# Patient Record
Sex: FEMALE | Race: WHITE | NOT HISPANIC OR LATINO | Employment: FULL TIME | URBAN - METROPOLITAN AREA
[De-identification: names, ages, dates, MRNs, and addresses within clinical notes are randomized per-mention and may not be internally consistent; named-entity substitution may affect disease eponyms.]

---

## 2018-02-09 ENCOUNTER — TRANSFERRED RECORDS (OUTPATIENT)
Dept: HEALTH INFORMATION MANAGEMENT | Facility: CLINIC | Age: 43
End: 2018-02-09

## 2018-02-20 ENCOUNTER — TRANSFERRED RECORDS (OUTPATIENT)
Dept: HEALTH INFORMATION MANAGEMENT | Facility: CLINIC | Age: 43
End: 2018-02-20

## 2018-02-21 ENCOUNTER — PRE VISIT (OUTPATIENT)
Dept: ONCOLOGY | Facility: CLINIC | Age: 43
End: 2018-02-21

## 2018-02-21 NOTE — TELEPHONE ENCOUNTER
1.  Date of consult:    2.  Reason for consult: breast cancer    3.  Referring provider/facility: pt    4. Scheduled by: pt    5.  Outside records requested from: [facility]    6.  Additional Information: pt wants appt with Dr Gann

## 2018-02-22 ENCOUNTER — PRE VISIT (OUTPATIENT)
Dept: ONCOLOGY | Facility: CLINIC | Age: 43
End: 2018-02-22

## 2018-02-22 NOTE — TELEPHONE ENCOUNTER
1.  Date of consult: 2/23    2.  Reason for consult: breast cancer    3.  Referring provider/facility: pt    4. Scheduled by: pt    5.  Outside records requested from: [facility] in [place], imaging pushed and pulled into PACS, slides were requested 2/21    6.  Additional Information:    rec'd patholgy slides from [facility] and sent to pathology

## 2018-02-23 ENCOUNTER — ONCOLOGY VISIT (OUTPATIENT)
Dept: ONCOLOGY | Facility: CLINIC | Age: 43
End: 2018-02-23
Attending: INTERNAL MEDICINE
Payer: MEDICAID

## 2018-02-23 ENCOUNTER — CARE COORDINATION (OUTPATIENT)
Dept: ONCOLOGY | Facility: CLINIC | Age: 43
End: 2018-02-23

## 2018-02-23 VITALS
HEART RATE: 77 BPM | WEIGHT: 169.4 LBS | SYSTOLIC BLOOD PRESSURE: 119 MMHG | HEIGHT: 68 IN | TEMPERATURE: 97.7 F | BODY MASS INDEX: 25.67 KG/M2 | OXYGEN SATURATION: 98 % | DIASTOLIC BLOOD PRESSURE: 75 MMHG

## 2018-02-23 DIAGNOSIS — C50.912 MALIGNANT NEOPLASM OF LEFT BREAST IN FEMALE, ESTROGEN RECEPTOR POSITIVE, UNSPECIFIED SITE OF BREAST (H): Primary | ICD-10-CM

## 2018-02-23 DIAGNOSIS — Z17.0 MALIGNANT NEOPLASM OF LEFT BREAST IN FEMALE, ESTROGEN RECEPTOR POSITIVE, UNSPECIFIED SITE OF BREAST (H): Primary | ICD-10-CM

## 2018-02-23 PROCEDURE — 99205 OFFICE O/P NEW HI 60 MIN: CPT | Mod: ZP | Performed by: INTERNAL MEDICINE

## 2018-02-23 PROCEDURE — G0463 HOSPITAL OUTPT CLINIC VISIT: HCPCS | Mod: ZF

## 2018-02-23 RX ORDER — BLUE-GREEN ALGAE 500 MG
TABLET ORAL
COMMUNITY

## 2018-02-23 RX ORDER — NICOTINE POLACRILEX 2 MG
GUM BUCCAL
COMMUNITY
End: 2018-08-03

## 2018-02-23 ASSESSMENT — PAIN SCALES - GENERAL: PAINLEVEL: MILD PAIN (3)

## 2018-02-23 NOTE — PROGRESS NOTES
Met with patient and two roommates and gave business card to contact the [facility]. Gave contact information to set up MyChart and not to use for symptoms, but to call in nurse triage number. Gave brochures for [employer], [employer] and [facility].  Answered all patient and friend's questions and verbalized understanding. Lauren Frank RN, BSN.

## 2018-02-23 NOTE — NURSING NOTE
"Oncology Rooming Note    February 23, 2018 1:14 PM   Mary Chadwick is a 42 year old female who presents for:    Chief Complaint   Patient presents with     Oncology Clinic Visit     New Patient-Breast CA     Initial Vitals: /75 (BP Location: Right arm, Patient Position: Sitting, Cuff Size: Adult Regular)  Pulse 77  Temp 97.7  F (36.5  C) (Oral)  Ht 1.727 m (5' 8\")  Wt 76.8 kg (169 lb 6.4 oz)  LMP 02/16/2018  SpO2 98%  Breastfeeding? Unknown  BMI 25.76 kg/m2 Estimated body mass index is 25.76 kg/(m^2) as calculated from the following:    Height as of this encounter: 1.727 m (5' 8\").    Weight as of this encounter: 76.8 kg (169 lb 6.4 oz). Body surface area is 1.92 meters squared.  Mild Pain (3) Comment: Left   Patient's last menstrual period was 02/16/2018.  Allergies reviewed: Yes  Medications reviewed: Yes    Medications: Medication refills not needed today.  Pharmacy name entered into [facility]:    [facility] [place], [place] - [address] [facility] [place], [place] - [address]    Clinical concerns: Questions Dr. Gann was notified.    10 minutes for nursing intake (face to face time)     Marta Banks LPN              "

## 2018-02-23 NOTE — LETTER
"2/23/2018       RE: Mary Chadwick  [address]  [place] [address]     Dear Colleague,    Thank you for referring your patient, Mary Chadwick, to the [facility]. Please see a copy of my visit note below.    HISTORY OF PRESENT ILLNESS:  I am seeing Ms. Omaira Chadwick at the request of Dr. Nicolette Rodarte for newly diagnosed breast cancer.      Mary, or \"Omaira,\" comes in with a new diagnosis of breast cancer.  She is originally from [place].  She moved here displaced from [place].  She underwent a screening mammography this week.  This screening mammogram was performed on 02/09/2018 followed by diagnostic breast ultrasound which revealed a 2.6 x 1.1 x 1.7 cm irregular shaped mass at the 2 o'clock position involving the left breast.  There were indeterminate microcalcifications involving the left breast and a biopsy was recommended.  A stereotactic biopsy occurred on 02/20/2018 revealing an invasive ductal carcinoma, grade 2, ER positive, VT positive, HER2 negative by immunohistochemistry histochemistry at 1+.  She sought out consultation here for a second opinion.  She saw Dr. Rodarte today who recommended lumpectomy versus mastectomy.  She is here to discuss these options.      She says she is feeling well.  She is trying to digest all of this new information.      She has no pain.  She is premenopausal.  She menstruates monthly.  Her last menstrual period was approximately 2 months ago.  She has never had any prior breast biopsies in the past.  No hormone replacement therapy.  She has 2 children, ages 7 and 11.      REVIEW OF SYSTEMS:  Her 10-point review of systems is otherwise negative.      PAST MEDICAL HISTORY:  No significant past medical history.      MEDICATIONS:  No medications.      ALLERGIES:  She is allergic to lactose.      SOCIAL HISTORY:  She has 7 and 11-year-old.  She is working part-time at the [employer].  She has a remote history of tobacco use.    "   FAMILY HISTORY:  Her mom  of colon cancer at age 41.  Maternal aunt with breast cancer in the 50s.  Maternal grandfather with stomach cancer.  Maternal uncle with stomach cancer.  Father with myasthenia gravis.  A birth brother who is alive and a sister who is alive.  Her significant other comes back and forth to the [place].      PHYSICAL EXAMINATION:   GENERAL:  She is well-appearing, in no apparent distress.   HEENT:  Exam reveals no icterus.  Her oropharynx is clear.  No ulcers or lesions.   NECK:  Supple.  No cervical, supraclavicular or axillary adenopathy.   HEART:  Regular.   LUNGS:  Clear bilaterally.   ABDOMEN:  Soft, nontender, nondistended.  There is no palpable hepatosplenomegaly.   BREASTS:  Her right breast is unremarkable without nodules or masses.  The left breast reveals a 3 x 3 cm mass at the 2 o'clock position involving the left breast with associated ecchymoses.  There is no palpable adenopathy, no nipple discharge.  Given the tenderness and her ecchymosis it was difficult to get an exact palpable size of this lesion.   EXTREMITIES:  She has no peripheral edema.   SKIN:  No skin rashes other than ecchymoses.   NEUROLOGIC:  Unremarkable.      LABORATORY:  Pathology was reviewed.      ASSESSMENT AND PLAN:  This is a 42-year-old female with stage T2 NX, invasive ductal carcinoma, ER positive, ND positive, HER2 negative, involving the left breast.      I discussed with Omaira today that she appears to have stage II-A breast cancer involving her left breast.  The standard of care would be surgical resection with a lumpectomy and radiation or a mastectomy with associated sentinel lymph node biopsy.      We discussed that based on her surgical resection the tumor would be sent off for genetic panel such as Oncotype or MammaPrint or Prosigna to determine the benefits of chemotherapy.      She will need adjuvant endocrine therapy in the form of tamoxifen or an aromatase inhibitor.  This was  briefly discussed.  An aromatase inhibitor would only be used in conjunction with ovarian suppression.      I discussed with her, that her young age as well as her dense breast tissue, I would recommend a breast MRI as well as to see Cancer Genetics.      I did discuss with Omaira today with her friends that she is eligible for our I-SPY-2 clinical trial.  With this trial she would have a breast MRI and biopsy.  If her mammogram or results of the biopsy are in the high-risk range, she will go on to receive neoadjuvant chemotherapy with Taxol, plus/minus an investigational agent, followed by dose-dense Adriamycin and Cytoxan, followed by surgical resection.  We discussed the pros and cons of the clinical trial.  After our large discussion, we decided to wait on her breast MRI to determine whether this would be a reasonable choice for her.      PLAN:   1.  Breast MRI.  This is scheduled for Wednesday 02/28 at [facility].   2.  Referral to Cancer Genetics.   3.  She will need baseline laboratories.   4.  She would like a second surgical opinion and she will see Dr. Antonio Andrews on 03/02 at 9:15.      Based on the information from this, she will decide whether or not she would like to get her care here or at [facility].      She is going to think about the I-SPY-2 clinical trial let us know whether or not she may be interested.      It was a pleasure to meet Omaira today.         Again, thank you for allowing me to participate in the care of your patient.      Sincerely,    Valeria Gnan MD

## 2018-02-23 NOTE — PROGRESS NOTES
"HISTORY OF PRESENT ILLNESS:  I am seeing Ms. Omaira Chadwick at the request of Dr. Nicolette Rodarte for newly diagnosed breast cancer.      Mray, or \"Omaira,\" comes in with a new diagnosis of breast cancer.  She is originally from [place].  She moved here displaced from [place].  She underwent a screening mammography this week.  This screening mammogram was performed on 2018 followed by diagnostic breast ultrasound which revealed a 2.6 x 1.1 x 1.7 cm irregular shaped mass at the 2 o'clock position involving the left breast.  There were indeterminate microcalcifications involving the left breast and a biopsy was recommended.  A stereotactic biopsy occurred on 2018 revealing an invasive ductal carcinoma, grade 2, ER positive, NC positive, HER2 negative by immunohistochemistry histochemistry at 1+.  She sought out consultation here for a second opinion.  She saw Dr. Rodarte today who recommended lumpectomy versus mastectomy.  She is here to discuss these options.      She says she is feeling well.  She is trying to digest all of this new information.      She has no pain.  She is premenopausal.  She menstruates monthly.  Her last menstrual period was approximately 2 months ago.  She has never had any prior breast biopsies in the past.  No hormone replacement therapy.  She has 2 children, ages 7 and 11.      REVIEW OF SYSTEMS:  Her 10-point review of systems is otherwise negative.      PAST MEDICAL HISTORY:  No significant past medical history.      MEDICATIONS:  No medications.      ALLERGIES:  She is allergic to lactose.      SOCIAL HISTORY:  She has 7 and 11-year-old.  She is working part-time at the [employer].  She has a remote history of tobacco use.      FAMILY HISTORY:  Her mom  of colon cancer at age 41.  Maternal aunt with breast cancer in the 50s.  Maternal grandfather with stomach cancer.  Maternal uncle with stomach cancer.  Father with myasthenia gravis.  A birth brother who is alive " and a sister who is alive.  Her significant other comes back and forth to [place].      PHYSICAL EXAMINATION:   GENERAL:  She is well-appearing, in no apparent distress.   HEENT:  Exam reveals no icterus.  Her oropharynx is clear.  No ulcers or lesions.   NECK:  Supple.  No cervical, supraclavicular or axillary adenopathy.   HEART:  Regular.   LUNGS:  Clear bilaterally.   ABDOMEN:  Soft, nontender, nondistended.  There is no palpable hepatosplenomegaly.   BREASTS:  Her right breast is unremarkable without nodules or masses.  The left breast reveals a 3 x 3 cm mass at the 2 o'clock position involving the left breast with associated ecchymoses.  There is no palpable adenopathy, no nipple discharge.  Given the tenderness and her ecchymosis it was difficult to get an exact palpable size of this lesion.   EXTREMITIES:  She has no peripheral edema.   SKIN:  No skin rashes other than ecchymoses.   NEUROLOGIC:  Unremarkable.      LABORATORY:  Pathology was reviewed.      ASSESSMENT AND PLAN:  This is a 42-year-old female with stage T2 NX, invasive ductal carcinoma, ER positive, TX positive, HER2 negative, involving the left breast.      I discussed with Omaira today that she appears to have stage II-A breast cancer involving her left breast.  The standard of care would be surgical resection with a lumpectomy and radiation or a mastectomy with associated sentinel lymph node biopsy.      We discussed that based on her surgical resection the tumor would be sent off for genetic panel such as Oncotype or MammaPrint or Prosigna to determine the benefits of chemotherapy.      She will need adjuvant endocrine therapy in the form of tamoxifen or an aromatase inhibitor.  This was briefly discussed.  An aromatase inhibitor would only be used in conjunction with ovarian suppression.      I discussed with her, that her young age as well as her dense breast tissue, I would recommend a breast MRI as well as to see Cancer  Genetics.      I did discuss with Omaira today with her friends that she is eligible for our I-SPY-2 clinical trial.  With this trial she would have a breast MRI and biopsy.  If her mammogram or results of the biopsy are in the high-risk range, she will go on to receive neoadjuvant chemotherapy with Taxol, plus/minus an investigational agent, followed by dose-dense Adriamycin and Cytoxan, followed by surgical resection.  We discussed the pros and cons of the clinical trial.  After our large discussion, we decided to wait on her breast MRI to determine whether this would be a reasonable choice for her.      PLAN:   1.  Breast MRI.  This is scheduled for Wednesday 02/28 at [facility].   2.  Referral to Cancer Genetics.   3.  She will need baseline laboratories.   4.  She would like a second surgical opinion and she will see Dr. Antonio Andrews on 03/02 at 9:15.      Based on the information from this, she will decide whether or not she would like to get her care here or at [facility].      She is going to think about the I-SPY-2 clinical trial let us know whether or not she may be interested.      It was a pleasure to meet Omaira today.     Addendum:  Met with Omaira today.  Her breast MRI reveals 8 cm area of enhancement with ? Node.  Node may be reactive from biopsy.  She has opted to screen for the ISPY clinical trial.  Consent signed.    Valeria Gann MD

## 2018-02-23 NOTE — MR AVS SNAPSHOT
After Visit Summary   2018    Mary Chadwick    MRN: [id number]           Patient Information     Date Of Birth          1975        Visit Information        Provider Department      2018 1:00 PM Valeria Gann MD [facility]        Today's Diagnoses     Malignant neoplasm of left breast in female, estrogen receptor positive, unspecified site of breast (H)    -  1       Follow-ups after your visit        Additional Services     CANCER RISK MGMT/CANCER GENETIC COUNSELING REFERRAL       Your provider has referred you to the Cancer Risk Management Program - Cancer Genetic Counseling.    Reason for Referral: breast cancer age 42, mom  colon cancer 40, maternal aunt breast cancer, maternal uncle gastric cancer    We have a sent a notice to a staff member of the Cancer Risk Management Program to give you a call to assist with scheduling your appointment.  You may also call  [phone] ([phone]) to initiate scheduling.    Please be aware that coverage of these services is subject to the terms and limitations of your health insurance plan.  Call member services at your health plan with any benefit or coverage questions.      Please bring the completed family history sheet to your appointment in addition to any available outside medical records documenting your cancer diagnosis.                  Your next 10 appointments already scheduled     Mar 02, 2018  8:00 AM CST   (Arrive by 7:45 AM)   NEW WITH ROOM with Nay Smith GC,  2 117 CONSULT [facility] ([facility] [place])    [address] [place] [address]  [address] [place] [id number] [phone]            Mar 02, 2018  9:15 AM CST   (Arrive by 9:00 AM)   New Patient Visit with Antonio Andrews MD   [facility] [place] ([facility])    [address] [place] [address]  [address] [place] [id number] [phone]             "  Who to contact     If you have questions or need follow up information about today's clinic visit or your schedule please contact [facility] directly at [phone].  Normal or non-critical lab and imaging results will be communicated to you by MyChart, letter or phone within 4 business days after the clinic has received the results. If you do not hear from us within 7 days, please contact the clinic through [employer] or phone. If you have a critical or abnormal lab result, we will notify you by phone as soon as possible.  Submit refill requests through [employer] or call your pharmacy and they will forward the refill request to us. Please allow 3 business days for your refill to be completed.          Additional Information About Your Visit        [employer] Information     [employer] gives you secure access to your electronic health record. If you see a primary care provider, you can also send messages to your care team and make appointments. If you have questions, please call your primary care clinic.  If you do not have a primary care provider, please call [phone] and they will assist you.        Care EveryWhere ID     This is your Care EveryWhere ID. This could be used by other organizations to access your [facility] medical records  [id number]        Your Vitals Were     Pulse Temperature Height Last Period Pulse Oximetry Breastfeeding?    77 97.7  F (36.5  C) (Oral) 1.727 m (5' 8\") 02/16/2018 98% Unknown    BMI (Body Mass Index)                   25.76 kg/m2            Blood Pressure from Last 3 Encounters:   02/23/18 119/75   03/30/06 118/78   03/24/06 120/82    Weight from Last 3 Encounters:   02/23/18 76.8 kg (169 lb 6.4 oz)   03/30/06 87.1 kg (192 lb)   03/24/06 86.2 kg (190 lb)              We Performed the Following     CANCER RISK MGMT/CANCER GENETIC COUNSELING REFERRAL          Today's Medication Changes          These changes are accurate as of 2/23/18 11:59 PM.  If you have any " questions, ask your nurse or doctor.               Stop taking these medicines if you haven't already. Please contact your care team if you have questions.     acyclovir 400 MG tablet   Commonly known as:  ZOVIRAX   Stopped by:  Valeria Gann MD           chlorophyll-thymol 3-0.6 MG Tabs   Stopped by:  Valeria Gann MD           LYSINE   Stopped by:  Valeria Gann MD           PRENATAL VITAMIN PO   Stopped by:  Valeria Gann MD                    Primary Care Provider    None Specified       No primary provider on file.        Equal Access to Services     [facility]: Hadii aad ku hadasho Soomaali, waaxda luqadaha, qaybta kaalmada adeegyada, waxay idiin hayaan adeeg [place] . So [facility] [phone].    ATENCIÓN: Si habla español, tiene a sparrow disposición servicios gratuitos de asistencia lingüística. Llame al [phone].    We comply with applicable federal civil rights laws and [place] laws. We do not discriminate on the basis of race, color, national origin, age, disability, sex, sexual orientation, or gender identity.            Thank you!     Thank you for choosing [facility]  for your care. Our goal is always to provide you with excellent care. Hearing back from our patients is one way we can continue to improve our services. Please take a few minutes to complete the written survey that you may receive in the mail after your visit with us. Thank you!             Your Updated Medication List - Protect others around you: Learn how to safely use, store and throw away your medicines at www.disposemymeds.org.          This list is accurate as of 2/23/18 11:59 PM.  Always use your most recent med list.                   Brand Name Dispense Instructions for use Diagnosis    Biotin 1 MG Caps       Malignant neoplasm of left breast in female, estrogen receptor positive, unspecified site of breast (H)       levonorgestrel 20 MCG/24HR IUD    MIRENA     1 each by  Intrauterine route    Malignant neoplasm of left breast in female, estrogen receptor positive, unspecified site of breast (H)       Spirulina 500 MG Tabs      6 Tabs daily.    Malignant neoplasm of left breast in female, estrogen receptor positive, unspecified site of breast (H)

## 2018-02-26 PROCEDURE — 00000346 ZZHCL STATISTIC REVIEW OUTSIDE SLIDES TC 88321: Performed by: INTERNAL MEDICINE

## 2018-02-27 PROBLEM — Z17.0 MALIGNANT NEOPLASM OF LEFT BREAST IN FEMALE, ESTROGEN RECEPTOR POSITIVE (H): Status: ACTIVE | Noted: 2018-02-27

## 2018-02-27 PROBLEM — C50.912 MALIGNANT NEOPLASM OF LEFT BREAST IN FEMALE, ESTROGEN RECEPTOR POSITIVE (H): Status: ACTIVE | Noted: 2018-02-27

## 2018-03-01 LAB — COPATH REPORT: NORMAL

## 2018-03-02 ENCOUNTER — RESEARCH ENCOUNTER (OUTPATIENT)
Dept: ONCOLOGY | Facility: CLINIC | Age: 43
End: 2018-03-02

## 2018-03-02 ENCOUNTER — ONCOLOGY VISIT (OUTPATIENT)
Dept: ONCOLOGY | Facility: CLINIC | Age: 43
End: 2018-03-02
Attending: SURGERY
Payer: COMMERCIAL

## 2018-03-02 ENCOUNTER — OFFICE VISIT (OUTPATIENT)
Dept: ONCOLOGY | Facility: CLINIC | Age: 43
End: 2018-03-02
Attending: GENETIC COUNSELOR, MS
Payer: COMMERCIAL

## 2018-03-02 VITALS
WEIGHT: 169.44 LBS | BODY MASS INDEX: 25.68 KG/M2 | SYSTOLIC BLOOD PRESSURE: 110 MMHG | HEART RATE: 67 BPM | TEMPERATURE: 98.4 F | DIASTOLIC BLOOD PRESSURE: 67 MMHG | HEIGHT: 68 IN | OXYGEN SATURATION: 99 % | RESPIRATION RATE: 16 BRPM

## 2018-03-02 DIAGNOSIS — C50.912 MALIGNANT NEOPLASM OF LEFT BREAST IN FEMALE, ESTROGEN RECEPTOR POSITIVE, UNSPECIFIED SITE OF BREAST (H): Primary | ICD-10-CM

## 2018-03-02 DIAGNOSIS — Z17.0 MALIGNANT NEOPLASM OF LEFT BREAST IN FEMALE, ESTROGEN RECEPTOR POSITIVE, UNSPECIFIED SITE OF BREAST (H): Primary | ICD-10-CM

## 2018-03-02 DIAGNOSIS — Z80.3 FAMILY HISTORY OF MALIGNANT NEOPLASM OF BREAST: ICD-10-CM

## 2018-03-02 DIAGNOSIS — C50.912 MALIGNANT NEOPLASM OF LEFT BREAST IN FEMALE, ESTROGEN RECEPTOR POSITIVE (H): Primary | ICD-10-CM

## 2018-03-02 DIAGNOSIS — Z17.0 MALIGNANT NEOPLASM OF LEFT BREAST IN FEMALE, ESTROGEN RECEPTOR POSITIVE (H): Primary | ICD-10-CM

## 2018-03-02 DIAGNOSIS — Z80.0 FAMILY HISTORY OF COLON CANCER: ICD-10-CM

## 2018-03-02 DIAGNOSIS — Z17.0 MALIGNANT NEOPLASM OF LEFT BREAST IN FEMALE, ESTROGEN RECEPTOR POSITIVE, UNSPECIFIED SITE OF BREAST (H): ICD-10-CM

## 2018-03-02 DIAGNOSIS — C50.912 MALIGNANT NEOPLASM OF LEFT BREAST IN FEMALE, ESTROGEN RECEPTOR POSITIVE, UNSPECIFIED SITE OF BREAST (H): ICD-10-CM

## 2018-03-02 LAB — MISCELLANEOUS TEST: NORMAL

## 2018-03-02 PROCEDURE — G0463 HOSPITAL OUTPT CLINIC VISIT: HCPCS | Mod: ZF

## 2018-03-02 ASSESSMENT — ENCOUNTER SYMPTOMS
DEPRESSION: 0
NERVOUS/ANXIOUS: 1
INSOMNIA: 1
PANIC: 0
DECREASED CONCENTRATION: 1

## 2018-03-02 ASSESSMENT — PAIN SCALES - GENERAL: PAINLEVEL: NO PAIN (0)

## 2018-03-02 NOTE — LETTER
Cancer Risk Management  Program Locations    [facility]  [facility]  Mailing Address  Cancer Risk Management Program  [facility]  [address]  [facility] [address]  [place] [address]    New patient appointments  [phone]  March 5, 2018    Mary Chadwick  [address]  [facility] [place] [facility] [address]      Dear Mary,    It was a pleasure meeting with you at the [facility] on 3/2/2018.  Here is a copy of the progress note from your recent genetic counseling visit to the Cancer Risk Management Program.  If you have any additional questions, please feel free to call.      Referring Provider: Valeria Gann MD    Presenting Information:   I met with Mary Chadwick today for genetic counseling at the Cancer Risk Management Program at the [facility] to discuss her personal and family history of breast cancer and family history of colon and other cancers.  She is here today with her two roommates to review this history, cancer screening recommendations, and available genetic testing options.    Personal History:  Mary is a 42 year old female.  She was diagnosed with invasive ductal breast cancer, ER positive, VA positive, HER-2/mason negative. Her course of treatment is being planned. She is meeting with Dr. Andrews today to discuss surgical options.       She had her first menstrual period at age 16, her first child at age 29, and is pre-menopausal.  Mary has her ovaries, fallopian tubes and uterus in place. She had not had a mammogram before her diagnosis. She reports she will be having a colonoscopy in June 2018 due to her family history of colon cancer. She reports a remote 8 year history of smoking, and social alcohol use.     Family History: (Please see scanned pedigree for detailed family history  information)    Mother had colon cancer at 31, and passed away at 39. Mary reports she was diagnosed after having blood in her stool. She is unsure if her mother had colon polyps.    Maternal aunt  in her 50s of breast cancer.    Maternal uncle  in his 50s of cancer. Mary is unsure what type of cancer this was. There is some speculation that it could have been stomach cancer.    Maternal grandfather  in his 70s-80s of a possible cancer. Mary thought this could have been stomach cancer, but is unsure.    Paternal uncle  in his 60s from cancer, but Mary is unsure what type.     Her maternal ethnicity is [place]. Her paternal ethnicity is  and Wolof.  There is no known Ashkenazi Bahai ancestry on either side of her family. There is no reported consanguinity.    Mary was given an list of follow-up questions to help clarify some of her family history information. She plans to ask her dad and maternal cousins about family history, and will report back. Please see the after visit summary for the list of questions.    Discussion:    Mary's personal and family history of cancer is suggestive of a hereditary cancer syndrome.  We reviewed the features of sporadic, familial, and hereditary cancers.  In looking at Mary's family history, it is possible that a cancer susceptibility gene is present due to her early-onset breast cancer, family history of breast cancer, and mother's history of early-onset colon cancer.    We discussed the following hereditary cancer syndromes as possible explanations for her personal and family history of cancer:    We discussed Hereditary Breast and Ovarian Cancer syndrome (HBOC) due to her personal and family history of breast cancer. Mutations in either BRCA1 or BRCA2 cause HBOC.  Women with HBOC have an increased risk for breast and ovarian cancer.  There is also an increased risk for prostate and breast cancer among males with a  BRCA1/BRCA2 mutation.  Males and females with BRCA1/BRCA2 mutations also face a slightly increased risk for pancreatic cancer.      We discussed Charles syndrome due to the family history of colon and possible stomach cancer. Charles syndrome can be caused by a mutation in one of five genes:  MLH1, MSH2, MSH6, PMS2, and EPCAM.  Charles syndrome may present with polyps, but typically a small number.  The highest cancer risks associated with Charles syndrome include colon cancer, endometrial/uterine cancer, gastric cancer, and ovarian cancer. We discussed that current research has shown a link between breast cancer risk and Charles syndrome in some cases.     We discussed Hereditary Diffuse Gastric Cancer (HDGC) due to the family history of breast and possible stomach cancer. HDGC is caused by CDH1 gene mutations.  HDGC is associated with increased risks for diffuse gastric cancer and lobular breast cancer.  Diffuse gastric cancer infiltrates the stomach wall without forming a distinct mass.  For this reason, individuals are typically recommended to meet with a surgeon to discuss a prophylactic gastrectomy. Women with HDGC are at increased risk for lobular breast cancer. We discussed that a CDH1 mutation is unlikely, but that we discussed it due to family history of stomach cancer and breast cancer.     Based on her personal and family history, Mary meets current National Comprehensive Cancer Network (NCCN) criteria for genetic testing of BRCA1/2.      We discussed that there are additional genes that could cause increased risk for breat and gastrointestinal cancers.  As many of these genes present with overlapping features in a family and accurate cancer risk cannot always be established based upon the pedigree analysis alone, it would be reasonable for Mary to consider panel genetic testing to analyze multiple genes at once.    Genetic testing is available for 34 genes associated with hereditary cancer. This panel  includes breast and colon cancer risk genes: APC, FRANCES, BARD1, BRCA1, BRCA2, BRIP1, BMPR1A, CDH1, CDK4, CDKN2A, CHEK2, DICER1, EPCAM, GREM1, HOXB13, MLH1, MRE11A, MSH2, MSH6, MUTYH, NBN, NF1, PALB2, PMS2, POLD1, POLE, PTEN, RAD50, RAD51C, RAD51D, SMAD4, SMARCA4, STK11, and TP53.    We discussed that many of the genes in the CancerNext panel have known risks and published management guidelines.  Some genes are associated with specific hereditary cancer syndromes: Hereditary Breast and Ovarian Cancer syndrome (BRCA1, BRCA2), Charles syndrome (MLH1, MSH2, MSH6, PMS2, EPCAM), Familial Adenomatous Polyposis syndrome (APC), Hereditary Gastric Cancer syndrome (CDH1), Familial Atypical Multiple Mole Melanoma syndrome (CDK4, CDKN2A), Juvenile Polyposis syndrome (BMPR1A, SMAD4), Cowden syndrome (PTEN), Li Fraumeni syndrome (TP53), Peutz-Jeghers syndrome (STK11), MUTYH Associated Polyposis syndrome (MUTYH), and Neurofibromatosis type 1 (NF1).     The FRANCES, BRIP1, CHEK2, GREM1, NBN , PALB2, POLD1, POLE, RAD51C, and RAD51D genes are associated with increased cancer risk and have published management guidelines for certain cancers.      The remaining genes (BARD1, DICER1, HOXB13, MRE11A, RAD50, and SMARCA4) are associated with increased cancer risk and may allow us to make medical recommendations when mutations are identified.      Consent was obtained and genetic testing for CancerNext was sent to [facility]. We discussed that we will rush the BRCA1/2 results in the event that Mary is scheduled for surgery in the next few weeks. These results typically take 7-10 days. The remainder of the results of the panel will follow a few weeks later.     A detailed handout regarding hereditary breast and gynecologic cancer and the information we discussed was provided to Mary at the end of our appointment today and can be found in the after visit summary.  Topics included: inheritance pattern, cancer risks, cancer  screening recommendations, and also risks, benefits and limitations of testing.    Medical Management: For Mary, we reviewed that the information from genetic testing may determine:    surgery to treat Mary's active cancer diagnosis (i.e. consideration of double mastectomy),    additional cancer screening for which Mary may qualify (i.e. more frequent colonoscopies, more frequent dermatologic exams, etc.),    options for risk reducing surgeries Mary could consider (i.e.surgery to remove the ovaries and/or uterus),      and targeted chemotherapies under particular circumstances if she were to develop certain cancers in the future (i.e. immunotherapy for individuals with Charles syndrome, PARP inhibitors, etc.).     These recommendations and possible targeted chemotherapies will be discussed in detail once genetic testing is completed.     Plan:  1) Today Mary elected to proceed with CancerNext.  2) BRCA1/2 results should be available in 7-10 days. The remainder of the CancerNext genes should follow a week or two after.  3) I will call Mary to discuss the results of BRCA1/2 when available. She will return to clinic to discuss the results of the remainder of the CancerNext panel.    Face to face time: 40 minutes    Nay Smith MS, [facility]  Licensed Genetic Counselor  P. [phone]  F. [phone]

## 2018-03-02 NOTE — NURSING NOTE
"Oncology Rooming Note    March 2, 2018 9:37 AM   Mary Chadwick is a 42 year old female who presents for:    Chief Complaint   Patient presents with     Oncology Clinic Visit     New Pt. Breast Ca     Initial Vitals: /67  Pulse 67  Temp 98.4  F (36.9  C) (Oral)  Resp 16  Ht 1.727 m (5' 8\")  Wt 76.9 kg (169 lb 7 oz)  LMP 02/16/2018  SpO2 99%  BMI 25.76 kg/m2 Estimated body mass index is 25.76 kg/(m^2) as calculated from the following:    Height as of this encounter: 1.727 m (5' 8\").    Weight as of this encounter: 76.9 kg (169 lb 7 oz). Body surface area is 1.92 meters squared.  No Pain (0) Comment: Data Unavailable   Patient's last menstrual period was 02/16/2018.  Allergies reviewed: Yes  Medications reviewed: Yes    Medications: Medication refills not needed today.  Pharmacy name entered into Canines:    [facility] [place], [place] - [address]  [facility] [place], [place] - [address]    Clinical concerns: new patient Dr. Andrews was NOT notified.    5 minutes for nursing intake (face to face time)     Arron Borjas CMA              "

## 2018-03-02 NOTE — MR AVS SNAPSHOT
After Visit Summary   3/2/2018    Mary Chadwick    MRN: [id number]           Patient Information     Date Of Birth          1975        Visit Information        Provider Department      3/2/2018 9:15 AM Antonio Andrews MD [facility]        Today's Diagnoses     Malignant neoplasm of left breast in female, estrogen receptor positive (H)    -  1       Follow-ups after your visit        Your next 10 appointments already scheduled     Mar 20, 2018  7:20 AM CDT   (Arrive by 7:05 AM)   CT CHEST/ABDOMEN/PELVIS W CONTRAST with UCCT2   [facility] ([facility])    [address]  1st Floor  [facility] [place] [facility] [address] [phone]           Please bring any scans or X-rays taken at other hospitals, if similar tests were done. Also bring a list of your medicines, including vitamins, minerals and over-the-counter drugs. It is safest to leave personal items at home.  Be sure to tell your doctor:   If you have any allergies.   If there s any chance you are pregnant.   If you are breastfeeding.  You may have contrast for this exam. To prepare:   Do not eat or drink for 2 hours before your exam. If you need to take medicine, you may take it with small sips of water. (We may ask you to take liquid medicine as well.)   The day before your exam, drink extra fluids at least six 8-ounce glasses (unless your doctor tells you to restrict your fluids).   You will be given instructions on how to drink the contrast.  Patients over 70 or patients with diabetes or kidney problems:   If you haven t had a blood test (creatinine test) within the last 30 days, the Cardiologist/Radiologist may require you to get this test prior to your exam.  If you have diabetes:   Continue to take your metformin medication on the day of your exam  Please wear loose clothing, such as a sweat suit or jogging clothes. Avoid snaps, zippers and other metal. We may ask you to undress  and put on a hospital gown.  If you have any questions, please call the Imaging Department where you will have your exam.            Mar 20, 2018  9:45 AM CDT   (Arrive by 8:15 AM)   IR CHEST PORT PLACEMENT > 5 YRS OF AGE with [id number]   [facility] ([facility])    [address]  5th Floor  [facility] [address]              1. Your doctor will need to do a history and physical within 7 days before this procedure. 2. Your doctor will which medications should not be taken the morning of the exam. 3. Laboratory tests are to be obtained by your doctor prior to the exam (Basic Metabolic Panel, CBCP, PTT and INR) (No labs needed if you are having a tunneled catheter exchange or removal) 4. If you have allergies to x-ray contrast or iodine, contact your doctor or a Radiology nurse prior to the exam day for instructions. 5. Someone will need to drive you to and from the hospital. 6. If you are or may be pregnant, contact your doctor or a Radiology nurse prior to the day of the exam. 7. If you have diabetes, check with your doctor or a Radiology nurse to see if your insulin needs to be adjusted for the exam. 8. If you are taking a medication called Glucophage or Glucovance; these medications need to be held the day of the exam and for approximately 48 hours following. A blood sample must be drawn so your creatinine level can be checked before resuming this medication. 9. If you are taking Coumadin (to thin you blood) please contact your doctor or a Radiology nurse at least 3 days before the exam for special instructions. 10. You should not have received contrast within 48 hours of this exam. 11. The day before your exam you may eat your regular diet and are encouraged to drink at least 2 quarts of clear liquids. Drink no alcoholic beverages for 24 hours prior to the exam. 12. If you have a colostomy you will need to irrigate it with tap water at 8PM the evening before and again  at 6AM the morning of the exam. 13. Do not smoke for 24 hours prior to the procedure. 14. Birth to 4 years: - Breast feeding must be stopped 4 hours prior to exam - Solid food or formula must be stopped 6 hours prior to exam - Tube feedings must be stopped 6 hours prior to exam 15. 4-10 years old: - Nothing to eat or drink 6 hours prior to exam 16. 10+ years old: - Nothing to eat or drink 8 hours prior to exam 17. The morning of the exam you may brush your teeth and take medications as directed with a sip of water. 18. When discharged, you cannot drive until morning, and an adult must be with you until then. You should stay in the [facility] overnight. 19. Bring a list of all drugs you are taking; include supplements and over-the-counter medications. Wear comfortable clothes and leave your valuables at home.            Mar 20, 2018   Procedure with Beba Carter PA-C   [facility] ([facility] [place])    [address] [place] [address]  5th Floor  [facility] [id number] [phone]           Located in the Clinics and Surgery Center at [address] [place] [address].   parking is very convenient and highly recommended.  is a $6 flat rate fee.  Both  and self parkers should enter the main arrival plaza from [facility]; parking attendants will direct you based on your parking preference.            Mar 22, 2018 10:30 AM CDT   [facility] Lab Draw with  MASONIC LAB DRAW   [facility] [place])    [address] [place] [address]  [address]  [facility] [id number] [phone]            Mar 22, 2018 11:00 AM CDT   (Arrive by 10:45 AM)   Return Visit with Valeria Gann MD   [facility] ([facility] [place])    [address] [place] [address]  [address]  [facility] [address]   [phone]            Mar 22, 2018 12:30 PM CDT   Infusion 240 with  ONCOLOGY INFUSION,  30 ATC     [facility] ([facility])    [address]  [address]  [facility] [address] [phone]              Who to contact     If you have questions or need follow up information about today's clinic visit or your schedule please contact [facility] directly at [phone].  Normal or non-critical lab and imaging results will be communicated to you by MyChart, letter or phone within 4 business days after the clinic has received the results. If you do not hear from us within 7 days, please contact the clinic through [employer] or phone. If you have a critical or abnormal lab result, we will notify you by phone as soon as possible.  Submit refill requests through [employer] or call your pharmacy and they will forward the refill request to us. Please allow 3 business days for your refill to be completed.          Additional Information About Your Visit        [employer] Information     [employer] gives you secure access to your electronic health record. If you see a primary care provider, you can also send messages to your care team and make appointments. If you have questions, please call your primary care clinic.  If you do not have a primary care provider, please call [phone] and they will assist you.        Care EveryWhere ID     This is your Care EveryWhere ID. This could be used by other organizations to access your [place] medical records  [id number]        Your Vitals Were     Pulse Temperature Respirations Height Last Period Pulse Oximetry    67 98.4  F (36.9  C) (Oral) 16 1.727 m (5' 8\") 02/16/2018 99%    BMI (Body Mass Index)                   25.76 kg/m2            Blood Pressure from Last 3 Encounters:   03/02/18 110/67   02/23/18 119/75   03/30/06 118/78    Weight from Last 3 Encounters:   03/02/18 76.9 kg (169 lb 7 oz)   02/23/18 76.8 kg (169 lb 6.4 oz)   03/30/06 87.1 kg (192 lb)              Today, you had the following     No orders found for " display       Primary Care Provider Fax #    Physician No Ref-Primary [phone]       No address on file        Equal Access to Services     SURESH CHAPARRO : Hadii aad ku hadmartha Herndon, gary lubnaluiz, wanda nina, naseem nicolein hayaablanca calderonsonny galarza laDebbieveronica mendoza. So [facility] [phone].    ATENCIÓN: Si habla español, tiene a sparrow disposición servicios gratuitos de asistencia lingüística. Llame al [phone].    We comply with applicable federal civil rights laws and [place] laws. We do not discriminate on the basis of race, color, national origin, age, disability, sex, sexual orientation, or gender identity.            Thank you!     Thank you for choosing [facility]  for your care. Our goal is always to provide you with excellent care. Hearing back from our patients is one way we can continue to improve our services. Please take a few minutes to complete the written survey that you may receive in the mail after your visit with us. Thank you!             Your Updated Medication List - Protect others around you: Learn how to safely use, store and throw away your medicines at www.disposemymeds.org.          This list is accurate as of 3/2/18 11:59 PM.  Always use your most recent med list.                   Brand Name Dispense Instructions for use Diagnosis    Biotin 1 MG Caps       Malignant neoplasm of left breast in female, estrogen receptor positive, unspecified site of breast (H)       levonorgestrel 20 MCG/24HR IUD    MIRENA     1 each by Intrauterine route    Malignant neoplasm of left breast in female, estrogen receptor positive, unspecified site of breast (H)       Spirulina 500 MG Tabs      6 Tabs daily.    Malignant neoplasm of left breast in female, estrogen receptor positive, unspecified site of breast (H)

## 2018-03-02 NOTE — PROGRESS NOTES
3/2/2018    Referring Provider: Valeria Gann MD    Presenting Information:   I met with Mary Chadwick today for genetic counseling at the Cancer Risk Management Program at the [facility] to discuss her personal and family history of breast cancer and family history of colon and other cancers.  She is here today with her two roommates to review this history, cancer screening recommendations, and available genetic testing options.    Personal History:  Mary is a 42 year old female.  She was diagnosed with invasive ductal breast cancer, ER positive, UT positive, HER-2/mason negative. Her course of treatment is being planned. She is meeting with Dr. Andrews today to discuss surgical options.       She had her first menstrual period at age 16, her first child at age 29, and is pre-menopausal.  Mary has her ovaries, fallopian tubes and uterus in place. She had not had a mammogram before her diagnosis. She reports she will be having a colonoscopy in 2018 due to her family history of colon cancer. She reports a remote 8 year history of smoking, and social alcohol use.     Family History: (Please see scanned pedigree for detailed family history information)    Mother had colon cancer at 31, and passed away at 39. Mary reports she was diagnosed after having blood in her stool. She is unsure if her mother had colon polyps.    Maternal aunt  in her 50s of breast cancer.    Maternal uncle  in his 50s of cancer. Mary is unsure what type of cancer this was. There is some speculation that it could have been stomach cancer.    Maternal grandfather  in his 70s-80s of a possible cancer. Mary thought this could have been stomach cancer, but is unsure.    Paternal uncle  in his 60s from cancer, but Mary is unsure what type.     Her maternal ethnicity is [place]. Her paternal ethnicity is  and [place].  There is no known Ashkenazi [employer] ancestry on either side of  her family. There is no reported consanguinity.    Mary was given an list of follow-up questions to help clarify some of her family history information. She plans to ask her dad and maternal cousins about family history, and will report back. Please see the after visit summary for the list of questions.    Discussion:    Mary's personal and family history of cancer is suggestive of a hereditary cancer syndrome.  We reviewed the features of sporadic, familial, and hereditary cancers.  In looking at Mary's family history, it is possible that a cancer susceptibility gene is present due to her early-onset breast cancer, family history of breast cancer, and mother's history of early-onset colon cancer.    We discussed the following hereditary cancer syndromes as possible explanations for her personal and family history of cancer:    We discussed Hereditary Breast and Ovarian Cancer syndrome (HBOC) due to her personal and family history of breast cancer. Mutations in either BRCA1 or BRCA2 cause HBOC.  Women with HBOC have an increased risk for breast and ovarian cancer.  There is also an increased risk for prostate and breast cancer among males with a BRCA1/BRCA2 mutation.  Males and females with BRCA1/BRCA2 mutations also face a slightly increased risk for pancreatic cancer.      We discussed Charles syndrome due to the family history of colon and possible stomach cancer. Charles syndrome can be caused by a mutation in one of five genes:  MLH1, MSH2, MSH6, PMS2, and EPCAM.  Charles syndrome may present with polyps, but typically a small number.  The highest cancer risks associated with Charles syndrome include colon cancer, endometrial/uterine cancer, gastric cancer, and ovarian cancer. We discussed that current research has shown a link between breast cancer risk and Charles syndrome in some cases.     We discussed Hereditary Diffuse Gastric Cancer (HDGC) due to the family history of breast and possible stomach  cancer. HDGC is caused by CDH1 gene mutations.  HDGC is associated with increased risks for diffuse gastric cancer and lobular breast cancer.  Diffuse gastric cancer infiltrates the stomach wall without forming a distinct mass.  For this reason, individuals are typically recommended to meet with a surgeon to discuss a prophylactic gastrectomy. Women with HDGC are at increased risk for lobular breast cancer. We discussed that a CDH1 mutation is unlikely, but that we discussed it due to family history of stomach cancer and breast cancer.     Based on her personal and family history, Mary meets current National Comprehensive Cancer Network (NCCN) criteria for genetic testing of BRCA1/2.      We discussed that there are additional genes that could cause increased risk for breat and gastrointestinal cancers.  As many of these genes present with overlapping features in a family and accurate cancer risk cannot always be established based upon the pedigree analysis alone, it would be reasonable for Mary to consider panel genetic testing to analyze multiple genes at once.    Genetic testing is available for 34 genes associated with hereditary cancer. This panel includes breast and colon cancer risk genes: APC, FRANCES, BARD1, BRCA1, BRCA2, BRIP1, BMPR1A, CDH1, CDK4, CDKN2A, CHEK2, DICER1, EPCAM, GREM1, HOXB13, MLH1, MRE11A, MSH2, MSH6, MUTYH, NBN, NF1, PALB2, PMS2, POLD1, POLE, PTEN, RAD50, RAD51C, RAD51D, SMAD4, SMARCA4, STK11, and TP53.    We discussed that many of the genes in the CancerNext panel have known risks and published management guidelines.  Some genes are associated with specific hereditary cancer syndromes: Hereditary Breast and Ovarian Cancer syndrome (BRCA1, BRCA2), Charles syndrome (MLH1, MSH2, MSH6, PMS2, EPCAM), Familial Adenomatous Polyposis syndrome (APC), Hereditary Gastric Cancer syndrome (CDH1), Familial Atypical Multiple Mole Melanoma syndrome (CDK4, CDKN2A), Juvenile Polyposis syndrome (BMPR1A,  SMAD4), Cowden syndrome (PTEN), Li Fraumeni syndrome (TP53), Peutz-Jeghers syndrome (STK11), MUTYH Associated Polyposis syndrome (MUTYH), and Neurofibromatosis type 1 (NF1).     The FRANCES, BRIP1, CHEK2, GREM1, NBN , PALB2, POLD1, POLE, RAD51C, and RAD51D genes are associated with increased cancer risk and have published management guidelines for certain cancers.      The remaining genes (BARD1, DICER1, HOXB13, MRE11A, RAD50, and SMARCA4) are associated with increased cancer risk and may allow us to make medical recommendations when mutations are identified.      Consent was obtained and genetic testing for CancerNext was sent to [facility]. We discussed that we will rush the BRCA1/2 results in the event that Mary is scheduled for surgery in the next few weeks. These results typically take 7-10 days. The remainder of the results of the panel will follow a few weeks later.     A detailed handout regarding hereditary breast and gynecologic cancer and the information we discussed was provided to Mary at the end of our appointment today and can be found in the after visit summary.  Topics included: inheritance pattern, cancer risks, cancer screening recommendations, and also risks, benefits and limitations of testing.    Medical Management: For Mary, we reviewed that the information from genetic testing may determine:    surgery to treat Mary's active cancer diagnosis (i.e. consideration of double mastectomy),    additional cancer screening for which Mary may qualify (i.e. more frequent colonoscopies, more frequent dermatologic exams, etc.),    options for risk reducing surgeries Mary could consider (i.e.surgery to remove the ovaries and/or uterus),      and targeted chemotherapies under particular circumstances if she were to develop certain cancers in the future (i.e. immunotherapy for individuals with Charles syndrome, PARP inhibitors, etc.).     These recommendations and  possible targeted chemotherapies will be discussed in detail once genetic testing is completed.     Plan:  1) Today Mary elected to proceed with CancerNext.  2) BRCA1/2 results should be available in 7-10 days. The remainder of the CancerNext genes should follow a week or two after.  3) I will call Mary to discuss the results of BRCA1/2 when available. She will return to clinic to discuss the results of the remainder of the CancerNext panel.    Face to face time: 40 minutes    Nay Smith MS, [facility]  Licensed Genetic Counselor  P. [phone]  F. [phone]

## 2018-03-02 NOTE — LETTER
3/2/2018       RE: Mary Chadwick  [address]  [place] [address]     Dear Colleague,    Thank you for referring your patient, Mary Chadwick, to the [facility]. Please see a copy of my visit note below.    3/2/2018    Referring Provider: Valeria Gann MD    Presenting Information:   I met with Mary Chadwick today for genetic counseling at the Cancer Risk Management Program at the [facility] to discuss her personal and family history of breast cancer and family history of colon and other cancers.  She is here today with her two roommates to review this history, cancer screening recommendations, and available genetic testing options.    Personal History:  Mary is a 42 year old female.  She was diagnosed with invasive ductal breast cancer, ER positive, MI positive, HER-2/mason negative. Her course of treatment is being planned. She is meeting with Dr. Andrews today to discuss surgical options.       She had her first menstrual period at age 16, her first child at age 29, and is pre-menopausal.  Mary has her ovaries, fallopian tubes and uterus in place. She had not had a mammogram before her diagnosis. She reports she will be having a colonoscopy in 2018 due to her family history of colon cancer. She reports a remote 8 year history of smoking, and social alcohol use.     Family History: (Please see scanned pedigree for detailed family history information)    Mother had colon cancer at 31, and passed away at 39. Mary reports she was diagnosed after having blood in her stool. She is unsure if her mother had colon polyps.    Maternal aunt  in her 50s of breast cancer.    Maternal uncle  in his 50s of cancer. Mary is unsure what type of cancer this was. There is some speculation that it could have been stomach cancer.    Maternal grandfather  in his 70s-80s of a possible cancer. Mary thought this could have been stomach cancer, but is  unsure.    Paternal uncle  in his 60s from cancer, but Mary is unsure what type.     Her maternal ethnicity is [place]. Her paternal ethnicity is  and [place].  There is no known Ashkenazi Restoration ancestry on either side of her family. There is no reported consanguinity.    Mary was given an list of follow-up questions to help clarify some of her family history information. She plans to ask her dad and maternal cousins about family history, and will report back. Please see the after visit summary for the list of questions.    Discussion:    Mary's personal and family history of cancer is suggestive of a hereditary cancer syndrome.  We reviewed the features of sporadic, familial, and hereditary cancers.  In looking at Mary's family history, it is possible that a cancer susceptibility gene is present due to her early-onset breast cancer, family history of breast cancer, and mother's history of early-onset colon cancer.    We discussed the following hereditary cancer syndromes as possible explanations for her personal and family history of cancer:    We discussed Hereditary Breast and Ovarian Cancer syndrome (HBOC) due to her personal and family history of breast cancer. Mutations in either BRCA1 or BRCA2 cause HBOC.  Women with HBOC have an increased risk for breast and ovarian cancer.  There is also an increased risk for prostate and breast cancer among males with a BRCA1/BRCA2 mutation.  Males and females with BRCA1/BRCA2 mutations also face a slightly increased risk for pancreatic cancer.      We discussed Charles syndrome due to the family history of colon and possible stomach cancer. Charles syndrome can be caused by a mutation in one of five genes:  MLH1, MSH2, MSH6, PMS2, and EPCAM.  Charles syndrome may present with polyps, but typically a small number.  The highest cancer risks associated with Charles syndrome include colon cancer, endometrial/uterine cancer, gastric cancer, and  ovarian cancer. We discussed that current research has shown a link between breast cancer risk and Charles syndrome in some cases.     We discussed Hereditary Diffuse Gastric Cancer (HDGC) due to the family history of breast and possible stomach cancer. HDGC is caused by CDH1 gene mutations.  HDGC is associated with increased risks for diffuse gastric cancer and lobular breast cancer.  Diffuse gastric cancer infiltrates the stomach wall without forming a distinct mass.  For this reason, individuals are typically recommended to meet with a surgeon to discuss a prophylactic gastrectomy. Women with HDGC are at increased risk for lobular breast cancer. We discussed that a CDH1 mutation is unlikely, but that we discussed it due to family history of stomach cancer and breast cancer.     Based on her personal and family history, Mary meets current National Comprehensive Cancer Network (NCCN) criteria for genetic testing of BRCA1/2.      We discussed that there are additional genes that could cause increased risk for breat and gastrointestinal cancers.  As many of these genes present with overlapping features in a family and accurate cancer risk cannot always be established based upon the pedigree analysis alone, it would be reasonable for Mary to consider panel genetic testing to analyze multiple genes at once.    Genetic testing is available for 34 genes associated with hereditary cancer. This panel includes breast and colon cancer risk genes: APC, FRANCES, BARD1, BRCA1, BRCA2, BRIP1, BMPR1A, CDH1, CDK4, CDKN2A, CHEK2, DICER1, EPCAM, GREM1, HOXB13, MLH1, MRE11A, MSH2, MSH6, MUTYH, NBN, NF1, PALB2, PMS2, POLD1, POLE, PTEN, RAD50, RAD51C, RAD51D, SMAD4, SMARCA4, STK11, and TP53.    We discussed that many of the genes in the CancerNext panel have known risks and published management guidelines.  Some genes are associated with specific hereditary cancer syndromes: Hereditary Breast and Ovarian Cancer syndrome (BRCA1,  BRCA2), Charles syndrome (MLH1, MSH2, MSH6, PMS2, EPCAM), Familial Adenomatous Polyposis syndrome (APC), Hereditary Gastric Cancer syndrome (CDH1), Familial Atypical Multiple Mole Melanoma syndrome (CDK4, CDKN2A), Juvenile Polyposis syndrome (BMPR1A, SMAD4), Cowden syndrome (PTEN), Li Fraumeni syndrome (TP53), Peutz-Jeghers syndrome (STK11), MUTYH Associated Polyposis syndrome (MUTYH), and Neurofibromatosis type 1 (NF1).     The FRANCES, BRIP1, CHEK2, GREM1, NBN , PALB2, POLD1, POLE, RAD51C, and RAD51D genes are associated with increased cancer risk and have published management guidelines for certain cancers.      The remaining genes (BARD1, DICER1, HOXB13, MRE11A, RAD50, and SMARCA4) are associated with increased cancer risk and may allow us to make medical recommendations when mutations are identified.      Consent was obtained and genetic testing for CancerNext was sent to [facility]. We discussed that we will rush the BRCA1/2 results in the event that Mary is scheduled for surgery in the next few weeks. These results typically take 7-10 days. The remainder of the results of the panel will follow a few weeks later.     A detailed handout regarding hereditary breast and gynecologic cancer and the information we discussed was provided to Mary at the end of our appointment today and can be found in the after visit summary.  Topics included: inheritance pattern, cancer risks, cancer screening recommendations, and also risks, benefits and limitations of testing.    Medical Management: For Mary, we reviewed that the information from genetic testing may determine:    surgery to treat Mary's active cancer diagnosis (i.e. consideration of double mastectomy),    additional cancer screening for which Mary may qualify (i.e. more frequent colonoscopies, more frequent dermatologic exams, etc.),    options for risk reducing surgeries Mary could consider (i.e.surgery to remove the ovaries  and/or uterus),      and targeted chemotherapies under particular circumstances if she were to develop certain cancers in the future (i.e. immunotherapy for individuals with Charles syndrome, PARP inhibitors, etc.).     These recommendations and possible targeted chemotherapies will be discussed in detail once genetic testing is completed.     Plan:  1) Today Mary elected to proceed with CancerNext.  2) BRCA1/2 results should be available in 7-10 days. The remainder of the CancerNext genes should follow a week or two after.  3) I will call Mary to discuss the results of BRCA1/2 when available. She will return to clinic to discuss the results of the remainder of the CancerNext panel.    Face to face time: 40 minutes    Nay Smith MS, Located [facility]  Licensed Genetic Counselor  P. [phone]  F. [phone]

## 2018-03-02 NOTE — PROGRESS NOTES
Research: pt here to see Dr Andrews re: new dx of Breast ca. Saw Dr Gann as well a week ago. Pt has a newly diagnoses ER/IN+/ HER2(-). ISPY research study recommended as an option to explore by both MDs as an option for treatment.   Pt here with 2 friends. Discussed the study, screening process, tests involved, time frame of screening. Went through the treatment schedule/ tests as study goes along. Based on tumor status could be randomized to either 1 of the 2 investigational arms or control of paclitaxel alone.   Discussed that participating in research is completely voluntary, and that she can decide to stop at any time. Also discussed PHI and the HIPAA consents re:PHI. Answered all questions with verbalization of understanding from the patient. Pt signed the screening consent and 4 HIPAA consents.   Discussed next steps- will work on getting the MRI and Biospy scheduled and then the rest of the screening tests. Informed patient its about 2 weeks from biopsy to starting treatment and randomization occurs right before starting.   ISPY research study  [id number]  PI Dr Dillard  Research Coordinator Susan Will RN  Office [phone]  [facility] [phone]

## 2018-03-02 NOTE — PATIENT INSTRUCTIONS
Questions to ask family members:  1. What type of cancer did you dad's bother have?  2. What type of cancer did your mom's brother and mom's father have?  3. Did you mom have any colon polyps? If so, how many?  4. Any other family history of cancer?        Assessing Cancer Risk  Only about 5-10% of cancers are thought to be due to an inherited cancer susceptibility gene.    These families often have:    Several people with the same or related types of cancer    Cancers diagnosed at a young age (before age 50)    Individuals with more than one primary cancer    Multiple generations of the family affected with cancer    Some people may be candidates for genetic testing of more than one gene.  For these families, genetic testing using a cancer panel may be offered.  These panels will test different genes known to increase the risk for breast, ovarian, uterine, and/or other cancers. All of the genes discussed below have published clinical management guidelines for individuals who are found to carry a mutation. The purpose of this handout is to serve as a brief summary of the genes analyzed by the panels used to inquire about hereditary breast and gynecologic cancer:  FRANCES, BRCA1, BRCA2, BRIP1, CDH1, CHEK2, MLH1, MSH2, MSH6, PMS2, EPCAM, PTEN, PALB2, RAD51C, RAD51D, and TP53.  ______________________________________________________________________________  Hereditary Breast and Ovarian Cancer Syndrome   (BRCA1 and BRCA2)  A single mutation in one of the copies of BRCA1 or BRCA2 increases the risk for breast and ovarian cancer, among others.  The risk for pancreatic cancer and melanoma may also be slightly increased in some families.  The chart below shows the chance that someone with a BRCA mutation would develop cancer in his or her lifetime1,2,3,4.        A person s ethnic background is also important to consider, as individuals of Ashkenazi Mormonism ancestry have a higher chance of having a BRCA gene mutation.   There are three BRCA mutations that occur more frequently in this population.    Charles Syndrome   (MLH1, MSH2, MSH6, PMS2, and EPCAM)  Currently five genes are known to cause Charles Syndrome: MLH1, MSH2, MSH6, PMS2, and EPCAM.  A single mutation in one of the Charles Syndrome genes increases the risk for colon, endometrial, ovarian, and stomach cancers.  Other cancers that occur less commonly in Charles Syndrome include urinary tract, skin, and brain cancers.  The chart below shows the chance that a person with Charles syndrome would develop cancer in his or her lifetime5.      *Cancer risk varies depending on Charles syndrome gene found    Cowden Syndrome   (PTEN)  Cowden syndrome is a hereditary condition that increases the risk for breast, thyroid, endometrial, colon, and kidney cancer.  Cowden syndrome is caused by a mutation in the PTEN gene.  A single mutation in one of the copies of PTEN causes Cowden syndrome and increases cancer risk.  The chart below shows the chance that someone with a PTEN mutation would develop cancer in their lifetime6,7.  Other benign features seen in some individuals with Cowden syndrome include benign skin lesions (facial papules, keratoses, lipomas), learning disability, autism, thyroid nodules, colon polyps, and larger head size.      *One recent study found breast cancer risk to be increased to 85%    Li-Fraumeni Syndrome   (TP53)  Li-Fraumeni Syndrome (LFS) is a cancer predisposition syndrome caused by a mutation in the TP53 gene. A single mutation in one of the copies of TP53 increases the risk for multiple cancers. Individuals with LFS are at an increased risk for developing cancer at a young age. The lifetime risk for development of a LFS-associated cancer is 50% by age 30 and 90% by age 60.     Core Cancers: Sarcomas, Breast, Brain, Lung, Leukemias/Lymphomas, Adrenocortical carcinomas  Other Cancers: Gastrointestinal, Thyroid, Skin, Genitourinary    Hereditary Diffuse Gastric Cancer    (CDH1)  Currently, one gene is known to cause hereditary diffuse gastric cancer (HDGC): CDH1.  Individuals with HDGC are at increased risk for diffuse gastric cancer and lobular breast cancer. Of people diagnosed with HDGC, 30-50% have a mutation in the CDH1 gene.  This suggests there are likely other genes that may cause HDGC that have not been identified yet.      Lifetime Cancer Risks    General Population HDGC    Diffuse Gastric  <1% ~80%   Breast 12% 39-52%         Additional Genes  FRANCES  FRANCES is a moderate-risk breast cancer gene. Women who have a mutation in FRANCES can have between a 2-4 fold increased risk for breast cancer compared to the general population8. FRANCES mutations have also been associated with increased risk for pancreatic cancer, however an estimate of this cancer risk is not well understood9. Individuals who inherit two FRANCES mutations have a condition called ataxia-telangiectasia (AT).  This rare autosomal recessive condition affects the nervous system and immune system, and is associated with progressive cerebellar ataxia beginning in childhood.  Individuals with ataxia-telangiectasia often have a weakened immune system and have an increased risk for childhood cancers.    PALB2  Mutations in PALB2 have been shown to increase the risk of breast cancer up to 33-58% in some families; where individuals fall within this risk range is dependent upon family [id number]. PALB2 mutations have also been associated with increased risk for pancreatic cancer, although this risk has not been quantified yet.  Individuals who inherit two PALB2 mutations--one from their mother and one from their father--have a condition called Fanconi Anemia.  This rare autosomal recessive condition is associated with short stature, developmental delay, bone marrow failure, and increased risk for childhood cancers.    CHEK2   CHEK2 is a moderate-risk breast cancer gene.  Women who have a mutation in CHEK2 have around a 2-fold  increased risk for breast cancer compared to the general population, and this risk may be higher depending upon family history.11,12,13 Mutations in CHEK2 have also been shown to increase the risk of a number of other cancers, including colon and prostate, however these cancer risks are currently not well understood.    BRIP1, RAD51C and RAD51D  Mutations in BRIP1, RAD51C, and RAD51D have been shown to increase the risk of ovarian cancer and possibly female breast cancer as well14,15 .       Lifetime Cancer Risk    General Population BRIP1 RAD51C RAD51D   Ovarian 1-2% ~5-8% ~5-9% ~7-15%               Inheritance  All of the cancer syndromes reviewed above are inherited in an autosomal dominant pattern.  This means that if a parent has a mutation, each of his or her children will have a 50% chance of inheriting that same mutation.  Therefore, each child--male or female--would have a 50% chance of being at increased risk for developing cancer.      Image obtained from [employer] Reference, 2013     Mutations in some genes can occur de khurram, which means that a person s mutation occurred for the first time in them and was not inherited from a parent.  Now that they have the mutation, however, it can be passed on to future generations.    Genetic Testing  Genetic testing involves a blood test and will look at the genetic information in the FRANCES, BRCA1, BRCA2, BRIP1, CDH1, CHEK2, MLH1, MSH2, MSH6, PMS2, EPCAM, PTEN, PALB2, RAD51C, RAD51D, and TP53 genes for any harmful mutations that are associated with increased cancer risk.  If possible, it is recommended that the person(s) who has had cancer be tested before other family members.  That person will give us the most useful information about whether or not a specific gene is associated with the cancer in the family.    Results  There are three possible results of genetic testing:    Positive--a harmful mutation was identified in one or more of the genes    Negative--no  mutation was identified in any of the genes on this panel    Variant of unknown significance--a variation in one of the genes was identified, but it is unclear how this impacts cancer risk in the family    Advantages and Disadvantages   There are advantages and disadvantages to genetic testing.    Advantages    May clarify your cancer risk    Can help you make medical decisions    May explain the cancers in your family    May give useful information to your family members (if you share your results)    Disadvantages    Possible negative emotional impact of learning about inherited cancer risk    Uncertainty in interpreting a negative test result in some situations    Possible genetic discrimination concerns (see below)    Genetic Information Nondiscrimination Act (CHRIS)  CHRIS is a federal law that protects individuals from health insurance or employment discrimination based on a genetic test result alone.  Although rare, there are currently no legal discrimination protections in terms of life insurance, long term care, or disability insurances.  Visit the National Human Genome Research [place] website to learn more.    Reducing Cancer Risk  All of the genes described above have nationally recognized cancer screening guidelines that would be recommended for individuals who test positive.  In addition to increased cancer screening, surgeries may be offered or recommended to reduce cancer risk.  Recommendations are based upon an individual s genetic test result as well as their personal and family history of cancer.    Questions to Think About Regarding Genetic Testing:    What effect will the test result have on me and my relationship with my family members if I have an inherited gene mutation?  If I don t have a gene mutation?    Should I share my test results, and how will my family react to this news, which may also affect them?    Are my children ready to learn new information that may one day affect their own  health?    Hereditary Cancer Resources    FORCE: Facing Our Risk of Cancer Empowered facingourrisk.org   Bright Pink bebrightpink.org   Li-Fraumeni Syndrome Association lfsassociation.org   PTEN World PTENworld.com   No stomach for cancer, Inc. nostomachforcancer.org   Stomach cancer relief network Scrnet.org   Collaborative Group of the Americas on Inherited Colorectal Cancer (CGA) cgaicc.com    Cancer Care cancercare.org   American Cancer Society (ACS) cancer.org   National Cancer [place] (NCI) cancer.gov     Cancer Risk Management Program [phone]-[facility]-CANCER ([phone])  ? Nahed Knox, MS, [facility] [place]  [phone]  ? Nay Luis, MS, [facility] [place]  [phone]  ? Thalia Oliver, MS, [facility] [place]  [phone]  ? Kandice Araiza, MS, [facility] [place]  [phone]  ? Renéeon Pedro Luis, MS, [facility] [place]  [phone]    References  1. Peggy Hastings PDP, Teja S, Huber MARTINES, Wan JE, Hemal JL, Edilia N, Suresh H, Linda O, Rachana A, Yongini B, Amy P, Mandavidkikerry S, Tiffanie DM, Arash N, Marlo E, Claudia H, Theron E, Maycol J, Viki J, Erica B, Tulinius H, Thorlacius S, Eerola H, Nevkerrylinna H, Marcus K, Carla OP. Average risks of breast and ovarian cancer associated with BRCA1 or BRCA2 mutations detected in case series unselected for family history: a combined analysis of 222 studies. Am J Hum Basia. 2003;72:1117-30.  2. Debbie N, Jennie M, Lal G.  BRCA1 and BRCA2 Hereditary Breast and Ovarian Cancer. Gene Reviews online. 2013.  3. Rene YC, Annmarie S, Bere G, Coulter S. Breast cancer risk among male BRCA1 and BRCA2 mutation carriers. J Natl Cancer Inst. 2007;99:1811-4.  4. Deondre MOHR, Terry I, Hua J, Lloyd E, Constance ER, Lalloo F. Risk of breast cancer in male BRCA2 carriers. J Med Basia. 2010;47:710-1.  5. National Comprehensive Cancer Network. Clinical practice guidelines in oncology, colorectal cancer screening. Available online (registration required). 2015.  6. oTño PETTIT, Yudy J, Shelby J, Andi REYES, Halima IBRAHIM, Eng C. Lifetime  cancer risks in individuals with germline PTEN mutations. Clin Cancer Res. 2012;18:400-7.  7. Sami CARCAMO. Cowden Syndrome: A Critical Review of the Clinical Literature. J Basia . 2009:18:13-27.  8. Sina DOWNS, Ray TERRY, Miguel S, Zeinab P, Arelis T, Cuco M, Adelfo B, Dev H, Timur R, Aracelis K, Arsh L, Deondre DG, Tiffanie D, Tee DF, Meera MR, The Breast Cancer Susceptibility Collaboration ([place]) & Fabi N. FRANCES mutations that cause ataxia-telangiectasia are breast cancer susceptibility alleles. Nature Genetics. 2006;38:873-875  9. Miguel N , Gladys Y, Christina J, Alexandra L, Amee GM , Feliciano ML, Lois S, Estevez AG, Syngal S, Sydney ML, Emely J , Filiberto R, Rebeka SZ, Esdenia JR, Maegan VE, Gorge M, Vogelstein B, Lindsey N, Stefanie RH, Elke KW, and Sandy AP. FRANCES mutations in patients with hereditary pancreatic cancer. Cancer Discover. 2012;2:41-46  10. Fam RIGGS, et al. Breast-Cancer Risk in Families with Mutations in PALB2. NEJM. 2014; 371(6):497-506.  11. CHEK2 Breast Cancer Case-Control Consortium. CHEK2*1100delC and susceptibility to breast cancer: A collaborative analysis involving 10,860 breast cancer cases and 9,065 controls from 10 studies. Am J Hum Basia, 74 (2004), pp. 6592-6589  12. Raleigh T, Jann S, Ted K, et al. Spectrum of Mutations in BRCA1, BRCA2, CHEK2, and TP53 in Families at High Risk of Breast Cancer. JT. 2006;295(12):2945-7586.   13. Bianka HARDIN, Marialuisa TERRY, Romina DOWNS, et al. Risk of breast cancer in women with a CHEK2 mutation with and without a family history of breast cancer. J Clin Oncol. 2011;29:5777-3977.  14. Moris H, Mira E, John SJ, et al. Contribution of germline mutations in the RAD51B, RAD51C, and RAD51D genes to ovarian cancer in the population. J Clin Oncol. 2015;33(26):5164-8060. Doi:10.1200/JCO.2015.61.2408.  15. Jenniffer KELLOGG, Giovany IRAHETA, Lisa P, et al. Mutations in BRIP1 confer high risk of ovarian cancer. Soledad Basia.  2011;43(11):3074-8434. doi:10.1038/ng.955.

## 2018-03-02 NOTE — PROGRESS NOTES
HISTORY OF PRESENT ILLNESS:  Omaira Chadwick is a 42-year-old woman I was asked to see at the request of Dr. Valeria Gann for evaluation of a new left breast cancer.  The patient came to the [place] from the [facility] and saw a physician who palpated a breast mass.  A diagnostic mammogram and ultrasound were performed which demonstrated a suspicious breast mass measuring 2.6 cm.  There were also some indeterminate calcifications that were anterior to this breast mass.  She underwent a biopsy of the breast mass which was an invasive ductal cancer, ER positive, OH positive, HER-2/mason negative.  The biopsy of the anterior calcifications was negative for DCIS or invasive breast cancer.  She saw Dr. Gann, who had recommended either surgery first or preoperative chemotherapy under the I-SPY trial.  The patient did have a breast MRI, which showed the main mass to be over 3 cm in size with non-mass-like enhancement extending 8 cm in total size.  She also had an enlarged ipsilateral left axillary lymph node.  She is now here to discuss treatment options.  She has had no prior history of any breast problems.      PAST MEDICAL HISTORY:  No major medical problems.      FAMILY HISTORY:  Significant for her mother who  of colon cancer and she had a maternal aunt who had breast cancer.  There is no history of ovarian cancer.  She saw Genetics today and had genetic testing initiated.      PHYSICAL EXAMINATION:  She is a well-appearing woman in no apparent distress.  Head and neck examination reveals no cervical, supraclavicular or infraclavicular lymphadenopathy.  Right breast examination reveals no dominant masses, skin changes, nipple changes or axillary lymphadenopathy.  Left breast examination reveals a palpable mass measuring about 2.5 cm at the 2 o'clock position, a couple of centimeters from the nipple-areolar complex.  It is a fairly superficial tumor.  She does have a fair amount of ecchymosis.  I cannot palpate  any left axillary lymphadenopathy.      IMPRESSION:  Stage II ER positive breast cancer.      PLAN:  I talked about the various treatment options, including a mastectomy with or without reconstruction versus lumpectomy plus radiation therapy.  I told her if she had a BRCA mutation that she would likely benefit from a double mastectomy.  I talked about the management of her axilla.  I told her that it is certainly possible to do a needle biopsy of her axilla.  If it were positive, then we know she had an axillary lymph node metastasis.  If it was negative, we would not be certain.  Alternatively, we could just do a sentinel lymph node biopsy at the time of her surgery.  I told her that endocrine therapy would be recommended.  We discussed her situation at our Tumor Conference this morning and people were leaning towards giving her preoperative chemotherapy.  However, if she entered the I-SPY trial, then she could have a MammaPrint, and if she were positive, then she would enroll in the I-SPY trial.  She is going to speak with the I-SPY trial coordinator today and then follow up with Dr. Gann to decide whether or not to have chemotherapy before surgery or not.      TT:  45 minutes.  CT:  35 minutes.      cc:   Valeria Gann MD   [facility] [address]

## 2018-03-02 NOTE — MR AVS SNAPSHOT
After Visit Summary   3/2/2018    Mary Chadwick    MRN: [id number]           Patient Information     Date Of Birth          1975        Visit Information        Provider Department      3/2/2018 8:00 AM Nay Smith GC;  2 [address] CONSULT [facility]        Today's Diagnoses     Malignant neoplasm of left breast in female, estrogen receptor positive, unspecified site of breast (H)    -  1    Family history of malignant neoplasm of breast        Family history of colon cancer          Care Instructions          Questions to ask family members:  1. What type of cancer did you dad's bother have?  2. What type of cancer did your mom's brother and mom's father have?  3. Did you mom have any colon polyps? If so, how many?  4. Any other family history of cancer?        Assessing Cancer Risk  Only about 5-10% of cancers are thought to be due to an inherited cancer susceptibility gene.    These families often have:    Several people with the same or related types of cancer    Cancers diagnosed at a young age (before age 50)    Individuals with more than one primary cancer    Multiple generations of the family affected with cancer    Some people may be candidates for genetic testing of more than one gene.  For these families, genetic testing using a cancer panel may be offered.  These panels will test different genes known to increase the risk for breast, ovarian, uterine, and/or other cancers. All of the genes discussed below have published clinical management guidelines for individuals who are found to carry a mutation. The purpose of this handout is to serve as a brief summary of the genes analyzed by the panels used to inquire about hereditary breast and gynecologic cancer:  FRANCES, BRCA1, BRCA2, BRIP1, CDH1, CHEK2, MLH1, MSH2, MSH6, PMS2, EPCAM, PTEN, PALB2, RAD51C, RAD51D, and TP53.  ______________________________________________________________________________  Hereditary  Breast and Ovarian Cancer Syndrome   (BRCA1 and BRCA2)  A single mutation in one of the copies of BRCA1 or BRCA2 increases the risk for breast and ovarian cancer, among others.  The risk for pancreatic cancer and melanoma may also be slightly increased in some families.  The chart below shows the chance that someone with a BRCA mutation would develop cancer in his or her lifetime1,2,3,4.        A person s ethnic background is also important to consider, as individuals of Ashkenazi Zoroastrianism ancestry have a higher chance of having a BRCA gene mutation.  There are three BRCA mutations that occur more frequently in this population.    Charles Syndrome   (MLH1, MSH2, MSH6, PMS2, and EPCAM)  Currently five genes are known to cause Charles Syndrome: MLH1, MSH2, MSH6, PMS2, and EPCAM.  A single mutation in one of the Charles Syndrome genes increases the risk for colon, endometrial, ovarian, and stomach cancers.  Other cancers that occur less commonly in Charles Syndrome include urinary tract, skin, and brain cancers.  The chart below shows the chance that a person with Charles syndrome would develop cancer in his or her lifetime5.      *Cancer risk varies depending on Charles syndrome gene found    Cowden Syndrome   (PTEN)  Cowden syndrome is a hereditary condition that increases the risk for breast, thyroid, endometrial, colon, and kidney cancer.  Cowden syndrome is caused by a mutation in the PTEN gene.  A single mutation in one of the copies of PTEN causes Cowden syndrome and increases cancer risk.  The chart below shows the chance that someone with a PTEN mutation would develop cancer in their lifetime6,7.  Other benign features seen in some individuals with Cowden syndrome include benign skin lesions (facial papules, keratoses, lipomas), learning disability, autism, thyroid nodules, colon polyps, and larger head size.      *One recent study found breast cancer risk to be increased to 85%    Li-Fraumeni Syndrome   (TP53)  Li-Fraumeni  Syndrome (LFS) is a cancer predisposition syndrome caused by a mutation in the TP53 gene. A single mutation in one of the copies of TP53 increases the risk for multiple cancers. Individuals with LFS are at an increased risk for developing cancer at a young age. The lifetime risk for development of a LFS-associated cancer is 50% by age 30 and 90% by age 60.     Core Cancers: Sarcomas, Breast, Brain, Lung, Leukemias/Lymphomas, Adrenocortical carcinomas  Other Cancers: Gastrointestinal, Thyroid, Skin, Genitourinary    Hereditary Diffuse Gastric Cancer   (CDH1)  Currently, one gene is known to cause hereditary diffuse gastric cancer (HDGC): CDH1.  Individuals with HDGC are at increased risk for diffuse gastric cancer and lobular breast cancer. Of people diagnosed with HDGC, 30-50% have a mutation in the CDH1 gene.  This suggests there are likely other genes that may cause HDGC that have not been identified yet.      Lifetime Cancer Risks    General Population HDGC    Diffuse Gastric  <1% ~80%   Breast 12% 39-52%         Additional Genes  FRANCES  FRANCES is a moderate-risk breast cancer gene. Women who have a mutation in FRANCES can have between a 2-4 fold increased risk for breast cancer compared to the general population8. FRANCES mutations have also been associated with increased risk for pancreatic cancer, however an estimate of this cancer risk is not well understood9. Individuals who inherit two FRANCES mutations have a condition called ataxia-telangiectasia (AT).  This rare autosomal recessive condition affects the nervous system and immune system, and is associated with progressive cerebellar ataxia beginning in childhood.  Individuals with ataxia-telangiectasia often have a weakened immune system and have an increased risk for childhood cancers.    PALB2  Mutations in PALB2 have been shown to increase the risk of breast cancer up to 33-58% in some families; where individuals fall within this risk range is dependent upon family  [id number]. PALB2 mutations have also been associated with increased risk for pancreatic cancer, although this risk has not been quantified yet.  Individuals who inherit two PALB2 mutations--one from their mother and one from their father--have a condition called Fanconi Anemia.  This rare autosomal recessive condition is associated with short stature, developmental delay, bone marrow failure, and increased risk for childhood cancers.    CHEK2   CHEK2 is a moderate-risk breast cancer gene.  Women who have a mutation in CHEK2 have around a 2-fold increased risk for breast cancer compared to the general population, and this risk may be higher depending upon family history.11,12,13 Mutations in CHEK2 have also been shown to increase the risk of a number of other cancers, including colon and prostate, however these cancer risks are currently not well understood.    BRIP1, RAD51C and RAD51D  Mutations in BRIP1, RAD51C, and RAD51D have been shown to increase the risk of ovarian cancer and possibly female breast cancer as well14,15 .       Lifetime Cancer Risk    General Population BRIP1 RAD51C RAD51D   Ovarian 1-2% ~5-8% ~5-9% ~7-15%               Inheritance  All of the cancer syndromes reviewed above are inherited in an autosomal dominant pattern.  This means that if a parent has a mutation, each of his or her children will have a 50% chance of inheriting that same mutation.  Therefore, each child--male or female--would have a 50% chance of being at increased risk for developing cancer.      Image obtained from Genetics Home Reference, 2013     Mutations in some genes can occur de khurram, which means that a person s mutation occurred for the first time in them and was not inherited from a parent.  Now that they have the mutation, however, it can be passed on to future generations.    Genetic Testing  Genetic testing involves a blood test and will look at the genetic information in the FRANCES, BRCA1, BRCA2, BRIP1, CDH1, CHEK2,  MLH1, MSH2, MSH6, PMS2, EPCAM, PTEN, PALB2, RAD51C, RAD51D, and TP53 genes for any harmful mutations that are associated with increased cancer risk.  If possible, it is recommended that the person(s) who has had cancer be tested before other family members.  That person will give us the most useful information about whether or not a specific gene is associated with the cancer in the family.    Results  There are three possible results of genetic testing:    Positive--a harmful mutation was identified in one or more of the genes    Negative--no mutation was identified in any of the genes on this panel    Variant of unknown significance--a variation in one of the genes was identified, but it is unclear how this impacts cancer risk in the family    Advantages and Disadvantages   There are advantages and disadvantages to genetic testing.    Advantages    May clarify your cancer risk    Can help you make medical decisions    May explain the cancers in your family    May give useful information to your family members (if you share your results)    Disadvantages    Possible negative emotional impact of learning about inherited cancer risk    Uncertainty in interpreting a negative test result in some situations    Possible genetic discrimination concerns (see below)    Genetic Information Nondiscrimination Act (CHRIS)  CHRIS is a federal law that protects individuals from health insurance or employment discrimination based on a genetic test result alone.  Although rare, there are currently no legal discrimination protections in terms of life insurance, long term care, or disability insurances.  Visit the National Human Genome Research Cheraw website to learn more.    Reducing Cancer Risk  All of the genes described above have nationally recognized cancer screening guidelines that would be recommended for individuals who test positive.  In addition to increased cancer screening, surgeries may be offered or recommended to  reduce cancer risk.  Recommendations are based upon an individual s genetic test result as well as their personal and family history of cancer.    Questions to Think About Regarding Genetic Testing:    What effect will the test result have on me and my relationship with my family members if I have an inherited gene mutation?  If I don t have a gene mutation?    Should I share my test results, and how will my family react to this news, which may also affect them?    Are my children ready to learn new information that may one day affect their own health?    Hereditary Cancer Resources    FORCE: Facing Our Risk of Cancer Empowered facingourrisk.org   Bright Pink bebrightpink.org   Li-Fraumeni Syndrome Association lfsassociation.org   PTEN World PTENworld.Your Survival   No stomach for cancer, Inc. nostomachforcancer.org   Stomach cancer relief network Scrnet.org   Collaborative Group of the Americas on Inherited Colorectal Cancer (CGA) cgaicc.com    Cancer Care cancercare.org   American Cancer Society (ACS) cancer.org   National Cancer [place] (NCI) cancer.gov     Cancer Risk Management Program [phone] ([phone])  ? Nahed Lisette, MS, [facility]  [phone]  ? Nay Luis, MS, [facility]  [phone]  ? Thalia Oliver, MS, [facility]  [phone]  ? Kandice Araiza, MS, [facility]  [phone]  ? Bruno Cloud, MS, [facility]  [phone]    References  1. Peggy Hastings PDP, Teja S, Huber MARTINES, Wan JE, Hemal JL, Edilia N, Suresh H, Linda O, Rachana A, Héctor B, Amy P, Mandenise S, Tiffanie DM, Antoine N, Marlo E, Claudia H, Theron E, Maycol J, Viki J, Erica B, Ibis H, Ashley S, Eerola H, Kenyetta H, Marcus K, Carla OP. Average risks of breast and ovarian cancer associated with BRCA1 or BRCA2 mutations detected in case series unselected for family history: a combined analysis of 222 studies. Am J Hum Basia. 2003;72:1117-30.  2. Debbie SMITH, Jennie RICHMOND, Hali BONILLA.  BRCA1 and BRCA2 Hereditary Breast and Ovarian  Cancer. Gene Reviews online. 2013.  3. Rene YC, Annmarie S, Bere G, Marylin S. Breast cancer risk among male BRCA1 and BRCA2 mutation carriers. J Natl Cancer Inst. 2007;99:1811-4.  4. Deondre MOHR, Terry I, Hua J, Lloyd E, Constance ER, Archie F. Risk of breast cancer in male BRCA2 carriers. J Med Basia. 2010;47:710-1.  5. National Comprehensive Cancer Network. Clinical practice guidelines in oncology, colorectal cancer screening. Available online (registration required). 2015.  6. Lundberg MH, Yudy J, Shelby J, Andi LA, Orscottie MS, Eng C. Lifetime cancer risks in individuals with germline PTEN mutations. Clin Cancer Res. 2012;18:400-7.  7. Sami CARCAMO. Cowden Syndrome: A Critical Review of the Clinical Literature. J Basia . 2009:18:13-27.  8. Sina A, Ray D, Miguel S, Zeinab P, Arelis T, Cuco M, Adelfo B, Dev H, Timur R, Aracelis K, Arsh L, Deondre MOHR, Tiffanie D, Tee DF, Meera MR, The Breast Cancer Susceptibility Collaboration (UK) & Fabi SMITH. FRANCES mutations that cause ataxia-telangiectasia are breast cancer susceptibility alleles. Nature Genetics. 2006;38:873-875  9. Miguel N , Gladys Y, Christina J, Alexandra L, Amee GM , Feliciano ML, Leathainger S, Estevez AG, Syngal S, Sydney ML, Emely J , Filiberto R, Rebeka SZ, Stacie JR, Maegan VE, Gorge M, Vogelstein B, Lindsey N, Stefanie RH, Elke KW, and Delgado AP. FRANCES mutations in patients with hereditary pancreatic cancer. Cancer Discover. 2012;2:41-46  10. Fam RIGGS et al. Breast-Cancer Risk in Families with Mutations in PALB2. NEJM. 2014; 371(6):497-506.  11. CHEK2 Breast Cancer Case-Control Consortium. CHEK2*1100delC and susceptibility to breast cancer: A collaborative analysis involving 10,860 breast cancer cases and 9,065 controls from 10 studies. Am J Hum Basia, 74 (2004), pp. 0052-4013  12. Raleigh T, Jann S, Ted K, et al. Spectrum of Mutations in BRCA1, BRCA2, CHEK2, and TP53 in Families at High Risk of Breast Cancer. JT.  2006;295(12):9969-6216.   13. Bianka C, Marialuisa D, Romina A, et al. Risk of breast cancer in women with a CHEK2 mutation with and without a family history of breast cancer. J Clin Oncol. 2011;29:0213-3224.  14. Moris H, Mira E, John SJ, et al. Contribution of germline mutations in the RAD51B, RAD51C, and RAD51D genes to ovarian cancer in the population. J Clin Oncol. 2015;33(26):5405-5067. Doi:10.1200/JCO.2015.61.2408.  15. Jenniffer T, Giovany DF, Lisa P, et al. Mutations in BRIP1 confer high risk of ovarian cancer. Soledad Basia. 2011;43(11):1117-3135. doi:10.1038/ng.955.            Follow-ups after your visit        Your next 10 appointments already scheduled     Mar 02, 2018  9:15 AM CST   (Arrive by 9:00 AM)   New Patient Visit with Antonio Andrews MD   [facility] ([facility])    [address] [place] [address]  [address] [place] [id number] [phone]              Future tests that were ordered for you today     Open Future Orders        Priority Expected Expires Ordered    [facility] - [facility] Test: Laboratory Miscellaneous Order Routine  3/2/2019 3/2/2018            Who to contact     If you have questions or need follow up information about today's clinic visit or your schedule please contact [facility] directly at [phone].  Normal or non-critical lab and imaging results will be communicated to you by SwapBeatshart, letter or phone within 4 business days after the clinic has received the results. If you do not hear from us within 7 days, please contact the clinic through SwapBeatshart or phone. If you have a critical or abnormal lab result, we will notify you by phone as soon as possible.  Submit refill requests through MarketRiders or call your pharmacy and they will forward the refill request to us. Please allow 3 business days for your refill to be completed.          Additional Information About Your Visit        SwapBeatsharCherry Bugs Information     MarketRiders gives  you secure access to your electronic health record. If you see a primary care provider, you can also send messages to your care team and make appointments. If you have questions, please call your primary care clinic.  If you do not have a primary care provider, please call [phone] and they will assist you.        Care EveryWhere ID     This is your Care EveryWhere ID. This could be used by other organizations to access your [place] medical records  [id number]        Your Vitals Were     Last Period                   02/16/2018            Blood Pressure from Last 3 Encounters:   02/23/18 119/75   03/30/06 118/78   03/24/06 120/82    Weight from Last 3 Encounters:   02/23/18 76.8 kg (169 lb 6.4 oz)   03/30/06 87.1 kg (192 lb)   03/24/06 86.2 kg (190 lb)               Primary Care Provider Fax #    Physician No Ref-Primary [phone]       No address on file        Equal Access to Services     SURESH CHAPARRO : Hadii olivier dotsono Sobharati, waaxda luqadaha, qaybta kaalmada adeegyada, naseem lugo . So [facility] [phone].    ATENCIÓN: Si habla español, tiene a sparrow disposición servicios gratuitos de asistencia lingüística. Llame al [phone].    We comply with applicable federal civil rights laws and [place] laws. We do not discriminate on the basis of race, color, national origin, age, disability, sex, sexual orientation, or gender identity.            Thank you!     Thank you for choosing [facility] [place]  for your care. Our goal is always to provide you with excellent care. Hearing back from our patients is one way we can continue to improve our services. Please take a few minutes to complete the written survey that you may receive in the mail after your visit with us. Thank you!             Your Updated Medication List - Protect others around you: Learn how to safely use, store and throw away your medicines at www.disposemymeds.org.          This list is accurate  as of 3/2/18  8:54 AM.  Always use your most recent med list.                   Brand Name Dispense Instructions for use Diagnosis    Biotin 1 MG Caps       Malignant neoplasm of left breast in female, estrogen receptor positive, unspecified site of breast (H)       levonorgestrel 20 MCG/24HR IUD    MIRENA     1 each by Intrauterine route    Malignant neoplasm of left breast in female, estrogen receptor positive, unspecified site of breast (H)       Spirulina 500 MG Tabs      6 Tabs daily.    Malignant neoplasm of left breast in female, estrogen receptor positive, unspecified site of breast (H)

## 2018-03-03 ENCOUNTER — HEALTH MAINTENANCE LETTER (OUTPATIENT)
Age: 43
End: 2018-03-03

## 2018-03-05 ENCOUNTER — ALLIED HEALTH/NURSE VISIT (OUTPATIENT)
Dept: ONCOLOGY | Facility: CLINIC | Age: 43
End: 2018-03-05
Attending: INTERNAL MEDICINE
Payer: COMMERCIAL

## 2018-03-05 ENCOUNTER — RADIANT APPOINTMENT (OUTPATIENT)
Dept: MRI IMAGING | Facility: CLINIC | Age: 43
End: 2018-03-05
Attending: INTERNAL MEDICINE
Payer: COMMERCIAL

## 2018-03-05 DIAGNOSIS — Z17.0 MALIGNANT NEOPLASM OF LEFT BREAST IN FEMALE, ESTROGEN RECEPTOR POSITIVE (H): ICD-10-CM

## 2018-03-05 DIAGNOSIS — Z17.0 MALIGNANT NEOPLASM OF LEFT BREAST IN FEMALE, ESTROGEN RECEPTOR POSITIVE, UNSPECIFIED SITE OF BREAST (H): Primary | ICD-10-CM

## 2018-03-05 DIAGNOSIS — C50.912 MALIGNANT NEOPLASM OF LEFT BREAST IN FEMALE, ESTROGEN RECEPTOR POSITIVE, UNSPECIFIED SITE OF BREAST (H): Primary | ICD-10-CM

## 2018-03-05 DIAGNOSIS — C50.912 MALIGNANT NEOPLASM OF LEFT BREAST IN FEMALE, ESTROGEN RECEPTOR POSITIVE (H): Primary | ICD-10-CM

## 2018-03-05 DIAGNOSIS — C50.912 MALIGNANT NEOPLASM OF LEFT BREAST IN FEMALE, ESTROGEN RECEPTOR POSITIVE (H): ICD-10-CM

## 2018-03-05 DIAGNOSIS — Z17.0 MALIGNANT NEOPLASM OF LEFT BREAST IN FEMALE, ESTROGEN RECEPTOR POSITIVE (H): Primary | ICD-10-CM

## 2018-03-05 PROCEDURE — 93005 ELECTROCARDIOGRAM TRACING: CPT

## 2018-03-05 PROCEDURE — 93010 ELECTROCARDIOGRAM REPORT: CPT | Performed by: INTERNAL MEDICINE

## 2018-03-05 RX ORDER — GADOBUTROL 604.72 MG/ML
0.1 INJECTION INTRAVENOUS ONCE
Status: COMPLETED | OUTPATIENT
Start: 2018-03-05 | End: 2018-03-05

## 2018-03-05 RX ADMIN — GADOBUTROL 7.69 ML: 604.72 INJECTION INTRAVENOUS at 09:38

## 2018-03-05 NOTE — MR AVS SNAPSHOT
After Visit Summary   3/5/2018    Mary Chadwick    MRN: [id number]           Patient Information     Date Of Birth          1975        Visit Information        Provider Department      3/5/2018 10:00 AM Nurse, Mary Oncology [facility]        Today's Diagnoses     Malignant neoplasm of left breast in female, estrogen receptor positive (H)           Follow-ups after your visit        Your next 10 appointments already scheduled     Mar 05, 2018 10:00 AM CST   Nurse Visit with  Oncology Nurse   [facility] ([facility])    [address]  [address]  [facility] [id number] [phone]            Mar 06, 2018 11:00 AM CST   US BREAST BIOPSY CORE NEEDLE LEFT with  Breast Rad, UCBCUS1,  BREAST NURSE   [facility] ([facility])    [address]  [address] Floor  [facility] [id number] [phone]           Bring a list of your medicines to the exam. Include vitamins, minerals and over-the-counter drugs.  Tell your doctor in advance:   If you are or may be pregnant.   If you are taking Coumadin (or any other blood thinners) 5 days prior to the exam for any special instructions.  IF YOUR DOCTOR HAS TOLD YOU THAT YOU WILL BE RECEIVING SEDATION (medicine to help you relax): (Typically sedation is only for liver exams at [facility] and Renal Biopsy exams in Pediatrics)   See your family doctor for an exam within 30 days of treatment.   Plan for an adult to drive you home and stay with you for at least 24 hours.   No eating or drinking for 4 hours before your test. You may take medicine with small sips of water.   If you have diabetes:If you take insulin, call your diabetes care team for any special instructions for this exam.  Please call the Imaging Department at your exam site with any questions.               Who to contact     If you have questions or need  follow up information about today's clinic visit or your schedule please contact [facility] directly at [phone].  Normal or non-critical lab and imaging results will be communicated to you by MyChart, letter or phone within 4 business days after the clinic has received the results. If you do not hear from us within 7 days, please contact the clinic through ROAM Datahart or phone. If you have a critical or abnormal lab result, we will notify you by phone as soon as possible.  Submit refill requests through Gravie or call your pharmacy and they will forward the refill request to us. Please allow 3 business days for your refill to be completed.          Additional Information About Your Visit        ROAM DataharEndurance Lending Network Information     Gravie gives you secure access to your electronic health record. If you see a primary care provider, you can also send messages to your care team and make appointments. If you have questions, please call your primary care clinic.  If you do not have a primary care provider, please call [phone] and they will assist you.        Care EveryWhere ID     This is your Care EveryWhere ID. This could be used by other organizations to access your [place] medical records  [id number]        Your Vitals Were     Last Period                   02/16/2018            Blood Pressure from Last 3 Encounters:   03/02/18 110/67   02/23/18 119/75   03/30/06 118/78    Weight from Last 3 Encounters:   03/02/18 76.9 kg (169 lb 7 oz)   02/23/18 76.8 kg (169 lb 6.4 oz)   03/30/06 87.1 kg (192 lb)              We Performed the Following     EKG 12-lead complete w/read - Clinics        Primary Care Provider Fax #    Physician No Ref-Primary [phone]       No address on file        Equal Access to Services     SURESH CHAPARRO : Michelle Herndon, gary black, naseem aguirre. So [facility] [phone].    ATENCIÓN: cheryl Cueva  a sparrow disposición servicios gratuitos de asistencia lingüística. Karsten samaniego [phone].    We comply with applicable federal civil rights laws and [place] laws. We do not discriminate on the basis of race, color, national origin, age, disability, sex, sexual orientation, or gender identity.            Thank you!     Thank you for choosing [facility]  for your care. Our goal is always to provide you with excellent care. Hearing back from our patients is one way we can continue to improve our services. Please take a few minutes to complete the written survey that you may receive in the mail after your visit with us. Thank you!             Your Updated Medication List - Protect others around you: Learn how to safely use, store and throw away your medicines at www.disposemymeds.org.          This list is accurate as of 3/5/18  9:42 AM.  Always use your most recent med list.                   Brand Name Dispense Instructions for use Diagnosis    Biotin 1 MG Caps       Malignant neoplasm of left breast in female, estrogen receptor positive, unspecified site of breast (H)       levonorgestrel 20 MCG/24HR IUD    MIRENA     1 each by Intrauterine route    Malignant neoplasm of left breast in female, estrogen receptor positive, unspecified site of breast (H)       Spirulina 500 MG Tabs      6 Tabs daily.    Malignant neoplasm of left breast in female, estrogen receptor positive, unspecified site of breast (H)

## 2018-03-06 ENCOUNTER — RADIANT APPOINTMENT (OUTPATIENT)
Dept: MAMMOGRAPHY | Facility: CLINIC | Age: 43
End: 2018-03-06
Attending: INTERNAL MEDICINE
Payer: COMMERCIAL

## 2018-03-06 DIAGNOSIS — Z17.0 MALIGNANT NEOPLASM OF LEFT BREAST IN FEMALE, ESTROGEN RECEPTOR POSITIVE, UNSPECIFIED SITE OF BREAST (H): ICD-10-CM

## 2018-03-06 DIAGNOSIS — C50.912 MALIGNANT NEOPLASM OF LEFT BREAST IN FEMALE, ESTROGEN RECEPTOR POSITIVE, UNSPECIFIED SITE OF BREAST (H): ICD-10-CM

## 2018-03-06 DIAGNOSIS — R92.8 ABNORMAL MRI, BREAST: Primary | ICD-10-CM

## 2018-03-06 DIAGNOSIS — C50.912 MALIGNANT NEOPLASM OF LEFT BREAST IN FEMALE, ESTROGEN RECEPTOR POSITIVE (H): ICD-10-CM

## 2018-03-06 DIAGNOSIS — Z17.0 MALIGNANT NEOPLASM OF LEFT BREAST IN FEMALE, ESTROGEN RECEPTOR POSITIVE (H): ICD-10-CM

## 2018-03-06 LAB
ALBUMIN SERPL-MCNC: 3.9 G/DL (ref 3.4–5)
ALBUMIN UR-MCNC: NEGATIVE MG/DL
ALP SERPL-CCNC: 52 U/L (ref 40–150)
ALT SERPL W P-5'-P-CCNC: 16 U/L (ref 0–50)
ANION GAP SERPL CALCULATED.3IONS-SCNC: 8 MMOL/L (ref 3–14)
APPEARANCE UR: ABNORMAL
APTT PPP: 32 SEC (ref 22–37)
AST SERPL W P-5'-P-CCNC: 20 U/L (ref 0–45)
BACTERIA #/AREA URNS HPF: ABNORMAL /HPF
BASOPHILS # BLD AUTO: 0.1 10E9/L (ref 0–0.2)
BASOPHILS NFR BLD AUTO: 0.7 %
BILIRUB SERPL-MCNC: 0.3 MG/DL (ref 0.2–1.3)
BILIRUB UR QL STRIP: NEGATIVE
BUN SERPL-MCNC: 9 MG/DL (ref 7–30)
CALCIUM SERPL-MCNC: 9.1 MG/DL (ref 8.5–10.1)
CHLORIDE SERPL-SCNC: 105 MMOL/L (ref 94–109)
CO2 SERPL-SCNC: 24 MMOL/L (ref 20–32)
COLOR UR AUTO: YELLOW
CREAT SERPL-MCNC: 0.66 MG/DL (ref 0.52–1.04)
DIFFERENTIAL METHOD BLD: NORMAL
EOSINOPHIL # BLD AUTO: 0.1 10E9/L (ref 0–0.7)
EOSINOPHIL NFR BLD AUTO: 1.6 %
ERYTHROCYTE [DISTWIDTH] IN BLOOD BY AUTOMATED COUNT: 12.3 % (ref 10–15)
GFR SERPL CREATININE-BSD FRML MDRD: >90 ML/MIN/1.7M2
GLUCOSE SERPL-MCNC: 95 MG/DL (ref 70–99)
GLUCOSE UR STRIP-MCNC: NEGATIVE MG/DL
HBA1C MFR BLD: 5.4 % (ref 4.3–6)
HCG SERPL QL: NEGATIVE
HCT VFR BLD AUTO: 42.3 % (ref 35–47)
HGB BLD-MCNC: 14.1 G/DL (ref 11.7–15.7)
HGB UR QL STRIP: NEGATIVE
IMM GRANULOCYTES # BLD: 0 10E9/L (ref 0–0.4)
IMM GRANULOCYTES NFR BLD: 0.3 %
INR PPP: 1.02 (ref 0.86–1.14)
INTERPRETATION ECG - MUSE: NORMAL
KETONES UR STRIP-MCNC: NEGATIVE MG/DL
LEUKOCYTE ESTERASE UR QL STRIP: ABNORMAL
LYMPHOCYTES # BLD AUTO: 2.8 10E9/L (ref 0.8–5.3)
LYMPHOCYTES NFR BLD AUTO: 37 %
MAGNESIUM SERPL-MCNC: 2.3 MG/DL (ref 1.6–2.3)
MCH RBC QN AUTO: 29.3 PG (ref 26.5–33)
MCHC RBC AUTO-ENTMCNC: 33.3 G/DL (ref 31.5–36.5)
MCV RBC AUTO: 88 FL (ref 78–100)
MONOCYTES # BLD AUTO: 0.5 10E9/L (ref 0–1.3)
MONOCYTES NFR BLD AUTO: 7.2 %
MUCOUS THREADS #/AREA URNS LPF: PRESENT /LPF
NEUTROPHILS # BLD AUTO: 4 10E9/L (ref 1.6–8.3)
NEUTROPHILS NFR BLD AUTO: 53.2 %
NITRATE UR QL: NEGATIVE
NRBC # BLD AUTO: 0 10*3/UL
NRBC BLD AUTO-RTO: 0 /100
PH UR STRIP: 5 PH (ref 5–7)
PLATELET # BLD AUTO: 273 10E9/L (ref 150–450)
POTASSIUM SERPL-SCNC: 3.9 MMOL/L (ref 3.4–5.3)
PROT SERPL-MCNC: 7.4 G/DL (ref 6.8–8.8)
RBC # BLD AUTO: 4.82 10E12/L (ref 3.8–5.2)
RBC #/AREA URNS AUTO: 4 /HPF (ref 0–2)
SODIUM SERPL-SCNC: 136 MMOL/L (ref 133–144)
SOURCE: ABNORMAL
SP GR UR STRIP: 1.02 (ref 1–1.03)
SQUAMOUS #/AREA URNS AUTO: 9 /HPF (ref 0–1)
TSH SERPL DL<=0.005 MIU/L-ACNC: 1.19 MU/L (ref 0.4–4)
UROBILINOGEN UR STRIP-MCNC: 0 MG/DL (ref 0–2)
WBC # BLD AUTO: 7.5 10E9/L (ref 4–11)
WBC #/AREA URNS AUTO: 3 /HPF (ref 0–5)

## 2018-03-06 PROCEDURE — 83036 HEMOGLOBIN GLYCOSYLATED A1C: CPT | Performed by: INTERNAL MEDICINE

## 2018-03-06 PROCEDURE — 85610 PROTHROMBIN TIME: CPT | Performed by: INTERNAL MEDICINE

## 2018-03-06 PROCEDURE — 84443 ASSAY THYROID STIM HORMONE: CPT | Performed by: INTERNAL MEDICINE

## 2018-03-06 PROCEDURE — 80053 COMPREHEN METABOLIC PANEL: CPT | Performed by: INTERNAL MEDICINE

## 2018-03-06 PROCEDURE — 81001 URINALYSIS AUTO W/SCOPE: CPT | Performed by: INTERNAL MEDICINE

## 2018-03-06 PROCEDURE — 83735 ASSAY OF MAGNESIUM: CPT | Performed by: INTERNAL MEDICINE

## 2018-03-06 PROCEDURE — 85025 COMPLETE CBC W/AUTO DIFF WBC: CPT | Performed by: INTERNAL MEDICINE

## 2018-03-06 PROCEDURE — 84703 CHORIONIC GONADOTROPIN ASSAY: CPT | Performed by: INTERNAL MEDICINE

## 2018-03-06 PROCEDURE — 85730 THROMBOPLASTIN TIME PARTIAL: CPT | Performed by: INTERNAL MEDICINE

## 2018-03-06 RX ORDER — LIDOCAINE HYDROCHLORIDE 10 MG/ML
10 INJECTION, SOLUTION EPIDURAL; INFILTRATION; INTRACAUDAL; PERINEURAL ONCE
Status: COMPLETED | OUTPATIENT
Start: 2018-03-06 | End: 2018-03-06

## 2018-03-06 RX ADMIN — LIDOCAINE HYDROCHLORIDE 100 MG: 10 INJECTION, SOLUTION EPIDURAL; INFILTRATION; INTRACAUDAL; PERINEURAL at 12:10

## 2018-03-06 NOTE — NURSING NOTE
Chief Complaint   Patient presents with     Lab Only     labs drawn with vpt by rn.     Labs (including research labs) drawn with vpt by rn.  Pt tolerated well.    Jennifer Muse RN

## 2018-03-07 ENCOUNTER — HOSPITAL ENCOUNTER (OUTPATIENT)
Facility: AMBULATORY SURGERY CENTER | Age: 43
End: 2018-03-07
Attending: PHYSICIAN ASSISTANT
Payer: COMMERCIAL

## 2018-03-07 LAB — COPATH REPORT: NORMAL

## 2018-03-14 ENCOUNTER — TELEPHONE (OUTPATIENT)
Dept: ONCOLOGY | Facility: CLINIC | Age: 43
End: 2018-03-14

## 2018-03-14 NOTE — TELEPHONE ENCOUNTER
Research- attempted to call pt to remind to call ECHO scheduling to set up appt for next week before 3/22 when she is suppose to see Dr Gann. This appt has been set up twice and pt missed both appts and was told she needed to set up herself to be scheduled. Noticed as of today 3/14 she has not set up this appt. She will not be able to be randomized for the ISPY research trial without receiving the ECHO.  Pts voice mail is full and unable to accept new messages. So did not leave a message.  Susan Gr  [phone]

## 2018-03-15 ENCOUNTER — TELEPHONE (OUTPATIENT)
Dept: ONCOLOGY | Facility: CLINIC | Age: 43
End: 2018-03-15

## 2018-03-15 NOTE — TELEPHONE ENCOUNTER
3/15/2018    I called Mary today to discuss her BRCA1/2 results, but was unable to reach her.  I was unable to leave a voicemail, as her voicemail box was full. I had sent a Tipzu message a few days ago, but this is still unread. I will try calling again at a later time.    Nay Smith MS, [facility]  Licensed Genetic Counselor  P. [phone]  F. [phone]

## 2018-03-16 ENCOUNTER — TELEPHONE (OUTPATIENT)
Dept: ONCOLOGY | Facility: CLINIC | Age: 43
End: 2018-03-16

## 2018-03-16 NOTE — TELEPHONE ENCOUNTER
I called Omaira and was unable to reach her.  Her mammaprint returned as low risk; she will not go onto the chemotherapy portion of the clinical trial.    She is to return and see me next week.  We will need to decide on goals of chemotherapy vs endocrine therapy vs surgery first.    Valeria Gann

## 2018-03-19 ENCOUNTER — TELEPHONE (OUTPATIENT)
Dept: ONCOLOGY | Facility: CLINIC | Age: 43
End: 2018-03-19

## 2018-03-19 RX ORDER — ONDANSETRON 4 MG/1
4 TABLET, ORALLY DISINTEGRATING ORAL EVERY 30 MIN PRN
Status: CANCELLED | OUTPATIENT
Start: 2018-03-19

## 2018-03-19 RX ORDER — HEPARIN SODIUM (PORCINE) LOCK FLUSH IV SOLN 100 UNIT/ML 100 UNIT/ML
5 SOLUTION INTRAVENOUS ONCE
Status: CANCELLED | OUTPATIENT
Start: 2018-03-19 | End: 2018-03-19

## 2018-03-19 RX ORDER — ONDANSETRON 2 MG/ML
4 INJECTION INTRAMUSCULAR; INTRAVENOUS EVERY 30 MIN PRN
Status: CANCELLED | OUTPATIENT
Start: 2018-03-19

## 2018-03-19 RX ORDER — SODIUM CHLORIDE, SODIUM LACTATE, POTASSIUM CHLORIDE, CALCIUM CHLORIDE 600; 310; 30; 20 MG/100ML; MG/100ML; MG/100ML; MG/100ML
INJECTION, SOLUTION INTRAVENOUS CONTINUOUS
Status: CANCELLED | OUTPATIENT
Start: 2018-03-19

## 2018-03-19 RX ORDER — NALOXONE HYDROCHLORIDE 0.4 MG/ML
.1-.4 INJECTION, SOLUTION INTRAMUSCULAR; INTRAVENOUS; SUBCUTANEOUS
Status: CANCELLED | OUTPATIENT
Start: 2018-03-19 | End: 2018-03-20

## 2018-03-19 RX ORDER — SODIUM CHLORIDE 9 MG/ML
INJECTION, SOLUTION INTRAVENOUS CONTINUOUS
Status: CANCELLED | OUTPATIENT
Start: 2018-03-19

## 2018-03-19 ASSESSMENT — ENCOUNTER SYMPTOMS
DECREASED CONCENTRATION: 1
BREAST PAIN: 1
DEPRESSION: 0
INSOMNIA: 1
PANIC: 0
BREAST MASS: 1
NERVOUS/ANXIOUS: 1

## 2018-03-19 NOTE — TELEPHONE ENCOUNTER
I tried calling Omaira to discuss her plan.  She is low risk Mammaprint and will not enroll on the treatment trial of I-SPY.  I have tried calling Omaira as well as her friend and emergency contact to discuss this with them.  I was not able to reach them.      I would like Omaira to get her CT scan tomorrow.  Will hold off on port placement.    Valeria Gann

## 2018-03-19 NOTE — TELEPHONE ENCOUNTER
Patient did not qualify for chemotherapy portion of I SPY - 2 trial. However she has CT and port placement scheduled tomorrow. Dr. Gann attempted to call her on 3/16 but did not reach her. Patient called back in today asking if she needs her appointments tomorrow and what the plan will be.     Paged Dr. Gann

## 2018-03-20 ENCOUNTER — TELEPHONE (OUTPATIENT)
Dept: ONCOLOGY | Facility: CLINIC | Age: 43
End: 2018-03-20

## 2018-03-20 ENCOUNTER — RADIANT APPOINTMENT (OUTPATIENT)
Dept: CT IMAGING | Facility: CLINIC | Age: 43
End: 2018-03-20
Attending: INTERNAL MEDICINE
Payer: COMMERCIAL

## 2018-03-20 DIAGNOSIS — K76.9 LESION OF LIVER: Primary | ICD-10-CM

## 2018-03-20 DIAGNOSIS — Z17.0 MALIGNANT NEOPLASM OF LEFT BREAST IN FEMALE, ESTROGEN RECEPTOR POSITIVE, UNSPECIFIED SITE OF BREAST (H): ICD-10-CM

## 2018-03-20 DIAGNOSIS — C50.912 MALIGNANT NEOPLASM OF LEFT BREAST IN FEMALE, ESTROGEN RECEPTOR POSITIVE, UNSPECIFIED SITE OF BREAST (H): ICD-10-CM

## 2018-03-20 LAB — RADIOLOGIST FLAGS: NORMAL

## 2018-03-20 RX ORDER — IOPAMIDOL 755 MG/ML
105 INJECTION, SOLUTION INTRAVASCULAR ONCE
Status: COMPLETED | OUTPATIENT
Start: 2018-03-20 | End: 2018-03-20

## 2018-03-20 RX ADMIN — IOPAMIDOL 105 ML: 755 INJECTION, SOLUTION INTRAVASCULAR at 07:25

## 2018-03-20 NOTE — DISCHARGE INSTRUCTIONS

## 2018-03-21 ENCOUNTER — OFFICE VISIT (OUTPATIENT)
Dept: ONCOLOGY | Facility: CLINIC | Age: 43
End: 2018-03-21
Attending: GENETIC COUNSELOR, MS
Payer: COMMERCIAL

## 2018-03-21 DIAGNOSIS — Z15.09 MUTATION IN TP53 GENE: ICD-10-CM

## 2018-03-21 DIAGNOSIS — Z15.89 MUTATION IN TP53 GENE: ICD-10-CM

## 2018-03-21 DIAGNOSIS — Z15.01 MUTATION IN TP53 GENE: ICD-10-CM

## 2018-03-21 DIAGNOSIS — Z14.8 CARRIER OF GENETIC DISORDER: Primary | ICD-10-CM

## 2018-03-21 PROCEDURE — 96040 ZZH GENETIC COUNSELING, EACH 30 MINUTES: CPT | Mod: ZF | Performed by: GENETIC COUNSELOR, MS

## 2018-03-21 NOTE — PATIENT INSTRUCTIONS
Assessing Cancer Risk  Only about 5-10% of cancers are thought to be due to an inherited cancer susceptibility gene.    These families often have:    Several people with the same or related types of cancer    Cancers diagnosed at a young age (before age 50)    Individuals with more than one primary cancer    Multiple generations of the family affected with cancer    Li-Fraumeni Syndrome (LFS)  LFS is a cancer predisposition syndrome. Individuals with LFS are at an increased risk for developing cancer at a young age. The general lifetime risk for development of cancer is 50% by age 30 and 90% by age 60.  LFS is caused by a mutation in the TP53 gene.     TP53 Gene  We each inherit two copies of every gene in our bodies: one from our mother and one from our father. Each gene has a specific job to do. When a gene has a mistake or  mutation  in it, it does not work like it should. Everyone has two copies of the TP53 gene. A single mutation in one of the copies of TP53 increases the risk for multiple cancers.     Core Cancers: Sarcomas, Breast, Brain, Lung, Leukemias/Lymphomas, Adrenocortical carcinomas  Other Cancers: Gastrointestinal, Thyroid, Skin, Genitourinary    Inheriting a mutation does not mean a person has cancer or will develop cancer, but it does significantly increase his or her risk above the general population risk.    Inheritance   TP53 mutations are inherited in an autosomal dominant pattern.  This means that if a parent has a mutation, each of his or her children will have a 50% chance of inheriting that same mutation.  Therefore, each child--male or female--would have a 50% chance of being at increased risk for developing cancer.    Some individuals with LFS (up to 20%) have a de khurram mutation, which means that their TP53 mutation was not inherited from a parent.  This means, the mutation occurred for the first time in them.  Now that they have the TP53 mutation, however, it can be passed on to future  generations.    Image obtained from Genetics Home Reference, 2013     Genetic Testing  Genetic testing involves a simple blood test and will look at the genetic information in the TP53 gene for any harmful mutations that are associated with increased cancer risk.  If possible, it is recommended that the person(s) who has had cancer be tested before other family members.  That person will give us the most useful information about whether or not a specific gene is associated with the cancer in the family.    Results  There are three possible results of TP53 genetic testing:    Positive--a harmful mutation was identified     Negative--no mutation was identified     Variant of unknown significance--a variation in TP53 was identified, but it is unclear how this impacts cancer risk in the family    Advantages and Disadvantages  There are advantages and disadvantages to genetic testing of TP53.    Advantages    May clarify your cancer risk    Can help you make medical decisions    May explain the cancers in your family    May give useful information to your family members (if you share your results)    Disadvantages    Possible negative emotional impact of learning about inherited cancer risk    Uncertainty in interpreting a negative test result in some situations    Possible genetic discrimination concerns (see below)  Genetic Information Nondiscrimination Act (CHRIS)  CHRIS is a federal law that protects individuals from health insurance or employment discrimination based on a genetic test result alone.  Although rare, there are currently no legal protections in terms of life insurance, long term care, or disability insurances.  Visit the National Human Genome Research [place] genome.gov/42570634 to learn more.    Reducing Cancer Risk  Current screening recommendations for people with Li Fraumeni Syndrome include1,2:    Breast:  o Breast self-exams starting at age 18; repeated monthly  o Clinical breast exams starting at  age 20-25; repeated every 6-12 months  o Annual mammogram and breast MRI starting at age 20-25 (or possibly younger)  o Consideration of preventative mastectomy    Colorectal: Consider colonoscopy starting no later than 25; repeated every 2-5 years     Other Cancers:   o Annual comprehensive physical exam  o Consider novel screening techniques and clinical trials: brain MRI, whole body MRI, abdominal and pelvic ultrasound    Skin: Consider annual dermatology exam    Radiation therapy for cancer should be used with caution in individuals with Li Fraumeni Syndrome.    Questions to Think About Regarding Genetic Testing    What effect will the test result have on me and my relationship with my family members if I have an inherited gene mutation?  If I don t have a gene mutation?    Should I share my test results, and how will my family react to this news, which may also affect them?    Are my children ready to learn new information that may one day affect their own health?    Resources  Li-Fraumeni Syndrome Association [email or web address]   Cancer Care [email or web address].org   American Cancer Society (ACS) cancer.org   National Cancer [place] (NCI) cancer.gov     Cancer Risk Management Program [id number]-UM-CANCER ([phone])    ? Nahed Lisette, MS, [facility]  [phone]  X Nay Luis, MS, [facility]  [phone]  ? Thalia Oliver, MS, [facility]  [phone]  ? Kandice Araiza, MS, [facility]  [phone]   ? Bruno Cloud, MS, [facility]  [phone]  References  1. National Comprehensive Cancer Network. Clinical practice guidelines in oncology, genetic/familial high-risk assessment: breast and ovarian. Available online (registration required). 2013.  2. Angely DOWNS, Domingo U, Alyssa J, Danay DOWNS, Long WHITEHEAD, Frank H, Zaid A, Leann J, Rich TERRY. Biochemical and imaging surveillance in germline TP53 mutation carriers with Li-Fraumeni syndrome: A prospective observational study. Lancet Oncol. 2011;12:559-67

## 2018-03-21 NOTE — MR AVS SNAPSHOT
After Visit Summary   3/21/2018    Mary Chadwick    MRN: [id number]           Patient Information     Date Of Birth          1975        Visit Information        Provider Department      3/21/2018 1:15 PM Nay Smith GC;  2 116 CONSULT [facility]        Care Instructions      Assessing Cancer Risk  Only about 5-10% of cancers are thought to be due to an inherited cancer susceptibility gene.    These families often have:    Several people with the same or related types of cancer    Cancers diagnosed at a young age (before age 50)    Individuals with more than one primary cancer    Multiple generations of the family affected with cancer    Li-Fraumeni Syndrome (LFS)  LFS is a cancer predisposition syndrome. Individuals with LFS are at an increased risk for developing cancer at a young age. The general lifetime risk for development of cancer is 50% by age 30 and 90% by age 60.  LFS is caused by a mutation in the TP53 gene.     TP53 Gene  We each inherit two copies of every gene in our bodies: one from our mother and one from our father. Each gene has a specific job to do. When a gene has a mistake or  mutation  in it, it does not work like it should. Everyone has two copies of the TP53 gene. A single mutation in one of the copies of TP53 increases the risk for multiple cancers.     Core Cancers: Sarcomas, Breast, Brain, Lung, Leukemias/Lymphomas, Adrenocortical carcinomas  Other Cancers: Gastrointestinal, Thyroid, Skin, Genitourinary    Inheriting a mutation does not mean a person has cancer or will develop cancer, but it does significantly increase his or her risk above the general population risk.    Inheritance   TP53 mutations are inherited in an autosomal dominant pattern.  This means that if a parent has a mutation, each of his or her children will have a 50% chance of inheriting that same mutation.  Therefore, each child--male or female--would have a 50%  chance of being at increased risk for developing cancer.    Some individuals with LFS (up to 20%) have a de khurram mutation, which means that their TP53 mutation was not inherited from a parent.  This means, the mutation occurred for the first time in them.  Now that they have the TP53 mutation, however, it can be passed on to future generations.    Image obtained from Genetics Home Reference, 2013     Genetic Testing  Genetic testing involves a simple blood test and will look at the genetic information in the TP53 gene for any harmful mutations that are associated with increased cancer risk.  If possible, it is recommended that the person(s) who has had cancer be tested before other family members.  That person will give us the most useful information about whether or not a specific gene is associated with the cancer in the family.    Results  There are three possible results of TP53 genetic testing:    Positive--a harmful mutation was identified     Negative--no mutation was identified     Variant of unknown significance--a variation in TP53 was identified, but it is unclear how this impacts cancer risk in the family    Advantages and Disadvantages  There are advantages and disadvantages to genetic testing of TP53.    Advantages    May clarify your cancer risk    Can help you make medical decisions    May explain the cancers in your family    May give useful information to your family members (if you share your results)    Disadvantages    Possible negative emotional impact of learning about inherited cancer risk    Uncertainty in interpreting a negative test result in some situations    Possible genetic discrimination concerns (see below)  Genetic Information Nondiscrimination Act (CHRIS)  CHRIS is a federal law that protects individuals from health insurance or employment discrimination based on a genetic test result alone.  Although rare, there are currently no legal protections in terms of life insurance, long  term care, or disability insurances.  Visit the National Human Genome [employer] [place] genome.gov/94096498 to learn more.    Reducing Cancer Risk  Current screening recommendations for people with Li Fraumeni Syndrome include1,2:    Breast:  o Breast self-exams starting at age 18; repeated monthly  o Clinical breast exams starting at age 20-25; repeated every 6-12 months  o Annual mammogram and breast MRI starting at age 20-25 (or possibly younger)  o Consideration of preventative mastectomy    Colorectal: Consider colonoscopy starting no later than 25; repeated every 2-5 years     Other Cancers:   o Annual comprehensive physical exam  o Consider novel screening techniques and clinical trials: brain MRI, whole body MRI, abdominal and pelvic ultrasound    Skin: Consider annual dermatology exam    Radiation therapy for cancer should be used with caution in individuals with Li Fraumeni Syndrome.    Questions to Think About Regarding Genetic Testing    What effect will the test result have on me and my relationship with my family members if I have an inherited gene mutation?  If I don t have a gene mutation?    Should I share my test results, and how will my family react to this news, which may also affect them?    Are my children ready to learn new information that may one day affect their own health?    Resources  Li-Fraumeni Syndrome Association [email or web address]   Cancer Care cancercare.org   American Cancer Society (ACS) cancer.org   National Cancer [place] (NCI) cancer.gov     Cancer Risk Management Program 5-015-2-[facility]-CANCER ([phone])    ? Nahed Knox, [place], [facility]  [phone]  X Nay Smith, MS, [facility]  [phone]  ? Thalia Oliver, MS, [facility]  [phone]  ? Kandice Araiza, MS, [facility]  [phone]   ? Bruno Cloud, MS, [facility]  [phone]  References  1. National Comprehensive Cancer Network. Clinical practice guidelines in oncology, genetic/familial high-risk assessment: breast and ovarian. Available online  (registration required). 2013.  2. Angely DOWNS, Domingo U, Alyssa J, Danay A, Long J, Frank H, Zaid A, Leann J, Rich TERRY. Biochemical and imaging surveillance in germline TP53 mutation carriers with Li-Fraumeni syndrome: A prospective observational study. Lancet Oncol. 2011;12:559-67            Follow-ups after your visit        Your next 10 appointments already scheduled     Mar 22, 2018 10:30 AM CDT   [facility] Draw [facility]   [facility] Draw ([facility])    [address] [place] [address]  [address]  [facility] [address]   [phone]            Mar 22, 2018 11:00 AM CDT   (Arrive by 10:45 AM)   Return Visit with Valeria Gann MD   [facility], located within [facility] ([facility])    [address] [place] [address]  [address]  [facility] [address]   [phone]            Mar 22, 2018 12:30 PM CDT   Infusion 240 with  ONCOLOGY INFUSION,  30 ATC   [facility]    [address] [place] [address]  [address]  [facility] [address] [phone]            Mar 30, 2018 10:15 AM CDT   (Arrive by 10:00 AM)   RETURN WITH ROOM with Nay Smith GC,  2 114 CONSULT RM   [facility])    [address] [place] [address]  [address]  [facility] [id number] [phone]              Future tests that were ordered for you today     Open Future Orders        Priority Expected Expires Ordered    MRI Liver  w & w/o contrast* Routine  6/18/2018 3/20/2018            Who to contact     If you have questions or need follow up information about today's clinic visit or your schedule please contact [facility] directly at [phone].  Normal or non-critical lab and imaging results will be communicated to you by MyChart, letter or phone within 4 business days after the clinic has received the results. If you do not hear from us within 7  days, please contact the clinic through [employer] or phone. If you have a critical or abnormal lab result, we will notify you by phone as soon as possible.  Submit refill requests through [employer] or call your pharmacy and they will forward the refill request to us. Please allow 3 business days for your refill to be completed.          Additional Information About Your Visit        [employer] Information     [employer] gives you secure access to your electronic health record. If you see a primary care provider, you can also send messages to your care team and make appointments. If you have questions, please call your primary care clinic.  If you do not have a primary care provider, please call [phone] and they will assist you.        Care EveryWhere ID     This is your Care EveryWhere ID. This could be used by other organizations to access your [facility] medical records  [id number]         Blood Pressure from Last 3 Encounters:   03/02/18 110/67   02/23/18 119/75   03/30/06 118/78    Weight from Last 3 Encounters:   03/02/18 76.9 kg (169 lb 7 oz)   02/23/18 76.8 kg (169 lb 6.4 oz)   03/30/06 87.1 kg (192 lb)              Today, you had the following     No orders found for display       Primary Care Provider Fax #    Physician No Ref-Primary [phone]       No address on file        Equal Access to Services     SURESH CHAPARRO : Hadii aad ku hadasho Soomaali, waaxda luqadaha, qaybta kaalmada adeegyada, naseem lugo . So [facility] [phone].    ATENCIÓN: Si habla español, tiene a sparrow disposición servicios gratuitos de asistencia lingüística. Llame al [phone].    We comply with applicable federal civil rights laws and [place] laws. We do not discriminate on the basis of race, color, national origin, age, disability, sex, sexual orientation, or gender identity.            Thank you!     Thank you for choosing [facility] CANCER [facility]  for your care. Our goal is always to provide you  with excellent care. Hearing back from our patients is one way we can continue to improve our services. Please take a few minutes to complete the written survey that you may receive in the mail after your visit with us. Thank you!             Your Updated Medication List - Protect others around you: Learn how to safely use, store and throw away your medicines at www.disposemymeds.org.          This list is accurate as of 3/21/18  1:39 PM.  Always use your most recent med list.                   Brand Name Dispense Instructions for use Diagnosis    Biotin 1 MG Caps       Malignant neoplasm of left breast in female, estrogen receptor positive, unspecified site of breast (H)       levonorgestrel 20 MCG/24HR IUD    MIRENA     1 each by Intrauterine route    Malignant neoplasm of left breast in female, estrogen receptor positive, unspecified site of breast (H)       Spirulina 500 MG Tabs      6 Tabs daily.    Malignant neoplasm of left breast in female, estrogen receptor positive, unspecified site of breast (H)

## 2018-03-21 NOTE — PROGRESS NOTES
"  3/21/2018    Referring Provider: Valeria Gann MD    Presenting Information:   Mary (\"Omaira\") Farrukh returned to the Cancer Risk Management Program at the [facility] to discuss her genetic testing results. Her blood was drawn on 3/2/2018 and we ordered Cancer[facility] testing from [facility]. This testing was done because of her diagnosis of breast cancer and family history of colon and other cancers.  She is here alone today.    Omaira reported some updates to the family history today that she learned from family members. Relevant updates to family history:    Maternal aunt who she though had breast cancer, had ovarian cancer.    Maternal uncle had liver cancer which progressed quickly. He did not have stomach cancer.    Maternal grandfather did have stomach cancer.    A maternal cousin had two cancers. Omaira is unsure what cancers she had.    Paternal uncle's cancer is still unknown.    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 Specifically her mutation is called c.1187G>A (p.G396D). We discussed this mutation is associated with a slightly increased risk for colon cancer.  Some research has suggested that there is a small increased risk for breast cancer in individuals who have a mutation in this gene.  However, there are no guidelines for breast screening with this gene. No other mutations were found in the MUTYH gene.    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 Specifically her mutation is called c.638G>A (p.R213Q). 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 We discussed the impact of this testing on Omaira in detail.     When TP53 mutations are identified, the laboratory notes on each report that it is expected that the sample " from which the DNA was extracted would represent the patient s germline. It is not possible, however, to determine whether the results of testing truly represent the germline, or if the mutation is of somatic origin. Possible explanations for somatic mutations would be: clonal hematopoiesis, an effect of chemotherapy, hematologic malignancy/pre-malignancy, or circulating tumor cells.  I did call [employer] to verify coverage, and determine the heterozygosity rate. I spoke with Juwan. This region was covered at 433x, and the heterozygosity rate was 48%. This is consistent with a germline mutation, as the expected heterozygosity rate is around 50% (i.e. one allele has the mutation and the other allele does not). Mandeep is confident that this is very likely germline.  Omaira and I discussed that testing of another tissue type may be helpful in clarifying this, but would most likely be consistent with a germline TP53 mutation, and yield the same results. Therefore, in this case, further testing would be likely be of low benefit. Omaira was in agreement of this.    Of note, Omaira tested negative for mutations in the following genes: APC, FRANCES, BARD1, BRCA1, BRCA2, BRIP1, BMPR1A, CDH1, CDK4, CDKN2A, CHEK2, DICER1, EPCAM, HOXB13, GREM1, MLH1, MRE11A, MSH2, MSH6, NBN, NF1, PALB2, PMS2, POLD1, POLE, PTEN, RAD50, RAD51C, RAD51D, SMAD4, SMARCA4, and STK11. No mutations were found in these remaining 32 genes analyzed.  This test involved sequencing and deletion/duplication analysis of all genes with the exception of EPCAM and GREM1 (deletions only). Omaira cannot pass on a mutation in any of these 32 genes to her children based on this test result.  Mutations in these genes do not skip generations.      Testing did not detect an identifiable mutation associated with  Hereditary Breast and Ovarian Cancer syndrome (BRCA1, BRCA2), Charles syndrome (MLH1, MSH2, MSH6, PMS2, EPCAM), Familial Adenomatous Polyposis (APC), Hereditary Diffuse  "Gastric Cancer (CDH1), Familial Atypical Multiple Mole Melanoma syndrome (CDK4, CDKN2A), Juvenile Polyposis syndrome (BMPR1A, SMAD4), Cowden syndrome (PTEN), Peutz-Jeghers syndrome (STK11), or Neurofibromatosis type 1 (NF1).    A copy of the test report can be found in the Laboratory tab, dated 3/2/2018, and named \"SEND OUTS MISC TEST\". The report is scanned in as a linked document.    Li-Fraumeni Cancer Risks:  We discussed that Li-Fraumeni syndrome is associated with a greater than 90% lifetime cancer risk for women and greater than 70% lifetime cancer risk for men.  Individuals with Li-Fraumeni syndrome are at increased risk of developing multiple primary cancers in their lifetime. Research has suggested there may be as high as a 50% chance to develop a second cancer and approximately 33% chance to develop a third cancer. It is not currently clear how the age at first diagnosis, the type of first cancer, or cancer treatment may influence future cancer risks.    There are several \"core\" cancers that are most commonly seen with classic Li-Fraumeni syndrome and are included below. The exact lifetime risks for these cancers have not been established, though recent research has attempted to define these risks.  We discussed cancer risks as seen by a research study in 2016 (Cancer. 2016 Dec 1;122(77):1280-7274.):    Breast cancer: the lifetime breast cancer risk may be as high as 85%, compared to a 12% lifetime risk in the general population (Milagros et al 2016).    Soft-tissue sarcoma: the lifetime risk may be as high as 70% and 40% for women and men, respectively (Milagros et al 2016).    Brain tumors (astrocytomas, glioblastomas, medulloblastomas, choroid plexus carcinomas [CPC], etc.):  the lifetime risk may be as high as 20% and 30% for women and men, respectively (Milagros et al 2016).    Osteosarcomas:  the lifetime risk may be as high as 10% for both women and men (Milagros et al 2016).    Adrenocortical carcinomas (ACC):  " "typically a childhood cancer, though it can rarely be seen in adulthood    Leukemia - previous research suggested that acute leukemia was a \"core'\" cancer in Li-Fraumeni syndrome, though more recent data has suggested otherwise.    Several other cancers have also been seen in families with Li-Fraumeni syndrome, including melanoma (and other skin cancers), lymphoma, gastrointestinal (colorectal, pancreatic, stomach, etc.), kidney, gynecologic (uterine and ovarian), lung (particularly bronchoalveolar), and thyroid cancer. The exact lifetime risks for these cancers are unknown at this time.  Of note, additional information may also be available in the future that further clarifies cancer risks for individuals, like Omaira, who may not have a history consistent with classic Li-Fraumeni syndrome.    Li-Fraumeni Cancer Screening (NCCN v.1.2018, Angely et al., 2016, Jesse et al, 2017):    Comprehensive: Comprehensive physical examination and neurologic exam, repeated every 6 months    Adrenocortical carcinoma: Annual ultrasound of abdomen and pelvis    Leukemia: complete blood count, erythrocyte sedimentation rate,and  lactate dehydrogenase every 4 months.  Recent data suggests that this screening may not be necessary.    Melanoma: Annual dermatological exam    Brain tumors and sarcomas: Perform annual whole body MRI.  The brain may be examined with the whole body MRI or separately (annual).     Gastrointestinal cancer: Upper endoscopy and colonoscopy every 2-5 years, beginning at age 25 years, or 5 years before the earliest known colon cancer in the family (whichever is first)     Breast:    Breast awareness beginning at age 18    Begin clinical breast exam, repeated 6-12 months, starting at age 20, or at age of earliest diagnosed breast cancer in the family, if below 20y    Breast screening:    Annual breast MRI with contrast starting at age 20-29 (or at earliest age of diagnosis in the family, if <20y)    Annual " mammogram and breast MRI for individuals between the ages of 30-75    For individuals >75y, screening should be individualized     Consideration of prophylactic bilateral mastectomy    Avoid therapeutic radiation when possible. Decisions regarding radiation therapy should be discussed with Omaira's treating physician    Additional screening may be indicated based on family history     Pediatric Screening (<18y)    The child's pediatrician should be aware of the risk of childhood cancers    Comprehensive screen, as noted above (physical/neurological exam), every 3-4 months (change to every 6 months at age 18)    Adrenocortical carcinoma screening:    Ultrasound of abdomen and pelvis every 3-4 months (change to annually at age 18)     Complete urinalysis every 3-4 months    Blood tests every 4 months: a-human chorionic gonadotropin, alpha-fetoprotein, 17-OH-progesterone, testosterone, dehydroepiandrosterone sulfate, androstenedione    Screening for sarcoma, and brain tumors, as noted above    We discussed that Omaira could participate in our Cancer Risk Management Program in which our clinical nurse specialist provides an individual screening plan and assists with medical management. She was agreeable to this, and a referral was made to see REY Palacios, for this service.    Implications for Family Members - TP53:  We reviewed that mutations in TP53 are inherited in an autosomal dominant pattern. This means that each of mOaira's children have a 50% chance of inheriting the same mutation.  Likewise, each of her siblings also has a 50% risk of having the same mutation. We did discuss that sometimes, mutations in TP53 arise de khurram (i.e. new for the first time in an individual), but this cannot be determined from Omaira's results alone. Genetic testing would be indicated for her relatives on either side of the family, as we are not able to tell with certainty which side of the family the mutation is coming from. I am  happy to help her relatives connect with a genetic counselor in their area if they would like to discuss testing.  Omaira plans on having her sons tested soon, and information on appointments for the Cancer Risk Management Program in [facility] was provided. One of her sons still lives in [place], but may be coming here on spring break in a few weeks. She also plans to discuss testing with her sister and cousins.     MUTYH Carrier Cancer Risks and Screening  We reviewed that having only one MUTYH mutation means that person is a carrier for MAP.  Carriers of MAP have a very slightly increased risk of colon cancer (which could be as high as twice the population risk of 5-6%). We discussed that approximately 1 in 100 (1%) individuals in the general population are carriers of MAP (carry a single mutation in the MUTYH gene). Individuals with MAP (who have two MUTYH mutations) can have hundreds to thousands of polyps, but most have fewer than 100.  Individuals with MAP have up to an 80% risk of developing colon cancer by age 70 if not treated.  MAP is also associated with an increased risk of duodenal cancer.  Other benign growths have been reported in MAP.      MUTYH Cancer Screening and Prevention:  The following screening is recommended for individuals who have a mutation in the MUTYH gene:  The National Comprehensive Cancer Network (NCCN) recommends that individuals who have a first degree relative (parent, child, sibling) with colon cancer have a colonoscopy every five years starting at 40 (or 10 years before their relative's age of diagnosis, whichever is first).  NCCN states that it is uncertain if a specialized screening plan is necessary for those individuals who have do not have a family history of colon cancer. These guidelines may change with time.  It is important to note that the screening that Omaira will receive for her diagnosis of Li-Fraumeni is more comprehensive than the screening for MUTYH  heterozygotes.    Implications for Family Members - MUTYH:  We reviewed the autosomal recessive inheritance of MAP. Individuals with MAP have a mutation within both copies of the MUTYH gene (two mutations, one that was inherited from each parent). When both parents are carriers for MAP, they have a 25% chance of having a child who inherited two mutations (affected with MAP), a 50% chance of having a child who inherited one mutation (carriers of MAP, with small increased risk for colon cancer), and a 25% chance of having a child who inherited neither mutation (unaffected; not carriers). These chances apply to each pregnancy.     Testing for MUTYH mutations in the father of Omaira's children is available, which would be helpful in determining risk for their children.  If he  is a carrier, the above risks apply to their sons.  If he is not, their children could still carry the mutation found in Omaira.  If the father of Fatous children chooses not to have testing, her children should have full gene sequencing and deletion/duplication analysis of the MUTYH gene to best clarify their risk. We discussed that this testing could be addressed in adulthood, as screening would not start until later ages. Genetic counseling is also recommended for Omaira s siblings, who should also be tested for mutations in MUTYH, as it is possible that they could have MAP, or carry a mutation in the MUTYH gene that is different the one identified in Omaira.      Support Resources:  I provided Omaira with information on the Li-Fraumeni Association (https://www.lfsassociation.[email or web address]). A handout on Li-Fraumeni syndrome, which also includes other resources, was also provided today and can be found in the after-visit summary.     We also discussed how she has been coping, especially with moving to [place] from [place], and her recent cancer diagnosis. She reports that she has a very good support system, and is thankful for her friends who she is  "currently living with. She stated that she is concerned now about finding a new place to live, and whether or not she will be able to work during her cancer treatment. We discussed that some patients find it helpful to meet with a third party to discuss stressors related to their cancer diagnosis. We discussed that she could meet with our psychologist, Christian Franco, who specializes in meeting patients with cancer diagnoses. She was agreeable to this. I contacted Dr. Gann about making a referral.     Plan:  1.  I provided Omaira with a copy of her test results and support resources today.  2.  I will provide a \"Dear Relative\" letter for Omaira to share with her family members.  3.  She plans to follow up with Dr. Gann. She has appointment with her tomorrow.    If Omaira has additional questions, I encouraged her to contact me directly at [phone].     Time spent face to face: 40 minutes.    Nay Smith MS, [facility]  Licensed Genetic Counselor  P. [phone]  F. [phone]      "

## 2018-03-21 NOTE — LETTER
Cancer Risk Management  Program [facility]  [facility] Cancer [facility]  Mailing Address  Cancer Risk Management Program  [address]  [place] [address]    New patient appointments  [phone]  3/22/2018    Dear Relative:    The purpose of this letter is to inform you that your relative recently underwent genetic counseling and genetic testing due to the family history of cancer.  The testing done through [employer] identified a mutation in the TP53 gene, c.638G>A (p.R213Q). This testing also identified one mutation in the MUTYH gene, c.1187G>A (p.G396D).      Li-Fraumeni syndrome (LFS) is caused by mutations in the TP53 gene.    Having just one mutation in the TP53 gene is sufficient to cause LFS. Cancers associated with LFS include: sarcomas, breast cancer, brain cancer, leukemia, lymphoma, adrenocortical carcinoma, and others.  LFS is associated with a greater than 90% lifetime cancer risk for women, and greater than 70% lifetime cancer risk for men.  Individuals with LFS are at increased risk for developing multiple primary cancers.      If individuals are found to have a mutation in the TP53 gene, we would recommend increased cancer screening starting in childhood.  Risk reduction surgeries are also available options that can prevent the development of certain cancers.  Both men and women have a 50% chance of passing a TP53 mutation on to each of their children.    Mutations in MUTYH cause a condition called MAP (MUTYH-associated polyposis).    Having just one mutation in MUTYH means you are a carrier, and have a slightly increased risk for colon cancer.  Carriers of MAP have a very slight increased risk of colon cancer (which could be as high as twice the population risk of about 5%).     Having  two mutations in MUTYH means you have MAP.  Individuals with MAP can have hundreds to thousands of colon polyps, although most have fewer than 100 adenomatous polyps.  Individuals with MAP have up to an 80% risk of developing colon cancer by age 70 if not treated.  MAP is also associated with an increased risk of duodenal cancer.  Several other benign growths have been reported in MAP.    The risk to pass a MUTYH mutation on to children depends on if a parent has one or two MUTYH mutation(s).  The risks are different if one or both parents are carriers, not carriers, or have MAP themselves.    I understand that this may be surprising, unexpected, and even scary news.  Because this mutation has been identified in your family, you are at risk for having this same mutation (being a carrier for MAP), or having two mutations (having MAP due to this mutation and another mutation).  As mentioned earlier, your children may also be at risk.      Genetic testing for mutations in the MUTYH gene and TP53 can be done to determine your risk.  Depending on how you are related to other individuals in the family who have already undergone genetic testing, additional testing may be appropriate. Thus, I encourage you to contact me with questions or to schedule a genetic counseling appointment.    If you do not live in the [facility] area, I would be happy to provide you with contact information for genetic counselors in your city or state.  Genetic counselors can be found by visiting [email or web address].     Scheduling a genetic counseling appointment does not mean you have to undergo genetic testing.  The decision to pursue such testing is a very personal one that is discussed in more detail during the session.  Much of cancer genetic counseling is providing valuable information to individuals who are impacted by genetic information such as this.      Please feel free to contact me with any questions or  concerns.  I can be reached at [phone].    Sincerely,       Nay Smith MS, [facility]  Licensed Genetic Counselor  P. [phone]  F. [phone]

## 2018-03-21 NOTE — LETTER
"3/21/2018       RE: Mary Chadwick  [address]  [place] [facility] [address]     Dear Colleague,    Thank you for referring your patient, Mary Chadwick, to the [facility]. Please see a copy of my visit note below.      3/21/2018    Referring Provider: Valeria Gann MD    Presenting Information:   Mary (\"Omaira\") Farrukh returned to the Cancer Risk Management Program at the [facility] to discuss her genetic testing results. Her blood was drawn on 3/2/2018 and we ordered [facility] testing from [facility]. This testing was done because of her diagnosis of breast cancer and family history of colon and other cancers.  She is here alone today.    Omaira reported some updates to the family history today that she learned from family members. Relevant updates to family history:    Maternal aunt who she though had breast cancer, had ovarian cancer.    Maternal uncle had liver cancer which progressed quickly. He did not have stomach cancer.    Maternal grandfather did have stomach cancer.    A maternal cousin had two cancers. Omaira is unsure what cancers she had.    Paternal uncle's cancer is still unknown.    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 Specifically her mutation is called c.[id number]>A (p.G396D). We discussed this mutation is associated with a slightly increased risk for colon cancer.  Some research has suggested that there is a small increased risk for breast cancer in individuals who have a mutation in this gene.  However, there are no guidelines for breast screening with this gene. No other mutations were found in the MUTYH gene.    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 Specifically her mutation is called c.638G>A (p.R213Q). 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 We discussed the impact of this testing on Omaira in detail.     When TP53 mutations are identified, the laboratory notes on each report that it is expected that the sample from which the DNA was extracted would represent the patient s germline. It is not possible, however, to determine whether the results of testing truly represent the germline, or if the mutation is of somatic origin. Possible explanations for somatic mutations would be: clonal hematopoiesis, an effect of chemotherapy, hematologic malignancy/pre-malignancy, or circulating tumor cells.  I did call [email or web address] to verify coverage, and determine the heterozygosity rate. I spoke with Juwan. This region was covered at 433x, and the heterozygosity rate was 48%. This is consistent with a germline mutation, as the expected heterozygosity rate is around 50% (i.e. one allele has the mutation and the other allele does not). Mandeep is confident that this is very likely germline.  Omaira and I discussed that testing of another tissue type may be helpful in clarifying this, but would most likely be consistent with a germline TP53 mutation, and yield the same results. Therefore, in this case, further testing would be likely be of low benefit. Omaira was in agreement of this.    Of note, Omaira tested negative for mutations in the following genes: APC, FRANCES, BARD1, BRCA1, BRCA2, BRIP1, BMPR1A, CDH1, CDK4, CDKN2A, CHEK2, DICER1, EPCAM, HOXB13, GREM1, MLH1, MRE11A, MSH2, MSH6, NBN, NF1, PALB2, PMS2, POLD1, POLE, PTEN, RAD50, RAD51C, RAD51D, SMAD4, SMARCA4, and STK11. No mutations were found in these remaining 32 genes analyzed.  This test involved sequencing and deletion/duplication analysis of all genes with the exception of EPCAM and GREM1 (deletions only). Omaira cannot pass on a mutation in any of these 32 genes to her children based on this test result.  Mutations in these genes do not skip  "generations.      Testing did not detect an identifiable mutation associated with  Hereditary Breast and Ovarian Cancer syndrome (BRCA1, BRCA2), Charles syndrome (MLH1, MSH2, MSH6, PMS2, EPCAM), Familial Adenomatous Polyposis (APC), Hereditary Diffuse Gastric Cancer (CDH1), Familial Atypical Multiple Mole Melanoma syndrome (CDK4, CDKN2A), Juvenile Polyposis syndrome (BMPR1A, SMAD4), Cowden syndrome (PTEN), Peutz-Jeghers syndrome (STK11), or Neurofibromatosis type 1 (NF1).    A copy of the test report can be found in the Laboratory tab, dated 3/2/2018, and named \"SEND OUTS [facility] TEST\". The report is scanned in as a linked document.    Li-Fraumeni Cancer Risks:  We discussed that Li-Fraumeni syndrome is associated with a greater than 90% lifetime cancer risk for women and greater than 70% lifetime cancer risk for men.  Individuals with Li-Fraumeni syndrome are at increased risk of developing multiple primary cancers in their lifetime. Research has suggested there may be as high as a 50% chance to develop a second cancer and approximately 33% chance to develop a third cancer. It is not currently clear how the age at first diagnosis, the type of first cancer, or cancer treatment may influence future cancer risks.    There are several \"core\" cancers that are most commonly seen with classic Li-Fraumeni syndrome and are included below. The exact lifetime risks for these cancers have not been established, though recent research has attempted to define these risks.  We discussed cancer risks as seen by a research study in 2016 (Cancer. 2016 Dec 1;122(24):4452-6773.):    Breast cancer: the lifetime breast cancer risk may be as high as 85%, compared to a 12% lifetime risk in the general population (Milagros et al 2016).    Soft-tissue sarcoma: the lifetime risk may be as high as 70% and 40% for women and men, respectively (Milagros et al 2016).    Brain tumors (astrocytomas, glioblastomas, medulloblastomas, choroid plexus carcinomas [CPC], " "etc.):  the lifetime risk may be as high as 20% and 30% for women and men, respectively (Milagros et al 2016).    Osteosarcomas:  the lifetime risk may be as high as 10% for both women and men (Milagros et al 2016).    Adrenocortical carcinomas (ACC):  typically a childhood cancer, though it can rarely be seen in adulthood    Leukemia - previous research suggested that acute leukemia was a \"core'\" cancer in Li-Fraumeni syndrome, though more recent data has suggested otherwise.    Several other cancers have also been seen in families with Li-Fraumeni syndrome, including melanoma (and other skin cancers), lymphoma, gastrointestinal (colorectal, pancreatic, stomach, etc.), kidney, gynecologic (uterine and ovarian), lung (particularly bronchoalveolar), and thyroid cancer. The exact lifetime risks for these cancers are unknown at this time.  Of note, additional information may also be available in the future that further clarifies cancer risks for individuals, like Omaira, who may not have a history consistent with classic Li-Fraumeni syndrome.    Li-Fraumeni Cancer Screening (NCCN v.1.2018, Angely et al., 2016, Jesse et al, 2017):    Comprehensive: Comprehensive physical examination and neurologic exam, repeated every 6 months    Adrenocortical carcinoma: Annual ultrasound of abdomen and pelvis    Leukemia: complete blood count, erythrocyte sedimentation rate,and  lactate dehydrogenase every 4 months.  Recent data suggests that this screening may not be necessary.    Melanoma: Annual dermatological exam    Brain tumors and sarcomas: Perform annual whole body MRI.  The brain may be examined with the whole body MRI or separately (annual).     Gastrointestinal cancer: Upper endoscopy and colonoscopy every 2-5 years, beginning at age 25 years, or 5 years before the earliest known colon cancer in the family (whichever is first)     Breast:    Breast awareness beginning at age 18    Begin clinical breast exam, repeated 6-12 months, " starting at age 20, or at age of earliest diagnosed breast cancer in the family, if below 20y    Breast screening:    Annual breast MRI with contrast starting at age 20-29 (or at earliest age of diagnosis in the family, if <20y)    Annual mammogram and breast MRI for individuals between the ages of 30-75    For individuals >75y, screening should be individualized     Consideration of prophylactic bilateral mastectomy    Avoid therapeutic radiation when possible. Decisions regarding radiation therapy should be discussed with Omaira's treating physician    Additional screening may be indicated based on family history     Pediatric Screening (<18y)    The child's pediatrician should be aware of the risk of childhood cancers    Comprehensive screen, as noted above (physical/neurological exam), every 3-4 months (change to every 6 months at age 18)    Adrenocortical carcinoma screening:    Ultrasound of abdomen and pelvis every 3-4 months (change to annually at age 18)     Complete urinalysis every 3-4 months    Blood tests every 4 months: a-human chorionic gonadotropin, alpha-fetoprotein, 17-OH-progesterone, testosterone, dehydroepiandrosterone sulfate, androstenedione    Screening for sarcoma, and brain tumors, as noted above    We discussed that Omaira could participate in our Cancer Risk Management Program in which our clinical nurse specialist provides an individual screening plan and assists with medical management. She was agreeable to this, and a referral was made to see REY Palacios, for this service.    Implications for Family Members - TP53:  We reviewed that mutations in TP53 are inherited in an autosomal dominant pattern. This means that each of Omaira's children have a 50% chance of inheriting the same mutation.  Likewise, each of her siblings also has a 50% risk of having the same mutation. We did discuss that sometimes, mutations in TP53 arise de khurram (i.e. new for the first time in an individual),  but this cannot be determined from Omiara's results alone. Genetic testing would be indicated for her relatives on either side of the family, as we are not able to tell with certainty which side of the family the mutation is coming from. I am happy to help her relatives connect with a genetic counselor in their area if they would like to discuss testing.  Omaira plans on having her sons tested soon, and information on appointments for the Cancer Risk Management Program in [facility] was provided. One of her sons still lives in [place], but may be coming here on spring break in a few weeks. She also plans to discuss testing with her sister and cousins.     MUTYH Carrier Cancer Risks and Screening  We reviewed that having only one MUTYH mutation means that person is a carrier for MAP.  Carriers of MAP have a very slightly increased risk of colon cancer (which could be as high as twice the population risk of 5-6%). We discussed that approximately 1 in 100 (1%) individuals in the general population are carriers of MAP (carry a single mutation in the MUTYH gene). Individuals with MAP (who have two MUTYH mutations) can have hundreds to thousands of polyps, but most have fewer than 100.  Individuals with MAP have up to an 80% risk of developing colon cancer by age 70 if not treated.  MAP is also associated with an increased risk of duodenal cancer.  Other benign growths have been reported in MAP.      MUTYH Cancer Screening and Prevention:  The following screening is recommended for individuals who have a mutation in the MUTYH gene:  The National Comprehensive Cancer Network (NCCN) recommends that individuals who have a first degree relative (parent, child, sibling) with colon cancer have a colonoscopy every five years starting at 40 (or 10 years before their relative's age of diagnosis, whichever is first).  NCCN states that it is uncertain if a specialized screening plan is necessary for those individuals who  have do not have a family history of colon cancer. These guidelines may change with time.  It is important to note that the screening that Omaira will receive for her diagnosis of Li-Fraumeni is more comprehensive than the screening for MUTYH heterozygotes.    Implications for Family Members - MUTLYRIC:  We reviewed the autosomal recessive inheritance of MAP. Individuals with MAP have a mutation within both copies of the MUTYH gene (two mutations, one that was inherited from each parent). When both parents are carriers for MAP, they have a 25% chance of having a child who inherited two mutations (affected with MAP), a 50% chance of having a child who inherited one mutation (carriers of MAP, with small increased risk for colon cancer), and a 25% chance of having a child who inherited neither mutation (unaffected; not carriers). These chances apply to each pregnancy.     Testing for MUTYH mutations in the father of Omaira's children is available, which would be helpful in determining risk for their children.  If he  is a carrier, the above risks apply to their sons.  If he is not, their children could still carry the mutation found in Omaira.  If the father of Omaira's children chooses not to have testing, her children should have full gene sequencing and deletion/duplication analysis of the MUTYH gene to best clarify their risk. We discussed that this testing could be addressed in adulthood, as screening would not start until later ages. Genetic counseling is also recommended for Omaira s siblings, who should also be tested for mutations in MUTYH, as it is possible that they could have MAP, or carry a mutation in the MUTYH gene that is different the one identified in Omaira.      Support Resources:  I provided Omaira with information on the Li-Fraumeni Association (https://www.lfsassociation.org/). A handout on Li-Fraumeni syndrome, which also includes other resources, was also provided today and can be found in the after-visit  "summary.     We also discussed how she has been coping, especially with moving to [place] from [place], and her recent cancer diagnosis. She reports that she has a very good support system, and is thankful for her friends who she is currently living with. She stated that she is concerned now about finding a new place to live, and whether or not she will be able to work during her cancer treatment. We discussed that some patients find it helpful to meet with a third party to discuss stressors related to their cancer diagnosis. We discussed that she could meet with our psychologist, Christian Franco, who specializes in meeting patients with cancer diagnoses. She was agreeable to this. I contacted Dr. Gann about making a referral.     Plan:  1.  I provided Omaira with a copy of her test results and support resources today.  2.  I will provide a \"Dear Relative\" letter for Omaira to share with her family members.  3.  She plans to follow up with Dr. Gann. She has appointment with her tomorrow.    If Omaira has additional questions, I encouraged her to contact me directly at [phone].     Time spent face to face: 40 minutes.    Nay Smith MS, [facility]  Licensed Genetic Counselor  P. [phone]  F. [phone]      "

## 2018-03-21 NOTE — LETTER
"    Cancer Risk Management  Program Locations    [facility]  [facility]  [facility]  [facility] [place] [facility]  Mailing Address  Cancer Risk Management Program  [facility]  [address]  [facility] [address]  [place] [address]    New patient appointments  [phone]  March 22, 2018    Mary Chadwick  [address]  [facility] [address]      Dear Mary,    It was a pleasure meeting with you at the [facility] on 3/21/2018.  Here is a copy of the progress note from your recent genetic counseling visit to the Cancer Risk Management Program.  If you have any additional questions, please feel free to call.    Referring Provider: Valeria Gann MD    Presenting Information:   Mary (\"Omaira\") Farrukh returned to the Cancer Risk Management Program at the [facility] to discuss her genetic testing results. Her blood was drawn on 3/2/2018 and we ordered Cancer[employer] testing from [employer]. This testing was done because of her diagnosis of breast cancer and family history of colon and other cancers.  She is here alone today.    Omaira reported some updates to the family history today that she learned from family members. Relevant updates to family history:    Maternal aunt who she though had breast cancer, had ovarian cancer.    Maternal uncle had liver cancer which progressed quickly. He did not have stomach cancer.    Maternal grandfather did have stomach cancer.    A maternal cousin had two cancers. Omaira is unsure what cancers she had.    Paternal uncle's cancer is still unknown.    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 Specifically her mutation is called c.[id number]>A (p.G396D). We discussed this mutation is associated with a " slightly increased risk for colon cancer.  Some research has suggested that there is a small increased risk for breast cancer in individuals who have a mutation in this gene.  However, there are no guidelines for breast screening with this gene. No other mutations were found in the MUTYH gene.    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 Specifically her mutation is called c.638G>A (p.R213Q). 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 We discussed the impact of this testing on Omaira in detail.     When TP53 mutations are identified, the laboratory notes on each report that it is expected that the sample from which the DNA was extracted would represent the patient s germline. It is not possible, however, to determine whether the results of testing truly represent the germline, or if the mutation is of somatic origin. Possible explanations for somatic mutations would be: clonal hematopoiesis, an effect of chemotherapy, hematologic malignancy/pre-malignancy, or circulating tumor cells.  I did call [employer] to verify coverage, and determine the heterozygosity rate. I spoke with Juwan. This region was covered at 433x, and the heterozygosity rate was 48%. This is consistent with a germline mutation, as the expected heterozygosity rate is around 50% (i.e. one allele has the mutation and the other allele does not). Mandeep is confident that this is very likely germline.  Omaira and I discussed that testing of another tissue type may be helpful in clarifying this, but would most likely be consistent with a germline TP53 mutation, and yield the same results. Therefore, in this case, further testing would be likely be of low benefit. Omaira was in agreement of this.    Of note, Omaira tested negative for mutations in the following genes: APC, FRANCES, BARD1, BRCA1, BRCA2, BRIP1,  "BMPR1A, CDH1, CDK4, CDKN2A, CHEK2, DICER1, EPCAM, HOXB13, GREM1, MLH1, MRE11A, MSH2, MSH6, NBN, NF1, PALB2, PMS2, POLD1, POLE, PTEN, RAD50, RAD51C, RAD51D, SMAD4, SMARCA4, and STK11. No mutations were found in these remaining 32 genes analyzed.  This test involved sequencing and deletion/duplication analysis of all genes with the exception of EPCAM and GREM1 (deletions only). Omaira cannot pass on a mutation in any of these 32 genes to her children based on this test result.  Mutations in these genes do not skip generations.      Testing did not detect an identifiable mutation associated with  Hereditary Breast and Ovarian Cancer syndrome (BRCA1, BRCA2), Charles syndrome (MLH1, MSH2, MSH6, PMS2, EPCAM), Familial Adenomatous Polyposis (APC), Hereditary Diffuse Gastric Cancer (CDH1), Familial Atypical Multiple Mole Melanoma syndrome (CDK4, CDKN2A), Juvenile Polyposis syndrome (BMPR1A, SMAD4), Cowden syndrome (PTEN), Peutz-Jeghers syndrome (STK11), or Neurofibromatosis type 1 (NF1).    A copy of the test report can be found in the Laboratory tab, dated 3/2/2018, and named \"[facility]\". The report is scanned in as a linked document.    Li-Fraumeni Cancer Risks:  We discussed that Li-Fraumeni syndrome is associated with a greater than 90% lifetime cancer risk for women and greater than 70% lifetime cancer risk for men.  Individuals with Li-Fraumeni syndrome are at increased risk of developing multiple primary cancers in their lifetime. Research has suggested there may be as high as a 50% chance to develop a second cancer and approximately 33% chance to develop a third cancer. It is not currently clear how the age at first diagnosis, the type of first cancer, or cancer treatment may influence future cancer risks.    There are several \"core\" cancers that are most commonly seen with classic Li-Fraumeni syndrome and are included below. The exact lifetime risks for these cancers have not been established, though recent " "research has attempted to define these risks.  We discussed cancer risks as seen by a research study in 2016 (Cancer. 2016 Dec 1;122(43):5536-9761.):    Breast cancer: the lifetime breast cancer risk may be as high as 85%, compared to a 12% lifetime risk in the general population (Milagros et al 2016).    Soft-tissue sarcoma: the lifetime risk may be as high as 70% and 40% for women and men, respectively (Milagros et al 2016).    Brain tumors (astrocytomas, glioblastomas, medulloblastomas, choroid plexus carcinomas [CPC], etc.):  the lifetime risk may be as high as 20% and 30% for women and men, respectively (Milagros et al 2016).    Osteosarcomas:  the lifetime risk may be as high as 10% for both women and men (Milagros et al 2016).    Adrenocortical carcinomas (ACC):  typically a childhood cancer, though it can rarely be seen in adulthood    Leukemia - previous research suggested that acute leukemia was a \"core'\" cancer in Li-Fraumeni syndrome, though more recent data has suggested otherwise.    Several other cancers have also been seen in families with Li-Fraumeni syndrome, including melanoma (and other skin cancers), lymphoma, gastrointestinal (colorectal, pancreatic, stomach, etc.), kidney, gynecologic (uterine and ovarian), lung (particularly bronchoalveolar), and thyroid cancer. The exact lifetime risks for these cancers are unknown at this time.  Of note, additional information may also be available in the future that further clarifies cancer risks for individuals, like Omaira, who may not have a history consistent with classic Li-Fraumeni syndrome.    Li-Fraumeni Cancer Screening (NCCN v.1.2018, Angely et al., 2016, Jesse et al, 2017):    Comprehensive: Comprehensive physical examination and neurologic exam, repeated every 6 months    Adrenocortical carcinoma: Annual ultrasound of abdomen and pelvis    Leukemia: complete blood count, erythrocyte sedimentation rate,and  lactate dehydrogenase every 4 months.  Recent data suggests " that this screening may not be necessary.    Melanoma: Annual dermatological exam    Brain tumors and sarcomas: Perform annual whole body MRI.  The brain may be examined with the whole body MRI or separately (annual).     Gastrointestinal cancer: Upper endoscopy and colonoscopy every 2-5 years, beginning at age 25 years, or 5 years before the earliest known colon cancer in the family (whichever is first)     Breast:    Breast awareness beginning at age 18    Begin clinical breast exam, repeated 6-12 months, starting at age 20, or at age of earliest diagnosed breast cancer in the family, if below 20y    Breast screening:    Annual breast MRI with contrast starting at age 20-29 (or at earliest age of diagnosis in the family, if <20y)    Annual mammogram and breast MRI for individuals between the ages of 30-75    For individuals >75y, screening should be individualized     Consideration of prophylactic bilateral mastectomy    Avoid therapeutic radiation when possible. Decisions regarding radiation therapy should be discussed with Omaira's treating physician    Additional screening may be indicated based on family history     Pediatric Screening (<18y)    The child's pediatrician should be aware of the risk of childhood cancers    Comprehensive screen, as noted above (physical/neurological exam), every 3-4 months (change to every 6 months at age 18)    Adrenocortical carcinoma screening:    Ultrasound of abdomen and pelvis every 3-4 months (change to annually at age 18)     Complete urinalysis every 3-4 months    Blood tests every 4 months: a-human chorionic gonadotropin, alpha-fetoprotein, 17-OH-progesterone, testosterone, dehydroepiandrosterone sulfate, androstenedione    Screening for sarcoma, and brain tumors, as noted above    We discussed that Omaira could participate in our Cancer Risk Management Program in which our clinical nurse specialist provides an individual screening plan and assists with medical management.  She was agreeable to this, and a referral was made to see REY Palacios, for this service.    Implications for Family Members - TP53:  We reviewed that mutations in TP53 are inherited in an autosomal dominant pattern. This means that each of Omaira's children have a 50% chance of inheriting the same mutation.  Likewise, each of her siblings also has a 50% risk of having the same mutation. We did discuss that sometimes, mutations in TP53 arise de khurram (i.e. new for the first time in an individual), but this cannot be determined from Omaira's results alone. Genetic testing would be indicated for her relatives on either side of the family, as we are not able to tell with certainty which side of the family the mutation is coming from. I am happy to help her relatives connect with a genetic counselor in their area if they would like to discuss testing.  Omaira plans on having her sons tested soon, and information on appointments for the Cancer Risk Management Program in [facility] [address] [facility] was provided. One of her sons still lives in [place], but may be coming here on spring break in a few weeks. She also plans to discuss testing with her sister and cousins.     MUTYH Carrier Cancer Risks and Screening  We reviewed that having only one MUTYH mutation means that person is a carrier for MAP.  Carriers of MAP have a very slightly increased risk of colon cancer (which could be as high as twice the population risk of 5-6%). We discussed that approximately 1 in 100 (1%) individuals in the general population are carriers of MAP (carry a single mutation in the MUTYH gene). Individuals with MAP (who have two MUTYH mutations) can have hundreds to thousands of polyps, but most have fewer than 100.  Individuals with MAP have up to an 80% risk of developing colon cancer by age 70 if not treated.  MAP is also associated with an increased risk of duodenal cancer.  Other benign growths have been reported in MAP.      MUTYH  Cancer Screening and Prevention:  The following screening is recommended for individuals who have a mutation in the MUTYH gene:  The National Comprehensive Cancer Network (NCCN) recommends that individuals who have a first degree relative (parent, child, sibling) with colon cancer have a colonoscopy every five years starting at 40 (or 10 years before their relative's age of diagnosis, whichever is first).  NCCN states that it is uncertain if a specialized screening plan is necessary for those individuals who have do not have a family history of colon cancer. These guidelines may change with time.  It is important to note that the screening that Omaira will receive for her diagnosis of Li-Fraumeni is more comprehensive than the screening for MUTYH heterozygotes.    Implications for Family Members - MUTYH:  We reviewed the autosomal recessive inheritance of MAP. Individuals with MAP have a mutation within both copies of the MUTYH gene (two mutations, one that was inherited from each parent). When both parents are carriers for MAP, they have a 25% chance of having a child who inherited two mutations (affected with MAP), a 50% chance of having a child who inherited one mutation (carriers of MAP, with small increased risk for colon cancer), and a 25% chance of having a child who inherited neither mutation (unaffected; not carriers). These chances apply to each pregnancy.     Testing for MUTYH mutations in the father of Omaira's children is available, which would be helpful in determining risk for their children.  If he  is a carrier, the above risks apply to their sons.  If he is not, their children could still carry the mutation found in Omaira.  If the father of Omaira's children chooses not to have testing, her children should have full gene sequencing and deletion/duplication analysis of the MUTYH gene to best clarify their risk. We discussed that this testing could be addressed in adulthood, as screening would not start  "until later ages. Genetic counseling is also recommended for Omaira s siblings, who should also be tested for mutations in MUTYH, as it is possible that they could have MAP, or carry a mutation in the MUTYH gene that is different the one identified in Omaira.      Support Resources:  I provided Omaira with information on the Li-Fraumeni Association (https://www.lfsassociation.org/). A handout on Li-Fraumeni syndrome, which also includes other resources, was also provided today and can be found in the after-visit summary.     We also discussed how she has been coping, especially with moving to [place] from [place], and her recent cancer diagnosis. She reports that she has a very good support system, and is thankful for her friends who she is currently living with. She stated that she is concerned now about finding a new place to live, and whether or not she will be able to work during her cancer treatment. We discussed that some patients find it helpful to meet with a third party to discuss stressors related to their cancer diagnosis. We discussed that she could meet with our psychologist, Christian Franco, who specializes in meeting patients with cancer diagnoses. She was agreeable to this. I contacted Dr. Gann about making a referral.     Plan:  1.  I provided Omaira with a copy of her test results and support resources today.  2.  I will provide a \"Dear Relative\" letter for Omaira to share with her family members.  3.  She plans to follow up with Dr. Gann. She has appointment with her tomorrow.    If Omaira has additional questions, I encouraged her to contact me directly at [phone].     Time spent face to face: 40 minutes.    Nay Smith MS, [facility]  Licensed Genetic Counselor  P. [phone]  F. [phone]    "

## 2018-03-22 ENCOUNTER — ALLIED HEALTH/NURSE VISIT (OUTPATIENT)
Dept: ONCOLOGY | Facility: CLINIC | Age: 43
End: 2018-03-22
Attending: INTERNAL MEDICINE
Payer: COMMERCIAL

## 2018-03-22 ENCOUNTER — APPOINTMENT (OUTPATIENT)
Dept: LAB | Facility: CLINIC | Age: 43
End: 2018-03-22
Attending: INTERNAL MEDICINE
Payer: COMMERCIAL

## 2018-03-22 VITALS
DIASTOLIC BLOOD PRESSURE: 69 MMHG | WEIGHT: 170.5 LBS | BODY MASS INDEX: 25.84 KG/M2 | HEIGHT: 68 IN | TEMPERATURE: 98 F | SYSTOLIC BLOOD PRESSURE: 103 MMHG | HEART RATE: 74 BPM | OXYGEN SATURATION: 100 % | RESPIRATION RATE: 16 BRPM

## 2018-03-22 DIAGNOSIS — C50.212 MALIGNANT NEOPLASM OF UPPER-INNER QUADRANT OF LEFT BREAST IN FEMALE, ESTROGEN RECEPTOR POSITIVE (H): Primary | ICD-10-CM

## 2018-03-22 DIAGNOSIS — Z15.01 LI-FRAUMENI SYNDROME: ICD-10-CM

## 2018-03-22 DIAGNOSIS — Z17.0 MALIGNANT NEOPLASM OF UPPER-OUTER QUADRANT OF LEFT BREAST IN FEMALE, ESTROGEN RECEPTOR POSITIVE (H): Primary | ICD-10-CM

## 2018-03-22 DIAGNOSIS — C50.412 MALIGNANT NEOPLASM OF UPPER-OUTER QUADRANT OF LEFT BREAST IN FEMALE, ESTROGEN RECEPTOR POSITIVE (H): Primary | ICD-10-CM

## 2018-03-22 DIAGNOSIS — Z17.0 MALIGNANT NEOPLASM OF UPPER-INNER QUADRANT OF LEFT BREAST IN FEMALE, ESTROGEN RECEPTOR POSITIVE (H): Primary | ICD-10-CM

## 2018-03-22 PROCEDURE — 25000128 H RX IP 250 OP 636: Mod: ZF | Performed by: INTERNAL MEDICINE

## 2018-03-22 PROCEDURE — 99215 OFFICE O/P EST HI 40 MIN: CPT | Mod: ZP | Performed by: INTERNAL MEDICINE

## 2018-03-22 PROCEDURE — 25000125 ZZHC RX 250: Mod: ZF | Performed by: INTERNAL MEDICINE

## 2018-03-22 PROCEDURE — 96402 CHEMO HORMON ANTINEOPL SQ/IM: CPT

## 2018-03-22 PROCEDURE — G0463 HOSPITAL OUTPT CLINIC VISIT: HCPCS | Mod: ZF,25

## 2018-03-22 RX ADMIN — GOSERELIN ACETATE 3.6 MG: 3.6 IMPLANT SUBCUTANEOUS at 13:21

## 2018-03-22 RX ADMIN — LIDOCAINE HYDROCHLORIDE 2 ML: 10 INJECTION, SOLUTION EPIDURAL; INFILTRATION; INTRACAUDAL; PERINEURAL at 13:17

## 2018-03-22 ASSESSMENT — PAIN SCALES - GENERAL: PAINLEVEL: NO PAIN (0)

## 2018-03-22 NOTE — MR AVS SNAPSHOT
After Visit Summary   3/22/2018    Mary Chadwick    MRN: [id number]           Patient Information     Date Of Birth          1975        Visit Information        Provider Department      3/22/2018 1:00 PM Nurse,  Oncology [facility]        Today's Diagnoses     Malignant neoplasm of upper-outer quadrant of left breast in female, estrogen receptor positive (H)    -  1      Care Instructions    Contact Numbers    [facility] Main Line: [phone]  [facility] Triage:  [phone]    Call triage with chills and/or temperature greater than or equal to 100.5, uncontrolled nausea/vomiting, diarrhea, constipation, dizziness, shortness of breath, chest pain, bleeding, unexplained bruising, or any new/concerning symptoms, questions/concerns.     If you are having any concerning symptoms or wish to speak to a provider before your next infusion visit, please call your care coordinator or triage to notify them so we can adequately serve you.       After Hours: [phone]    If after hours, weekends, or holidays, call main hospital  and ask for Oncology doctor on call.           March 2018 Sunday Monday Tuesday Wednesday Thursday Friday Saturday                       1     2     [facility] WITH ROOM    7:45 AM   (75 min.)   Nay Smith GC   [facility] LAB DRAW    8:45 AM   (15 min.)   [facility]   [facility] Lab Draw     [facility]    9:00 AM   (45 min.)   Antonio Andrews MD   [facility] 3       4     5     MR BREAST BILATERAL WWO    7:45 AM   (90 min.)   [id number]   [facility]     [facility] NURSE VISIT   10:00 AM   (10 min.)   Nurse,  Oncology   [facility] 6     [facility] LAB DRAW   10:30 AM   (15 min.)   [facility] DRAW   [facility] Lab Draw     US BREAST BX CORE NEEDLE LEFT   11:00 AM   (50 min.)   [email or web address]   [facility] Imaging     US BREAST RT  LMT 1-3 QUAD   11:35 AM   (30 min.)   [id number]   [facility] Imaging     US BREAST BX CORE NEEDLE RIGHT   11:45 AM   (50 min.)   [email or web address]   [facility] Imaging     MA POST PROCEDURE RIGHT   12:30 PM   (10 min.)   [email or web address]   [facility] Imaging 7     8     9     10       11     12     13     14     15     16     17       18     19     20     CT CHEST/ABDOMEN/PELVIS W    7:05 AM   (20 min.)   UCCT2   [facility] CT 21     [facility] RETURN WITH ROOM    1:00 PM   (60 min.)   Nay Smith GC   [facility] 22     [facility] LAB DRAW   10:30 AM   (15 min.)   [facility] LAB DRAW   [facility] Lab Draw     [facility] RETURN   10:45 AM   (30 min.)   Valeria Gann MD   [facility] INJECTION   12:45 PM   (30 min.)   Nurse,  Oncology Injection   [facility] 23     24       25     26     27     28     MR ABDOMEN WWO    2:45 PM   (60 min.)   UUMR1   [facility] [place], MRI 29     30     [facility] NEW   11:15 AM   (60 min.)   Shelli Díaz APRN CNS   [facility] 31 April 2018 Sunday Monday Tuesday Wednesday Thursday Friday Saturday   1     2     3     4     5     6     7       8     9     10     11     12     13     14       15     16     17     18     19     20     21       22     23     24     25     26     27     28       29     30                                        No results found for this or any previous visit (from the past 24 hour(s)).              Follow-ups after your visit        Your next 10 appointments already scheduled     Mar 28, 2018  3:00 PM CDT   (Arrive by 2:45 PM)   MR ABDOMEN W/O & W CONTRAST with UUMR1   [facility] [place], MRI ([facility] [place])    [address] [address] [phone]           Take your medicines as usual, unless your doctor tells you not to. Bring a list of your current  medicines to your exam (including vitamins, minerals and over-the-counter drugs). Also bring the results of similar scans you may have had.    You may or may not receive IV contrast for this exam pending the discretion of the Radiologist.   Do not eat or drink for 6 hours prior to exam.  The MRI machine uses a strong magnet. Please wear clothes without metal (snaps, zippers). A sweatsuit works well, or we may give you a hospital gown.  Please remove any body piercings and hair extensions before you arrive. You will also remove watches, jewelry, hairpins, wallets, dentures, partial dental plates and hearing aids. You may wear contact lenses, and you may be able to wear your rings. We have a safe place to keep your personal items, but it is safer to leave them at home.  **IMPORTANT** THE INSTRUCTIONS BELOW ARE ONLY FOR THOSE PATIENTS WHO HAVE BEEN PRESCRIBED SEDATION OR GENERAL ANESTHESIA DURING THEIR MRI PROCEDURE:  IF YOUR DOCTOR PRESCRIBED ORAL SEDATION (take medicine to help you relax during your exam):   You must get the medicine from your doctor (oral medication) before you arrive. Bring the medicine to the exam. Do not take it at home. You ll be told when to take it upon arriving for your exam.   Arrive one hour early. Bring someone who can take you home after the test. Your medicine will make you sleepy. After the exam, you may not drive, take a bus or take a taxi by yourself.  IF YOUR DOCTOR PRESCRIBED IV SEDATION:   Arrive one hour early. Bring someone who can take you home after the test. Your medicine will make you sleepy. After the exam, you may not drive, take a bus or take a taxi by yourself.   No eating 6 hours before your exam. You may have clear liquids up until 4 hours before your exam. (Clear liquids include water, clear tea, black coffee and fruit juice without pulp.)  IF YOUR DOCTOR PRESCRIBED ANESTHESIA (be asleep for your exam):   Arrive 1 1/2 hours early. Bring someone who can take you home  after the test. You may not drive, take a bus or take a taxi by yourself.   No eating 8 hours before your exam. You may have clear liquids up until 4 hours before your exam. (Clear liquids include water, clear tea, black coffee and fruit juice without pulp.)   You will spend four to five hours in the recovery room.  If you have any questions, please contact your Imaging Department exam site.            Mar 30, 2018 11:30 AM CDT   (Arrive by 11:15 AM)   New Patient Visit with REY Johnson   [facility] ([email or web address] and Surgery [place])    [address]  [address]  [place] [address] [phone]              Who to contact     If you have questions or need follow up information about today's clinic visit or your schedule please contact [facility] directly at [phone].  Normal or non-critical lab and imaging results will be communicated to you by [employer], letter or phone within 4 business days after the clinic has received the results. If you do not hear from us within 7 days, please contact the clinic through [employer] or phone. If you have a critical or abnormal lab result, we will notify you by phone as soon as possible.  Submit refill requests through [employer] or call your pharmacy and they will forward the refill request to us. Please allow 3 business days for your refill to be completed.          Additional Information About Your Visit        Links [employer] Information     [employer] gives you secure access to your electronic health record. If you see a primary care provider, you can also send messages to your care team and make appointments. If you have questions, please call your primary care clinic.  If you do not have a primary care provider, please call [phone] and they will assist you.        Care EveryWhere ID     This is your Care EveryWhere ID. This could be used by other organizations to access your [facility]  records  [id number]        Your Vitals Were     Last Period                   03/14/2018            Blood Pressure from Last 3 Encounters:   03/22/18 103/69   03/02/18 110/67   02/23/18 119/75    Weight from Last 3 Encounters:   03/22/18 77.3 kg (170 lb 8 oz)   03/02/18 76.9 kg (169 lb 7 oz)   02/23/18 76.8 kg (169 lb 6.4 oz)              Today, you had the following     No orders found for display       Primary Care Provider Fax #    Physician No Ref-Primary [phone]       No address on file        Equal Access to Services     [facility]: Hadblessing [place], gary black, wanda nina, naseem lugo . So [facility] [phone].    ATENCIÓN: Si habla español, tiene a sparrow disposición servicios gratuitos de asistencia lingüística. Llame al [phone].    We comply with applicable federal civil rights laws and [place] laws. We do not discriminate on the basis of race, color, national origin, age, disability, sex, sexual orientation, or gender identity.            Thank you!     Thank you for choosing [facility]  for your care. Our goal is always to provide you with excellent care. Hearing back from our patients is one way we can continue to improve our services. Please take a few minutes to complete the written survey that you may receive in the mail after your visit with us. Thank you!             Your Updated Medication List - Protect others around you: Learn how to safely use, store and throw away your medicines at www.disposemymeds.org.          This list is accurate as of 3/22/18  1:53 PM.  Always use your most recent med list.                   Brand Name Dispense Instructions for use Diagnosis    Biotin 1 MG Caps       Malignant neoplasm of left breast in female, estrogen receptor positive, unspecified site of breast (H)       levonorgestrel 20 MCG/24HR IUD    MIRENA     1 each by Intrauterine route    Malignant neoplasm of left  breast in female, estrogen receptor positive, unspecified site of breast (H)       Spirulina 500 MG Tabs      6 Tabs daily.    Malignant neoplasm of left breast in female, estrogen receptor positive, unspecified site of breast (H)

## 2018-03-22 NOTE — MR AVS SNAPSHOT
After Visit Summary   3/22/2018    Mary Chadwick    MRN: [id number]           Patient Information     Date Of Birth          1975        Visit Information        Provider Department      3/22/2018 11:00 AM Valeria Gann MD [facility]        Today's Diagnoses     Malignant neoplasm of upper-inner quadrant of left breast in female, estrogen receptor positive (H)    -  1    Li-Fraumeni syndrome           Follow-ups after your visit        Additional Services     PLASTIC SURGERY REFERRAL       Your provider has referred you to: [facility]: Plastic and Reconstructive Surgery [facility] [phone]   [email or web address]    Please be aware that coverage of these services is subject to the terms and limitations of your health insurance plan.  Call member services at your health plan with any benefit or coverage questions.      Please bring the following with you to your appointment:    (1) Any X-Rays, CTs or MRIs which have been performed.  Contact the facility where they were done to arrange for  prior to your scheduled appointment.    (2) List of current medications  (3) This referral request   (4) Any documents/labs given to you for this referral                  Your next 10 appointments already scheduled     Mar 22, 2018  1:00 PM CDT   (Arrive by 12:45 PM)   INJECTION with  Oncology Injection Nurse   [facility] ([facility])    [address]  [address] [place] [id number] [phone]            Mar 28, 2018  3:00 PM CDT   (Arrive by 2:45 PM)   MR ABDOMEN W/O & W CONTRAST with UUMR1   [facility] [place], MRI ([facility] [place])    [address] [id number] [phone]           Take your medicines as usual, unless your doctor tells you not to. Bring a list of your  current medicines to your exam (including vitamins, minerals and over-the-counter drugs). Also bring the results of similar scans you may have had.    You may or may not receive IV contrast for this exam pending the discretion of the Radiologist.   Do not eat or drink for 6 hours prior to exam.  The MRI machine uses a strong magnet. Please wear clothes without metal (snaps, zippers). A sweatsuit works well, or we may give you a hospital gown.  Please remove any body piercings and hair extensions before you arrive. You will also remove watches, jewelry, hairpins, wallets, dentures, partial dental plates and hearing aids. You may wear contact lenses, and you may be able to wear your rings. We have a safe place to keep your personal items, but it is safer to leave them at home.  **IMPORTANT** THE INSTRUCTIONS BELOW ARE ONLY FOR THOSE PATIENTS WHO HAVE BEEN PRESCRIBED SEDATION OR GENERAL ANESTHESIA DURING THEIR MRI PROCEDURE:  IF YOUR DOCTOR PRESCRIBED ORAL SEDATION (take medicine to help you relax during your exam):   You must get the medicine from your doctor (oral medication) before you arrive. Bring the medicine to the exam. Do not take it at home. You ll be told when to take it upon arriving for your exam.   Arrive one hour early. Bring someone who can take you home after the test. Your medicine will make you sleepy. After the exam, you may not drive, take a bus or take a taxi by yourself.  IF YOUR DOCTOR PRESCRIBED IV SEDATION:   Arrive one hour early. Bring someone who can take you home after the test. Your medicine will make you sleepy. After the exam, you may not drive, take a bus or take a taxi by yourself.   No eating 6 hours before your exam. You may have clear liquids up until 4 hours before your exam. (Clear liquids include water, clear tea, black coffee and fruit juice without pulp.)  IF YOUR DOCTOR PRESCRIBED ANESTHESIA (be asleep for your exam):   Arrive 1 1/2 hours early. Bring someone who can take you  "home after the test. You may not drive, take a bus or take a taxi by yourself.   No eating 8 hours before your exam. You may have clear liquids up until 4 hours before your exam. (Clear liquids include water, clear tea, black coffee and fruit juice without pulp.)   You will spend four to five hours in the recovery room.  If you have any questions, please contact your Imaging Department exam site.              Who to contact     If you have questions or need follow up information about today's clinic visit or your schedule please contact [facility] directly at [phone].  Normal or non-critical lab and imaging results will be communicated to you by Airbritehart, letter or phone within 4 business days after the clinic has received the results. If you do not hear from us within 7 days, please contact the clinic through [employer] or phone. If you have a critical or abnormal lab result, we will notify you by phone as soon as possible.  Submit refill requests through [employer] or call your pharmacy and they will forward the refill request to us. Please allow 3 business days for your refill to be completed.          Additional Information About Your Visit        [employer] Information     [employer] gives you secure access to your electronic health record. If you see a primary care provider, you can also send messages to your care team and make appointments. If you have questions, please call your primary care clinic.  If you do not have a primary care provider, please call [phone] and they will assist you.        Care EveryWhere ID     This is your Care EveryWhere ID. This could be used by other organizations to access your [facility] medical records  [id number]        Your Vitals Were     Pulse Temperature Respirations Height Last Period Pulse Oximetry    74 98  F (36.7  C) (Oral) 16 1.727 m (5' 7.99\") 03/14/2018 100%    BMI (Body Mass Index)                   25.93 kg/m2            Blood Pressure from Last " 3 Encounters:   03/22/18 103/69   03/02/18 110/67   02/23/18 119/75    Weight from Last 3 Encounters:   03/22/18 77.3 kg (170 lb 8 oz)   03/02/18 76.9 kg (169 lb 7 oz)   02/23/18 76.8 kg (169 lb 6.4 oz)              We Performed the Following     PLASTIC SURGERY REFERRAL        Primary Care Provider Fax #    Physician No Ref-Primary [phone]       No address on file        Equal Access to Services     SURESH CHAPARRO : Hadii aad ku hadasho Soomaali, waaxda luqadaha, qaybta kaalmada adeegyada, waxtala mendoza. So [facility] [phone].    ATENCIÓN: Si habla español, tiene a sparrow disposición servicios gratuitos de asistencia lingüística. Llame al [phone].    We comply with applicable federal civil rights laws and [place] laws. We do not discriminate on the basis of race, color, national origin, age, disability, sex, sexual orientation, or gender identity.            Thank you!     Thank you for choosing [facility] CL[facility]  for your care. Our goal is always to provide you with excellent care. Hearing back from our patients is one way we can continue to improve our services. Please take a few minutes to complete the written survey that you may receive in the mail after your visit with us. Thank you!             Your Updated Medication List - Protect others around you: Learn how to safely use, store and throw away your medicines at www.disposemymeds.org.          This list is accurate as of 3/22/18 12:31 PM.  Always use your most recent med list.                   Brand Name Dispense Instructions for use Diagnosis    Biotin 1 MG Caps       Malignant neoplasm of left breast in female, estrogen receptor positive, unspecified site of breast (H)       levonorgestrel 20 MCG/24HR IUD    MIRENA     1 each by Intrauterine route    Malignant neoplasm of left breast in female, estrogen receptor positive, unspecified site of breast (H)       Spirulina 500 MG Tabs      6 Tabs daily.     Malignant neoplasm of left breast in female, estrogen receptor positive, unspecified site of breast (H)

## 2018-03-22 NOTE — NURSING NOTE
"Oncology Rooming Note    March 22, 2018 11:17 AM   Mary Chadwick is a 42 year old female who presents for:    Chief Complaint   Patient presents with     Blood Draw     no labs pt checked in for provider appt only     Oncology Clinic Visit     Return: Breast c a     Initial Vitals: /69 (BP Location: Right arm, Patient Position: Chair, Cuff Size: Adult Regular)  Pulse 74  Temp 98  F (36.7  C) (Oral)  Resp 16  Ht 1.727 m (5' 7.99\")  Wt 77.3 kg (170 lb 8 oz)  LMP 03/14/2018  SpO2 100%  BMI 25.93 kg/m2 Estimated body mass index is 25.93 kg/(m^2) as calculated from the following:    Height as of this encounter: 1.727 m (5' 7.99\").    Weight as of this encounter: 77.3 kg (170 lb 8 oz). Body surface area is 1.93 meters squared.  No Pain (0) Comment: Data Unavailable   Patient's last menstrual period was 03/14/2018.  Allergies reviewed: Yes  Medications reviewed: Yes    Medications: Medication refills not needed today.  Pharmacy name entered into Mobile Safe Case:    [facility] [place], [place] - [address]  [facility] [place], [place] - [address]    Clinical concerns: no new concerns     6 minutes for nursing intake (face to face time)     Kathi Moore CMA                "

## 2018-03-22 NOTE — PROGRESS NOTES
Infusion Nursing Note:  Mary Chadwick presents today for Zoladex.    Patient seen by provider today: Yes: Dr. Gann   present during visit today: Not Applicable.    Note: Patient new to oncology infusion room and is receiving zoladex for the first time. Pt oriented to infusion room and call light. New patient teaching done previously. Pt received drug info handout prior to coming to infusion. Writer reinforced teaching/side effects and schedule. Patient instructed to call triage with questions/concerns or if he/she has chills and/or temperature greater than or equal to 100.5. Triage number: [phone]; After hours, weekends, or holidays, call main hospital  at [phone] and ask for oncology doctor on call.    Did not complete the day because pt states she is getting her oral chemo in three weeks.     Intravenous Access:  No Intravenous access/labs at this visit.    Post Infusion Assessment:  Patient tolerated injection without incident to left lower quadrant abdomen    Discharge Plan:   Patient declined prescription refills.  Discharge instructions reviewed with: Patient.  Patient and/or family verbalized understanding of discharge instructions and all questions answered.  AVS to patient via [employer].  Patient will monitor mychart for next infusion. Pts apts to be scheduled per Dr. Gann' note sent to scheduling.   Patient discharged in stable condition accompanied by: self.  Departure Mode: Ambulatory.    ZAIRA ARDON RN

## 2018-03-22 NOTE — LETTER
"3/22/2018       RE: Mary Chadwick  [address]  [facility] [address]     Dear Colleague,    Thank you for referring your patient, Mary Chadwick, to the [facility] at [facility]. Please see a copy of my visit note below.    HISTORY OF PRESENT ILLNESS:  I am seeing Ms. Omaira Chadwick in follow up for low risk mammaprint at least stage 2 breast cancer.     Mary, or \"Omaira,\" comes in with a new diagnosis of breast cancer.  She is originally from [place].  She moved here displaced from [place].  She underwent a screening mammography this week.  This screening mammogram was performed on 02/09/2018 followed by diagnostic breast ultrasound which revealed a 2.6 x 1.1 x 1.7 cm irregular shaped mass at the 2 o'clock position involving the left breast.  There were indeterminate microcalcifications involving the left breast and a biopsy was recommended.  A stereotactic biopsy occurred on 02/20/2018 revealing an invasive ductal carcinoma, grade 2, ER positive, AL positive, HER2 negative by immunohistochemistry histochemistry at 1+.      Omaira had a breast MRI which revealed a 3.5-cm mass in her left breast with an area of non-mass-like enhancement measuring approximately 8 cm.  On this imaging, she had an equivocal lymph node in the left axilla.  This was not biopsied.       She was screened for the I-SPY-2 clinical trial.  She came back as a low-risk MammaPrint and comes in today to discuss next steps.       She was also recently diagnosed with Li-Fraumeni syndrome and has questions about this.      She says she is coping reasonably well.  She has no chest pain or shortness of breath.  She has no nausea, vomiting, diarrhea or constipation.  Her 10-point review of systems is otherwise negative.     PAST MEDICAL HISTORY:  No significant past medical history.      MEDICATIONS:  No medications.      ALLERGIES:  She is allergic to lactose.    "   SOCIAL HISTORY:  She has 7 and 11-year-old.  She is working part-time at the [employer].  She has a remote history of tobacco use.      FAMILY HISTORY:  Her mom  of colon cancer at age 41.  Maternal aunt with breast cancer in the 50s.  Maternal grandfather with stomach cancer.  Maternal uncle with stomach cancer.  Father with myasthenia gravis.  A birth brother who is alive and a sister who is alive.  Her significant other comes back and forth to the [place].      PHYSICAL EXAMINATION: pt appears well.  > 40 min were spent in counseling and coordinating care.     LABORATORY:  Pathology was reviewed.      Lab Results   Component Value Date    WBC 7.5 2018     Lab Results   Component Value Date    RBC 4.82 2018     Lab Results   Component Value Date    HGB 14.1 2018     Lab Results   Component Value Date    HCT 42.3 2018     No components found for: MCT  Lab Results   Component Value Date    MCV 88 2018     Lab Results   Component Value Date    MCH 29.3 2018     Lab Results   Component Value Date    MCHC 33.3 2018     Lab Results   Component Value Date    RDW 12.3 2018     Lab Results   Component Value Date     2018       Recent Labs   Lab Test  18   1029   NA  136   POTASSIUM  3.9   CHLORIDE  105   CO2  24   ANIONGAP  8   GLC  95   BUN  9   CR  0.66   MARICHUY  9.1     Liver Function Studies -   Recent Labs   Lab Test  18   1029   PROTTOTAL  7.4   ALBUMIN  3.9   BILITOTAL  0.3   ALKPHOS  52   AST  20   ALT  16     CT - 2 suspicious areas in the liver that need further follow up imaging    ASSESSMENT AND PLAN:  This is a 42-year-old female with stage T2 NX, invasive ductal carcinoma, ER positive, KS positive, HER2 negative, involving the left breast in the setting of a new diagnosis of Li-Fraumeni syndrome.     I discussed with Omaira today that she appears to have stage II-A breast cancer involving her left breast.   She was screened for  the I-SPY-2 clinical trial and was found to have a low MammaPrint, indicating that she does not go on to the chemotherapy portion of the clinical trial.        I discussed with Omaira that in a patient with a low-risk MammaPrint and ineligible for I-SPY, I think it would be reasonable to go ahead with surgery.  There were questions for her breast MRI whether or not she has isreal involvement as well as the extent of disease.  If truly she had stage III breast cancer, I would recommend chemotherapy.       In the meanwhile since I met her, she was found to have Li-Fraumeni syndrome.  We discussed that radiation in general is typically avoided in women who have Li-Fraumeni syndrome.  As a result, with her p53 mutation, she is interested in bilateral mastectomy with sentinel lymph node biopsy.  She is also interested in reconstruction.  We will go ahead and have her see Dr. Andrews and our plastic surgeon, Dr. Kidd, for consultation.      At this time, given the uncertainty about the role of chemotherapy with surgical resection, we discussed going ahead with neoadjuvant endocrine therapy at this time.  She will begin Zoladex today and then subsequently start Femara.  We discussed the side effects including hot flashes, menopausal changes, vaginal dryness, mood changes, irritability, as well as arthralgias, myalgias and bone loss.  Consent for neoadjuvant endocrine therapy was obtained.       I will have her come back in 2 weeks for symptom assessment and subsequently on monthly Zoladex.       She has a Mirena IUD.  This needs to be removed.  A referral to Gynecology will be placed.      She was found to have Li-Fraumeni syndrome.  She will need additional imaging.  She does have  two abnormal lesions in her liver appreciated.  A liver MRI is set up for next week.  Overall, with Li-Fraumeni she will need brain MRI as well as full body MRI.  We did not go into all of if this screening and counseling today given the extent  of other conversations that we had.       She understands she will be put into menopause.  She is not currently interested in future children and understands she will be put into menopause.       She is coping reasonably well.  She is here with a friend.  She has been given information regarding support systems.         Again, thank you for allowing me to participate in the care of your patient.      Sincerely,    Valeria Gann MD

## 2018-03-22 NOTE — PATIENT INSTRUCTIONS
Contact Numbers    [facility] Main Line: [phone]  [facility] Triage:  [phone]    Call triage with chills and/or temperature greater than or equal to 100.5, uncontrolled nausea/vomiting, diarrhea, constipation, dizziness, shortness of breath, chest pain, bleeding, unexplained bruising, or any new/concerning symptoms, questions/concerns.     If you are having any concerning symptoms or wish to speak to a provider before your next infusion visit, please call your care coordinator or triage to notify them so we can adequately serve you.       After Hours: [phone]    If after hours, weekends, or holidays, call main hospital  and ask for Oncology doctor on call.           March 2018 Sunday Monday Tuesday Wednesday Thursday Friday Saturday                       1     2     [facility] WITH ROOM    7:45 AM   (75 min.)   Nay Smith GC   [facility] LAB DRAW    8:45 AM   (15 min.)    [facility] DRAW   [facility] Lab Draw     [facility]    9:00 AM   (45 min.)   Antonio Andrews MD   [facility] 3       4     5     MR BREAST BILATERAL WWO    7:45 AM   (90 min.)   [id number]   [facility]     [facility] NURSE VISIT   10:00 AM   (10 min.)   Nurse,  Oncology   [facility] 6     [facility] DRAW   10:30 AM   (15 min.)    [facility] DRAW   [facility] Lab Draw     US BREAST BX CORE NEEDLE LEFT   11:00 AM   (50 min.)   [id number] [place] [address] Imaging     US BREAST RT LMT 1-3 QUAD   11:35 AM   (30 min.)   [id number] [place] [address] Imaging     US BREAST BX CORE NEEDLE RIGHT   11:45 AM   (50 min.)   [facility] Imaging     MA POST PROCEDURE RIGHT   12:30 PM   (10 min.)   [id number]   [facility] Imaging 7     8     9     10       11     12     13     14     15     16     17       18     19     20     CT CHEST/ABDOMEN/PELVIS W    7:05 AM   (20 min.)   [email or web address]   [facility]  Center CT 21     [facility] RETURN WITH ROOM    1:00 PM   (60 min.)   Nay Smith GC   [facility] 22     [facility] LAB DRAW   10:30 AM   (15 min.)    [facility] DRAW   [facility] Lab Draw     [facility] RETURN   10:45 AM   (30 min.)   Valeria Gann MD   [facility] INJECTION   12:45 PM   (30 min.)   Nurse,  Oncology Injection   [facility] 23     24       25     26     27     28     MR ABDOMEN WWO    2:45 PM   (60 min.)   [email or web address]   [facility] [place], MRI 29     30     P NEW   11:15 AM   (60 min.)   Shelli Díaz APRN CNS   [facility] 31 April 2018 Sunday Monday Tuesday Wednesday Thursday Friday Saturday   1     2     3     4     5     6     7       8     9     10     11     12     13     14       15     16     17     18     19     20     21       22     23     24     25     26     27     28       29     30                                        No results found for this or any previous visit (from the past 24 hour(s)).

## 2018-03-22 NOTE — LETTER
"3/22/2018       RE: Mary Chadwick  [address]  [facility] [address]     Dear Colleague,    Thank you for referring your patient, Mary Chadwick, to the [facility]CER [facility]. Please see a copy of my visit note below.    HISTORY OF PRESENT ILLNESS:  I am seeing Ms. Omaira Chadwick in follow up for low risk mammaprint at least stage 2 breast cancer.     Mary, or \"Omaira,\" comes in with a new diagnosis of breast cancer.  She is originally from [place].  She moved here displaced from [place].  She underwent a screening mammography this week.  This screening mammogram was performed on 2018 followed by diagnostic breast ultrasound which revealed a 2.6 x 1.1 x 1.7 cm irregular shaped mass at the 2 o'clock position involving the left breast.  There were indeterminate microcalcifications involving the left breast and a biopsy was recommended.  A stereotactic biopsy occurred on 2018 revealing an invasive ductal carcinoma, grade 2, ER positive, WA positive, HER2 negative by immunohistochemistry histochemistry at 1+.       Dictation on: 2018 12:44 PM by: ELIZ STINSON [[id number]]     PAST MEDICAL HISTORY:  No significant past medical history.      MEDICATIONS:  No medications.      ALLERGIES:  She is allergic to lactose.      SOCIAL HISTORY:  She has 7 and 11-year-old.  She is working part-time at the [employer].  She has a remote history of tobacco use.      FAMILY HISTORY:  Her mom  of colon cancer at age 41.  Maternal aunt with breast cancer in the 50s.  Maternal grandfather with stomach cancer.  Maternal uncle with stomach cancer.  Father with myasthenia gravis.  A birth brother who is alive and a sister who is alive.  Her significant other comes back and forth to the [place].      PHYSICAL EXAMINATION: pt appears well.  > 40 min were spent in counseling and coordinating care.     LABORATORY:  Pathology was reviewed.      Lab Results   Component Value Date "    WBC 7.5 03/06/2018     Lab Results   Component Value Date    RBC 4.82 03/06/2018     Lab Results   Component Value Date    HGB 14.1 03/06/2018     Lab Results   Component Value Date    HCT 42.3 03/06/2018     No components found for: MCT  Lab Results   Component Value Date    MCV 88 03/06/2018     Lab Results   Component Value Date    MCH 29.3 03/06/2018     Lab Results   Component Value Date    MCHC 33.3 03/06/2018     Lab Results   Component Value Date    RDW 12.3 03/06/2018     Lab Results   Component Value Date     03/06/2018       Recent Labs   Lab Test  03/06/18   1029   NA  136   POTASSIUM  3.9   CHLORIDE  105   CO2  24   ANIONGAP  8   GLC  95   BUN  9   CR  0.66   MARICHUY  9.1     Liver Function Studies -   Recent Labs   Lab Test  03/06/18   1029   PROTTOTAL  7.4   ALBUMIN  3.9   BILITOTAL  0.3   ALKPHOS  52   AST  20   ALT  16     CT - 2 suspicious areas in the liver that need further follow up imaging    ASSESSMENT AND PLAN:  This is a 42-year-old female with stage T2 NX, invasive ductal carcinoma, ER positive, MT positive, HER2 negative, involving the left breast in the setting of a new diagnosis of Li-Fraumeni syndrome.     I discussed with Omaira today that she appears to have stage II-A breast cancer involving her left breast.    Dictation on: 03/22/2018 12:43 PM by: VALERIA STINSON [[id number]]         Again, thank you for allowing me to participate in the care of your patient.      Sincerely,    Valeria Stinson MD

## 2018-03-22 NOTE — LETTER
"3/22/2018      RE: Mary Chadwick  [address]  [facility] [address]       HISTORY OF PRESENT ILLNESS:  I am seeing Ms. Omaira Chadwick in follow up for low risk mammaprint at least stage 2 breast cancer.     Mary, or \"Omaira,\" comes in with a new diagnosis of breast cancer.  She is originally from [place].  She moved here displaced from [place].  She underwent a screening mammography this week.  This screening mammogram was performed on 2018 followed by diagnostic breast ultrasound which revealed a 2.6 x 1.1 x 1.7 cm irregular shaped mass at the 2 o'clock position involving the left breast.  There were indeterminate microcalcifications involving the left breast and a biopsy was recommended.  A stereotactic biopsy occurred on 2018 revealing an invasive ductal carcinoma, grade 2, ER positive, IN positive, HER2 negative by immunohistochemistry histochemistry at 1+.       Dictation on: 2018 12:44 PM by: ELIZ STINSON [[id number]]     PAST MEDICAL HISTORY:  No significant past medical history.      MEDICATIONS:  No medications.      ALLERGIES:  She is allergic to lactose.      SOCIAL HISTORY:  She has 7 and 11-year-old.  She is working part-time at the [employer].  She has a remote history of tobacco use.      FAMILY HISTORY:  Her mom  of colon cancer at age 41.  Maternal aunt with breast cancer in the 50s.  Maternal grandfather with stomach cancer.  Maternal uncle with stomach cancer.  Father with myasthenia gravis.  A birth brother who is alive and a sister who is alive.  Her significant other comes back and forth to the [place].      PHYSICAL EXAMINATION: pt appears well.  > 40 min were spent in counseling and coordinating care.     LABORATORY:  Pathology was reviewed.      Lab Results   Component Value Date    WBC 7.5 2018     Lab Results   Component Value Date    RBC 4.82 2018     Lab Results   Component Value Date    HGB 14.1 2018     Lab " Results   Component Value Date    HCT 42.3 03/06/2018     No components found for: MCT  Lab Results   Component Value Date    MCV 88 03/06/2018     Lab Results   Component Value Date    MCH 29.3 03/06/2018     Lab Results   Component Value Date    MCHC 33.3 03/06/2018     Lab Results   Component Value Date    RDW 12.3 03/06/2018     Lab Results   Component Value Date     03/06/2018       Recent Labs   Lab Test  03/06/18   1029   NA  136   POTASSIUM  3.9   CHLORIDE  105   CO2  24   ANIONGAP  8   GLC  95   BUN  9   CR  0.66   MARICHUY  9.1     Liver Function Studies -   Recent Labs   Lab Test  03/06/18   1029   PROTTOTAL  7.4   ALBUMIN  3.9   BILITOTAL  0.3   ALKPHOS  52   AST  20   ALT  16     CT - 2 suspicious areas in the liver that need further follow up imaging    ASSESSMENT AND PLAN:  This is a 42-year-old female with stage T2 NX, invasive ductal carcinoma, ER positive, MT positive, HER2 negative, involving the left breast in the setting of a new diagnosis of Li-Fraumeni syndrome.     I discussed with Omaira today that she appears to have stage II-A breast cancer involving her left breast.    Dictation on: 03/22/2018 12:43 PM by: ELIZ STINSON [[id number]]         She was screened for the I-SPY-2 clinical trial and was found to have a low MammaPrint, indicating that she does not go on to the chemotherapy portion of the clinical trial.        I discussed with Omaira that in a patient with a low-risk MammaPrint and ineligible for I-SPY, I think it would be reasonable to go ahead with surgery.  There were questions for her breast MRI whether or not she has isreal involvement as well as the extent of disease.  If truly she had stage III breast cancer, I would recommend chemotherapy.       In the meanwhile since I met her, she was found to have Li-Fraumeni syndrome.  We discussed that radiation in general is typically avoided in women who have Li-Fraumeni syndrome.  As a result, with her p53 mutation, she is interested in  bilateral mastectomy with sentinel lymph node biopsy.  She is also interested in reconstruction.  We will go ahead and have her see Dr. Andrews and our plastic surgeon, Dr. Kidd, for consultation.      At this time, given the uncertainty about the role of chemotherapy with surgical resection, we discussed going ahead with neoadjuvant endocrine therapy at this time.  She will begin Zoladex today and then subsequently start Femara.  We discussed the side effects including hot flashes, menopausal changes, vaginal dryness, mood changes, irritability, as well as arthralgias, myalgias and bone loss.  Consent for neoadjuvant endocrine therapy was obtained.       I will have her come back in 2 weeks for symptom assessment and subsequently on monthly Zoladex.       She has a Mirena IUD.  This needs to be removed.  A referral to Gynecology will be placed.      She was found to have Li-Fraumeni syndrome.  She will need additional imaging.  She does have  two abnormal lesions in her liver appreciated.  A liver MRI is set up for next week.  Overall, with Li-Fraumeni she will need brain MRI as well as full body MRI.  We did not go into all of if this screening and counseling today given the extent of other conversations that we had.       She understands she will be put into menopause.  She is not currently interested in future children and understands she will be put into menopause.       She is coping reasonably well.  She is here with a friend.  She has been given information regarding support systems.     Omaira had a breast MRI which revealed a 3.5-cm mass in her left breast with an area of non-mass-like enhancement measuring approximately 8 cm.  On this imaging, she had an equivocal lymph node in the left axilla.  This was not biopsied.       She was screened for the I-SPY-2 clinical trial.  She came back as a low-risk MammaPrint and comes in today to discuss next steps.       She was also recently diagnosed with  Li-Fraumeni syndrome and has questions about this.      She says she is coping reasonably well.  She has no chest pain or shortness of breath.  She has no nausea, vomiting, diarrhea or constipation.  Her 10-point review of systems is otherwise negative.      Valeria Gann MD

## 2018-03-22 NOTE — PROGRESS NOTES
"HISTORY OF PRESENT ILLNESS:  I am seeing Ms. Omaira Chadwick in follow up for low risk mammaprint at least stage 2 breast cancer.     Mary, or \"Omaira,\" comes in with a new diagnosis of breast cancer.  She is originally from [place].  She moved here displaced from [place].  She underwent a screening mammography this week.  This screening mammogram was performed on 2018 followed by diagnostic breast ultrasound which revealed a 2.6 x 1.1 x 1.7 cm irregular shaped mass at the 2 o'clock position involving the left breast.  There were indeterminate microcalcifications involving the left breast and a biopsy was recommended.  A stereotactic biopsy occurred on 2018 revealing an invasive ductal carcinoma, grade 2, ER positive, UT positive, HER2 negative by immunohistochemistry histochemistry at 1+.      Omaira had a breast MRI which revealed a 3.5-cm mass in her left breast with an area of non-mass-like enhancement measuring approximately 8 cm.  On this imaging, she had an equivocal lymph node in the left axilla.  This was not biopsied.       She was screened for the I-SPY-2 clinical trial.  She came back as a low-risk MammaPrint and comes in today to discuss next steps.       She was also recently diagnosed with Li-Fraumeni syndrome and has questions about this.      She says she is coping reasonably well.  She has no chest pain or shortness of breath.  She has no nausea, vomiting, diarrhea or constipation.  Her 10-point review of systems is otherwise negative.     PAST MEDICAL HISTORY:  No significant past medical history.      MEDICATIONS:  No medications.      ALLERGIES:  She is allergic to lactose.      SOCIAL HISTORY:  She has 7 and 11-year-old.  She is working part-time at the [employer].  She has a remote history of tobacco use.      FAMILY HISTORY:  Her mom  of colon cancer at age 41.  Maternal aunt with breast cancer in the 50s.  Maternal grandfather with stomach cancer.  Maternal " uncle with stomach cancer.  Father with myasthenia gravis.  A birth brother who is alive and a sister who is alive.  Her significant other comes back and forth to the [place].      PHYSICAL EXAMINATION: pt appears well.  > 40 min were spent in counseling and coordinating care.     LABORATORY:  Pathology was reviewed.      Lab Results   Component Value Date    WBC 7.5 03/06/2018     Lab Results   Component Value Date    RBC 4.82 03/06/2018     Lab Results   Component Value Date    HGB 14.1 03/06/2018     Lab Results   Component Value Date    HCT 42.3 03/06/2018     No components found for: MCT  Lab Results   Component Value Date    MCV 88 03/06/2018     Lab Results   Component Value Date    MCH 29.3 03/06/2018     Lab Results   Component Value Date    MCHC 33.3 03/06/2018     Lab Results   Component Value Date    RDW 12.3 03/06/2018     Lab Results   Component Value Date     03/06/2018       Recent Labs   Lab Test  03/06/18   1029   NA  136   POTASSIUM  3.9   CHLORIDE  105   CO2  24   ANIONGAP  8   GLC  95   BUN  9   CR  0.66   MARICHUY  9.1     Liver Function Studies -   Recent Labs   Lab Test  03/06/18   1029   PROTTOTAL  7.4   ALBUMIN  3.9   BILITOTAL  0.3   ALKPHOS  52   AST  20   ALT  16     CT - 2 suspicious areas in the liver that need further follow up imaging    ASSESSMENT AND PLAN:  This is a 42-year-old female with stage T2 NX, invasive ductal carcinoma, ER positive, ID positive, HER2 negative, involving the left breast in the setting of a new diagnosis of Li-Fraumeni syndrome.     I discussed with Omaira today that she appears to have stage II-A breast cancer involving her left breast.   She was screened for the I-SPY-2 clinical trial and was found to have a low MammaPrint, indicating that she does not go on to the chemotherapy portion of the clinical trial.        I discussed with Omaira that in a patient with a low-risk MammaPrint and ineligible for I-SPY, I think it would be reasonable to go ahead  with surgery.  There were questions for her breast MRI whether or not she has isreal involvement as well as the extent of disease.  If truly she had stage III breast cancer, I would recommend chemotherapy.       In the meanwhile since I met her, she was found to have Li-Fraumeni syndrome.  We discussed that radiation in general is typically avoided in women who have Li-Fraumeni syndrome.  As a result, with her p53 mutation, she is interested in bilateral mastectomy with sentinel lymph node biopsy.  She is also interested in reconstruction.  We will go ahead and have her see Dr. Andrews and our plastic surgeon, Dr. Kidd, for consultation.      At this time, given the uncertainty about the role of chemotherapy with surgical resection, we discussed going ahead with neoadjuvant endocrine therapy at this time.  She will begin Zoladex today and then subsequently start Femara.  We discussed the side effects including hot flashes, menopausal changes, vaginal dryness, mood changes, irritability, as well as arthralgias, myalgias and bone loss.  Consent for neoadjuvant endocrine therapy was obtained.       I will have her come back in 2 weeks for symptom assessment and subsequently on monthly Zoladex.       She has a Mirena IUD.  This needs to be removed.  A referral to Gynecology will be placed.      She was found to have Li-Fraumeni syndrome.  She will need additional imaging.  She does have  two abnormal lesions in her liver appreciated.  A liver MRI is set up for next week.  Overall, with Li-Fraumeni she will need brain MRI as well as full body MRI.  We did not go into all of if this screening and counseling today given the extent of other conversations that we had.       She understands she will be put into menopause.  She is not currently interested in future children and understands she will be put into menopause.       She is coping reasonably well.  She is here with a friend.  She has been given information regarding  support systems.

## 2018-03-28 ENCOUNTER — HOSPITAL ENCOUNTER (OUTPATIENT)
Dept: MRI IMAGING | Facility: CLINIC | Age: 43
Discharge: HOME | End: 2018-03-28
Attending: INTERNAL MEDICINE | Admitting: INTERNAL MEDICINE
Payer: COMMERCIAL

## 2018-03-28 DIAGNOSIS — K76.9 LESION OF LIVER: ICD-10-CM

## 2018-03-28 LAB — LAB SCANNED RESULT: ABNORMAL

## 2018-03-28 PROCEDURE — 25000128 H RX IP 250 OP 636: Performed by: INTERNAL MEDICINE

## 2018-03-28 PROCEDURE — A9585 GADOBUTROL INJECTION: HCPCS | Performed by: INTERNAL MEDICINE

## 2018-03-28 PROCEDURE — 74183 MRI ABD W/O CNTR FLWD CNTR: CPT

## 2018-03-28 RX ORDER — GADOBUTROL 604.72 MG/ML
7.5 INJECTION INTRAVENOUS ONCE
Status: COMPLETED | OUTPATIENT
Start: 2018-03-28 | End: 2018-03-28

## 2018-03-28 RX ORDER — GADOBUTROL 604.72 MG/ML
7.5 INJECTION INTRAVENOUS ONCE
Status: DISCONTINUED | OUTPATIENT
Start: 2018-03-28 | End: 2018-03-29 | Stop reason: HOSPADM

## 2018-03-28 RX ADMIN — SODIUM CHLORIDE 100 ML: 9 INJECTION, SOLUTION INTRAVENOUS at 16:25

## 2018-03-28 RX ADMIN — GADOBUTROL 7.5 ML: 604.72 INJECTION INTRAVENOUS at 16:25

## 2018-04-04 ASSESSMENT — ENCOUNTER SYMPTOMS
DECREASED LIBIDO: 1
PANIC: 0
POLYPHAGIA: 1
DECREASED APPETITE: 0
INSOMNIA: 1
JOINT SWELLING: 0
ARTHRALGIAS: 0
INCREASED ENERGY: 1
SINUS PAIN: 0
NECK MASS: 0
MUSCLE CRAMPS: 0
SINUS CONGESTION: 0
HALLUCINATIONS: 0
MUSCLE WEAKNESS: 0
TASTE DISTURBANCE: 0
CHILLS: 1
SORE THROAT: 0
FEVER: 0
NERVOUS/ANXIOUS: 1
WEIGHT LOSS: 0
TROUBLE SWALLOWING: 0
BACK PAIN: 1
DEPRESSION: 1
ALTERED TEMPERATURE REGULATION: 1
POLYDIPSIA: 1
SMELL DISTURBANCE: 0
HOARSE VOICE: 0
HOT FLASHES: 1
MYALGIAS: 1
FATIGUE: 1
STIFFNESS: 0
NECK PAIN: 0
WEIGHT GAIN: 0
NIGHT SWEATS: 1
DECREASED CONCENTRATION: 1

## 2018-04-06 ENCOUNTER — ONCOLOGY VISIT (OUTPATIENT)
Dept: ONCOLOGY | Facility: CLINIC | Age: 43
End: 2018-04-06
Attending: SURGERY
Payer: COMMERCIAL

## 2018-04-06 ENCOUNTER — ONCOLOGY VISIT (OUTPATIENT)
Dept: ONCOLOGY | Facility: CLINIC | Age: 43
End: 2018-04-06
Attending: PHYSICIAN ASSISTANT
Payer: COMMERCIAL

## 2018-04-06 VITALS
HEART RATE: 68 BPM | BODY MASS INDEX: 25.58 KG/M2 | OXYGEN SATURATION: 98 % | SYSTOLIC BLOOD PRESSURE: 118 MMHG | WEIGHT: 168.8 LBS | HEIGHT: 68 IN | DIASTOLIC BLOOD PRESSURE: 72 MMHG | RESPIRATION RATE: 18 BRPM | TEMPERATURE: 98 F

## 2018-04-06 VITALS
DIASTOLIC BLOOD PRESSURE: 72 MMHG | TEMPERATURE: 98 F | SYSTOLIC BLOOD PRESSURE: 118 MMHG | RESPIRATION RATE: 16 BRPM | BODY MASS INDEX: 25.46 KG/M2 | WEIGHT: 168 LBS | HEART RATE: 68 BPM | OXYGEN SATURATION: 98 % | HEIGHT: 68 IN

## 2018-04-06 DIAGNOSIS — Z17.0 MALIGNANT NEOPLASM OF UPPER-INNER QUADRANT OF LEFT BREAST IN FEMALE, ESTROGEN RECEPTOR POSITIVE (H): Primary | ICD-10-CM

## 2018-04-06 DIAGNOSIS — Z17.0 MALIGNANT NEOPLASM OF UPPER-OUTER QUADRANT OF LEFT BREAST IN FEMALE, ESTROGEN RECEPTOR POSITIVE (H): ICD-10-CM

## 2018-04-06 DIAGNOSIS — C50.212 MALIGNANT NEOPLASM OF UPPER-INNER QUADRANT OF LEFT BREAST IN FEMALE, ESTROGEN RECEPTOR POSITIVE (H): Primary | ICD-10-CM

## 2018-04-06 DIAGNOSIS — C50.412 MALIGNANT NEOPLASM OF UPPER-OUTER QUADRANT OF LEFT BREAST IN FEMALE, ESTROGEN RECEPTOR POSITIVE (H): ICD-10-CM

## 2018-04-06 DIAGNOSIS — C50.912 MALIGNANT NEOPLASM OF LEFT BREAST (H): Primary | ICD-10-CM

## 2018-04-06 PROCEDURE — G0463 HOSPITAL OUTPT CLINIC VISIT: HCPCS | Mod: ZF

## 2018-04-06 PROCEDURE — 99213 OFFICE O/P EST LOW 20 MIN: CPT | Mod: ZP | Performed by: PHYSICIAN ASSISTANT

## 2018-04-06 RX ORDER — LETROZOLE 2.5 MG/1
2.5 TABLET, FILM COATED ORAL DAILY
Qty: 30 TABLET | Refills: 11 | Status: SHIPPED | OUTPATIENT
Start: 2018-04-06 | End: 2019-05-14

## 2018-04-06 ASSESSMENT — PAIN SCALES - GENERAL
PAINLEVEL: NO PAIN (0)
PAINLEVEL: NO PAIN (0)

## 2018-04-06 NOTE — PATIENT INSTRUCTIONS
[place] Arredondo or [place]    Appointments:  [phone] ([phone]  Information:  [phone]    Calcium 1200 mg to 1500 mg daily (usually divided into 2 doses)  Vitamin D at least 800-1000 IU daily    Start letrozole today

## 2018-04-06 NOTE — PROGRESS NOTES
Mary Chadwick is here for a followup visit.  Since I have seen her, she underwent genetic testing as a p53 mutation.      PLAN:  We discussed her situation in breast conference today.  The general feeling was that she is not a good candidate for radiation therapy.  She is interested therefore in having a mastectomy and a double mastectomy.  I think she is a good candidate for that.  We talked about nipple-sparing versus non-nipple sparing.  On her MRI, she has linear enhancement all the way up to the nipple.  I think she is a poor candidate for nipple preservation.  We talked about the role of a sentinel lymph node biopsy and a complete axillary lymph node dissection.  I told her that if she had a negative sentinel lymph node then it is an easy decision.  If she had a positive sentinel lymph node, then we will have to decide whether or not to do a dissection on her or just observe her since radiation is not an option.  We talked about the risks and benefits of a lymph node dissection if she has a positive sentinel lymph node biopsy.  She does not want to undergo a complete axillary lymph node dissection.  I think this is reasonable if she has limited axillary disease.  We did discuss the risks and complications of surgery and she wishes to proceed.  We are going to make an appointment with her for a plastic surgeon.  She is now on aromatase inhibitor.  I told her that she could have endocrine treatment for several months if she likes before undergoing surgical treatment.  She likes that idea and we will tentatively look at doing a bilateral skin-sparing, non-nipple-sparing mastectomy and sentinel lymph node biopsy.      TT:  30 minutes.  CT:  30 minutes.      cc:   Valeria Gann MD    Physicians    [address]  [address]

## 2018-04-06 NOTE — MR AVS SNAPSHOT
After Visit Summary   4/6/2018    Mary Chadwick    MRN: [id number]           Patient Information     Date Of Birth          1975        Visit Information        Provider Department      4/6/2018 7:50 AM Paty Pandey PA-C [facility]        Today's Diagnoses     Malignant neoplasm of upper-inner quadrant of left breast in female, estrogen receptor positive (H)    -  1    Malignant neoplasm of upper-outer quadrant of left breast in female, estrogen receptor positive (H)          Care Instructions    [place] TRINITYSARAH Arredondo or [place]    Appointments:  [phone] ([phone])  Information:  [phone]    Calcium 1200 mg to 1500 mg daily (usually divided into 2 doses)  Vitamin D at least 800-1000 IU daily    Start letrozole today            Follow-ups after your visit        Your next 10 appointments already scheduled     Apr 06, 2018  9:30 AM CDT   (Arrive by 9:15 AM)   Return Visit with Antonio Andrews MD   [facility] ([facility])    [address] [place] [address]  [address] [place] [address] [phone]            Apr 06, 2018 11:00 AM CDT   US AXILLARY LEFT with [email or web address]   [facility] ([facility])    [address] [place] [address]  2nd Floor  [facility] [address] [phone]           Please bring a list of your medicines (including vitamins, minerals and over-the-counter drugs). Also, tell your doctor about any allergies you may have. Wear comfortable clothes and leave your valuables at home.  You do not need to do anything special to prepare for your exam.  Please call the Imaging Department at your exam site with any questions.            Apr 16, 2018  9:00 AM CDT   (Arrive by 8:45 AM)   New Patient Visit with Bruce Ribeiro MD   [facility] ([facility])    [address] [place] [address]  [address]  [facility] [address]    [phone]            Apr 20, 2018 12:30 PM CDT   [facility] Lab Draw with UC MASONIC LAB DRAW   [facility] ([facility])    [address] [place] [address]  [address]  [facility] [id number] [phone]            Apr 20, 2018  1:00 PM CDT   (Arrive by 12:45 PM)   Return Visit with Valeria Gann MD   [facility] ([facility])    [address] [place] [address]  [address]  [facility] [address] [phone]            Apr 20, 2018  2:00 PM CDT   Infusion 60 with UC ONCOLOGY INFUSION, UC 24 ATC   [facility] ([facility])    [address] [place] [address]  [address]  [facility] [address] [phone]              Future tests that were ordered for you today     Open Future Orders        Priority Expected Expires Ordered    US Axillary Left Routine  4/6/2019 4/6/2018            Who to contact     If you have questions or need follow up information about today's clinic visit or your schedule please contact [facility] directly at [phone].  Normal or non-critical lab and imaging results will be communicated to you by MyChart, letter or phone within 4 business days after the clinic has received the results. If you do not hear from us within 7 days, please contact the clinic through ideasofthart or phone. If you have a critical or abnormal lab result, we will notify you by phone as soon as possible.  Submit refill requests through [employer] or call your pharmacy and they will forward the refill request to us. Please allow 3 business days for your refill to be completed.          Additional Information About Your Visit        ideasoftharAccelalox Information     [employer] gives you secure access to your electronic health record. If you see a primary care provider, you can also send messages to your care team and make appointments. If you have questions, please call your primary care clinic.  If you do not  "have a primary care provider, please call [phone] and they will assist you.        Care EveryWhere ID     This is your Care EveryWhere ID. This could be used by other organizations to access your [facility] medical records  [id number]        Your Vitals Were     Pulse Temperature Respirations Height Last Period Pulse Oximetry    68 98  F (36.7  C) (Tympanic) 18 1.727 m (5' 7.99\") 03/14/2018 98%    BMI (Body Mass Index)                   25.67 kg/m2            Blood Pressure from Last 3 Encounters:   04/06/18 118/72   03/22/18 103/69   03/02/18 110/67    Weight from Last 3 Encounters:   04/06/18 76.6 kg (168 lb 12.8 oz)   03/22/18 77.3 kg (170 lb 8 oz)   03/02/18 76.9 kg (169 lb 7 oz)                 Today's Medication Changes          These changes are accurate as of 4/6/18  8:51 AM.  If you have any questions, ask your nurse or doctor.               Start taking these medicines.        Dose/Directions    letrozole 2.5 MG tablet   Commonly known as:  FEMARA   Used for:  Malignant neoplasm of upper-outer quadrant of left breast in female, estrogen receptor positive (H)   Started by:  Paty Pandey PA-C        Dose:  2.5 mg   Take 1 tablet (2.5 mg) by mouth daily   Quantity:  30 tablet   Refills:  11            Where to get your medicines      These medications were sent to [address] [place] [address] [address] [place] [id number]    Hours:  TRANSPLANT PHONE NUMBER [phone] Phone:  [phone]     letrozole 2.5 MG tablet                Primary Care Provider Fax #    Physician No Ref-Primary [phone]       No address on file        Equal Access to Services     SURESH CHAPARRO : Michelle Herndon, gary black, wanda kaalmanaseem gant So [facility] [phone].    ATENCIÓN: Si habla español, tiene a sparrow disposición servicios gratuitos de asistencia lingüística. Llame al " [phone].    We comply with applicable federal civil rights laws and [place] laws. We do not discriminate on the basis of race, color, national origin, age, disability, sex, sexual orientation, or gender identity.            Thank you!     Thank you for choosing [facility]  for your care. Our goal is always to provide you with excellent care. Hearing back from our patients is one way we can continue to improve our services. Please take a few minutes to complete the written survey that you may receive in the mail after your visit with us. Thank you!             Your Updated Medication List - Protect others around you: Learn how to safely use, store and throw away your medicines at www.disposemymeds.org.          This list is accurate as of 4/6/18  8:51 AM.  Always use your most recent med list.                   Brand Name Dispense Instructions for use Diagnosis    Biotin 1 MG Caps       Malignant neoplasm of left breast in female, estrogen receptor positive, unspecified site of breast (H)       letrozole 2.5 MG tablet    FEMARA    30 tablet    Take 1 tablet (2.5 mg) by mouth daily    Malignant neoplasm of upper-outer quadrant of left breast in female, estrogen receptor positive (H)       levonorgestrel 20 MCG/24HR IUD    MIRENA     1 each by Intrauterine route    Malignant neoplasm of left breast in female, estrogen receptor positive, unspecified site of breast (H)       Spirulina 500 MG Tabs      6 Tabs daily.    Malignant neoplasm of left breast in female, estrogen receptor positive, unspecified site of breast (H)
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
## 2018-04-06 NOTE — MR AVS SNAPSHOT
After Visit Summary   4/6/2018    Mary Chadwick    MRN: [id number]           Patient Information     Date Of Birth          1975        Visit Information        Provider Department      4/6/2018 9:30 AM Antonio Andrews MD [facility]        Today's Diagnoses     Malignant neoplasm of left breast (H)    -  1       Follow-ups after your visit        Your next 10 appointments already scheduled     Apr 10, 2018 11:45 AM CDT   (Arrive by 11:30 AM)   New Patient Visit with BASILIO Kidd MD   [facility] ([facility])    [address] [place] [address]  [address]  [facility] [address]   [phone]            Apr 20, 2018 12:30 PM CDT   [facility] Lab Draw with  [facility] LAB DRAW   [facility] ([facility])    [address] [place] [address]  [address]  [facility] [address] [phone]            Apr 20, 2018  1:00 PM CDT   (Arrive by 12:45 PM)   Return Visit with Valeria Gann MD   [facility] ([facility])    [address] [place] [address]  [address]  [facility] [address] [phone]            Apr 20, 2018  2:00 PM CDT   Infusion 60 with  ONCOLOGY INFUSION, UC 24 ATC   [facility] ([facility])    [address] [place] [address]  [address]  [facility] [id number] [phone]              Who to contact     If you have questions or need follow up information about today's clinic visit or your schedule please contact [facility] directly at [phone].  Normal or non-critical lab and imaging results will be communicated to you by MyChart, letter or phone within 4 business days after the clinic has received the results. If you do not hear from us within 7 days, please contact the clinic through MyChart or phone. If you have a critical or abnormal lab result, we will notify you by phone as soon as possible.  Submit  "refill requests through Zova or call your pharmacy and they will forward the refill request to us. Please allow 3 business days for your refill to be completed.          Additional Information About Your Visit        VeriFonehart Information     Zova gives you secure access to your electronic health record. If you see a primary care provider, you can also send messages to your care team and make appointments. If you have questions, please call your primary care clinic.  If you do not have a primary care provider, please call [phone] and they will assist you.        Care EveryWhere ID     This is your Care EveryWhere ID. This could be used by other organizations to access your [facility] medical records  [id number]        Your Vitals Were     Pulse Temperature Respirations Height Last Period Pulse Oximetry    68 98  F (36.7  C) (Oral) 16 1.727 m (5' 7.99\") 03/14/2018 98%    BMI (Body Mass Index)                   25.55 kg/m2            Blood Pressure from Last 3 Encounters:   04/06/18 118/72   04/06/18 118/72   03/22/18 103/69    Weight from Last 3 Encounters:   04/06/18 76.2 kg (168 lb)   04/06/18 76.6 kg (168 lb 12.8 oz)   03/22/18 77.3 kg (170 lb 8 oz)              Today, you had the following     No orders found for display         Today's Medication Changes          These changes are accurate as of 4/6/18 11:59 PM.  If you have any questions, ask your nurse or doctor.               Start taking these medicines.        Dose/Directions    letrozole 2.5 MG tablet   Commonly known as:  FEMARA   Used for:  Malignant neoplasm of upper-outer quadrant of left breast in female, estrogen receptor positive (H)   Started by:  Paty Pandey PA-C        Dose:  2.5 mg   Take 1 tablet (2.5 mg) by mouth daily   Quantity:  30 tablet   Refills:  11            Where to get your medicines      These medications were sent to [facility] [place], [place] - [address] [place] [address] [phone] " [facility] [address] [address]    Hours:  TRANSPLANT PHONE NUMBER [phone] Phone:  [phone]     letrozole 2.5 MG tablet                Primary Care Provider Fax #    Physician No Ref-Primary [phone]       No address on file        Equal Access to Services     SURESH CHAPARRO : Hadii aad ku hadlindao Soomaali, waaxda luqadaha, qaybta kaalmada adeegyada, waxay idiin hayangelineblanca liceashahnazjoby mendoza. So [facility] [phone].    ATENCIÓN: Si habla español, tiene a sparrow disposición servicios gratuitos de asistencia lingüística. Angellaame al [phone].    We comply with applicable federal civil rights laws and [place] laws. We do not discriminate on the basis of race, color, national origin, age, disability, sex, sexual orientation, or gender identity.            Thank you!     Thank you for choosing [facility]  for your care. Our goal is always to provide you with excellent care. Hearing back from our patients is one way we can continue to improve our services. Please take a few minutes to complete the written survey that you may receive in the mail after your visit with us. Thank you!             Your Updated Medication List - Protect others around you: Learn how to safely use, store and throw away your medicines at www.disposemymeds.org.          This list is accurate as of 4/6/18 11:59 PM.  Always use your most recent med list.                   Brand Name Dispense Instructions for use Diagnosis    Biotin 1 MG Caps       Malignant neoplasm of left breast in female, estrogen receptor positive, unspecified site of breast (H)       letrozole 2.5 MG tablet    FEMARA    30 tablet    Take 1 tablet (2.5 mg) by mouth daily    Malignant neoplasm of upper-outer quadrant of left breast in female, estrogen receptor positive (H)       levonorgestrel 20 MCG/24HR IUD    MIRENA     1 each by Intrauterine route    Malignant neoplasm of left breast in female, estrogen receptor positive, unspecified site of  breast (H)       Spirulina 500 MG Tabs      6 Tabs daily.    Malignant neoplasm of left breast in female, estrogen receptor positive, unspecified site of breast (H)

## 2018-04-06 NOTE — NURSING NOTE
"Oncology Rooming Note    April 6, 2018 8:02 AM   Mary Chadwick is a 42 year old female who presents for:    Chief Complaint   Patient presents with     Oncology Clinic Visit     Return visit related to Breast Cancer     Initial Vitals: /72 (BP Location: Left arm, Patient Position: Sitting, Cuff Size: Adult Regular)  Pulse 68  Temp 98  F (36.7  C) (Tympanic)  Resp 18  Ht 1.727 m (5' 7.99\")  Wt 76.6 kg (168 lb 12.8 oz)  LMP 03/14/2018  SpO2 98%  BMI 25.67 kg/m2 Estimated body mass index is 25.67 kg/(m^2) as calculated from the following:    Height as of this encounter: 1.727 m (5' 7.99\").    Weight as of this encounter: 76.6 kg (168 lb 12.8 oz). Body surface area is 1.92 meters squared.  No Pain (0) Comment: Data Unavailable   Patient's last menstrual period was 03/14/2018.  Allergies reviewed: Yes  Medications reviewed: Yes    Medications: Medication refills not needed today.  Pharmacy name entered into [facility]:    [facility] [place], [place] - [address]  [facility] [place], [place] - [address]    Clinical concerns: No new concerns. Provider was notified.    10 minutes for nursing intake (face to face time)     Valeria Ray LPN            "

## 2018-04-06 NOTE — NURSING NOTE
"Oncology Rooming Note    April 6, 2018 9:42 AM   Mary Chadwick is a 42 year old female who presents for:    Chief Complaint   Patient presents with     Oncology Clinic Visit     Return: Breast CA     Initial Vitals: /72  Pulse 68  Temp 98  F (36.7  C) (Oral)  Resp 16  Ht 1.727 m (5' 7.99\")  Wt 76.2 kg (168 lb)  LMP 03/14/2018  SpO2 98%  BMI 25.55 kg/m2 Estimated body mass index is 25.55 kg/(m^2) as calculated from the following:    Height as of this encounter: 1.727 m (5' 7.99\").    Weight as of this encounter: 76.2 kg (168 lb). Body surface area is 1.91 meters squared.  No Pain (0) Comment: Data Unavailable   Patient's last menstrual period was 03/14/2018.  Allergies reviewed: Yes  Medications reviewed: Yes    Medications: Medication refills not needed today.  Pharmacy name entered into [facility]:    [facility] [place], [place] - [address] [place], [place] [address]  [facility] [place], [place] - [address] [facility]    Clinical concerns: no new concerns     6 minutes for nursing intake (face to face time)     Kathi Moore CMA                "
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
## 2018-04-06 NOTE — Clinical Note
Urgent-- pt is here to see dr macdonald and needs an US. US not urgent but urgent in sense she would like it done while here if possible

## 2018-04-10 ENCOUNTER — OFFICE VISIT (OUTPATIENT)
Dept: PLASTIC SURGERY | Facility: CLINIC | Age: 43
End: 2018-04-10
Attending: PLASTIC SURGERY
Payer: COMMERCIAL

## 2018-04-10 ENCOUNTER — MYC MEDICAL ADVICE (OUTPATIENT)
Dept: PLASTIC SURGERY | Facility: CLINIC | Age: 43
End: 2018-04-10

## 2018-04-10 DIAGNOSIS — Z17.0 MALIGNANT NEOPLASM OF UPPER-INNER QUADRANT OF LEFT BREAST IN FEMALE, ESTROGEN RECEPTOR POSITIVE (H): Primary | ICD-10-CM

## 2018-04-10 DIAGNOSIS — C50.212 MALIGNANT NEOPLASM OF UPPER-INNER QUADRANT OF LEFT BREAST IN FEMALE, ESTROGEN RECEPTOR POSITIVE (H): Primary | ICD-10-CM

## 2018-04-10 RX ORDER — CEFAZOLIN SODIUM 1 G/50ML
1 INJECTION, SOLUTION INTRAVENOUS SEE ADMIN INSTRUCTIONS
Status: CANCELLED | OUTPATIENT
Start: 2018-04-10

## 2018-04-10 NOTE — MR AVS SNAPSHOT
After Visit Summary   4/10/2018    Mary Chadwick    MRN: [id number]           Patient Information     Date Of Birth          1975        Visit Information        Provider Department      4/10/2018 11:45 AM BASILIO Kidd MD [facility]        Today's Diagnoses     Malignant neoplasm of upper-inner quadrant of left breast in female, estrogen receptor positive (H)    -  1       Follow-ups after your visit        Follow-up notes from your care team     Return if symptoms worsen or fail to improve.      Your next 10 appointments already scheduled     Apr 20, 2018 12:30 PM CDT   [facility] Lab Draw with  M[facility]IC LAB DRAW   [facility] ([facility])    [address] [place] [address]  [address]  [facility] [id number] [phone]            Apr 20, 2018  1:00 PM CDT   (Arrive by 12:45 PM)   Return Visit with Valeria Gann MD   [facility] ([facility])    [address] [place] [address]  [address]  [facility] [id number] [phone]            Apr 20, 2018  2:00 PM CDT   Infusion 60 with  ONCOLOGY INFUSION, UC 24 ATC   [facility] ([facility])    [address] [place] [address]  [address]  [facility] [id number] [phone]              Who to contact     If you have questions or need follow up information about today's clinic visit or your schedule please contact [facility] directly at [phone].  Normal or non-critical lab and imaging results will be communicated to you by MyChart, letter or phone within 4 business days after the clinic has received the results. If you do not hear from us within 7 days, please contact the clinic through MyChart or phone. If you have a critical or abnormal lab result, we will notify you by phone as soon as possible.  Submit refill requests through H[employer]le or call your pharmacy and they will forward the refill  request to us. Please allow 3 business days for your refill to be completed.          Additional Information About Your Visit        Sunlothart Information     Sun[employer]art gives you secure access to your electronic health record. If you see a primary care provider, you can also send messages to your care team and make appointments. If you have questions, please call your primary care clinic.  If you do not have a primary care provider, please call [phone] and they will assist you.        Care EveryWhere ID     This is your Care EveryWhere ID. This could be used by other organizations to access your [facility] medical records  [id number]        Your Vitals Were     Last Period                   03/14/2018            Blood Pressure from Last 3 Encounters:   04/06/18 118/72   04/06/18 118/72   03/22/18 103/69    Weight from Last 3 Encounters:   04/06/18 168 lb   04/06/18 168 lb 12.8 oz   03/22/18 170 lb 8 oz              Today, you had the following     No orders found for display       Primary Care Provider Fax #    Physician No Ref-Primary [phone]       No address on file        Equal Access to Services     [facility]: Hadii aad ku hadasho Soomaali, waaxda luqadaha, qaybta kaalmada adeegyashani, naseem lugo . So [facility] [phone].    ATENCIÓN: Si habla español, tiene a sparrow disposición servicios gratuitos de asistencia lingüística. Llame al [phone].    We comply with applicable federal civil rights laws and [place] laws. We do not discriminate on the basis of race, color, national origin, age, disability, sex, sexual orientation, or gender identity.            Thank you!     Thank you for choosing [facility]  for your care. Our goal is always to provide you with excellent care. Hearing back from our patients is one way we can continue to improve our services. Please take a few minutes to complete the written survey that you may receive in the mail after your visit with  us. Thank you!             Your Updated Medication List - Protect others around you: Learn how to safely use, store and throw away your medicines at www.disposemymeds.org.          This list is accurate as of 4/10/18 11:59 PM.  Always use your most recent med list.                   Brand Name Dispense Instructions for use Diagnosis    Biotin 1 MG Caps       Malignant neoplasm of left breast in female, estrogen receptor positive, unspecified site of breast (H)       letrozole 2.5 MG tablet    FEMARA    30 tablet    Take 1 tablet (2.5 mg) by mouth daily    Malignant neoplasm of upper-outer quadrant of left breast in female, estrogen receptor positive (H)       levonorgestrel 20 MCG/24HR IUD    MIRENA     1 each by Intrauterine route    Malignant neoplasm of left breast in female, estrogen receptor positive, unspecified site of breast (H)       Spirulina 500 MG Tabs      6 Tabs daily.    Malignant neoplasm of left breast in female, estrogen receptor positive, unspecified site of breast (H)

## 2018-04-10 NOTE — LETTER
4/10/2018       RE: Mary Chadwick  [address]  [facility] [address]     Dear Colleague,    Thank you for referring your patient, Mary Chadwick, to the [facility] at [facility]. Please see a copy of my visit note below.    Service Date: 04/10/2018      REFERRING PROVIDER:  Dr. Antonio Andrews      PRESENTING COMPLAINT:  Consultation for breast reconstruction.      HISTORY OF PRESENTING COMPLAINT:  Ms. Chadwick is 42 years old.  She has been diagnosed with stage II left-sided breast cancer and has also been diagnosed with Li-Fraumeni syndrome which puts her at a higher risk for breast cancer and other cancers and is not a candidate for radiation therapy.  The patient is scheduled to undergo bilateral mastectomies, the left side being nipple nonsparing and the right side potentially could be nipple sparing.  She wears a B to C cup.  She would like to be around the same size or little larger.  She has no back pain.  She has a very active lifestyle.  She likes to swim and is a .      PAST MEDICAL HISTORY:  Li-Fraumeni syndrome.      PAST SURGICAL HISTORY:  Right inguinal hernia repair with mesh.      MEDICATIONS:  Femara, Spirulina.       ALLERGIES:  Nil.      SOCIAL HISTORY:  She used to smoke about a pack a week for about 7 years, quit in 2004.  Does not drink alcohol.  She lives in [place].  She works in the design world.      REVIEW OF SYSTEMS:  Denies chest pain, shortness of breath, MI, CVA, DVT and PE.      PHYSICAL EXAMINATION:  Vitals signs stable.  She is afebrile, in no obvious distress.  She is 5 feet 8 inches, 168 pounds, BMI of 25 kg/m2.  On examination of her breasts, she has deflated breasts, grade 0 ptosis.  The left breast is slightly larger than the right side.  She has a skin pinch thickness of about 2-4 cm.  No obvious hernias.  No scars on the back.      ASSESSMENT AND PLAN:  Based on above findings, a diagnosis of  left-sided breast cancer and Li-Fraumeni syndrome with a planned bilateral mastectomy and interested in immediate reconstruction was made.  She is here with her friend.  I discussed and examined the patient in the presence of my nurse.  We discussed what breast reconstruction philosophically was and explained to them the elective nature of reconstruction.  I explained immediate versus delayed reconstruction, expander based versus autologous reconstruction.  I explained the pros and cons of each.  It is important to note that the patient is not going to get radiation therapy given her Li-Fraumeni syndrome.  We did discuss the concept behind symmetry.  I did discuss the fact what nipple sparing mastectomies were and the advantages of keeping a nipple on the contralateral prophylactic side versus the disadvantages.  I was very clear that from a symmetry enhancement standpoint, probably doing bilateral nipple nonsparing mastectomies would give her more symmetry as the same procedure would be done on each side.  However, she must also understand that having a scar going across the center of the breast has its own potential aesthetic downsides including the lower chance of getting a nice rounded breast mound and a more prominent scar in the center of the chest compared to potentially a more aesthetic and natural looking breast with 1 original nipple areolar complex, especially if the scar for the mastectomy is hidden in the inframammary fold or lateral mammary fold.  Obviously, the left side cannot be a nipple sparing given the location of the tumor, but the right side has the opportunity of a nipple sparing procedure with the pros and cons described above.  These options were given to the patient.  The ultimate decision is in the hands of Dr. Andrews and the patient given the fact that deciding how best to proceed does impact how the mastectomy is carried out.  We discussed all options for breast reconstruction going  forward including expander implant-based versus autologous reconstruction.  Given the fact that the patient is relatively thin and does not have the amount of tissue to give her a full C cup bilaterally and she is an active and avid , I think an implant-based reconstruction would be the way to proceed, especially given the fact that radiation therapy is not going to be in the adjuvant treatment picture.  I discussed with the patient the staged nature reconstruction, the first stage being placement of an expander with ADM possibly in the subcutaneous or submuscular plane followed by a second stage where the expander is replaced with an implant and then a third stage where revisional surgeries and possible nipple reconstruction could be undertaken.  With regards to the first stage, I will leave the timing to do the mastectomy and reconstruction to her oncologists given the fact she is undergoing neoadjuvant hormonal treatments in an effort to try and affect the tumor.  The patient stated that this was planned sometime in June.  I went over the planned first stage.  All risks, benefits and alternatives of the procedure including pain, infection, bleeding, scarring, asymmetry, seromas, hematomas, wound breakdown, wound dehiscence, standing cutaneous cones, skin necrosis, fat necrosis, implant problems like implant visibility, palpability, rippling, exposure, infections, requirement of removal of the implant, asymmetries, seromas, hematomas, DVT, PE, MI, CVA, pneumonia, renal failure and death were all explained.  She understood everything and wants to proceed.  I will see her back in clinic prior to the proposed surgery.  All exam and discussion were done presence of my nurse.  Consent obtained and photographs obtained.  I look forward to helping out in the near future.      Total time spent with patient 40 minutes, of which more than half was counseling.     BASILIO STEEN MD       cc:   Antonio  MD Darryl    Physicians    [address], [facility] [address]   [place]  [address]                    D: 04/10/2018   T: 04/10/2018   MT: winston      Name:     JASMYNE HERNANDEZ   MRN:      [id number]        Account:      [id number]   :      1975           Service Date: 04/10/2018      Document: [id number]

## 2018-04-11 NOTE — PROGRESS NOTES
Service Date: 04/10/2018      REFERRING PROVIDER:  Dr. Antonio Andrews      PRESENTING COMPLAINT:  Consultation for breast reconstruction.      HISTORY OF PRESENTING COMPLAINT:  Ms. Chadwick is 42 years old.  She has been diagnosed with stage II left-sided breast cancer and has also been diagnosed with Li-Fraumeni syndrome which puts her at a higher risk for breast cancer and other cancers and is not a candidate for radiation therapy.  The patient is scheduled to undergo bilateral mastectomies, the left side being nipple nonsparing and the right side potentially could be nipple sparing.  She wears a B to C cup.  She would like to be around the same size or little larger.  She has no back pain.  She has a very active lifestyle.  She likes to swim and is a .      PAST MEDICAL HISTORY:  Li-Fraumeni syndrome.      PAST SURGICAL HISTORY:  Right inguinal hernia repair with mesh.      MEDICATIONS:  Femara, Spirulina.       ALLERGIES:  Nil.      SOCIAL HISTORY:  She used to smoke about a pack a week for about 7 years, quit in 2004.  Does not drink alcohol.  She lives in [place].  She works in the design world.      REVIEW OF SYSTEMS:  Denies chest pain, shortness of breath, MI, CVA, DVT and PE.      PHYSICAL EXAMINATION:  Vitals signs stable.  She is afebrile, in no obvious distress.  She is 5 feet 8 inches, 168 pounds, BMI of 25 kg/m2.  On examination of her breasts, she has deflated breasts, grade 0 ptosis.  The left breast is slightly larger than the right side.  She has a skin pinch thickness of about 2-4 cm.  No obvious hernias.  No scars on the back.      ASSESSMENT AND PLAN:  Based on above findings, a diagnosis of left-sided breast cancer and Li-Fraumeni syndrome with a planned bilateral mastectomy and interested in immediate reconstruction was made.  She is here with her friend.  I discussed and examined the patient in the presence of my nurse.  We discussed what breast reconstruction philosophically  was and explained to them the elective nature of reconstruction.  I explained immediate versus delayed reconstruction, expander based versus autologous reconstruction.  I explained the pros and cons of each.  It is important to note that the patient is not going to get radiation therapy given her Li-Fraumeni syndrome.  We did discuss the concept behind symmetry.  I did discuss the fact what nipple sparing mastectomies were and the advantages of keeping a nipple on the contralateral prophylactic side versus the disadvantages.  I was very clear that from a symmetry enhancement standpoint, probably doing bilateral nipple nonsparing mastectomies would give her more symmetry as the same procedure would be done on each side.  However, she must also understand that having a scar going across the center of the breast has its own potential aesthetic downsides including the lower chance of getting a nice rounded breast mound and a more prominent scar in the center of the chest compared to potentially a more aesthetic and natural looking breast with 1 original nipple areolar complex, especially if the scar for the mastectomy is hidden in the inframammary fold or lateral mammary fold.  Obviously, the left side cannot be a nipple sparing given the location of the tumor, but the right side has the opportunity of a nipple sparing procedure with the pros and cons described above.  These options were given to the patient.  The ultimate decision is in the hands of Dr. Andrews and the patient given the fact that deciding how best to proceed does impact how the mastectomy is carried out.  We discussed all options for breast reconstruction going forward including expander implant-based versus autologous reconstruction.  Given the fact that the patient is relatively thin and does not have the amount of tissue to give her a full C cup bilaterally and she is an active and avid , I think an implant-based reconstruction would  be the way to proceed, especially given the fact that radiation therapy is not going to be in the adjuvant treatment picture.  I discussed with the patient the staged nature reconstruction, the first stage being placement of an expander with ADM possibly in the subcutaneous or submuscular plane followed by a second stage where the expander is replaced with an implant and then a third stage where revisional surgeries and possible nipple reconstruction could be undertaken.  With regards to the first stage, I will leave the timing to do the mastectomy and reconstruction to her oncologists given the fact she is undergoing neoadjuvant hormonal treatments in an effort to try and affect the tumor.  The patient stated that this was planned sometime in .  I went over the planned first stage.  All risks, benefits and alternatives of the procedure including pain, infection, bleeding, scarring, asymmetry, seromas, hematomas, wound breakdown, wound dehiscence, standing cutaneous cones, skin necrosis, fat necrosis, implant problems like implant visibility, palpability, rippling, exposure, infections, requirement of removal of the implant, asymmetries, seromas, hematomas, DVT, PE, MI, CVA, pneumonia, renal failure and death were all explained.  She understood everything and wants to proceed.  I will see her back in clinic prior to the proposed surgery.  All exam and discussion were done presence of my nurse.  Consent obtained and photographs obtained.  I look forward to helping out in the near future.      Total time spent with patient 40 minutes, of which more than half was counseling.      cc:   Antonio Andrews MD    Physicians    [address] [facility] [facility] [address]   [place]  [address]         [facility] ALLISON STEEN MD             D: 04/10/2018   T: 04/10/2018   MT: [place]      Name:     JASMYNE HERNANDEZ   MRN:      [id number]        Account:      [id number]   :      1975           Service Date: 04/10/2018      Document:  [id number]

## 2018-04-20 ENCOUNTER — INFUSION THERAPY VISIT (OUTPATIENT)
Dept: ONCOLOGY | Facility: CLINIC | Age: 43
End: 2018-04-20
Attending: INTERNAL MEDICINE
Payer: COMMERCIAL

## 2018-04-20 ENCOUNTER — APPOINTMENT (OUTPATIENT)
Dept: LAB | Facility: CLINIC | Age: 43
End: 2018-04-20
Attending: INTERNAL MEDICINE
Payer: COMMERCIAL

## 2018-04-20 VITALS
HEART RATE: 80 BPM | RESPIRATION RATE: 16 BRPM | TEMPERATURE: 98.8 F | BODY MASS INDEX: 25.37 KG/M2 | DIASTOLIC BLOOD PRESSURE: 72 MMHG | HEIGHT: 68 IN | OXYGEN SATURATION: 98 % | WEIGHT: 167.4 LBS | SYSTOLIC BLOOD PRESSURE: 112 MMHG

## 2018-04-20 DIAGNOSIS — C50.212 MALIGNANT NEOPLASM OF UPPER-INNER QUADRANT OF LEFT BREAST IN FEMALE, ESTROGEN RECEPTOR POSITIVE (H): Primary | ICD-10-CM

## 2018-04-20 DIAGNOSIS — C50.412 MALIGNANT NEOPLASM OF UPPER-OUTER QUADRANT OF LEFT BREAST IN FEMALE, ESTROGEN RECEPTOR POSITIVE (H): ICD-10-CM

## 2018-04-20 DIAGNOSIS — Z17.0 MALIGNANT NEOPLASM OF UPPER-INNER QUADRANT OF LEFT BREAST IN FEMALE, ESTROGEN RECEPTOR POSITIVE (H): Primary | ICD-10-CM

## 2018-04-20 DIAGNOSIS — Z17.0 MALIGNANT NEOPLASM OF UPPER-OUTER QUADRANT OF LEFT BREAST IN FEMALE, ESTROGEN RECEPTOR POSITIVE (H): ICD-10-CM

## 2018-04-20 DIAGNOSIS — Z15.01 LI-FRAUMENI SYNDROME: Primary | ICD-10-CM

## 2018-04-20 LAB
ALBUMIN SERPL-MCNC: 4.1 G/DL (ref 3.4–5)
ALP SERPL-CCNC: 56 U/L (ref 40–150)
ALT SERPL W P-5'-P-CCNC: 15 U/L (ref 0–50)
ANION GAP SERPL CALCULATED.3IONS-SCNC: 6 MMOL/L (ref 3–14)
AST SERPL W P-5'-P-CCNC: 11 U/L (ref 0–45)
BASOPHILS # BLD AUTO: 0.1 10E9/L (ref 0–0.2)
BASOPHILS NFR BLD AUTO: 1 %
BILIRUB SERPL-MCNC: 0.4 MG/DL (ref 0.2–1.3)
BUN SERPL-MCNC: 17 MG/DL (ref 7–30)
CALCIUM SERPL-MCNC: 9.1 MG/DL (ref 8.5–10.1)
CHLORIDE SERPL-SCNC: 104 MMOL/L (ref 94–109)
CO2 SERPL-SCNC: 29 MMOL/L (ref 20–32)
CREAT SERPL-MCNC: 0.76 MG/DL (ref 0.52–1.04)
DIFFERENTIAL METHOD BLD: NORMAL
EOSINOPHIL # BLD AUTO: 0.1 10E9/L (ref 0–0.7)
EOSINOPHIL NFR BLD AUTO: 1.5 %
ERYTHROCYTE [DISTWIDTH] IN BLOOD BY AUTOMATED COUNT: 12.2 % (ref 10–15)
GFR SERPL CREATININE-BSD FRML MDRD: 84 ML/MIN/1.7M2
GLUCOSE SERPL-MCNC: 89 MG/DL (ref 70–99)
HCT VFR BLD AUTO: 42.3 % (ref 35–47)
HGB BLD-MCNC: 14.5 G/DL (ref 11.7–15.7)
IMM GRANULOCYTES # BLD: 0 10E9/L (ref 0–0.4)
IMM GRANULOCYTES NFR BLD: 0.1 %
LYMPHOCYTES # BLD AUTO: 2.7 10E9/L (ref 0.8–5.3)
LYMPHOCYTES NFR BLD AUTO: 37.4 %
MCH RBC QN AUTO: 30.3 PG (ref 26.5–33)
MCHC RBC AUTO-ENTMCNC: 34.3 G/DL (ref 31.5–36.5)
MCV RBC AUTO: 89 FL (ref 78–100)
MONOCYTES # BLD AUTO: 0.5 10E9/L (ref 0–1.3)
MONOCYTES NFR BLD AUTO: 7.4 %
NEUTROPHILS # BLD AUTO: 3.9 10E9/L (ref 1.6–8.3)
NEUTROPHILS NFR BLD AUTO: 52.6 %
NRBC # BLD AUTO: 0 10*3/UL
NRBC BLD AUTO-RTO: 0 /100
PLATELET # BLD AUTO: 258 10E9/L (ref 150–450)
POTASSIUM SERPL-SCNC: 3.8 MMOL/L (ref 3.4–5.3)
PROT SERPL-MCNC: 7.7 G/DL (ref 6.8–8.8)
RBC # BLD AUTO: 4.78 10E12/L (ref 3.8–5.2)
SODIUM SERPL-SCNC: 138 MMOL/L (ref 133–144)
WBC # BLD AUTO: 7.3 10E9/L (ref 4–11)

## 2018-04-20 PROCEDURE — G0463 HOSPITAL OUTPT CLINIC VISIT: HCPCS | Mod: ZF

## 2018-04-20 PROCEDURE — 85025 COMPLETE CBC W/AUTO DIFF WBC: CPT | Performed by: PHYSICIAN ASSISTANT

## 2018-04-20 PROCEDURE — 80053 COMPREHEN METABOLIC PANEL: CPT | Performed by: PHYSICIAN ASSISTANT

## 2018-04-20 PROCEDURE — 25000128 H RX IP 250 OP 636: Mod: ZF | Performed by: INTERNAL MEDICINE

## 2018-04-20 PROCEDURE — 96402 CHEMO HORMON ANTINEOPL SQ/IM: CPT

## 2018-04-20 PROCEDURE — 25000125 ZZHC RX 250: Mod: JW,ZF | Performed by: INTERNAL MEDICINE

## 2018-04-20 PROCEDURE — 36415 COLL VENOUS BLD VENIPUNCTURE: CPT

## 2018-04-20 PROCEDURE — 99214 OFFICE O/P EST MOD 30 MIN: CPT | Mod: ZP | Performed by: INTERNAL MEDICINE

## 2018-04-20 RX ORDER — BISACODYL 5 MG/1
10 TABLET, DELAYED RELEASE ORAL
COMMUNITY
Start: 2018-04-20 | End: 2018-04-21

## 2018-04-20 RX ORDER — POLYETHYLENE GLYCOL 3350 17 G/17G
238 POWDER, FOR SOLUTION ORAL
COMMUNITY
Start: 2018-04-20 | End: 2018-04-21

## 2018-04-20 RX ORDER — LETROZOLE 2.5 MG/1
2.5 TABLET, FILM COATED ORAL DAILY
Qty: 30 TABLET | Refills: 11 | Status: ON HOLD | OUTPATIENT
Start: 2018-04-20 | End: 2018-08-06

## 2018-04-20 RX ORDER — MAGNESIUM CITRATE
296 SOLUTION, ORAL ORAL
COMMUNITY
Start: 2018-04-20 | End: 2018-04-21

## 2018-04-20 RX ADMIN — LIDOCAINE HYDROCHLORIDE 1 ML: 10 INJECTION, SOLUTION EPIDURAL; INFILTRATION; INTRACAUDAL; PERINEURAL at 15:01

## 2018-04-20 RX ADMIN — GOSERELIN ACETATE 3.6 MG: 3.6 IMPLANT SUBCUTANEOUS at 15:04

## 2018-04-20 ASSESSMENT — PAIN SCALES - GENERAL: PAINLEVEL: NO PAIN (0)

## 2018-04-20 NOTE — PROGRESS NOTES
Infusion Nursing Note:  Mary Chadwick presents today for Zoladex injection.    Patient seen by provider today: Yes: Tracy Gann MD   present during visit today: Not Applicable.    Note: Patient feels well. No complaints made. Appointment is not made by the the time patient left the facility. Instructed patient if unable to see appointment for next couple of days to call triage. Verbalized understanding.     Intravenous Access:  No Intravenous access/labs at this visit.    Treatment Conditions:  Lab Results   Component Value Date    HGB 14.5 04/20/2018     Lab Results   Component Value Date    WBC 7.3 04/20/2018      Lab Results   Component Value Date    ANEU 3.9 04/20/2018     Lab Results   Component Value Date     04/20/2018      Lab Results   Component Value Date     04/20/2018                   Lab Results   Component Value Date    POTASSIUM 3.8 04/20/2018           Lab Results   Component Value Date    MAG 2.3 03/06/2018            Lab Results   Component Value Date    CR 0.76 04/20/2018                   Lab Results   Component Value Date    MARICHUY 9.1 04/20/2018                Lab Results   Component Value Date    BILITOTAL 0.4 04/20/2018           Lab Results   Component Value Date    ALBUMIN 4.1 04/20/2018                    Lab Results   Component Value Date    ALT 15 04/20/2018           Lab Results   Component Value Date    AST 11 04/20/2018       Results reviewed, labs MET treatment parameters, ok to proceed with treatment.    Zoladex injection on left lower quadrant.      Post Infusion Assessment:  Patient tolerated injection without incident.  Site patent and intact, free from redness, edema or discomfort.  No evidence of extravasations.    Discharge Plan:   Patient declined prescription refills.  Discharge instructions reviewed with: Patient.  Patient and/or family verbalized understanding of discharge instructions and all questions answered.  AVS to patient via [facility].   Patient will return 4 weeks from today for next appointment.   Patient discharged in stable condition accompanied by: self.  Departure Mode: Ambulatory.    ANNEMARIE SCHAFFER RN                         none

## 2018-04-20 NOTE — PROGRESS NOTES
"April 20, 2018    HISTORY OF PRESENT ILLNESS:  I am seeing Ms. Omaira Chadwick in follow up for low risk mammaprint at least stage 2 breast cancer.     Mary, or \"Omaira,\" comes in with a new diagnosis of breast cancer.  She is originally from [place].  She [place] here displaced from [place].  She underwent a screening mammography this week.  This screening mammogram was performed on 02/09/2018 followed by diagnostic breast ultrasound which revealed a 2.6 x 1.1 x 1.7 cm irregular shaped mass at the 2 o'clock position involving the left breast.  There were indeterminate microcalcifications involving the left breast and a biopsy was recommended.  A stereotactic biopsy occurred on 02/20/2018 revealing an invasive ductal carcinoma, grade 2, ER positive, GA positive, HER2 negative by immunohistochemistry histochemistry at 1+.      Omaira had a breast MRI which revealed a 3.5-cm mass in her left breast with an area of non-mass-like enhancement measuring approximately 8 cm.  On this imaging, she had an equivocal lymph node in the left axilla.  This was not biopsied.       She was screened for the I-SPY-2 clinical trial.  She came back as a low-risk MammaPrint.  She was not enrolled on the therapeutic portion of the trial, as a result.  She was also diagnosed with Li-Fraumeni syndrome.      She began zoladex and  Femara.  She returns today for follow up.        She says she is coping reasonably well.  She has no chest pain or shortness of breath.  She has no nausea, vomiting, diarrhea or constipation.  She has occasional hot flashes.  She thinks her breast mass is getting softer.      Her 10-point review of systems is otherwise negative.     PAST MEDICAL HISTORY:  Breast cancer, p53 mutation, MUTHY mutation     MEDICATIONS:  No medications.      ALLERGIES:  She is allergic to lactose.      SOCIAL HISTORY:  She has 7 and 11-year-old.  She is working part-time at the [employer].  She has a remote history of " tobacco use.      FAMILY HISTORY:  Her mom  of colon cancer at age 41.  Maternal aunt with breast cancer in the 50s.  Maternal grandfather with stomach cancer.  Maternal uncle with stomach cancer.  Father with myasthenia gravis.  A birth brother who is alive and a sister who is alive.  Her significant other comes back and forth to [place].      PHYSICAL EXAMINATION:  /72 (BP Location: Right arm, Cuff Size: Adult Regular)  Pulse 80  Temp 98.8  F (37.1  C) (Oral)  Resp 16  Wt 75.9 kg (167 lb 6.4 oz)  SpO2 98%  BMI 25.46 kg/m2  Gen: well appearing, nad  Heent: no icterus o/p clear  Cv: rrr, nl S1S2  Lungs: clear  Abd: soft, nt, nd + bs  Ext: no edema  Skin: no rashes  Breast exam:  Left breast with 3 x 2 cm mass that is softer in her upper outer quadrant, no axillary adenopathy     LABORATORY:  Pathology was reviewed.      Lab Results   Component Value Date    WBC 7.5 2018     Lab Results   Component Value Date    RBC 4.82 2018     Lab Results   Component Value Date    HGB 14.1 2018     Lab Results   Component Value Date    HCT 42.3 2018     No components found for: MCT  Lab Results   Component Value Date    MCV 88 2018     Lab Results   Component Value Date    MCH 29.3 2018     Lab Results   Component Value Date    MCHC 33.3 2018     Lab Results   Component Value Date    RDW 12.3 2018     Lab Results   Component Value Date     2018       Recent Labs   Lab Test  18   1029   NA  136   POTASSIUM  3.9   CHLORIDE  105   CO2  24   ANIONGAP  8   GLC  95   BUN  9   CR  0.66   MARICHUY  9.1     Liver Function Studies -   Recent Labs   Lab Test  18   1029   PROTTOTAL  7.4   ALBUMIN  3.9   BILITOTAL  0.3   ALKPHOS  52   AST  20   ALT  16       ASSESSMENT AND PLAN:  This is a 42-year-old female with stage T2 NX, invasive ductal carcinoma, ER positive, VA positive, HER2 negative, involving the left breast in the setting of Li-Fraumeni  syndrome.     I discussed with Omaira today that she appears to have stage II-A breast cancer involving her left breast.   She was screened for the I-SPY-2 clinical trial and was found to have a low MammaPrint, indicating that she does not go on to the chemotherapy portion of the clinical trial.        Given Omaira's low-risk MammaPrint, we began her on therapy with Zoladex and an aromatase inhibitor.  She is tolerating this therapy without any difficulty.  She has occasional hot flashes.  Recommended continuing her Zoladex on a monthly basis in conjunction with her aromatase inhibitor.      She has met with Dr. Kidd, as well as Dr. Antonio Andrews.  Given her P53 mutation, the plan is for bilateral mastectomy with a sentinel lymph node biopsy.  If her sentinel lymph node is positive, the plan is likely to proceed with a full axillary lymph node dissection in order to try to avoid adjuvant radiation.  She has met with Dr. Kidd to discuss breast reconstruction.      We discussed given her low risk MammaPrint at this time, I would not recommend chemotherapy.  We have discussed that if she has more extensive disease than initially anticipated based on her final pathology from her surgery, this may be reconsidered.      She is having her Mirena IUD removed later this afternoon.      She has occasional hot flashes that are well-tolerated.      She is coping reasonably well.      She will likely plan for surgery in late June.

## 2018-04-20 NOTE — MR AVS SNAPSHOT
After Visit Summary   4/20/2018    Mary Chadwick    MRN: [id number]           Patient Information     Date Of Birth          1975        Visit Information        Provider Department      4/20/2018 1:00 PM Valeria Gann MD [facility]        Today's Diagnoses     Li-Fraumeni syndrome    -  1    Malignant neoplasm of upper-outer quadrant of left breast in female, estrogen receptor positive (H)           Follow-ups after your visit        Your next 10 appointments already scheduled     May 18, 2018  2:00 PM CDT   Infusion 60 with UC ONCOLOGY INFUSION, UC 16 ATC   [facility] ([facility])    [address]  [address]  [facility] [address]   [phone]            Vincenzo 15, 2018  1:00 PM CDT   [facility] Draw with  [facility] LAB DRAW   [facility] ([facility])    [address] [place] [address]  [address]  [facility] [address]   [phone]            Vincenzo 15, 2018  1:40 PM CDT   (Arrive by 1:25 PM)   Return Visit with Paty Pandey PA-C   [facility] ([facility])    [address] [place] [address]  [address]  [facility] [address]   [phone]            Vincenzo 15, 2018  2:30 PM CDT   Infusion 60 with UC ONCOLOGY INFUSION, UC 19 ATC   [facility] ([facility])    [address]  [address]  [facility] [address]   [phone]            Jul 13, 2018 12:00 PM CDT   (Arrive by 11:45 AM)   Return Visit with Valeria Gann MD   [facility] ([facility])    [address] [place] [address]  [address]  [facility] [address]   [phone]            Jul 13, 2018 12:30 PM CDT   Infusion 60 with UC ONCOLOGY INFUSION, UC 30 ATC   [facility] ([facility])    [address] [place] [address]  [address]  [facility]  "[id number] [phone]              Who to contact     If you have questions or need follow up information about today's clinic visit or your schedule please contact [facility] directly at [phone].  Normal or non-critical lab and imaging results will be communicated to you by Urbasolarhart, letter or phone within 4 business days after the clinic has received the results. If you do not hear from us within 7 days, please contact the clinic through Urbasolarhart or phone. If you have a critical or abnormal lab result, we will notify you by phone as soon as possible.  Submit refill requests through Tynt or call your pharmacy and they will forward the refill request to us. Please allow 3 business days for your refill to be completed.          Additional Information About Your Visit        [email or web address] Information     Tynt gives you secure access to your electronic health record. If you see a primary care provider, you can also send messages to your care team and make appointments. If you have questions, please call your primary care clinic.  If you do not have a primary care provider, please call [phone] and they will assist you.        Care EveryWhere ID     This is your Care EveryWhere ID. This could be used by other organizations to access your [facility] medical records  [id number]        Your Vitals Were     Pulse Temperature Respirations Height Last Period Pulse Oximetry    80 98.8  F (37.1  C) (Oral) 16 1.727 m (5' 8\") 04/20/2018 98%    Breastfeeding? BMI (Body Mass Index)                No 25.45 kg/m2           Blood Pressure from Last 3 Encounters:   04/20/18 112/72   04/06/18 118/72   04/06/18 118/72    Weight from Last 3 Encounters:   04/20/18 75.9 kg (167 lb 6.4 oz)   04/06/18 76.2 kg (168 lb)   04/06/18 76.6 kg (168 lb 12.8 oz)              Today, you had the following     No orders found for display         Today's Medication Changes          These changes are accurate as of 4/20/18 " 11:59 PM.  If you have any questions, ask your nurse or doctor.               These medicines have changed or have updated prescriptions.        Dose/Directions    * letrozole 2.5 MG tablet   Commonly known as:  FEMARA   This may have changed:  Another medication with the same name was added. Make sure you understand how and when to take each.   Used for:  Malignant neoplasm of upper-outer quadrant of left breast in female, estrogen receptor positive (H)        Dose:  2.5 mg   Take 1 tablet (2.5 mg) by mouth daily   Quantity:  30 tablet   Refills:  11       * letrozole 2.5 MG tablet   Commonly known as:  FEMARA   This may have changed:  You were already taking a medication with the same name, and this prescription was added. Make sure you understand how and when to take each.   Used for:  Malignant neoplasm of upper-outer quadrant of left breast in female, estrogen receptor positive (H)        Dose:  2.5 mg   Take 1 tablet (2.5 mg) by mouth daily   Quantity:  30 tablet   Refills:  11       * Notice:  This list has 2 medication(s) that are the same as other medications prescribed for you. Read the directions carefully, and ask your doctor or other care provider to review them with you.         Where to get your medicines      These medications were sent to [address] [place] [address] [address] [id number]    Hours:  TRANSPLANT PHONE NUMBER [phone] Phone:  [phone]     letrozole 2.5 MG tablet                Primary Care Provider Fax #    Physician No Ref-Primary [phone]       No address on file        Equal Access to Services     SURESH CHAPARRO AH: Hadii olivier dotsono Sobharati, waaxda luqadaha, qaybta kaalmada adeegyashani, naseem mendoza. So [facility] [phone].    ATENCIÓN: Si habla español, tiene a sparrow disposición servicios gratuitos de asistencia lingüística. Llame al [phone].    We comply with  applicable federal civil rights laws and [place] laws. We do not discriminate on the basis of race, color, national origin, age, disability, sex, sexual orientation, or gender identity.            Thank you!     Thank you for choosing [facility]  for your care. Our goal is always to provide you with excellent care. Hearing back from our patients is one way we can continue to improve our services. Please take a few minutes to complete the written survey that you may receive in the mail after your visit with us. Thank you!             Your Updated Medication List - Protect others around you: Learn how to safely use, store and throw away your medicines at www.disposemymeds.org.          This list is accurate as of 4/20/18 11:59 PM.  Always use your most recent med list.                   Brand Name Dispense Instructions for use Diagnosis    Biotin 1 MG Caps       Malignant neoplasm of left breast in female, estrogen receptor positive, unspecified site of breast (H)       bisacodyl 5 MG EC tablet    DULCOLAX     Take 10 mg by mouth        calcium citrate-vitamin D 315-250 MG-UNIT Tabs per tablet    CITRACAL     Take 2 tablets by mouth        citrate of magnesia Soln      Take 296 mLs by mouth        * letrozole 2.5 MG tablet    FEMARA    30 tablet    Take 1 tablet (2.5 mg) by mouth daily    Malignant neoplasm of upper-outer quadrant of left breast in female, estrogen receptor positive (H)       * letrozole 2.5 MG tablet    FEMARA    30 tablet    Take 1 tablet (2.5 mg) by mouth daily    Malignant neoplasm of upper-outer quadrant of left breast in female, estrogen receptor positive (H)       levonorgestrel 20 MCG/24HR IUD    MIRENA     1 each by Intrauterine route    Malignant neoplasm of left breast in female, estrogen receptor positive, unspecified site of breast (H)       NONFORMULARY      Take 1 capsule by mouth daily Rosehip oil        OMEGA 3-6-9 FATTY ACIDS PO      Take 2 capsules by mouth  daily        polyethylene glycol powder    MIRALAX/GLYCOLAX     Take 238 g by mouth        Spirulina 500 MG Tabs      6 Tabs daily.    Malignant neoplasm of left breast in female, estrogen receptor positive, unspecified site of breast (H)       * Notice:  This list has 2 medication(s) that are the same as other medications prescribed for you. Read the directions carefully, and ask your doctor or other care provider to review them with you.

## 2018-04-20 NOTE — PATIENT INSTRUCTIONS
Contact Numbers  [facility]: [phone]    After Hours:  [phone]  Triage: [phone]    Please call the [facility] Triage line if you experience a temperature greater than or equal to 100.5, shaking chills, have uncontrolled nausea, vomiting and/or diarrhea, dizziness, shortness of breath, chest pain, bleeding, unexplained bruising, or if you have any other new/concerning symptoms, questions or concerns.     If it is after hours, weekends, or holidays, please call the main hospital  at  [phone] and ask to speak to the Oncology doctor on call.     If you are having any concerning symptoms or wish to speak to a provider before your next infusion visit, please call your care coordinator or triage to notify them so we can adequately serve you.     If you need a refill on a narcotic prescription or other medication, please call triage before your infusion appointment.           April 2018 Sunday Monday Tuesday Wednesday Thursday Friday Saturday   1     2     3     4     5     6     [facility] RETURN    7:35 AM   (50 min.)   Paty Pandey PA-C   [facility] RETURN    9:15 AM   (30 min.)   Antonio Andrews MD   [facility] 7       8     9     10     [facility] NEW   11:30 AM   (45 min.)   BASILIO Kidd MD   [facility] 11     12     13     14       15     16     17     18     19     20     [facility] LAB DRAW   12:30 PM   (15 min.)   [facility]   [facility] Lab Draw     [facility] RETURN   12:45 PM   (30 min.)   Valeria Gann MD   [facility] ONC INFUSION 60    2:00 PM   (60 min.)   [facility] ONCOLOGY INFUSION   [facility] NEW    3:00 PM   (30 min.)   Kayla Hugo APRN CN   [facility] 21       22     23     24     25     26     27     28       29     30                                         May 2018   [place] Monday Tuesday Wednesday Thursday  Friday Saturday             1     2     3     4     5       6     7     8     9     10     11     12       13     14     15     16     17     18     [facility] ONC INFUSION 60    2:00 PM   (60 min.)    ONCOLOGY INFUSION   [facility] 19       20 21 22 23     24     25     26       27     28     29     30     31                            Lab Results:  Recent Results (from the past 12 hour(s))   CBC with platelets differential    Collection Time: 04/20/18  1:10 PM   Result Value Ref Range    WBC 7.3 4.0 - 11.0 10e9/L    RBC Count 4.78 3.8 - 5.2 10e12/L    Hemoglobin 14.5 11.7 - 15.7 g/dL    Hematocrit 42.3 35.0 - 47.0 %    MCV 89 78 - 100 fl    MCH 30.3 26.5 - 33.0 pg    MCHC 34.3 31.5 - 36.5 g/dL    RDW 12.2 10.0 - 15.0 %    Platelet Count 258 150 - 450 10e9/L    Diff Method Automated Method     % Neutrophils 52.6 %    % Lymphocytes 37.4 %    % Monocytes 7.4 %    % Eosinophils 1.5 %    % Basophils 1.0 %    % Immature Granulocytes 0.1 %    Nucleated RBCs 0 0 /100    Absolute Neutrophil 3.9 1.6 - 8.3 10e9/L    Absolute Lymphocytes 2.7 0.8 - 5.3 10e9/L    Absolute Monocytes 0.5 0.0 - 1.3 10e9/L    Absolute Eosinophils 0.1 0.0 - 0.7 10e9/L    Absolute Basophils 0.1 0.0 - 0.2 10e9/L    Abs Immature Granulocytes 0.0 0 - 0.4 10e9/L    Absolute Nucleated RBC 0.0    Comprehensive metabolic panel    Collection Time: 04/20/18  1:10 PM   Result Value Ref Range    Sodium 138 133 - 144 mmol/L    Potassium 3.8 3.4 - 5.3 mmol/L    Chloride 104 94 - 109 mmol/L    Carbon Dioxide 29 20 - 32 mmol/L    Anion Gap 6 3 - 14 mmol/L    Glucose 89 70 - 99 mg/dL    Urea Nitrogen 17 7 - 30 mg/dL    Creatinine 0.76 0.52 - 1.04 mg/dL    GFR Estimate 84 >60 mL/min/1.7m2    GFR Estimate If Black >90 >60 mL/min/1.7m2    Calcium 9.1 8.5 - 10.1 mg/dL    Bilirubin Total 0.4 0.2 - 1.3 mg/dL    Albumin 4.1 3.4 - 5.0 g/dL    Protein Total 7.7 6.8 - 8.8 g/dL    Alkaline Phosphatase 56 40 - 150 U/L    ALT 15 0 - 50 U/L    AST 11 0  - 45 U/L

## 2018-04-20 NOTE — MR AVS SNAPSHOT
After Visit Summary   4/20/2018    Mary Chadwick    MRN: [id number]           Patient Information     Date Of Birth          1975        Visit Information        Provider Department      4/20/2018 2:00 PM  24 ATC;  ONCOLOGY INFUSION [facility]        Today's Diagnoses     Malignant neoplasm of upper-inner quadrant of left breast in female, estrogen receptor positive (H)    -  1    Malignant neoplasm of upper-outer quadrant of left breast in female, estrogen receptor positive (H)          Care Instructions    Contact Numbers  [facility]: [phone]    After Hours:  [phone]  Triage: [phone]    Please call the [facility] Triage line if you experience a temperature greater than or equal to 100.5, shaking chills, have uncontrolled nausea, vomiting and/or diarrhea, dizziness, shortness of breath, chest pain, bleeding, unexplained bruising, or if you have any other new/concerning symptoms, questions or concerns.     If it is after hours, weekends, or holidays, please call the main hospital  at  [phone] and ask to speak to the Oncology doctor on call.     If you are having any concerning symptoms or wish to speak to a provider before your next infusion visit, please call your care coordinator or triage to notify them so we can adequately serve you.     If you need a refill on a narcotic prescription or other medication, please call triage before your infusion appointment.           April 2018 Sunday Monday Tuesday Wednesday Thursday Friday Saturday   1     2     3     4     5     6     [facility] RETURN    7:35 AM   (50 min.)   Paty Pandey PA-C   [facility] RETURN    9:15 AM   (30 min.)   Antonio Andrews MD   [facility] 7       8     9     10     [facility] NEW   11:30 AM   (45 min.)   BASILIO Kidd MD   [facility] 11     12     13     14       15     16     17     18     19      20     [facility] DRAW   12:30 PM   (15 min.)    [facility] DRAW   [facility] Lab Draw     [facility] RETURN   12:45 PM   (30 min.)   Valeria Gann MD   [facility]     [facility] ONC INFUSION 60    2:00 PM   (60 min.)   [facility] ONCOLOGY INFUSION   [facility]     [facility] NEW    3:00 PM   (30 min.)   Kayla Hugo APRN [facility] 21       22     23     24     25     26     27     28       29     30                                         May 2018   [place] Monday Tuesday Wednesday Thursday Friday Saturday             1     2     3     4     5       6     7     8     9     10     11     12       13     14     15     16     17     18     [facility] ONC INFUSION 60    2:00 PM   (60 min.)    ONCOLOGY INFUSION   [facility] 19       20 21     22     23     24     25     26       27     28     29     30     31                            Lab Results:  Recent Results (from the past 12 hour(s))   CBC with platelets differential    Collection Time: 04/20/18  1:10 PM   Result Value Ref Range    WBC 7.3 4.0 - 11.0 10e9/L    RBC Count 4.78 3.8 - 5.2 10e12/L    Hemoglobin 14.5 11.7 - 15.7 g/dL    Hematocrit 42.3 35.0 - 47.0 %    MCV 89 78 - 100 fl    MCH 30.3 26.5 - 33.0 pg    MCHC 34.3 31.5 - 36.5 g/dL    RDW 12.2 10.0 - 15.0 %    Platelet Count 258 150 - 450 10e9/L    Diff Method Automated Method     % Neutrophils 52.6 %    % Lymphocytes 37.4 %    % Monocytes 7.4 %    % Eosinophils 1.5 %    % Basophils 1.0 %    % Immature Granulocytes 0.1 %    Nucleated RBCs 0 0 /100    Absolute Neutrophil 3.9 1.6 - 8.3 10e9/L    Absolute Lymphocytes 2.7 0.8 - 5.3 10e9/L    Absolute Monocytes 0.5 0.0 - 1.3 10e9/L    Absolute Eosinophils 0.1 0.0 - 0.7 10e9/L    Absolute Basophils 0.1 0.0 - 0.2 10e9/L    Abs Immature Granulocytes 0.0 0 - 0.4 10e9/L    Absolute Nucleated RBC 0.0    Comprehensive metabolic panel    Collection Time: 04/20/18  1:10 PM   Result  Value Ref Range    Sodium 138 133 - 144 mmol/L    Potassium 3.8 3.4 - 5.3 mmol/L    Chloride 104 94 - 109 mmol/L    Carbon Dioxide 29 20 - 32 mmol/L    Anion Gap 6 3 - 14 mmol/L    Glucose 89 70 - 99 mg/dL    Urea Nitrogen 17 7 - 30 mg/dL    Creatinine 0.76 0.52 - 1.04 mg/dL    GFR Estimate 84 >60 mL/min/1.7m2    GFR Estimate If Black >90 >60 mL/min/1.7m2    Calcium 9.1 8.5 - 10.1 mg/dL    Bilirubin Total 0.4 0.2 - 1.3 mg/dL    Albumin 4.1 3.4 - 5.0 g/dL    Protein Total 7.7 6.8 - 8.8 g/dL    Alkaline Phosphatase 56 40 - 150 U/L    ALT 15 0 - 50 U/L    AST 11 0 - 45 U/L               Follow-ups after your visit        Your next 10 appointments already scheduled     May 18, 2018  2:00 PM CDT   Infusion 60 with UC ONCOLOGY INFUSION, [facility] 16 ATC   [facility])    [address] [place] [address]  [address]  [facility] [address] [phone]            Vincenzo 15, 2018  1:00 PM CDT   [facility] Draw with [facility] DRAW   [facility] Lab Draw ([facility])    [address] [place] [address]  [address]  [facility] [address] [phone]            Vincenzo 15, 2018  1:40 PM CDT   (Arrive by 1:25 PM)   Return Visit with Paty Pandey PA-C   [facility] ([facility])    [address] [place] [address]  [address]  [facility] [address]   [phone]            Vincenzo 15, 2018  2:30 PM CDT   Infusion 60 with UC ONCOLOGY INFUSION, UC 19 ATC   [facility] ([facility])    [address] [place] [address]  [address]  [facility] [address]   [phone]            Jul 13, 2018 12:00 PM CDT   (Arrive by 11:45 AM)   Return Visit with Valeria Gann MD   [facility] ([facility])    [address] [place] [address]  [address]  [facility] [address]   [phone]            Jul 13, 2018 12:30 PM CDT   Infusion 60 with UC ONCOLOGY INFUSION,  30  ATC   [facility])    [address]  [address]  [facility] [address] [phone]              Who to contact     If you have questions or need follow up information about today's clinic visit or your schedule please contact [facility] CANCER [facility] directly at [phone].  Normal or non-critical lab and imaging results will be communicated to you by MyChart, letter or phone within 4 business days after the clinic has received the results. If you do not hear from us within 7 days, please contact the clinic through [employer] or phone. If you have a critical or abnormal lab result, we will notify you by phone as soon as possible.  Submit refill requests through [employer] or call your pharmacy and they will forward the refill request to us. Please allow 3 business days for your refill to be completed.          Additional Information About Your Visit        [email or web address] Information     [employer] gives you secure access to your electronic health record. If you see a primary care provider, you can also send messages to your care team and make appointments. If you have questions, please call your primary care clinic.  If you do not have a primary care provider, please call [phone] and they will assist you.        Care EveryWhere ID     This is your Care EveryWhere ID. This could be used by other organizations to access your [place] medical records  [id number]        Your Vitals Were     Last Period                   04/20/2018            Blood Pressure from Last 3 Encounters:   04/20/18 112/72   04/06/18 118/72   04/06/18 118/72    Weight from Last 3 Encounters:   04/20/18 75.9 kg (167 lb 6.4 oz)   04/06/18 76.2 kg (168 lb)   04/06/18 76.6 kg (168 lb 12.8 oz)              We Performed the Following     CBC with platelets differential     Comprehensive metabolic panel          Today's Medication Changes          These changes are accurate as of  4/20/18  3:18 PM.  If you have any questions, ask your nurse or doctor.               These medicines have changed or have updated prescriptions.        Dose/Directions    * letrozole 2.5 MG tablet   Commonly known as:  FEMARA   This may have changed:  Another medication with the same name was added. Make sure you understand how and when to take each.   Used for:  Malignant neoplasm of upper-outer quadrant of left breast in female, estrogen receptor positive (H)        Dose:  2.5 mg   Take 1 tablet (2.5 mg) by mouth daily   Quantity:  30 tablet   Refills:  11       * letrozole 2.5 MG tablet   Commonly known as:  FEMARA   This may have changed:  You were already taking a medication with the same name, and this prescription was added. Make sure you understand how and when to take each.   Used for:  Malignant neoplasm of upper-outer quadrant of left breast in female, estrogen receptor positive (H)        Dose:  2.5 mg   Take 1 tablet (2.5 mg) by mouth daily   Quantity:  30 tablet   Refills:  11       * Notice:  This list has 2 medication(s) that are the same as other medications prescribed for you. Read the directions carefully, and ask your doctor or other care provider to review them with you.         Where to get your medicines      These medications were sent to [address] [place] [address] [address] [address]    Hours:  TRANSPLANT PHONE NUMBER [phone] Phone:  [phone]     letrozole 2.5 MG tablet                Primary Care Provider Fax #    Physician No Ref-Primary [phone]       No address on file        Equal Access to Services     SURESH CHAPARRO : Hadii aad ku hadasho Soconstantinoali, waaxda luqadaha, qaybta kaalmada adeegyada, naseem mendoza. So [facility] [phone].    ATENCIÓN: Si habla español, tiene a sparrow disposición servicios gratuitos de asistencia lingüística. Llame al [phone].    We  comply with applicable federal civil rights laws and [place] laws. We do not discriminate on the basis of race, color, national origin, age, disability, sex, sexual orientation, or gender identity.            Thank you!     Thank you for choosing [facility]CER CLINIC  for your care. Our goal is always to provide you with excellent care. Hearing back from our patients is one way we can continue to improve our services. Please take a few minutes to complete the written survey that you may receive in the mail after your visit with us. Thank you!             Your Updated Medication List - Protect others around you: Learn how to safely use, store and throw away your medicines at www.disposemymeds.org.          This list is accurate as of 4/20/18  3:18 PM.  Always use your most recent med list.                   Brand Name Dispense Instructions for use Diagnosis    Biotin 1 MG Caps       Malignant neoplasm of left breast in female, estrogen receptor positive, unspecified site of breast (H)       bisacodyl 5 MG EC tablet    DULCOLAX     Take 10 mg by mouth        calcium citrate-vitamin D 315-250 MG-UNIT Tabs per tablet    CITRACAL     Take 2 tablets by mouth        citrate of magnesia Soln      Take 296 mLs by mouth        * letrozole 2.5 MG tablet    FEMARA    30 tablet    Take 1 tablet (2.5 mg) by mouth daily    Malignant neoplasm of upper-outer quadrant of left breast in female, estrogen receptor positive (H)       * letrozole 2.5 MG tablet    FEMARA    30 tablet    Take 1 tablet (2.5 mg) by mouth daily    Malignant neoplasm of upper-outer quadrant of left breast in female, estrogen receptor positive (H)       levonorgestrel 20 MCG/24HR IUD    MIRENA     1 each by Intrauterine route    Malignant neoplasm of left breast in female, estrogen receptor positive, unspecified site of breast (H)       NONFORMULARY      Take 1 capsule by mouth daily Rosehip oil        OMEGA 3-6-9 FATTY ACIDS PO      Take 2 capsules  by mouth daily        polyethylene glycol powder    MIRALAX/GLYCOLAX     Take 238 g by mouth        Spirulina 500 MG Tabs      6 Tabs daily.    Malignant neoplasm of left breast in female, estrogen receptor positive, unspecified site of breast (H)       * Notice:  This list has 2 medication(s) that are the same as other medications prescribed for you. Read the directions carefully, and ask your doctor or other care provider to review them with you.

## 2018-04-20 NOTE — LETTER
"4/20/2018       RE: Mary Chadwick  [address]  [facility] [address]     Dear Colleague,    Thank you for referring your patient, Mary Chadwick, to the [facility]. Please see a copy of my visit note below.    April 20, 2018    HISTORY OF PRESENT ILLNESS:  I am seeing Ms. Omaira Chadwick in follow up for low risk mammaprint at least stage 2 breast cancer.     Mary, or \"Omaira,\" comes in with a new diagnosis of breast cancer.  She is originally from [place].  She [place] displaced from [place].  She underwent a screening mammography this week.  This screening mammogram was performed on 02/09/2018 followed by diagnostic breast ultrasound which revealed a 2.6 x 1.1 x 1.7 cm irregular shaped mass at the 2 o'clock position involving the left breast.  There were indeterminate microcalcifications involving the left breast and a biopsy was recommended.  A stereotactic biopsy occurred on 02/20/2018 revealing an invasive ductal carcinoma, grade 2, ER positive, AR positive, HER2 negative by immunohistochemistry histochemistry at 1+.      Omaira had a breast MRI which revealed a 3.5-cm mass in her left breast with an area of non-mass-like enhancement measuring approximately 8 cm.  On this imaging, she had an equivocal lymph node in the left axilla.  This was not biopsied.       She was screened for the I-SPY-2 clinical trial.  She came back as a low-risk MammaPrint.  She was not enrolled on the therapeutic portion of the trial, as a result.  She was also diagnosed with Li-Fraumeni syndrome.      She began zoladex and  Femara.  She returns today for follow up.        She says she is coping reasonably well.  She has no chest pain or shortness of breath.  She has no nausea, vomiting, diarrhea or constipation.  She has occasional hot flashes.  She thinks her breast mass is getting softer.      Her 10-point review of systems is otherwise negative.     PAST MEDICAL HISTORY:  Breast " cancer, p53 mutation, MUTHY mutation     MEDICATIONS:  No medications.      ALLERGIES:  She is allergic to lactose.      SOCIAL HISTORY:  She has 7 and 11-year-old.  She is working part-time at the [employer].  She has a remote history of tobacco use.      FAMILY HISTORY:  Her mom  of colon cancer at age 41.  Maternal aunt with breast cancer in the 50s.  Maternal grandfather with stomach cancer.  Maternal uncle with stomach cancer.  Father with myasthenia gravis.  A birth brother who is alive and a sister who is alive.  Her significant other comes back and forth to [place].      PHYSICAL EXAMINATION:  /72 (BP Location: Right arm, Cuff Size: Adult Regular)  Pulse 80  Temp 98.8  F (37.1  C) (Oral)  Resp 16  Wt 75.9 kg (167 lb 6.4 oz)  SpO2 98%  BMI 25.46 kg/m2  Gen: well appearing, nad  Heent: no icterus o/p clear  Cv: rrr, nl S1S2  Lungs: clear  Abd: soft, nt, nd + bs  Ext: no edema  Skin: no rashes  Breast exam:  Left breast with 3 x 2 cm mass that is softer in her upper outer quadrant, no axillary adenopathy     LABORATORY:  Pathology was reviewed.      Lab Results   Component Value Date    WBC 7.5 2018     Lab Results   Component Value Date    RBC 4.82 2018     Lab Results   Component Value Date    HGB 14.1 2018     Lab Results   Component Value Date    HCT 42.3 2018     No components found for: MCT  Lab Results   Component Value Date    MCV 88 2018     Lab Results   Component Value Date    MCH 29.3 2018     Lab Results   Component Value Date    MCHC 33.3 2018     Lab Results   Component Value Date    RDW 12.3 2018     Lab Results   Component Value Date     2018       Recent Labs   Lab Test  18   1029   NA  136   POTASSIUM  3.9   CHLORIDE  105   CO2  24   ANIONGAP  8   GLC  95   BUN  9   CR  0.66   MARICHUY  9.1     Liver Function Studies -   Recent Labs   Lab Test  18   1029   PROTTOTAL  7.4   ALBUMIN  3.9   BILITOTAL   0.3   ALKPHOS  52   AST  20   ALT  16       ASSESSMENT AND PLAN:  This is a 42-year-old female with stage T2 NX, invasive ductal carcinoma, ER positive, VA positive, HER2 negative, involving the left breast in the setting of Li-Fraumeni syndrome.     I discussed with Omaira today that she appears to have stage II-A breast cancer involving her left breast.   She was screened for the I-SPY-2 clinical trial and was found to have a low MammaPrint, indicating that she does not go on to the chemotherapy portion of the clinical trial.        Given Omaira's low-risk MammaPrint, we began her on therapy with Zoladex and an aromatase inhibitor.  She is tolerating this therapy without any difficulty.  She has occasional hot flashes.  Recommended continuing her Zoladex on a monthly basis in conjunction with her aromatase inhibitor.      She has met with Dr. Kidd, as well as Dr. Antonio Andrews.  Given her P53 mutation, the plan is for bilateral mastectomy with a sentinel lymph node biopsy.  If her sentinel lymph node is positive, the plan is likely to proceed with a full axillary lymph node dissection in order to try to avoid adjuvant radiation.  She has met with Dr. Kidd to discuss breast reconstruction.      We discussed given her low risk MammaPrint at this time, I would not recommend chemotherapy.  We have discussed that if she has more extensive disease than initially anticipated based on her final pathology from her surgery, this may be reconsidered.      She is having her Mirena IUD removed later this afternoon.      She has occasional hot flashes that are well-tolerated.      She is coping reasonably well.      She will likely plan for surgery in late June.          Again, thank you for allowing me to participate in the care of your patient.      Sincerely,    Valeria Gann MD

## 2018-04-30 ENCOUNTER — CARE COORDINATION (OUTPATIENT)
Dept: ONCOLOGY | Facility: CLINIC | Age: 43
End: 2018-04-30

## 2018-04-30 NOTE — PROGRESS NOTES
Attempted for two days to reach patient on 4/27 and 4/30/18 and telephone has a busy signal to return call about legal issues. Lauren Frank RN, BSN

## 2018-05-14 ENCOUNTER — OFFICE VISIT (OUTPATIENT)
Dept: OBGYN | Facility: CLINIC | Age: 43
End: 2018-05-14
Attending: ADVANCED PRACTICE MIDWIFE
Payer: COMMERCIAL

## 2018-05-14 VITALS
HEIGHT: 68 IN | DIASTOLIC BLOOD PRESSURE: 72 MMHG | HEART RATE: 72 BPM | BODY MASS INDEX: 24.96 KG/M2 | SYSTOLIC BLOOD PRESSURE: 106 MMHG | WEIGHT: 164.7 LBS

## 2018-05-14 DIAGNOSIS — Z30.432 ENCOUNTER FOR IUD REMOVAL: Primary | ICD-10-CM

## 2018-05-14 PROBLEM — Z80.0 FAMILY HISTORY OF COLON CANCER: Status: ACTIVE | Noted: 2018-02-09

## 2018-05-14 PROCEDURE — 58301 REMOVE INTRAUTERINE DEVICE: CPT | Mod: ZF | Performed by: ADVANCED PRACTICE MIDWIFE

## 2018-05-14 PROCEDURE — G0463 HOSPITAL OUTPT CLINIC VISIT: HCPCS | Mod: ZF

## 2018-05-14 PROCEDURE — 40000269 ZZH STATISTIC NO CHARGE FACILITY FEE

## 2018-05-14 RX ORDER — HYDROCORTISONE 25 MG/G
OINTMENT TOPICAL
COMMUNITY
Start: 2018-05-07 | End: 2018-07-13

## 2018-05-14 NOTE — MR AVS SNAPSHOT
After Visit Summary   5/14/2018    Mary Chadwick    MRN: [id number]           Patient Information     Date Of Birth          1975        Visit Information        Provider Department      5/14/2018 1:00 PM Kayla Hugo APRN CNM [facility]        Today's Diagnoses     Encounter for IUD removal    -  1      Care Instructions      IUD pamphlet reviewed and given to patient.    IUD pamphlet reviewed and given to patient.          Follow-ups after your visit        Your next 10 appointments already scheduled     Vincenzo 15, 2018  1:00 PM CDT   [facility] with  [facility]   [facility] Draw ([facility])    [address]  [address]  [facility] [id number] [phone]            Vincenzo 15, 2018  1:40 PM CDT   (Arrive by 1:25 PM)   Return Visit with Paty Pandey PA-C   [facility] ([facility])    [address] [place] [address]  [address]  [facility] [id number] [phone]            Vincenzo 15, 2018  2:30 PM CDT   Infusion 60 with [facility] ONCOLOGY INFUSION, UC 19 ATC   [facility] ([facility])    [address] [place] [address]  [address]  [facility] [id number] [phone]            Jul 13, 2018 12:00 PM CDT   (Arrive by 11:45 AM)   Return Visit with Valeria Gann MD   [facility] ([facility])    [address]  [address]  [facility] [id number] [phone]            Jul 13, 2018 12:30 PM CDT   Infusion 60 with UC ONCOLOGY INFUSION, UC 30 ATC   [facility] ([facility])    [address] [place] [address]  [address]  [facility] [id number] [phone]              Who to contact     Please call your clinic at [phone] to:    Ask questions about your health    Make or cancel appointments    Discuss your medicines    Learn about your  "test results    Speak to your doctor            Additional Information About Your Visit        [employer] Information     [employer] gives you secure access to your electronic health record. If you see a primary care provider, you can also send messages to your care team and make appointments. If you have questions, please call your primary care clinic.  If you do not have a primary care provider, please call [phone] and they will assist you.      [employer] is an electronic gateway that provides easy, online access to your medical records. With [employer], you can request a clinic appointment, read your test results, renew a prescription or communicate with your care team.     To access your existing account, please contact your [facility] or call [phone] for assistance.        Care EveryWhere ID     This is your Care EveryWhere ID. This could be used by other organizations to access your [facility] medical records  [id number]        Your Vitals Were     Pulse Height Last Period BMI (Body Mass Index)          72 1.727 m (5' 7.99\") 04/20/2018 25.05 kg/m2         Blood Pressure from Last 3 Encounters:   No data found for BP    Weight from Last 3 Encounters:   No data found for Wt              Today, you had the following     No orders found for display         Today's Medication Changes          These changes are accurate as of 5/14/18 11:59 PM.  If you have any questions, ask your nurse or doctor.               Stop taking these medicines if you haven't already. Please contact your care team if you have questions.     levonorgestrel 20 MCG/24HR IUD   Commonly known as:  MIRENA   Stopped by:  Kayla Hugo APRN CNM                    Primary Care Provider Fax #    Physician No Ref-Primary [phone]       No address on file        Equal Access to Services     SURESH CHAPARRO AH: Hadii olivier dotsono Sobharati, waaxda luqadaha, qaybta kaaljoo nina, naseem carrillo " geovanni lugo ah. So [facility] [phone].    ATENCIÓN: Si mkla roc, tiene a sparrow disposición servicios gratuitos de asistencia lingüística. Karsten al [phone].    We comply with applicable federal civil rights laws and [place] laws. We do not discriminate on the basis of race, color, national origin, age, disability, sex, sexual orientation, or gender identity.            Thank you!     Thank you for choosing [facility]  for your care. Our goal is always to provide you with excellent care. Hearing back from our patients is one way we can continue to improve our services. Please take a few minutes to complete the written survey that you may receive in the mail after your visit with us. Thank you!             Your Updated Medication List - Protect others around you: Learn how to safely use, store and throw away your medicines at www.disposemymeds.org.          This list is accurate as of 5/14/18 11:59 PM.  Always use your most recent med list.                   Brand Name Dispense Instructions for use Diagnosis    Biotin 1 MG Caps       Malignant neoplasm of left breast in female, estrogen receptor positive, unspecified site of breast (H)       calcium citrate-vitamin D 315-250 MG-UNIT Tabs per tablet    CITRACAL     Take 2 tablets by mouth        goserelin 3.6 MG injection    ZOLADEX     Inject 3.6 mg Subcutaneous every 30 days        hydrocortisone 2.5 % ointment           * letrozole 2.5 MG tablet    FEMARA    30 tablet    Take 1 tablet (2.5 mg) by mouth daily    Malignant neoplasm of upper-outer quadrant of left breast in female, estrogen receptor positive (H)       * letrozole 2.5 MG tablet    FEMARA    30 tablet    Take 1 tablet (2.5 mg) by mouth daily    Malignant neoplasm of upper-outer quadrant of left breast in female, estrogen receptor positive (H)       NONFORMULARY      Take 1 capsule by mouth daily Rosehip oil        OMEGA 3-6-9 FATTY ACIDS PO      Take 2 capsules by mouth  daily        Spirulina 500 MG Tabs      6 Tabs daily.    Malignant neoplasm of left breast in female, estrogen receptor positive, unspecified site of breast (H)       * Notice:  This list has 2 medication(s) that are the same as other medications prescribed for you. Read the directions carefully, and ask your doctor or other care provider to review them with you.

## 2018-05-14 NOTE — LETTER
"2018       RE: Mary Chadwick  [address]  [place] [address]     Dear Colleague,    Thank you for referring your patient, Mary Chadwick, to the [facility] at [facility]. Please see a copy of my visit note below.        SUBJECTIVE: Mary Chadwick is a 42 year old  female, requests Mirena IUD removal.  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  Pt states she is taking medication daily to suppress ovulation/cuase menopause as part of her cancer treatment so she declines to have Paragard IUD placed today.  Her partner is out of the country until January so she does not forsee having any sexual activity until then. Plans to follow up sooner with [facility] clinic should she decide to become sexually active before that time.  Agreeable to have CNM verify ovulation suppression/pregnancy risk without additional birth control.     Pt has questions about possible option of surgical menopause.     Verification of Procedure:  Just before the procedure begans through verbal and active participation of team members, I verified:     Initials   Patient Name [facility]   Patient  [facility]   Procedure to be performed [facility]     Consent:  Risks, benefits of treatment, and alternative options for contraception were discussed.  Patient's questions were elicited and answered.  Written consent was obtained and scanned into medical record.       OBJECTIVE: /72  Pulse 72  Ht 1.727 m (5' 7.99\")  Wt 74.7 kg (164 lb 11.2 oz)  LMP 2018  BMI 25.05 kg/m2    Pelvic Exam:  EG/BUS: Normal genital architecture without lesions, erythema or abnormal secretions. Bartholin's, Urethra, Del Sol's glands are normal.   Vagina: moist, pink, rugae with creamy, white and odorlesssecretions  Cervix: Multiparous,, no lesions, pink, moist, closed, without lesion and Mirena IUD strings extend 4 cm from " external os.  Uterus: small, smooth, firm, mobile w/o pain   Adnexa: Within normal limits and No masses, nodularity, tenderness  Rectum: anus normal      PROCEDURE NOTE - Mirena IUD removal    Reason for removal: Hormone mediated breast cancer, Oncology team recommends removal    Cervix was visualized with a medium Pedersion speculum and prepped with Hibiclens. The strings were grasped with a uterine packing forceps and the IUD removed with gentle traction.  No resistance was encountered.  The Mirena IUD immediately regained resting shape and was without odor or discoloration.  There were no complications.  Mary Chadwick reported no pain.  There was no bleeding.     EBL:  Minimal     Complications: None      Mary Johns Farrukh tolerated procedure well.    PLAN:      -  Symptoms to report reviewed. To report heavy bleeding, severe cramping, or abnormal vaginal discharge.  May take Ibuprofen 400-800 mg PO TID PRN or Naproxen 500 mg PO BID for cramping. Patient has been counseled to use backup birth control at this time should she resume sexual activity.    - Reviewed Paragard IUD as non hormonal option, CDC safety category 1 for current breast cancer.  Pt again declined placement today - plans abstinence at this time as partner is out of town until January and then condoms.  Reviewed contraceptive benefit of Mirena immediately non effective with removal.  Pt agreeable for CNM to look into effectiveness of medications she is currently on to suppress ovulation and update.      - Reviewed with pt  OB/GYN MD team  available for surgical consults prn if oncology team recommends oophorectomy.   Pt encouraged to follow up asap with surgical consult visit with MD team if desires additional information.         Over 50% of the 30 minute visit was spent in face to face counseling and care coordination as above.  Mary Chadwick  verbalized understanding of and agreement to plan of care    Kayla LE,  CNM

## 2018-05-14 NOTE — PROGRESS NOTES
"    SUBJECTIVE: Mary Chadwick is a 42 year old  female, requests Mirena IUD removal.  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  Pt states she is taking medication daily to suppress ovulation/cuase menopause as part of her cancer treatment so she declines to have Paragard IUD placed today.  Her partner is out of the country until January so she does not forsee having any sexual activity until then. Plans to follow up sooner with [facility] clinic should she decide to become sexually active before that time.  Agreeable to have CNM verify ovulation suppression/pregnancy risk without additional birth control.     Pt has questions about possible option of surgical menopause.     Verification of Procedure:  Just before the procedure begans through verbal and active participation of team members, I verified:     Initials   Patient Name [facility]   Patient  [facility]   Procedure to be performed [facility]     Consent:  Risks, benefits of treatment, and alternative options for contraception were discussed.  Patient's questions were elicited and answered.  Written consent was obtained and scanned into medical record.       OBJECTIVE: /72  Pulse 72  Ht 1.727 m (5' 7.99\")  Wt 74.7 kg (164 lb 11.2 oz)  LMP 2018  BMI 25.05 kg/m2    Pelvic Exam:  EG/BUS: Normal genital architecture without lesions, erythema or abnormal secretions. Bartholin's, Urethra, Pear[place]'s glands are normal.   Vagina: moist, pink, rugae with creamy, white and odorlesssecretions  Cervix: Multiparous,, no lesions, pink, moist, closed, without lesion and Mirena IUD strings extend 4 cm from external os.  Uterus: small, smooth, firm, mobile w/o pain   Adnexa: Within normal limits and No masses, nodularity, tenderness  Rectum: anus normal      PROCEDURE NOTE - Mirena IUD removal    Reason for removal: Hormone mediated breast cancer, Oncology team recommends removal    Cervix was visualized " with a medium Pedersion speculum and prepped with Hibiclens. The strings were grasped with a uterine packing forceps and the IUD removed with gentle traction.  No resistance was encountered.  The Mirena IUD immediately regained resting shape and was without odor or discoloration.  There were no complications.  Mary Chadwick reported no pain.  There was no bleeding.     EBL:  Minimal     Complications: None      Mary Dohertydiogenes Chadwick tolerated procedure well.    PLAN:      -  Symptoms to report reviewed. To report heavy bleeding, severe cramping, or abnormal vaginal discharge.  May take Ibuprofen 400-800 mg PO TID PRN or Naproxen 500 mg PO BID for cramping. Patient has been counseled to use backup birth control at this time should she resume sexual activity.    - Reviewed Paragard IUD as non hormonal option, CDC safety category 1 for current breast cancer.  Pt again declined placement today - plans abstinence at this time as partner is out of town until January and then condoms.  Reviewed contraceptive benefit of Mirena immediately non effective with removal.  Pt agreeable for CNM to look into effectiveness of medications she is currently on to suppress ovulation and update.      - Reviewed with pt  OB/GYN MD team  available for surgical consults prn if oncology team recommends oophorectomy.   Pt encouraged to follow up asap with surgical consult visit with MD team if desires additional information.         Over 50% of the 30 minute visit was spent in face to face counseling and care coordination as above.  Mary Chadwick  verbalized understanding of and agreement to plan of care    Kayla LE CNM

## 2018-05-18 ENCOUNTER — INFUSION THERAPY VISIT (OUTPATIENT)
Dept: ONCOLOGY | Facility: CLINIC | Age: 43
End: 2018-05-18
Attending: INTERNAL MEDICINE
Payer: COMMERCIAL

## 2018-05-18 VITALS
SYSTOLIC BLOOD PRESSURE: 108 MMHG | OXYGEN SATURATION: 99 % | HEART RATE: 74 BPM | TEMPERATURE: 98.5 F | DIASTOLIC BLOOD PRESSURE: 68 MMHG | RESPIRATION RATE: 18 BRPM

## 2018-05-18 DIAGNOSIS — Z17.0 MALIGNANT NEOPLASM OF UPPER-OUTER QUADRANT OF LEFT BREAST IN FEMALE, ESTROGEN RECEPTOR POSITIVE (H): Primary | ICD-10-CM

## 2018-05-18 DIAGNOSIS — C50.412 MALIGNANT NEOPLASM OF UPPER-OUTER QUADRANT OF LEFT BREAST IN FEMALE, ESTROGEN RECEPTOR POSITIVE (H): Primary | ICD-10-CM

## 2018-05-18 PROCEDURE — 96402 CHEMO HORMON ANTINEOPL SQ/IM: CPT

## 2018-05-18 PROCEDURE — 25000128 H RX IP 250 OP 636: Mod: ZF | Performed by: INTERNAL MEDICINE

## 2018-05-18 PROCEDURE — 25000125 ZZHC RX 250: Mod: JW,ZF | Performed by: INTERNAL MEDICINE

## 2018-05-18 RX ADMIN — GOSERELIN ACETATE 3.6 MG: 3.6 IMPLANT SUBCUTANEOUS at 14:39

## 2018-05-18 RX ADMIN — LIDOCAINE HYDROCHLORIDE 1 ML: 10 INJECTION, SOLUTION EPIDURAL; INFILTRATION; INTRACAUDAL; PERINEURAL at 14:37

## 2018-05-18 ASSESSMENT — PAIN SCALES - GENERAL: PAINLEVEL: NO PAIN (0)

## 2018-05-18 NOTE — PROGRESS NOTES
Infusion Nursing Note:  Mary Chadwick presents today for Zoladex.    Patient seen by provider today: No   present during visit today: Not Applicable.    Note: pt feeling well today with no new complaints. Pt has a specific location in the left abdomen where she prefers the Zoladex injection. Pt wondering if Zoladex can continue in this same location. Per Tom in pharmacy, Zoladex site should be rotated to decrease chance of tissue damage and scar tissue buildup/risk for decreased absorption. Zoladex injection given in left abdomen today (several inches from previous location) per pt preference. Pt understands that next Zoladex needs to be given in the right abdomen.     Intravenous Access:  No Intravenous access/labs at this visit.    Treatment Conditions:  Not Applicable.    Post Infusion Assessment:  Patient tolerated injection to left abdomen without incident.    Discharge Plan:   Patient declined prescription refills.  [facility]  Patient will return 6/15 for next appointment.   Patient discharged in stable condition accompanied by: self.    CLAUDIO SOL RN

## 2018-05-18 NOTE — PATIENT INSTRUCTIONS
Contact Numbers    [facility] [place]: [phone]  [facility] [place] and after hours / weekends / holidays:  [phone]      Please call the triage or after hours line if you experience a temperature greater than or equal to 100.5, shaking chills, have uncontrolled nausea, vomiting and/or diarrhea, dizziness, shortness of breath, chest pain, bleeding, unexplained bruising, or if you have any other new/concerning symptoms, questions or concerns.      If you are having any concerning symptoms or wish to speak to a provider before your next infusion visit, please call your care coordinator or triage to notify them so we can adequately serve you.     If you need a refill on a narcotic prescription or other medication, please call before your infusion appointment.                     May 2018   [place] Monday Tuesday Wednesday Thursday Friday Saturday             1     2     3     4     5       6     7     8     9     10     11     12       13     14     [facility] RETURN    1:00 PM   (30 min.)   [facility]     16     17     18     [facility] ONC INFUSION 60    2:00 PM   (60 min.)   [facility]FUSION   [facility] 19       20     21     22     23     24     25     26       27     28     29     30     31 June 2018 Sunday Monday Tuesday Wednesday Thursday Friday Saturday                            1     2       3     4     5     6     7     8     9       10     11     12     13     14     15     [facility]    1:00 PM   (15 min.)   [facility]   [facility] Draw     [facility] RETURN    1:25 PM   (50 min.)   Paty Pandey PA-C   [facility]     [facility] ONC INFUSION 60    2:30 PM   (60 min.)   [facility]   [facility] 16       17     18     19     20     21     22     23       24     25     26     27     28     29     30               No results found for this or any  previous visit (from the past 24 hour(s)).

## 2018-05-18 NOTE — MR AVS SNAPSHOT
After Visit Summary   5/18/2018    Mary Chadwick    MRN: [id number]           Patient Information     Date Of Birth          1975        Visit Information        Provider Department      5/18/2018 2:00 PM UC 16 ATC; UC ONCOLOGY INFUSION [facility]        Today's Diagnoses     Malignant neoplasm of upper-outer quadrant of left breast in female, estrogen receptor positive (H)    -  1      Care Instructions    Contact Numbers    [facility] [place]: [phone]  [facility] Triage and after hours / weekends / holidays:  [phone]      Please call the triage or after hours line if you experience a temperature greater than or equal to 100.5, shaking chills, have uncontrolled nausea, vomiting and/or diarrhea, dizziness, shortness of breath, chest pain, bleeding, unexplained bruising, or if you have any other new/concerning symptoms, questions or concerns.      If you are having any concerning symptoms or wish to speak to a provider before your next infusion visit, please call your care coordinator or triage to notify them so we can adequately serve you.     If you need a refill on a narcotic prescription or other medication, please call before your infusion appointment.                     May 2018   [place] Monday Tuesday Wednesday Thursday Friday Saturday             1     2     3     4     5       6     7     8     9     10     11     12       13     14     [facility] RETURN    1:00 PM   (30 min.)   Kayla Hugo APRN CNM   [facility] 15     16     17     18     UMP ONC INFUSION 60    2:00 PM   (60 min.)   UC ONCOLOGY INFUSION   [facility] 19       20     21     22     23     24     25     26       27     28     29     30     31 June 2018 Sunday Monday Tuesday Wednesday Thursday Friday Saturday                            1     2       3     4     5     6     7     8     9       10     11     12     13     14      15     [facility] MASONIC LAB DRAW    1:00 PM   (15 min.)   UC MASONIC LAB DRAW   [facility] Masonic Lab Draw     UMP RETURN    1:25 PM   (50 min.)   Paty Pandey PA-C   [facility]     UMP ONC INFUSION 60    2:30 PM   (60 min.)   UC ONCOLOGY INFUSION   [facility] 16       17     18     19     20     21     22     23       24     25     26     27     28     29     30               No results found for this or any previous visit (from the past 24 hour(s)).              Follow-ups after your visit        Your next 10 appointments already scheduled     Vincenzo 15, 2018  1:00 PM CDT   Masonic Lab Draw with UC MASONIC LAB DRAW   [facility] Draw ([facility])    [address] [place] [address]  [address]  [facility] [address]   [phone]            Vincenzo 15, 2018  1:40 PM CDT   (Arrive by 1:25 PM)   Return Visit with Paty Pandey PA-C   [facility] ([facility])    [address] [place] [address]  [address]  [facility] [address]   [phone]            Vincenzo 15, 2018  2:30 PM CDT   Infusion 60 with UC ONCOLOGY INFUSION, UC 19 ATC   [facility] ([facility])    [address] [place] [address]  [address]  [facility] [address]   [phone]            Jul 13, 2018 12:00 PM CDT   (Arrive by 11:45 AM)   Return Visit with Valeria Gann MD   [facility] ([facility])    [address] [place] [address]  [address]  [facility] [address]   [phone]            Jul 13, 2018 12:30 PM CDT   Infusion 60 with UC ONCOLOGY INFUSION, UC 30 ATC   [facility] ([facility])    [address] [place] [address]  [address]  [facility] [address]   [phone]              Who to contact     If you have questions or need follow up information about today's clinic visit or your schedule please contact [facility] [place] directly at [phone].  Normal or non-critical lab and imaging results will be communicated to you by MyChart, letter or phone within 4 business days after the clinic has received the results. If you do not hear from us within 7 days, please contact the clinic through [employer] or phone. If you have a critical or abnormal lab result, we will notify you by phone as soon as possible.  Submit refill requests through [employer] or call your pharmacy and they will forward the refill request to us. Please allow 3 business days for your refill to be completed.          Additional Information About Your Visit        [employer] Information     [employer] gives you secure access to your electronic health record. If you see a primary care provider, you can also send messages to your care team and make appointments. If you have questions, please call your primary care clinic.  If you do not have a primary care provider, please call [phone] and they will assist you.        Care EveryWhere ID     This is your Care EveryWhere ID. This could be used by other organizations to access your [place] medical records  [id number]        Your Vitals Were     Pulse Temperature Respirations Last Period Pulse Oximetry       74 98.5  F (36.9  C) 18 04/20/2018 99%        Blood Pressure from Last 3 Encounters:   05/18/18 108/68   05/14/18 106/72   04/20/18 112/72    Weight from Last 3 Encounters:   05/14/18 74.7 kg (164 lb 11.2 oz)   04/20/18 75.9 kg (167 lb 6.4 oz)   04/06/18 76.2 kg (168 lb)              Today, you had the following     No orders found for display       Primary Care Provider Fax #    Physician No Ref-Primary [phone]       No address on file        Equal Access to Services     [facility] : Hadii olivier Herndon, gary black, qaybta parrishalnaseem toscano . So [facility] [phone].    ATENCIÓN: Si habla español, tiene a sparrow disposición servicios gratuitos  de asistencia lingüística. Karsten samaniego [phone].    We comply with applicable federal civil rights laws and [place] laws. We do not discriminate on the basis of race, color, national origin, age, disability, sex, sexual orientation, or gender identity.            Thank you!     Thank you for choosing [facility]  for your care. Our goal is always to provide you with excellent care. Hearing back from our patients is one way we can continue to improve our services. Please take a few minutes to complete the written survey that you may receive in the mail after your visit with us. Thank you!             Your Updated Medication List - Protect others around you: Learn how to safely use, store and throw away your medicines at www.disposemymeds.org.          This list is accurate as of 5/18/18  3:16 PM.  Always use your most recent med list.                   Brand Name Dispense Instructions for use Diagnosis    Biotin 1 MG Caps       Malignant neoplasm of left breast in female, estrogen receptor positive, unspecified site of breast (H)       calcium citrate-vitamin D 315-250 MG-UNIT Tabs per tablet    CITRACAL     Take 2 tablets by mouth        goserelin 3.6 MG injection    ZOLADEX     Inject 3.6 mg Subcutaneous every 30 days        hydrocortisone 2.5 % ointment           * letrozole 2.5 MG tablet    FEMARA    30 tablet    Take 1 tablet (2.5 mg) by mouth daily    Malignant neoplasm of upper-outer quadrant of left breast in female, estrogen receptor positive (H)       * letrozole 2.5 MG tablet    FEMARA    30 tablet    Take 1 tablet (2.5 mg) by mouth daily    Malignant neoplasm of upper-outer quadrant of left breast in female, estrogen receptor positive (H)       NONFORMULARY      Take 1 capsule by mouth daily Rosehip oil        OMEGA 3-6-9 FATTY ACIDS PO      Take 2 capsules by mouth daily        Spirulina 500 MG Tabs      6 Tabs daily.    Malignant neoplasm of left breast in female, estrogen receptor  positive, unspecified site of breast (H)       * Notice:  This list has 2 medication(s) that are the same as other medications prescribed for you. Read the directions carefully, and ask your doctor or other care provider to review them with you.

## 2018-05-30 ENCOUNTER — CARE COORDINATION (OUTPATIENT)
Dept: ONCOLOGY | Facility: CLINIC | Age: 43
End: 2018-05-30

## 2018-05-30 NOTE — PROGRESS NOTES
Verbally given message from Lauren Frank care coordinator that patient would like to have breast surgery.  Reviewed patient's chart and gave Lauren 3 appt times for next Thursday that patient may come in and meet with Dr. Andrews to firm up surgical plan.  Lauren will contact patient.     Portia Fisher RNCC BSN CBCN

## 2018-05-30 NOTE — PROGRESS NOTES
Patient called today to schedule a bilateral mastectomy and re-construction with Dr Andrews/Marti. Will offer an appointment with Dr Andrews having been seen with Dr Andrews April 6, 18 and will offer an appointment June 7, 18 and will offer appointment times and send a message to scheduling to confirm the time.  Left a message on patient''s home telephone per patient at [phone] to return call to offer appointment times to see Dr Andrews on June 7, 18 either 7:30, 7:45 or 8:15. Lauren Frank RN, BSN.

## 2018-06-07 ENCOUNTER — ONCOLOGY VISIT (OUTPATIENT)
Dept: ONCOLOGY | Facility: CLINIC | Age: 43
End: 2018-06-07
Attending: SURGERY
Payer: COMMERCIAL

## 2018-06-07 DIAGNOSIS — C50.912 MALIGNANT NEOPLASM OF LEFT BREAST (H): Primary | ICD-10-CM

## 2018-06-07 PROCEDURE — G0463 HOSPITAL OUTPT CLINIC VISIT: HCPCS | Mod: ZF

## 2018-06-07 RX ORDER — CEFAZOLIN SODIUM 1 G/50ML
1 INJECTION, SOLUTION INTRAVENOUS SEE ADMIN INSTRUCTIONS
Status: CANCELLED | OUTPATIENT
Start: 2018-06-07

## 2018-06-07 ASSESSMENT — PAIN SCALES - GENERAL: PAINLEVEL: NO PAIN (0)

## 2018-06-07 NOTE — LETTER
June 7, 2018      To Whom It May Concern:      This letter is on behalf of my patient Ms Mary Chadwick; she is currently being treated for left breast cancer (C50.912). 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 This genetic mutation is significant and makes her vulnerable to developing other cancers.     We are in the process of scheduling her for a complex surgery for her breast cancer. She will be having bilateral mastectomies with reconstruction and a left sentinel lymph node biopsy in the next month. The recovery for this surgery will be 4-5 weeks. She will have a second surgery in several months for her second stage of reconstruction.    Depending on the pathology results from her surgery she may also need chemotherapy, that is yet to be determined.    This is a current update on her health status. Thank you.        Sincerely yours,         Elodia Davidson RN, MA for Dr Antonio Andrews  [facility]

## 2018-06-07 NOTE — PROGRESS NOTES
HISTORY OF PRESENT ILLNESS:  Mary hCadwick is here for a followup visit.  She has a p53 mutation and is scheduled for bilateral mastectomies for her stage II breast cancer.   She has seen Dr. Kidd who has agreed with immediate reconstruction.  Our plan is for a bilateral skin-sparing mastectomy with a sentinel lymph node biopsy.  We will not do an axillary node dissection.  I talked to her today about the risks and complications of surgery including infection and nonhealing.  She agrees and wishes to proceed.  Will find a date for her surgery.        TT:  15 minutes.  CT:  15 minutes.      cc:   BASILIO Kidd MD     Physicians   [address], [facility] [address]   [place] [address]      Valeria Gann MD     Physicians   [address], [facility] [address]   [place] [address]

## 2018-06-07 NOTE — MR AVS SNAPSHOT
After Visit Summary   6/7/2018    Mary Chadwick    MRN: [id number]           Patient Information     Date Of Birth          1975        Visit Information        Provider Department      6/7/2018 7:30 AM Antonio Andrews MD [facility]        Today's Diagnoses     Malignant neoplasm of left breast (H)    -  1       Follow-ups after your visit        Your next 10 appointments already scheduled     Vincenzo 15, 2018  1:00 PM CDT   Masonic Lab Draw with  MASONIC LAB DRAW   [facility] Lab Draw ([facility])    [address] [place] [address]  [address]  [facility] [address]   [phone]            Vincenzo 15, 2018  1:40 PM CDT   (Arrive by 1:25 PM)   Return Visit with Paty Pandey PA-C   [facility] ([facility])    [address] [place] [address]  [address]  [facility] [id number] [phone]            Vincenzo 15, 2018  2:30 PM CDT   Infusion 60 with UC ONCOLOGY INFUSION, UC 19 ATC   [facility] ([facility])    [address] [place] [address]  [address]  [facility] [address] [phone]            Jul 13, 2018 12:00 PM CDT   (Arrive by 11:45 AM)   Return Visit with Valeria Gann MD   [facility] ([facility])    [address] [place] [address]  [address]  [facility] [address] [phone]            Jul 13, 2018 12:30 PM CDT   Infusion 60 with UC ONCOLOGY INFUSION, UC 30 ATC   [facility] ([facility])    [address] [place] [address]  [address]  [facility] [address] [phone]              Who to contact     If you have questions or need follow up information about today's clinic visit or your schedule please contact [facility] directly at [phone].  Normal or non-critical lab and imaging results will be communicated to you by MyChart, letter or phone within 4 business days  after the clinic has received the results. If you do not hear from us within 7 days, please contact the clinic through [employer] or phone. If you have a critical or abnormal lab result, we will notify you by phone as soon as possible.  Submit refill requests through [employer] or call your pharmacy and they will forward the refill request to us. Please allow 3 business days for your refill to be completed.          Additional Information About Your Visit        [employer] Information     [employer] gives you secure access to your electronic health record. If you see a primary care provider, you can also send messages to your care team and make appointments. If you have questions, please call your primary care clinic.  If you do not have a primary care provider, please call [phone] and they will assist you.        Care EveryWhere ID     This is your Care EveryWhere ID. This could be used by other organizations to access your [facility] medical records  [id number]         Blood Pressure from Last 3 Encounters:   06/07/18 (P) 107/71   05/18/18 108/68   05/14/18 106/72    Weight from Last 3 Encounters:   06/07/18 (P) 74.4 kg (164 lb)   05/14/18 74.7 kg (164 lb 11.2 oz)   04/20/18 75.9 kg (167 lb 6.4 oz)              We Performed the Following     Jordyn-Operative Worksheet ([facility] - Plastics)        Primary Care Provider Fax #    Physician No Ref-Primary [phone]       No address on file        Equal Access to Services     SURESH CHAPARRO : Hadii olivier ku hadasho Soconstantinoali, waaxda luqadaha, qaybta kaalmada adeegyada, naseem lugo . So [facility] [phone].    ATENCIÓN: Si habla español, tiene a sparrow disposición servicios gratuitos de asistencia lingüística. Llame al [phone].    We comply with applicable federal civil rights laws and [place] laws. We do not discriminate on the basis of race, color, national origin, age, disability, sex, sexual orientation, or gender identity.            Thank  you!     Thank you for choosing [facility]  for your care. Our goal is always to provide you with excellent care. Hearing back from our patients is one way we can continue to improve our services. Please take a few minutes to complete the written survey that you may receive in the mail after your visit with us. Thank you!             Your Updated Medication List - Protect others around you: Learn how to safely use, store and throw away your medicines at www.disposemymeds.org.          This list is accurate as of 6/7/18 11:59 PM.  Always use your most recent med list.                   Brand Name Dispense Instructions for use Diagnosis    Biotin 1 MG Caps       Malignant neoplasm of left breast in female, estrogen receptor positive, unspecified site of breast (H)       calcium citrate-vitamin D 315-250 MG-UNIT Tabs per tablet    CITRACAL     Take 2 tablets by mouth        goserelin 3.6 MG injection    ZOLADEX     Inject 3.6 mg Subcutaneous every 30 days        hydrocortisone 2.5 % ointment           * letrozole 2.5 MG tablet    FEMARA    30 tablet    Take 1 tablet (2.5 mg) by mouth daily    Malignant neoplasm of upper-outer quadrant of left breast in female, estrogen receptor positive (H)       * letrozole 2.5 MG tablet    FEMARA    30 tablet    Take 1 tablet (2.5 mg) by mouth daily    Malignant neoplasm of upper-outer quadrant of left breast in female, estrogen receptor positive (H)       NONFORMULARY      Take 1 capsule by mouth daily Rosehip oil        OMEGA 3-6-9 FATTY ACIDS PO      Take 2 capsules by mouth daily        Spirulina 500 MG Tabs      6 Tabs daily.    Malignant neoplasm of left breast in female, estrogen receptor positive, unspecified site of breast (H)       * Notice:  This list has 2 medication(s) that are the same as other medications prescribed for you. Read the directions carefully, and ask your doctor or other care provider to review them with you.

## 2018-06-07 NOTE — NURSING NOTE
"Oncology Rooming Note    June 7, 2018 7:22 AM   Mary Chadwick is a 42 year old female who presents for:    Chief Complaint   Patient presents with     Oncology Clinic Visit     return       Initial Vitals: There were no vitals taken for this visit. Estimated body mass index is 25.05 kg/(m^2) as calculated from the following:    Height as of 5/14/18: 1.727 m (5' 7.99\").    Weight as of 5/14/18: 74.7 kg (164 lb 11.2 oz). There is no height or weight on file to calculate BSA.  Data Unavailable Comment: Data Unavailable   No LMP recorded. Patient is not currently having periods (Reason: Premenopausal).  Allergies reviewed: Yes  Medications reviewed: Yes    Medications: Medication refills not needed today.  Pharmacy name entered into [employer]:    [facility] [place], [place] - [address]  [facility] - [place], [place] - [address]    Clinical concerns: no new concerns      6 minutes for nursing intake (face to face time)     Marie Willoughby CMA            "

## 2018-06-13 ENCOUNTER — TELEPHONE (OUTPATIENT)
Dept: SURGERY | Facility: CLINIC | Age: 43
End: 2018-06-13
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
## 2018-06-15 ENCOUNTER — INFUSION THERAPY VISIT (OUTPATIENT)
Dept: ONCOLOGY | Facility: CLINIC | Age: 43
End: 2018-06-15
Attending: INTERNAL MEDICINE
Payer: COMMERCIAL

## 2018-06-15 ENCOUNTER — APPOINTMENT (OUTPATIENT)
Dept: LAB | Facility: CLINIC | Age: 43
End: 2018-06-15
Attending: INTERNAL MEDICINE
Payer: COMMERCIAL

## 2018-06-15 VITALS
TEMPERATURE: 98.3 F | OXYGEN SATURATION: 100 % | SYSTOLIC BLOOD PRESSURE: 112 MMHG | RESPIRATION RATE: 16 BRPM | WEIGHT: 167.4 LBS | BODY MASS INDEX: 25.46 KG/M2 | HEART RATE: 68 BPM | DIASTOLIC BLOOD PRESSURE: 67 MMHG

## 2018-06-15 DIAGNOSIS — Z17.0 MALIGNANT NEOPLASM OF UPPER-OUTER QUADRANT OF LEFT BREAST IN FEMALE, ESTROGEN RECEPTOR POSITIVE (H): ICD-10-CM

## 2018-06-15 DIAGNOSIS — Z17.0 MALIGNANT NEOPLASM OF UPPER-OUTER QUADRANT OF LEFT BREAST IN FEMALE, ESTROGEN RECEPTOR POSITIVE (H): Primary | ICD-10-CM

## 2018-06-15 DIAGNOSIS — Z17.0 MALIGNANT NEOPLASM OF UPPER-INNER QUADRANT OF LEFT BREAST IN FEMALE, ESTROGEN RECEPTOR POSITIVE (H): Primary | ICD-10-CM

## 2018-06-15 DIAGNOSIS — Z17.0 MALIGNANT NEOPLASM OF UPPER-INNER QUADRANT OF LEFT BREAST IN FEMALE, ESTROGEN RECEPTOR POSITIVE (H): ICD-10-CM

## 2018-06-15 DIAGNOSIS — C50.212 MALIGNANT NEOPLASM OF UPPER-INNER QUADRANT OF LEFT BREAST IN FEMALE, ESTROGEN RECEPTOR POSITIVE (H): Primary | ICD-10-CM

## 2018-06-15 DIAGNOSIS — C50.412 MALIGNANT NEOPLASM OF UPPER-OUTER QUADRANT OF LEFT BREAST IN FEMALE, ESTROGEN RECEPTOR POSITIVE (H): Primary | ICD-10-CM

## 2018-06-15 DIAGNOSIS — C50.212 MALIGNANT NEOPLASM OF UPPER-INNER QUADRANT OF LEFT BREAST IN FEMALE, ESTROGEN RECEPTOR POSITIVE (H): ICD-10-CM

## 2018-06-15 DIAGNOSIS — C50.412 MALIGNANT NEOPLASM OF UPPER-OUTER QUADRANT OF LEFT BREAST IN FEMALE, ESTROGEN RECEPTOR POSITIVE (H): ICD-10-CM

## 2018-06-15 LAB
ALBUMIN SERPL-MCNC: 4.2 G/DL (ref 3.4–5)
ALP SERPL-CCNC: 67 U/L (ref 40–150)
ALT SERPL W P-5'-P-CCNC: 27 U/L (ref 0–50)
ANION GAP SERPL CALCULATED.3IONS-SCNC: 9 MMOL/L (ref 3–14)
AST SERPL W P-5'-P-CCNC: 22 U/L (ref 0–45)
BASOPHILS # BLD AUTO: 0.1 10E9/L (ref 0–0.2)
BASOPHILS NFR BLD AUTO: 0.8 %
BILIRUB SERPL-MCNC: 0.4 MG/DL (ref 0.2–1.3)
BUN SERPL-MCNC: 16 MG/DL (ref 7–30)
CALCIUM SERPL-MCNC: 10 MG/DL (ref 8.5–10.1)
CHLORIDE SERPL-SCNC: 101 MMOL/L (ref 94–109)
CO2 SERPL-SCNC: 28 MMOL/L (ref 20–32)
CREAT SERPL-MCNC: 0.77 MG/DL (ref 0.52–1.04)
DIFFERENTIAL METHOD BLD: NORMAL
EOSINOPHIL # BLD AUTO: 0.1 10E9/L (ref 0–0.7)
EOSINOPHIL NFR BLD AUTO: 1.8 %
ERYTHROCYTE [DISTWIDTH] IN BLOOD BY AUTOMATED COUNT: 12.3 % (ref 10–15)
GFR SERPL CREATININE-BSD FRML MDRD: 83 ML/MIN/1.7M2
GLUCOSE SERPL-MCNC: 85 MG/DL (ref 70–99)
HCT VFR BLD AUTO: 42.3 % (ref 35–47)
HGB BLD-MCNC: 13.7 G/DL (ref 11.7–15.7)
IMM GRANULOCYTES # BLD: 0 10E9/L (ref 0–0.4)
IMM GRANULOCYTES NFR BLD: 0.1 %
LYMPHOCYTES # BLD AUTO: 2.8 10E9/L (ref 0.8–5.3)
LYMPHOCYTES NFR BLD AUTO: 36.7 %
MCH RBC QN AUTO: 28.7 PG (ref 26.5–33)
MCHC RBC AUTO-ENTMCNC: 32.4 G/DL (ref 31.5–36.5)
MCV RBC AUTO: 89 FL (ref 78–100)
MONOCYTES # BLD AUTO: 0.7 10E9/L (ref 0–1.3)
MONOCYTES NFR BLD AUTO: 9.1 %
NEUTROPHILS # BLD AUTO: 3.9 10E9/L (ref 1.6–8.3)
NEUTROPHILS NFR BLD AUTO: 51.5 %
NRBC # BLD AUTO: 0 10*3/UL
NRBC BLD AUTO-RTO: 0 /100
PLATELET # BLD AUTO: 282 10E9/L (ref 150–450)
POTASSIUM SERPL-SCNC: 4.1 MMOL/L (ref 3.4–5.3)
PROT SERPL-MCNC: 7.6 G/DL (ref 6.8–8.8)
RBC # BLD AUTO: 4.78 10E12/L (ref 3.8–5.2)
SODIUM SERPL-SCNC: 138 MMOL/L (ref 133–144)
WBC # BLD AUTO: 7.6 10E9/L (ref 4–11)

## 2018-06-15 PROCEDURE — 99213 OFFICE O/P EST LOW 20 MIN: CPT | Mod: ZP | Performed by: PHYSICIAN ASSISTANT

## 2018-06-15 PROCEDURE — 96402 CHEMO HORMON ANTINEOPL SQ/IM: CPT

## 2018-06-15 PROCEDURE — 25000128 H RX IP 250 OP 636: Mod: ZF | Performed by: PHYSICIAN ASSISTANT

## 2018-06-15 PROCEDURE — 85025 COMPLETE CBC W/AUTO DIFF WBC: CPT | Performed by: PHYSICIAN ASSISTANT

## 2018-06-15 PROCEDURE — 80053 COMPREHEN METABOLIC PANEL: CPT | Performed by: PHYSICIAN ASSISTANT

## 2018-06-15 PROCEDURE — 36415 COLL VENOUS BLD VENIPUNCTURE: CPT

## 2018-06-15 PROCEDURE — G0463 HOSPITAL OUTPT CLINIC VISIT: HCPCS | Mod: ZF

## 2018-06-15 PROCEDURE — 25000125 ZZHC RX 250: Mod: JW,ZF | Performed by: PHYSICIAN ASSISTANT

## 2018-06-15 RX ADMIN — GOSERELIN ACETATE 3.6 MG: 3.6 IMPLANT SUBCUTANEOUS at 15:01

## 2018-06-15 RX ADMIN — LIDOCAINE HYDROCHLORIDE 1 ML: 10 INJECTION, SOLUTION EPIDURAL; INFILTRATION; INTRACAUDAL; PERINEURAL at 14:57

## 2018-06-15 ASSESSMENT — PAIN SCALES - GENERAL: PAINLEVEL: NO PAIN (0)

## 2018-06-15 NOTE — NURSING NOTE
Chief Complaint   Patient presents with     Blood Draw            Vitals taken, labs collected from right antecubital space via .  Pt tolerated procedure.  Checked in for next appt.     Isabella Qureshi RN

## 2018-06-15 NOTE — PROGRESS NOTES
Infusion Nursing Note:  Mary Chadwick presents today for cycle 4, day 1 Zoladex.    Patient seen by provider today: Yes: ABDIRIZAK Michel   present during visit today: Not Applicable.    Note: Patient arrived to infusion center with no new complaints since seeing provider in clinic. Zoladex injected into right side of abdomen with lidocaine used as numbing agent prior -- see MAR for details.    Intravenous Access:  No Intravenous access at this visit.    Treatment Conditions:  Lab Results   Component Value Date    HGB 13.7 06/15/2018     Lab Results   Component Value Date    WBC 7.6 06/15/2018      Lab Results   Component Value Date    ANEU 3.9 06/15/2018     Lab Results   Component Value Date     06/15/2018      Lab Results   Component Value Date     06/15/2018                   Lab Results   Component Value Date    POTASSIUM 4.1 06/15/2018           Lab Results   Component Value Date    CR 0.77 06/15/2018                   Lab Results   Component Value Date    MARICHUY 10.0 06/15/2018                Lab Results   Component Value Date    BILITOTAL 0.4 06/15/2018           Lab Results   Component Value Date    ALBUMIN 4.2 06/15/2018                    Lab Results   Component Value Date    ALT 27 06/15/2018           Lab Results   Component Value Date    AST 22 06/15/2018     Results reviewed, labs MET treatment parameters, ok to proceed with treatment.    Post Infusion Assessment:  Patient tolerated injection without incident.    Discharge Plan:   Patient declined prescription refills.  Discharge instructions reviewed with: Patient.  Patient and/or family verbalized understanding of discharge instructions and all questions answered.  Copy of AVS reviewed with patient and/or family.  Patient will return 7/13/2018 for next appointment.  Patient discharged in stable condition accompanied by: self.  Departure Mode: Ambulatory.    Jameel Pereira RN

## 2018-06-15 NOTE — PATIENT INSTRUCTIONS
Contact Numbers    [facility] Main Line: [phone]  [facility] Triage and after hours / weekends / holidays:  [phone]      Please call the triage or after hours line if you experience a temperature greater than or equal to 100.5, shaking chills, have uncontrolled nausea, vomiting and/or diarrhea, dizziness, shortness of breath, chest pain, bleeding, unexplained bruising, or if you have any other new/concerning symptoms, questions or concerns.      If you are having any concerning symptoms or wish to speak to a provider before your next infusion visit, please call your care coordinator or triage to notify them so we can adequately serve you.     If you need a refill on a narcotic prescription or other medication, please call before your infusion appointment.           June 2018 Sunday Monday Tuesday Wednesday Thursday Friday Saturday                            1     2       3     4     5     6     7     [facility] RETURN    7:15 AM   (30 min.)   Antonio Andrews MD   [facility] 8     9       10     11     12     13     14     15     [facility]    1:00 PM   (15 min.)    [facility] DRAW   [facility] Lab Draw     [facility] RETURN    1:25 PM   (50 min.)   Paty Pandey PA-C   [facility] 60    2:30 PM   (60 min.)   UC ONCOLOGY INFUSION   [facility] 16       17     18     19     20     21     22     23       24     25     26     27     28     29     30                July 2018 Sunday Monday Tuesday Wednesday Thursday Friday Saturday   1     2     3     4     5     6     7       8     9     10     11     12     13     [facility] RETURN   11:45 AM   (30 min.)   Valeria Gann MD   [facility]     [facility] ONC INFUSION 60   12:30 PM   (60 min.)    ONCOLOGY INFUSION   [facility] 14       15     16     17     18     19     20     21       22     23     24     25     26     27     28       29     30      31                                      Lab Results:  Recent Results (from the past 12 hour(s))   CBC with platelets differential    Collection Time: 06/15/18  1:29 PM   Result Value Ref Range    WBC 7.6 4.0 - 11.0 10e9/L    RBC Count 4.78 3.8 - 5.2 10e12/L    Hemoglobin 13.7 11.7 - 15.7 g/dL    Hematocrit 42.3 35.0 - 47.0 %    MCV 89 78 - 100 fl    MCH 28.7 26.5 - 33.0 pg    MCHC 32.4 31.5 - 36.5 g/dL    RDW 12.3 10.0 - 15.0 %    Platelet Count 282 150 - 450 10e9/L    Diff Method Automated Method     % Neutrophils 51.5 %    % Lymphocytes 36.7 %    % Monocytes 9.1 %    % Eosinophils 1.8 %    % Basophils 0.8 %    % Immature Granulocytes 0.1 %    Nucleated RBCs 0 0 /100    Absolute Neutrophil 3.9 1.6 - 8.3 10e9/L    Absolute Lymphocytes 2.8 0.8 - 5.3 10e9/L    Absolute Monocytes 0.7 0.0 - 1.3 10e9/L    Absolute Eosinophils 0.1 0.0 - 0.7 10e9/L    Absolute Basophils 0.1 0.0 - 0.2 10e9/L    Abs Immature Granulocytes 0.0 0 - 0.4 10e9/L    Absolute Nucleated RBC 0.0    Comprehensive metabolic panel    Collection Time: 06/15/18  1:29 PM   Result Value Ref Range    Sodium 138 133 - 144 mmol/L    Potassium 4.1 3.4 - 5.3 mmol/L    Chloride 101 94 - 109 mmol/L    Carbon Dioxide 28 20 - 32 mmol/L    Anion Gap 9 3 - 14 mmol/L    Glucose 85 70 - 99 mg/dL    Urea Nitrogen 16 7 - 30 mg/dL    Creatinine 0.77 0.52 - 1.04 mg/dL    GFR Estimate 83 >60 mL/min/1.7m2    GFR Estimate If Black >90 >60 mL/min/1.7m2    Calcium 10.0 8.5 - 10.1 mg/dL    Bilirubin Total 0.4 0.2 - 1.3 mg/dL    Albumin 4.2 3.4 - 5.0 g/dL    Protein Total 7.6 6.8 - 8.8 g/dL    Alkaline Phosphatase 67 40 - 150 U/L    ALT 27 0 - 50 U/L    AST 22 0 - 45 U/L

## 2018-06-15 NOTE — MR AVS SNAPSHOT
After Visit Summary   6/15/2018    Mary Chadwick    MRN: [id number]           Patient Information     Date Of Birth          1975        Visit Information        Provider Department      6/15/2018 2:30 PM UC 19 ATC;  ONCOLOGY INFUSION [facility]        Today's Diagnoses     Malignant neoplasm of upper-outer quadrant of left breast in female, estrogen receptor positive (H)    -  1      Care Instructions    Contact Numbers    [facility] [place]: [phone]  [facility] [place] and after hours / weekends / holidays:  [phone]      Please call the triage or after hours line if you experience a temperature greater than or equal to 100.5, shaking chills, have uncontrolled nausea, vomiting and/or diarrhea, dizziness, shortness of breath, chest pain, bleeding, unexplained bruising, or if you have any other new/concerning symptoms, questions or concerns.      If you are having any concerning symptoms or wish to speak to a provider before your next infusion visit, please call your care coordinator or triage to notify them so we can adequately serve you.     If you need a refill on a narcotic prescription or other medication, please call before your infusion appointment.           June 2018 Sunday Monday Tuesday Wednesday Thursday Friday Saturday                            1     2       3     4     5     6     7     [facility] RETURN    7:15 AM   (30 min.)   Antonio Andrews MD   [facility] 8     9       10     11     12     13     14     15     [facility] DRAW    1:00 PM   (15 min.)   [facility] LAB DRAW   [facility] Lab Draw     [facility] RETURN    1:25 PM   (50 min.)   Paty Pandey PA-C   [facility] ONC INFUSION 60    2:30 PM   (60 min.)    ONCOLOGY INFUSION   [facility] 16       17     18     19     20     21     22     23       24     25     26     27     28     29     30                July 2018 Sunday  Monday Tuesday Wednesday Thursday Friday Saturday   1     2     3     4     5     6     7       8     9     10     11     12     13     [facility] RETURN   11:45 AM   (30 min.)   Valeria Gann MD   [facility]   12:30 PM   (60 min.)   UC ONCOLOGY INFUSION   [facility] 14       15     16     17     18     19     20     21       22     23     24     25     26     27     28       29     30     31                                      Lab Results:  Recent Results (from the past 12 hour(s))   CBC with platelets differential    Collection Time: 06/15/18  1:29 PM   Result Value Ref Range    WBC 7.6 4.0 - 11.0 10e9/L    RBC Count 4.78 3.8 - 5.2 10e12/L    Hemoglobin 13.7 11.7 - 15.7 g/dL    Hematocrit 42.3 35.0 - 47.0 %    MCV 89 78 - 100 fl    MCH 28.7 26.5 - 33.0 pg    MCHC 32.4 31.5 - 36.5 g/dL    RDW 12.3 10.0 - 15.0 %    Platelet Count 282 150 - 450 10e9/L    Diff Method Automated Method     % Neutrophils 51.5 %    % Lymphocytes 36.7 %    % Monocytes 9.1 %    % Eosinophils 1.8 %    % Basophils 0.8 %    % Immature Granulocytes 0.1 %    Nucleated RBCs 0 0 /100    Absolute Neutrophil 3.9 1.6 - 8.3 10e9/L    Absolute Lymphocytes 2.8 0.8 - 5.3 10e9/L    Absolute Monocytes 0.7 0.0 - 1.3 10e9/L    Absolute Eosinophils 0.1 0.0 - 0.7 10e9/L    Absolute Basophils 0.1 0.0 - 0.2 10e9/L    Abs Immature Granulocytes 0.0 0 - 0.4 10e9/L    Absolute Nucleated RBC 0.0    Comprehensive metabolic panel    Collection Time: 06/15/18  1:29 PM   Result Value Ref Range    Sodium 138 133 - 144 mmol/L    Potassium 4.1 3.4 - 5.3 mmol/L    Chloride 101 94 - 109 mmol/L    Carbon Dioxide 28 20 - 32 mmol/L    Anion Gap 9 3 - 14 mmol/L    Glucose 85 70 - 99 mg/dL    Urea Nitrogen 16 7 - 30 mg/dL    Creatinine 0.77 0.52 - 1.04 mg/dL    GFR Estimate 83 >60 mL/min/1.7m2    GFR Estimate If Black >90 >60 mL/min/1.7m2    Calcium 10.0 8.5 - 10.1 mg/dL    Bilirubin Total 0.4 0.2 - 1.3 mg/dL     Albumin 4.2 3.4 - 5.0 g/dL    Protein Total 7.6 6.8 - 8.8 g/dL    Alkaline Phosphatase 67 40 - 150 U/L    ALT 27 0 - 50 U/L    AST 22 0 - 45 U/L               Follow-ups after your visit        Your next 10 appointments already scheduled     Jul 13, 2018 12:00 PM CDT   (Arrive by 11:45 AM)   Return Visit with Valeria Gann MD   [facility] ([facility])    [address] [place] [address]  [address]  [facility] [id number] [phone]            Jul 13, 2018 12:30 PM CDT   Infusion 60 with UC ONCOLOGY INFUSION, UC 30 ATC   [facility] ([facility])    [address] [place] [address]  [address]  [facility] [id number] [phone]            Aug 06, 2018  7:00 AM CDT   NM SENTINEL NODE INJECTION BILATERAL with [id number]   [facility], Nuclear Medicine ([facility])    [address] [facility] [id number] [phone]           Please bring a list of your medicines to the exam. (Include vitamins, minerals and over-the-counter drugs.) You should wear comfortable clothes. Leave your valuables at home. Please bring related prior results and films.  Tell your doctor:   If you are breastfeeding or may be pregnant.   If you have had a barium test within the past few days. Barium may change the results of certain exams.   If you think you may need sedation (medicine to help you relax).  You may eat and drink as normal.  Please call your Imaging Department at your exam site with any questions.            Aug 06, 2018   Procedure with Antonio Andrews MD   [facility], Same Day Surgery (--)    [address] [facility] [id number] [phone]            Aug 14, 2018 11:00 AM CDT   (Arrive by 10:45 AM)   Post-Op with BASILIO Kidd MD   [facility] ([facility])    [address] [place] [address]  [address]  [facility] [id number] [phone]             Aug 17, 2018 11:45 AM CDT   (Arrive by 11:30 AM)   Post-Op with Antonio Andrews MD   [facility] ([facility])    [address] [facility]  [address] [place] [id number] [phone]              Who to contact     If you have questions or need follow up information about today's clinic visit or your schedule please contact [facility] directly at [phone].  Normal or non-critical lab and imaging results will be communicated to you by COGEONhart, letter or phone within 4 business days after the clinic has received the results. If you do not hear from us within 7 days, please contact the clinic through COGEONhart or phone. If you have a critical or abnormal lab result, we will notify you by phone as soon as possible.  Submit refill requests through Taste Filter or call your pharmacy and they will forward the refill request to us. Please allow 3 business days for your refill to be completed.          Additional Information About Your Visit        COGEONharCrowdFeed Information     Taste Filter gives you secure access to your electronic health record. If you see a primary care provider, you can also send messages to your care team and make appointments. If you have questions, please call your primary care clinic.  If you do not have a primary care provider, please call [phone] and they will assist you.        Care EveryWhere ID     This is your Care EveryWhere ID. This could be used by other organizations to access your [facility] medical records  [id number]         Blood Pressure from Last 3 Encounters:   06/15/18 112/67   06/07/18 (P) 107/71   05/18/18 108/68    Weight from Last 3 Encounters:   06/15/18 75.9 kg (167 lb 6.4 oz)   06/07/18 (P) 74.4 kg (164 lb)   05/14/18 74.7 kg (164 lb 11.2 oz)              Today, you had the following     No orders found for display       Primary Care Provider Fax #    Physician No Ref-Primary [phone]       No address on file        Equal  Access to Services     [facility] AL[facility]ERT[facility] : Hadii aad ku hadlindaaletha Danielali, wajaceyda luqadaha, qaraineta kakamnaseem gant. So [facility] [phone].    ATENCIÓN: Si habla español, tiene a sparrow disposición servicios gratuitos de asistencia lingüística. Llame al [phone].    We comply with applicable federal civil rights laws and [place] laws. We do not discriminate on the basis of race, color, national origin, age, disability, sex, sexual orientation, or gender identity.            Thank you!     Thank you for choosing [facility]  for your care. Our goal is always to provide you with excellent care. Hearing back from our patients is one way we can continue to improve our services. Please take a few minutes to complete the written survey that you may receive in the mail after your visit with us. Thank you!             Your Updated Medication List - Protect others around you: Learn how to safely use, store and throw away your medicines at www.disposemymeds.org.          This list is accurate as of 6/15/18  3:56 PM.  Always use your most recent med list.                   Brand Name Dispense Instructions for use Diagnosis    Biotin 1 MG Caps       Malignant neoplasm of left breast in female, estrogen receptor positive, unspecified site of breast (H)       calcium citrate-vitamin D 315-250 MG-UNIT Tabs per tablet    CITRACAL     Take 2 tablets by mouth        goserelin 3.6 MG injection    ZOLADEX     Inject 3.6 mg Subcutaneous every 30 days        hydrocortisone 2.5 % ointment           * letrozole 2.5 MG tablet    FEMARA    30 tablet    Take 1 tablet (2.5 mg) by mouth daily    Malignant neoplasm of upper-outer quadrant of left breast in female, estrogen receptor positive (H)       * letrozole 2.5 MG tablet    FEMARA    30 tablet    Take 1 tablet (2.5 mg) by mouth daily    Malignant neoplasm of upper-outer quadrant of left breast in female, estrogen receptor positive  (H)       NONFORMULARY      Take 1 capsule by mouth daily Rosehip oil        OMEGA 3-6-9 FATTY ACIDS PO      Take 2 capsules by mouth daily        Spirulina 500 MG Tabs      6 Tabs daily.    Malignant neoplasm of left breast in female, estrogen receptor positive, unspecified site of breast (H)       * Notice:  This list has 2 medication(s) that are the same as other medications prescribed for you. Read the directions carefully, and ask your doctor or other care provider to review them with you.
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
## 2018-06-15 NOTE — MR AVS SNAPSHOT
After Visit Summary   6/15/2018    Mary Chadwick    MRN: [id number]           Patient Information     Date Of Birth          1975        Visit Information        Provider Department      6/15/2018 1:40 PM Paty Pandey PA-C [facility]        Today's Diagnoses     Malignant neoplasm of upper-inner quadrant of left breast in female, estrogen receptor positive (H)    -  1    Malignant neoplasm of upper-outer quadrant of left breast in female, estrogen receptor positive (H)           Follow-ups after your visit        Your next 10 appointments already scheduled     Jul 13, 2018 12:00 PM CDT   (Arrive by 11:45 AM)   Return Visit with Valeria Gann MD   [facility] ([facility])    [address] [place] [address]  [address]  [facility] [place] [address] [phone]            Jul 13, 2018 12:30 PM CDT   Infusion 60 with UC ONCOLOGY INFUSION, UC 30 ATC   [facility] ([facility])    [address] [place] [address]  [address]  [facility] [place] [id number] [phone]            Aug 06, 2018  7:00 AM CDT   NM SENTINEL NODE INJECTION BILATERAL with UUNMINJ1   [facility], [place], Nuclear Medicine ([facility])    [address] [facility] [place] [facility] [id number] [phone]           Please bring a list of your medicines to the exam. (Include vitamins, minerals and over-the-counter drugs.) You should wear comfortable clothes. Leave your valuables at home. Please bring related prior results and films.  Tell your doctor:   If you are breastfeeding or may be pregnant.   If you have had a barium test within the past few days. Barium may change the results of certain exams.   If you think you may need sedation (medicine to help you relax).  You may eat and drink as normal.  Please call your Imaging Department at your exam site with any questions.            Aug 06,  2018   Procedure with Antonio Andrews MD   [facility], [place], Same Day Surgery (--)    [address]  [place] [address] [phone]            Aug 14, 2018 11:00 AM CDT   (Arrive by 10:45 AM)   Post-Op with BASILIO Kidd MD   [facility] ([facility])    [address]  [address]  [facility] [address] [phone]            Aug 17, 2018 11:45 AM CDT   (Arrive by 11:30 AM)   Post-Op with Antonio Andrews MD   [facility] ([facility])    [address]  [address]  [facility] [address] [phone]              Who to contact     If you have questions or need follow up information about today's clinic visit or your schedule please contact [facility] directly at [phone].  Normal or non-critical lab and imaging results will be communicated to you by VM Enterpriseshart, letter or phone within 4 business days after the clinic has received the results. If you do not hear from us within 7 days, please contact the clinic through [email or web address] or phone. If you have a critical or abnormal lab result, we will notify you by phone as soon as possible.  Submit refill requests through Virtusize or call your pharmacy and they will forward the refill request to us. Please allow 3 business days for your refill to be completed.          Additional Information About Your Visit        VM EnterprisesharPicaboo Information     V[employer]ize gives you secure access to your electronic health record. If you see a primary care provider, you can also send messages to your care team and make appointments. If you have questions, please call your primary care clinic.  If you do not have a primary care provider, please call [phone] and they will assist you.        Care EveryWhere ID     This is your Care EveryWhere ID. This could be used by other organizations to access your [place] medical records  [id number]        Your Vitals Were     Pulse Temperature  Respirations Pulse Oximetry BMI (Body Mass Index)       68 98.3  F (36.8  C) (Oral) 16 100% 25.46 kg/m2        Blood Pressure from Last 3 Encounters:   06/15/18 112/67   06/07/18 (P) 107/71   05/18/18 108/68    Weight from Last 3 Encounters:   06/15/18 75.9 kg (167 lb 6.4 oz)   06/07/18 (P) 74.4 kg (164 lb)   05/14/18 74.7 kg (164 lb 11.2 oz)               Primary Care Provider Fax #    Physician No Ref-Primary [phone]       No address on file        Equal Access to Services     ALIX CHAPARRO : Michelle Herndon, gary black, wanda nina, naseem mendoza. So [facility] [phone].    ATENCIÓN: Si habla español, tiene a sparrow disposición servicios gratuitos de asistencia lingüística. [facility] [phone].    We comply with applicable federal civil rights laws and [place] laws. We do not discriminate on the basis of race, color, national origin, age, disability, sex, sexual orientation, or gender identity.            Thank you!     Thank you for choosing [facility]  for your care. Our goal is always to provide you with excellent care. Hearing back from our patients is one way we can continue to improve our services. Please take a few minutes to complete the written survey that you may receive in the mail after your visit with us. Thank you!             Your Updated Medication List - Protect others around you: Learn how to safely use, store and throw away your medicines at www.disposemymeds.org.          This list is accurate as of 6/15/18  3:38 PM.  Always use your most recent med list.                   Brand Name Dispense Instructions for use Diagnosis    Biotin 1 MG Caps       Malignant neoplasm of left breast in female, estrogen receptor positive, unspecified site of breast (H)       calcium citrate-vitamin D 315-250 MG-UNIT Tabs per tablet    CITRACAL     Take 2 tablets by mouth        goserelin 3.6 MG injection    ZOLADEX     Inject 3.6  mg Subcutaneous every 30 days        hydrocortisone 2.5 % ointment           * letrozole 2.5 MG tablet    FEMARA    30 tablet    Take 1 tablet (2.5 mg) by mouth daily    Malignant neoplasm of upper-outer quadrant of left breast in female, estrogen receptor positive (H)       * letrozole 2.5 MG tablet    FEMARA    30 tablet    Take 1 tablet (2.5 mg) by mouth daily    Malignant neoplasm of upper-outer quadrant of left breast in female, estrogen receptor positive (H)       NONFORMULARY      Take 1 capsule by mouth daily Rosehip oil        OMEGA 3-6-9 FATTY ACIDS PO      Take 2 capsules by mouth daily        Spirulina 500 MG Tabs      6 Tabs daily.    Malignant neoplasm of left breast in female, estrogen receptor positive, unspecified site of breast (H)       * Notice:  This list has 2 medication(s) that are the same as other medications prescribed for you. Read the directions carefully, and ask your doctor or other care provider to review them with you.

## 2018-06-15 NOTE — LETTER
Date:June 18, 2018      Patient was self referred, no letter generated. Do not send.        [facility] Health Information

## 2018-06-15 NOTE — NURSING NOTE
"Oncology Rooming Note    Ammy 15, 2018 1:50 PM   Mary Chadwick is a 42 year old female who presents for:    Chief Complaint   Patient presents with     Blood Draw          Oncology Clinic Visit     Return for Breast Ca , Labs, Tx     Initial Vitals: /67  Pulse 68  Temp 98.3  F (36.8  C) (Oral)  Resp 16  Wt 75.9 kg (167 lb 6.4 oz)  SpO2 100%  BMI (P) 25.46 kg/m2 Estimated body mass index is 25.46 kg/(m^2) (pended) as calculated from the following:    Height as of 6/7/18: (P) 1.727 m (5' 7.99\").    Weight as of this encounter: 75.9 kg (167 lb 6.4 oz). Body surface area is 1.91 meters squared (pended).  No Pain (0) Comment: Data Unavailable   No LMP recorded. Patient is not currently having periods (Reason: Premenopausal).  Allergies reviewed: Yes  Medications reviewed: Yes    Medications: Medication refills not needed today.  Pharmacy name entered into [facility]:    [facility] [place], [place] - [address] [place] [address]  [facility] [place], [place] - [address] [facility] [address] [facility] [address]    Clinical concerns: results  Paty was notified.    6  minutes for nursing intake (face to face time)     Sheryl Oliveira MA              "

## 2018-07-13 ENCOUNTER — INFUSION THERAPY VISIT (OUTPATIENT)
Dept: ONCOLOGY | Facility: CLINIC | Age: 43
End: 2018-07-13
Attending: INTERNAL MEDICINE
Payer: COMMERCIAL

## 2018-07-13 ENCOUNTER — RADIANT APPOINTMENT (OUTPATIENT)
Dept: MAMMOGRAPHY | Facility: CLINIC | Age: 43
End: 2018-07-13
Attending: INTERNAL MEDICINE
Payer: COMMERCIAL

## 2018-07-13 VITALS
TEMPERATURE: 97.9 F | DIASTOLIC BLOOD PRESSURE: 75 MMHG | HEART RATE: 62 BPM | BODY MASS INDEX: 25.83 KG/M2 | RESPIRATION RATE: 16 BRPM | SYSTOLIC BLOOD PRESSURE: 114 MMHG | HEIGHT: 68 IN | WEIGHT: 170.44 LBS | OXYGEN SATURATION: 98 %

## 2018-07-13 DIAGNOSIS — Z17.0 MALIGNANT NEOPLASM OF UPPER-OUTER QUADRANT OF LEFT BREAST IN FEMALE, ESTROGEN RECEPTOR POSITIVE (H): Primary | ICD-10-CM

## 2018-07-13 DIAGNOSIS — C50.412 MALIGNANT NEOPLASM OF UPPER-OUTER QUADRANT OF LEFT BREAST IN FEMALE, ESTROGEN RECEPTOR POSITIVE (H): ICD-10-CM

## 2018-07-13 DIAGNOSIS — C50.412 MALIGNANT NEOPLASM OF UPPER-OUTER QUADRANT OF LEFT BREAST IN FEMALE, ESTROGEN RECEPTOR POSITIVE (H): Primary | ICD-10-CM

## 2018-07-13 DIAGNOSIS — Z17.0 MALIGNANT NEOPLASM OF UPPER-OUTER QUADRANT OF LEFT BREAST IN FEMALE, ESTROGEN RECEPTOR POSITIVE (H): ICD-10-CM

## 2018-07-13 PROCEDURE — 99214 OFFICE O/P EST MOD 30 MIN: CPT | Mod: ZP | Performed by: INTERNAL MEDICINE

## 2018-07-13 PROCEDURE — G0463 HOSPITAL OUTPT CLINIC VISIT: HCPCS | Mod: ZF

## 2018-07-13 PROCEDURE — 25000128 H RX IP 250 OP 636: Mod: ZF | Performed by: INTERNAL MEDICINE

## 2018-07-13 PROCEDURE — 25000125 ZZHC RX 250: Mod: ZF | Performed by: INTERNAL MEDICINE

## 2018-07-13 PROCEDURE — 96402 CHEMO HORMON ANTINEOPL SQ/IM: CPT

## 2018-07-13 RX ORDER — LETROZOLE 2.5 MG/1
2.5 TABLET, FILM COATED ORAL DAILY
Qty: 90 TABLET | Refills: 11 | Status: ON HOLD | OUTPATIENT
Start: 2018-07-13 | End: 2018-08-06

## 2018-07-13 RX ADMIN — GOSERELIN ACETATE 3.6 MG: 3.6 IMPLANT SUBCUTANEOUS at 12:33

## 2018-07-13 RX ADMIN — LIDOCAINE HYDROCHLORIDE 2 ML: 10 INJECTION, SOLUTION EPIDURAL; INFILTRATION; INTRACAUDAL; PERINEURAL at 12:32

## 2018-07-13 ASSESSMENT — PAIN SCALES - GENERAL: PAINLEVEL: MILD PAIN (2)

## 2018-07-13 NOTE — MR AVS SNAPSHOT
After Visit Summary   7/13/2018    Mary Chadwick    MRN: [id number]           Patient Information     Date Of Birth          1975        Visit Information        Provider Department      7/13/2018 10:30 AM Valeria Gann MD [facility]        Today's Diagnoses     Malignant neoplasm of upper-outer quadrant of left breast in female, estrogen receptor positive (H)    -  1       Follow-ups after your visit        Your next 10 appointments already scheduled     Aug 06, 2018  7:00 AM CDT   NM SENTINEL NODE INJECTION BILATERAL with UUNMINJ1   [facility] [place], Nuclear Medicine ([facility])    [address] [facility] [address] [phone]           Please bring a list of your medicines to the exam. (Include vitamins, minerals and over-the-counter drugs.) You should wear comfortable clothes. Leave your valuables at home. Please bring related prior results and films.  Tell your doctor:   If you are breastfeeding or may be pregnant.   If you have had a barium test within the past few days. Barium may change the results of certain exams.   If you think you may need sedation (medicine to help you relax).  You may eat and drink as normal.  Please call your Imaging Department at your exam site with any questions.            Aug 06, 2018   Procedure with Antonio Andrews MD   [facility] [place], Same Day Surgery (--)    [address] [place] [id number] [phone]            Aug 14, 2018 11:00 AM CDT   (Arrive by 10:45 AM)   Post-Op with BASILIO Kidd MD   [facility] [place] ([facility])    [address]  [address]  [facility] [address] [phone]            Aug 14, 2018 11:50 AM CDT   (Arrive by 11:35 AM)   Return Visit with Paty Pandey PA-C   [facility] ([facility])    [address] [place] [address]  [address]  [place] [address]   [phone]            Aug 14, 2018  1:00 PM CDT   Infusion 60 with UC ONCOLOGY INFUSION, UC 11 ATC   [facility] ([facility])    [address] [place] [address]  [address]  [facility] [address]   [phone]            Aug 17, 2018 11:45 AM CDT   (Arrive by 11:30 AM)   Post-Op with Antonio Andrews MD   [facility] ([facility])    [address] [place] [address]  [address]  [facility] [address]   [phone]            Sep 11, 2018 12:00 PM CDT   (Arrive by 11:45 AM)   Return Visit with Valeria Gann MD   [facility] ([facility])    [address] [place] [address]  [address]  [facility] [address]   [phone]            Sep 11, 2018  1:30 PM CDT   Infusion 60 with UC ONCOLOGY INFUSION, UC 28 ATC   [facility] ([facility])    [address] [place] [address]  [address]  [facility] [address]   [phone]              Who to contact     If you have questions or need follow up information about today's clinic visit or your schedule please contact [facility] directly at [phone].  Normal or non-critical lab and imaging results will be communicated to you by Xoinkahart, letter or phone within 4 business days after the clinic has received the results. If you do not hear from us within 7 days, please contact the clinic through Xoinkahart or phone. If you have a critical or abnormal lab result, we will notify you by phone as soon as possible.  Submit refill requests through DeviceFidelity or call your pharmacy and they will forward the refill request to us. Please allow 3 business days for your refill to be completed.          Additional Information About Your Visit        DeviceFidelity Information     Device[employer]idelity gives you secure access to your electronic health record. If you see a primary care provider, you can also send messages to your care team and  "make appointments. If you have questions, please call your primary care clinic.  If you do not have a primary care provider, please call [phone] and they will assist you.        Care EveryWhere ID     This is your Care EveryWhere ID. This could be used by other organizations to access your [place] medical records  [id number]        Your Vitals Were     Pulse Temperature Respirations Height Pulse Oximetry Breastfeeding?    62 97.9  F (36.6  C) (Oral) 16 1.727 m (5' 8\") 98% No    BMI (Body Mass Index)                   25.91 kg/m2            Blood Pressure from Last 3 Encounters:   07/13/18 114/75   06/15/18 112/67   06/07/18 (P) 107/71    Weight from Last 3 Encounters:   07/13/18 77.3 kg (170 lb 7 oz)   06/15/18 75.9 kg (167 lb 6.4 oz)   06/07/18 (P) 74.4 kg (164 lb)                 Today's Medication Changes          These changes are accurate as of 7/13/18  2:51 PM.  If you have any questions, ask your nurse or doctor.               These medicines have changed or have updated prescriptions.        Dose/Directions    * letrozole 2.5 MG tablet   Commonly known as:  FEMARA   This may have changed:  Another medication with the same name was added. Make sure you understand how and when to take each.   Used for:  Malignant neoplasm of upper-outer quadrant of left breast in female, estrogen receptor positive (H)   Changed by:  Valeria Gann MD        Dose:  2.5 mg   Take 1 tablet (2.5 mg) by mouth daily   Quantity:  30 tablet   Refills:  11       * letrozole 2.5 MG tablet   Commonly known as:  FEMARA   This may have changed:  Another medication with the same name was added. Make sure you understand how and when to take each.   Used for:  Malignant neoplasm of upper-outer quadrant of left breast in female, estrogen receptor positive (H)   Changed by:  Valeria Gann MD        Dose:  2.5 mg   Take 1 tablet (2.5 mg) by mouth daily   Quantity:  30 tablet   Refills:  11       * letrozole 2.5 MG tablet "   Commonly known as:  FEMARA   This may have changed:  You were already taking a medication with the same name, and this prescription was added. Make sure you understand how and when to take each.   Used for:  Malignant neoplasm of upper-outer quadrant of left breast in female, estrogen receptor positive (H)   Changed by:  Valeria Gann MD        Dose:  2.5 mg   Take 1 tablet (2.5 mg) by mouth daily   Quantity:  90 tablet   Refills:  11       * Notice:  This list has 3 medication(s) that are the same as other medications prescribed for you. Read the directions carefully, and ask your doctor or other care provider to review them with you.         Where to get your medicines      These medications were sent to [address] [address]     Phone:  [phone]     letrozole 2.5 MG tablet                Primary Care Provider Fax #    Physician No Ref-Primary [phone]       No address on file        Equal Access to Services     ALIX [facility] ALBERTO : Michelle dotsono Sobharati, waaxda luqadaha, qaybta kaalmada adetommy, naseem lugo . So [facility] [phone].    ATENCIÓN: Si habla español, tiene a sparrow disposición servicios gratuitos de asistencia lingüística. Llame al [phone].    We comply with applicable federal civil rights laws and [place] laws. We do not discriminate on the basis of race, color, national origin, age, disability, sex, sexual orientation, or gender identity.            Thank you!     Thank you for choosing [facility]IN[facility]  for your care. Our goal is always to provide you with excellent care. Hearing back from our patients is one way we can continue to improve our services. Please take a few minutes to complete the written survey that you may receive in the mail after your visit with us. Thank you!             Your Updated Medication List - Protect others around you: Learn how to  safely use, store and throw away your medicines at www.disposemymeds.org.          This list is accurate as of 7/13/18  2:51 PM.  Always use your most recent med list.                   Brand Name Dispense Instructions for use Diagnosis    Biotin 1 MG Caps       Malignant neoplasm of left breast in female, estrogen receptor positive, unspecified site of breast (H)       calcium citrate-vitamin D 315-250 MG-UNIT Tabs per tablet    CITRACAL     Take 2 tablets by mouth        goserelin 3.6 MG injection    ZOLADEX     Inject 3.6 mg Subcutaneous every 30 days        * letrozole 2.5 MG tablet    FEMARA    30 tablet    Take 1 tablet (2.5 mg) by mouth daily    Malignant neoplasm of upper-outer quadrant of left breast in female, estrogen receptor positive (H)       * letrozole 2.5 MG tablet    FEMARA    30 tablet    Take 1 tablet (2.5 mg) by mouth daily    Malignant neoplasm of upper-outer quadrant of left breast in female, estrogen receptor positive (H)       * letrozole 2.5 MG tablet    FEMARA    90 tablet    Take 1 tablet (2.5 mg) by mouth daily    Malignant neoplasm of upper-outer quadrant of left breast in female, estrogen receptor positive (H)       NONFORMULARY      Take 1 capsule by mouth daily Rosehip oil        OMEGA 3-6-9 FATTY ACIDS PO      Take 2 capsules by mouth daily        Spirulina 500 MG Tabs      6 Tabs daily.    Malignant neoplasm of left breast in female, estrogen receptor positive, unspecified site of breast (H)       * Notice:  This list has 3 medication(s) that are the same as other medications prescribed for you. Read the directions carefully, and ask your doctor or other care provider to review them with you.

## 2018-07-13 NOTE — PROGRESS NOTES
"July 13, 2018    HISTORY OF PRESENT ILLNESS:  I am seeing Ms. Omaira Chadwick in follow up for low risk mammaprint at least stage 2 breast cancer.     Mary, or \"Omaira,\" comes in for follow up of breast cancer.  She is originally from [place]  [place]  She underwent a screening mammography this week.  This screening mammogram was performed on 02/09/2018 followed by diagnostic breast ultrasound which revealed a 2.6 x 1.1 x 1.7 cm irregular shaped mass at the 2 o'clock position involving the left breast.  There were indeterminate microcalcifications involving the left breast and a biopsy was recommended.  A stereotactic biopsy occurred on 02/20/2018 revealing an invasive ductal carcinoma, grade 2, ER positive, SC positive, HER2 negative by immunohistochemistry histochemistry at 1+.      Omaira had a breast MRI which revealed a 3.5-cm mass in her left breast with an area of non-mass-like enhancement measuring approximately 8 cm.  On this imaging, she had an equivocal lymph node in the left axilla.  This was not biopsied.       She was screened for the I-SPY-2 clinical trial.  She came back as a low-risk MammaPrint.  She was not enrolled on the therapeutic portion of the trial, as a result.  She was also diagnosed with Li-Fraumeni syndrome.      She began zoladex and  Femara April 2018.  She returns today for follow up.        She says she is coping reasonably well.  She has no chest pain or shortness of breath.  She has no nausea, vomiting, diarrhea or constipation.  She has occasional hot flashes.  She thinks her breast mass is getting softer.      She feels like she has a respiratory infection today and has a sore throat.  Left ear pain.  No fevers.  No cough.  No nasal drainage.  She has also noticed more discomfort in her left axilla.    Her 10-point review of systems is otherwise negative.     PAST MEDICAL HISTORY:  Breast cancer, p53 mutation, MUTHY mutation   " "  MEDICATIONS:  No medications.      ALLERGIES:  She is allergic to lactose.      SOCIAL HISTORY:  She has 7 and 11-year-old.  She is working part-time at the [employer].  She has a remote history of tobacco use.      FAMILY HISTORY:  Her mom  of colon cancer at age 41.  Maternal aunt with breast cancer in the 50s.  Maternal grandfather with stomach cancer.  Maternal uncle with stomach cancer.  Father with myasthenia gravis.  A birth brother who is alive and a sister who is alive.  Her significant other comes back and forth to [place].      PHYSICAL EXAMINATION:  /75  Pulse 62  Temp 97.9  F (36.6  C) (Oral)  Resp 16  Ht 1.727 m (5' 8\")  Wt 77.3 kg (170 lb 7 oz)  SpO2 98%  Breastfeeding? No  BMI 25.91 kg/m2  Gen: well appearing, nad  Heent: no icterus o/p clear  Cv: rrr, nl S1S2  Lungs: clear  Abd: soft, nt, nd + bs  Ext: no edema  Skin: no rashes  Breast exam:  Left breast with no discretely palpable mass, no axillary adenopathy     LABORATORY:  Pathology was reviewed.      Lab Results   Component Value Date    WBC 7.6 06/15/2018     Lab Results   Component Value Date    RBC 4.78 06/15/2018     Lab Results   Component Value Date    HGB 13.7 06/15/2018     Lab Results   Component Value Date    HCT 42.3 06/15/2018     No components found for: MCT  Lab Results   Component Value Date    MCV 89 06/15/2018     Lab Results   Component Value Date    MCH 28.7 06/15/2018     Lab Results   Component Value Date    MCHC 32.4 06/15/2018     Lab Results   Component Value Date    RDW 12.3 06/15/2018     Lab Results   Component Value Date     06/15/2018       Recent Labs   Lab Test  06/15/18   1329  18   1310   NA  138  138   POTASSIUM  4.1  3.8   CHLORIDE  101  104   CO2  28  29   ANIONGAP  9  6   GLC  85  89   BUN  16  17   CR  0.77  0.76   MARICHUY  10.0  9.1     Liver Function Studies -   Recent Labs   Lab Test  06/15/18   1329   PROTTOTAL  7.6   ALBUMIN  4.2   BILITOTAL  0.4   ALKPHOS  67 "   AST  22   ALT  27          ASSESSMENT AND PLAN:  This is a 42-year-old female with stage T2 NX, invasive ductal carcinoma, ER positive, GA positive, HER2 negative, involving the left breast in the setting of Li-Fraumeni syndrome.     I discussed with Omaira today that she appears to have stage II-A breast cancer involving her left breast.   She was screened for the I-SPY-2 clinical trial and was found to have a low MammaPrint, indicating that she does not go on to the chemotherapy portion of the clinical trial.        Given Omaira's low-risk MammaPrint, we began her on therapy with Zoladex and an aromatase inhibitor.  She is tolerating this therapy without any difficulty.  She has occasional hot flashes.  Recommended continuing her Zoladex on a monthly basis in conjunction with her aromatase inhibitor.      I will reimage her prior to surgery.     She has met with Dr. Kidd, as well as Dr. Antonio Andrews.  Given her P53 mutation, the plan is for bilateral mastectomy with a sentinel lymph node biopsy.  Surgery is scheduled in early August.  I will see her back following this to review her pathology.    Viral URI - discussed pushing fluids.  Ibuprofen prn.        She has occasional hot flashes that are well-tolerated.      She is coping reasonably well.      Valeria Gann

## 2018-07-13 NOTE — PROGRESS NOTES
Infusion Nursing Note:  Mary Chadwick presents today for Zoladex.    Patient seen by provider today: Yes: Dr. Gann   present during visit today: Not Applicable.    Note: Patient used ice and lidocaine at Zoladex site.    Post Infusion Assessment:  Patient tolerated One injection in LLQ of abdomen without incident.    Discharge Plan:   Patient declined prescription refills.  AVS to patient via [employer]. Patient will return 8/14/18 for next appointment.   Patient discharged in stable condition accompanied by: self.  Departure Mode: Ambulatory.    Deepika Venegas RN

## 2018-07-13 NOTE — LETTER
"7/13/2018       RE: Mary Chadwick  [address]  [facility] [address]     Dear Colleague,    Thank you for referring your patient, Mary Chadwick, to the [facility]. Please see a copy of my visit note below.    July 13, 2018    HISTORY OF PRESENT ILLNESS:  I am seeing Ms. Omaira Chadwick in follow up for low risk mammaprint at least stage 2 breast cancer.     Mary, or \"Omaira,\" comes in for follow up of breast cancer.  She is originally from [place].  She moved here displaced from [place].  She underwent a screening mammography this week.  This screening mammogram was performed on 02/09/2018 followed by diagnostic breast ultrasound which revealed a 2.6 x 1.1 x 1.7 cm irregular shaped mass at the 2 o'clock position involving the left breast.  There were indeterminate microcalcifications involving the left breast and a biopsy was recommended.  A stereotactic biopsy occurred on 02/20/2018 revealing an invasive ductal carcinoma, grade 2, ER positive, DE positive, HER2 negative by immunohistochemistry histochemistry at 1+.      Omaira had a breast MRI which revealed a 3.5-cm mass in her left breast with an area of non-mass-like enhancement measuring approximately 8 cm.  On this imaging, she had an equivocal lymph node in the left axilla.  This was not biopsied.       She was screened for the I-SPY-2 clinical trial.  She came back as a low-risk MammaPrint.  She was not enrolled on the therapeutic portion of the trial, as a result.  She was also diagnosed with Li-Fraumeni syndrome.      She began zoladex and  Femara April 2018.  She returns today for follow up.        She says she is coping reasonably well.  She has no chest pain or shortness of breath.  She has no nausea, vomiting, diarrhea or constipation.  She has occasional hot flashes.  She thinks her breast mass is getting softer.      She feels like she has a respiratory infection today and has a sore throat.  " "Left ear pain.  No fevers.  No cough.  No nasal drainage.  She has also noticed more discomfort in her left axilla.    Her 10-point review of systems is otherwise negative.     PAST MEDICAL HISTORY:  Breast cancer, p53 mutation, MUTHY mutation     MEDICATIONS:  No medications.      ALLERGIES:  She is allergic to lactose.      SOCIAL HISTORY:  She has 7 and 11-year-old.  She is working part-time at the [employer].  She has a remote history of tobacco use.      FAMILY HISTORY:  Her mom  of colon cancer at age 41.  Maternal aunt with breast cancer in the 50s.  Maternal grandfather with stomach cancer.  Maternal uncle with stomach cancer.  Father with myasthenia gravis.  A birth brother who is alive and a sister who is alive.  Her significant other comes back and forth to [place].      PHYSICAL EXAMINATION:  /75  Pulse 62  Temp 97.9  F (36.6  C) (Oral)  Resp 16  Ht 1.727 m (5' 8\")  Wt 77.3 kg (170 lb 7 oz)  SpO2 98%  Breastfeeding? No  BMI 25.91 kg/m2  Gen: well appearing, nad  Heent: no icterus o/p clear  Cv: rrr, nl S1S2  Lungs: clear  Abd: soft, nt, nd + bs  Ext: no edema  Skin: no rashes  Breast exam:  Left breast with no discretely palpable mass, no axillary adenopathy     LABORATORY:  Pathology was reviewed.      Lab Results   Component Value Date    WBC 7.6 06/15/2018     Lab Results   Component Value Date    RBC 4.78 06/15/2018     Lab Results   Component Value Date    HGB 13.7 06/15/2018     Lab Results   Component Value Date    HCT 42.3 06/15/2018     No components found for: MCT  Lab Results   Component Value Date    MCV 89 06/15/2018     Lab Results   Component Value Date    MCH 28.7 06/15/2018     Lab Results   Component Value Date    MCHC 32.4 06/15/2018     Lab Results   Component Value Date    RDW 12.3 06/15/2018     Lab Results   Component Value Date     06/15/2018       Recent Labs   Lab Test  06/15/18   1329  18   1310   NA  138  138   POTASSIUM  4.1  3.8 "   CHLORIDE  101  104   CO2  28  29   ANIONGAP  9  6   GLC  85  89   BUN  16  17   CR  0.77  0.76   MARICHUY  10.0  9.1     Liver Function Studies -   Recent Labs   Lab Test  06/15/18   1329   PROTTOTAL  7.6   ALBUMIN  4.2   BILITOTAL  0.4   ALKPHOS  67   AST  22   ALT  27          ASSESSMENT AND PLAN:  This is a 42-year-old female with stage T2 NX, invasive ductal carcinoma, ER positive, NM positive, HER2 negative, involving the left breast in the setting of Li-Fraumeni syndrome.     I discussed with Omaira today that she appears to have stage II-A breast cancer involving her left breast.   She was screened for the I-SPY-2 clinical trial and was found to have a low MammaPrint, indicating that she does not go on to the chemotherapy portion of the clinical trial.        Given Omaira's low-risk MammaPrint, we began her on therapy with Zoladex and an aromatase inhibitor.  She is tolerating this therapy without any difficulty.  She has occasional hot flashes.  Recommended continuing her Zoladex on a monthly basis in conjunction with her aromatase inhibitor.      I will reimage her prior to surgery.     She has met with Dr. Kidd, as well as Dr. Antonio Andrews.  Given her P53 mutation, the plan is for bilateral mastectomy with a sentinel lymph node biopsy.  Surgery is scheduled in early August.  I will see her back following this to review her pathology.    Viral URI - discussed pushing fluids.  Ibuprofen prn.        She has occasional hot flashes that are well-tolerated.      She is coping reasonably well.        Valeria Gann MD

## 2018-07-13 NOTE — MR AVS SNAPSHOT
"              After Visit Summary   7/13/2018    Mary Chadwick    MRN: [id number]           Patient Information     Date Of Birth          1975        Visit Information        Provider Department      7/13/2018 12:30 PM UC 30 ATC; UC ONCOLOGY INFUSION [facility]        Today's Diagnoses     Malignant neoplasm of upper-outer quadrant of left breast in female, estrogen receptor positive (H)    -  1       Follow-ups after your visit        Your next 10 appointments already scheduled     Jul 13, 2018  1:45 PM CDT   MA SCREENING DIGITAL LEFT with [id number]   [facility] ([facility])    [address]  [facility] [address] [phone]           Do not use any powder, lotion or deodorant under your arms or on your breast. If you do, we will ask you to remove it before your exam.  Wear comfortable, two-piece clothing.  If you have any allergies, tell your care team.  Bring any previous mammograms from other facilities or have them mailed to the breast center. Three-dimensional (3D) mammograms are available at [facility] locations in [facility], [facility], [facility], and [place]. [facility] locations include [place] [facility] in [place]. Benefits of 3D mammograms include: - Improved rate of cancer detection - Decreases your chance of having to go back for more tests, which means fewer: - \"False-positive\" results (This means that there is an abnormal area but it isn't cancer.) - Invasive testing procedures, such as a biopsy or surgery - Can provide clearer images of the breast if you have dense breast tissue. 3D mammography is an optional exam that anyone can have with a 2D mammogram. It doesn't replace or take the place of a 2D mammogram. 2D mammograms remain an effective screening test for all women.  Not all insurance companies cover the cost of a 3D mammogram. " Check with your insurance.            Jul 13, 2018  2:00 PM CDT   US BREAST LEFT LIMITED 1-3 QUAD with [id number]   [facility] ([facility])    [address]  [facility] [address] [phone]           Please bring a list of your medicines (including vitamins, minerals and over-the-counter drugs). Also, tell your doctor about any allergies you may have. Wear comfortable clothes and leave your valuables at home.  You do not need to do anything special to prepare for your exam.  Please call the Imaging Department at your exam site with any questions.            Aug 06, 2018  7:00 AM CDT   NM SENTINEL NODE INJECTION BILATERAL with [id number]   [facility], Nuclear Medicine ([facility])    [address] [place] [address] [facility] [address] [phone]           Please bring a list of your medicines to the exam. (Include vitamins, minerals and over-the-counter drugs.) You should wear comfortable clothes. Leave your valuables at home. Please bring related prior results and films.  Tell your doctor:   If you are breastfeeding or may be pregnant.   If you have had a barium test within the past few days. Barium may change the results of certain exams.   If you think you may need sedation (medicine to help you relax).  You may eat and drink as normal.  Please call your Imaging Department at your exam site with any questions.            Aug 06, 2018   Procedure with Antonio Andrews MD   [facility] [place], Same Day Surgery (--)    [address] [id number] [phone]            Aug 14, 2018 11:00 AM CDT   (Arrive by 10:45 AM)   Post-Op with BASILIO Kidd MD   [facility] ([facility])    [address]  [address]  [facility] [address] [phone]            Aug 14, 2018 11:50 AM CDT   (Arrive by 11:35 AM)   Return Visit with Paty Pandey,  DOMINGO   [facility] ([facility])    [address]  [address]  [facility] [address]   [phone]            Aug 14, 2018  1:00 PM CDT   Infusion 60 with UC ONCOLOGY INFUSION, UC 11 ATC   [facility] ([facility])    [address]  [address]  [facility] [address]   [phone]            Aug 17, 2018 11:45 AM CDT   (Arrive by 11:30 AM)   Post-Op with Antonio Andrews MD   [facility] ([facility])    [address] [place] [address]  [address]  [facility] [address]   [phone]            Sep 11, 2018 12:00 PM CDT   (Arrive by 11:45 AM)   Return Visit with Valeria Gann MD   [facility] ([facility])    [address] [place] [address]  [address]  [facility] [address] [phone]              Who to contact     If you have questions or need follow up information about today's clinic visit or your schedule please contact [facility] directly at [phone].  Normal or non-critical lab and imaging results will be communicated to you by MyChart, letter or phone within 4 business days after the clinic has received the results. If you do not hear from us within 7 days, please contact the clinic through [employer] or phone. If you have a critical or abnormal lab result, we will notify you by phone as soon as possible.  Submit refill requests through [employer] or call your pharmacy and they will forward the refill request to us. Please allow 3 business days for your refill to be completed.          Additional Information About Your Visit        [employer] Information     [employer] gives you secure access to your electronic health record. If you see a primary care provider, you can also send messages to your care team and make appointments. If you have questions, please call your primary care clinic.  If you do not have a primary care  provider, please call [phone] and they will assist you.        Care EveryWhere ID     This is your Care EveryWhere ID. This could be used by other organizations to access your [facility] medical records  [id number]         Blood Pressure from Last 3 Encounters:   07/13/18 114/75   06/15/18 112/67   06/07/18 (P) 107/71    Weight from Last 3 Encounters:   07/13/18 77.3 kg (170 lb 7 oz)   06/15/18 75.9 kg (167 lb 6.4 oz)   06/07/18 (P) 74.4 kg (164 lb)              Today, you had the following     No orders found for display         Today's Medication Changes          These changes are accurate as of 7/13/18  1:26 PM.  If you have any questions, ask your nurse or doctor.               These medicines have changed or have updated prescriptions.        Dose/Directions    * letrozole 2.5 MG tablet   Commonly known as:  FEMARA   This may have changed:  Another medication with the same name was added. Make sure you understand how and when to take each.   Used for:  Malignant neoplasm of upper-outer quadrant of left breast in female, estrogen receptor positive (H)   Changed by:  Valeria Gann MD        Dose:  2.5 mg   Take 1 tablet (2.5 mg) by mouth daily   Quantity:  30 tablet   Refills:  11       * letrozole 2.5 MG tablet   Commonly known as:  FEMARA   This may have changed:  Another medication with the same name was added. Make sure you understand how and when to take each.   Used for:  Malignant neoplasm of upper-outer quadrant of left breast in female, estrogen receptor positive (H)   Changed by:  Valeria Gann MD        Dose:  2.5 mg   Take 1 tablet (2.5 mg) by mouth daily   Quantity:  30 tablet   Refills:  11       * letrozole 2.5 MG tablet   Commonly known as:  FEMARA   This may have changed:  You were already taking a medication with the same name, and this prescription was added. Make sure you understand how and when to take each.   Used for:  Malignant neoplasm of upper-outer quadrant of left  breast in female, estrogen receptor positive (H)   Changed by:  Valeria Gann MD        Dose:  2.5 mg   Take 1 tablet (2.5 mg) by mouth daily   Quantity:  90 tablet   Refills:  11       * Notice:  This list has 3 medication(s) that are the same as other medications prescribed for you. Read the directions carefully, and ask your doctor or other care provider to review them with you.         Where to get your medicines      These medications were sent to [address] [address]     Phone:  [phone]     letrozole 2.5 MG tablet                Primary Care Provider Fax #    Physician No Ref-Primary [phone]       No address on file        Equal Access to Services     SURESH CHAPARRO : Michelle Herndon, gary black, wanda nina, naseem mendoza. So [facility] [phone].    ATENCIÓN: Si habla español, tiene a sparrow disposición servicios gratuitos de asistencia lingüística. Llame al [phone].    We comply with applicable federal civil rights laws and [place] laws. We do not discriminate on the basis of race, color, national origin, age, disability, sex, sexual orientation, or gender identity.            Thank you!     Thank you for choosing [facility]  for your care. Our goal is always to provide you with excellent care. Hearing back from our patients is one way we can continue to improve our services. Please take a few minutes to complete the written survey that you may receive in the mail after your visit with us. Thank you!             Your Updated Medication List - Protect others around you: Learn how to safely use, store and throw away your medicines at www.disposemymeds.org.          This list is accurate as of 7/13/18  1:26 PM.  Always use your most recent med list.                   Brand Name Dispense Instructions for use Diagnosis    Biotin 1 MG Caps        Malignant neoplasm of left breast in female, estrogen receptor positive, unspecified site of breast (H)       calcium citrate-vitamin D 315-250 MG-UNIT Tabs per tablet    CITRACAL     Take 2 tablets by mouth        goserelin 3.6 MG injection    ZOLADEX     Inject 3.6 mg Subcutaneous every 30 days        * letrozole 2.5 MG tablet    FEMARA    30 tablet    Take 1 tablet (2.5 mg) by mouth daily    Malignant neoplasm of upper-outer quadrant of left breast in female, estrogen receptor positive (H)       * letrozole 2.5 MG tablet    FEMARA    30 tablet    Take 1 tablet (2.5 mg) by mouth daily    Malignant neoplasm of upper-outer quadrant of left breast in female, estrogen receptor positive (H)       * letrozole 2.5 MG tablet    FEMARA    90 tablet    Take 1 tablet (2.5 mg) by mouth daily    Malignant neoplasm of upper-outer quadrant of left breast in female, estrogen receptor positive (H)       NONFORMULARY      Take 1 capsule by mouth daily Rosehip oil        OMEGA 3-6-9 FATTY ACIDS PO      Take 2 capsules by mouth daily        Spirulina 500 MG Tabs      6 Tabs daily.    Malignant neoplasm of left breast in female, estrogen receptor positive, unspecified site of breast (H)       * Notice:  This list has 3 medication(s) that are the same as other medications prescribed for you. Read the directions carefully, and ask your doctor or other care provider to review them with you.

## 2018-08-03 ENCOUNTER — ANESTHESIA EVENT (OUTPATIENT)
Dept: SURGERY | Facility: CLINIC | Age: 43
End: 2018-08-03
Payer: COMMERCIAL

## 2018-08-06 ENCOUNTER — HOSPITAL ENCOUNTER (OUTPATIENT)
Facility: CLINIC | Age: 43
Discharge: HOME | End: 2018-08-07
Attending: SURGERY | Admitting: SURGERY
Payer: COMMERCIAL

## 2018-08-06 ENCOUNTER — ANESTHESIA (OUTPATIENT)
Dept: SURGERY | Facility: CLINIC | Age: 43
End: 2018-08-06
Payer: COMMERCIAL

## 2018-08-06 ENCOUNTER — HOSPITAL ENCOUNTER (OUTPATIENT)
Dept: NUCLEAR MEDICINE | Facility: CLINIC | Age: 43
Setting detail: NUCLEAR MEDICINE
Discharge: HOME | End: 2018-08-06
Attending: SURGERY | Admitting: SURGERY
Payer: COMMERCIAL

## 2018-08-06 DIAGNOSIS — R11.2 NAUSEA AND VOMITING, INTRACTABILITY OF VOMITING NOT SPECIFIED, UNSPECIFIED VOMITING TYPE: ICD-10-CM

## 2018-08-06 DIAGNOSIS — G89.18 ACUTE POST-OPERATIVE PAIN: ICD-10-CM

## 2018-08-06 DIAGNOSIS — C50.912 MALIGNANT NEOPLASM OF LEFT BREAST (H): ICD-10-CM

## 2018-08-06 DIAGNOSIS — Z98.890 S/P BREAST RECONSTRUCTION: Primary | ICD-10-CM

## 2018-08-06 PROBLEM — C50.919 BREAST CANCER (H): Status: ACTIVE | Noted: 2018-08-06

## 2018-08-06 LAB
ABO + RH BLD: NORMAL
ABO + RH BLD: NORMAL
BLD GP AB SCN SERPL QL: NORMAL
BLOOD BANK CMNT PATIENT-IMP: NORMAL
CREAT SERPL-MCNC: 0.83 MG/DL (ref 0.52–1.04)
GFR SERPL CREATININE-BSD FRML MDRD: 76 ML/MIN/1.7M2
GLUCOSE BLDC GLUCOMTR-MCNC: 100 MG/DL (ref 70–99)
HCG UR QL: NEGATIVE
HGB BLD-MCNC: 12.3 G/DL (ref 11.7–15.7)
INR PPP: 1.02 (ref 0.86–1.14)
POTASSIUM SERPL-SCNC: 3.5 MMOL/L (ref 3.4–5.3)
SPECIMEN EXP DATE BLD: NORMAL

## 2018-08-06 PROCEDURE — 36000057 ZZH SURGERY LEVEL 3 1ST 30 MIN - UMMC: Performed by: SURGERY

## 2018-08-06 PROCEDURE — 84132 ASSAY OF SERUM POTASSIUM: CPT | Performed by: ANESTHESIOLOGY

## 2018-08-06 PROCEDURE — 25000125 ZZHC RX 250: Performed by: STUDENT IN AN ORGANIZED HEALTH CARE EDUCATION/TRAINING PROGRAM

## 2018-08-06 PROCEDURE — 88341 IMHCHEM/IMCYTCHM EA ADD ANTB: CPT | Performed by: SURGERY

## 2018-08-06 PROCEDURE — 25000128 H RX IP 250 OP 636: Performed by: STUDENT IN AN ORGANIZED HEALTH CARE EDUCATION/TRAINING PROGRAM

## 2018-08-06 PROCEDURE — 00000158 ZZHCL STATISTIC H-FISH PROCESS B/S: Performed by: SURGERY

## 2018-08-06 PROCEDURE — 86901 BLOOD TYPING SEROLOGIC RH(D): CPT | Performed by: ANESTHESIOLOGY

## 2018-08-06 PROCEDURE — 37000008 ZZH ANESTHESIA TECHNICAL FEE, 1ST 30 MIN: Performed by: SURGERY

## 2018-08-06 PROCEDURE — 85018 HEMOGLOBIN: CPT | Performed by: ANESTHESIOLOGY

## 2018-08-06 PROCEDURE — 88342 IMHCHEM/IMCYTCHM 1ST ANTB: CPT | Performed by: SURGERY

## 2018-08-06 PROCEDURE — 25000125 ZZHC RX 250: Performed by: SURGERY

## 2018-08-06 PROCEDURE — 85610 PROTHROMBIN TIME: CPT | Performed by: ANESTHESIOLOGY

## 2018-08-06 PROCEDURE — 25000125 ZZHC RX 250: Performed by: PLASTIC SURGERY

## 2018-08-06 PROCEDURE — 25000128 H RX IP 250 OP 636: Performed by: NURSE ANESTHETIST, CERTIFIED REGISTERED

## 2018-08-06 PROCEDURE — 86850 RBC ANTIBODY SCREEN: CPT | Performed by: ANESTHESIOLOGY

## 2018-08-06 PROCEDURE — 34300033 ZZH RX 343: Performed by: SURGERY

## 2018-08-06 PROCEDURE — 27210995 ZZH RX 272: Performed by: PLASTIC SURGERY

## 2018-08-06 PROCEDURE — 25000132 ZZH RX MED GY IP 250 OP 250 PS 637: Performed by: STUDENT IN AN ORGANIZED HEALTH CARE EDUCATION/TRAINING PROGRAM

## 2018-08-06 PROCEDURE — A9520 TC99 TILMANOCEPT DIAG 0.5MCI: HCPCS | Performed by: SURGERY

## 2018-08-06 PROCEDURE — 36000059 ZZH SURGERY LEVEL 3 EA 15 ADDTL MIN UMMC: Performed by: SURGERY

## 2018-08-06 PROCEDURE — L8600 IMPLANT BREAST SILICONE/EQ: HCPCS | Performed by: SURGERY

## 2018-08-06 PROCEDURE — 25000125 ZZHC RX 250: Performed by: NURSE ANESTHETIST, CERTIFIED REGISTERED

## 2018-08-06 PROCEDURE — C9399 UNCLASSIFIED DRUGS OR BIOLOG: HCPCS | Performed by: NURSE ANESTHETIST, CERTIFIED REGISTERED

## 2018-08-06 PROCEDURE — 88307 TISSUE EXAM BY PATHOLOGIST: CPT | Performed by: SURGERY

## 2018-08-06 PROCEDURE — 81025 URINE PREGNANCY TEST: CPT | Performed by: ANESTHESIOLOGY

## 2018-08-06 PROCEDURE — 40000171 ZZH STATISTIC PRE-PROCEDURE ASSESSMENT III: Performed by: SURGERY

## 2018-08-06 PROCEDURE — 00000159 ZZHCL STATISTIC H-SEND OUTS PREP: Performed by: SURGERY

## 2018-08-06 PROCEDURE — 25000132 ZZH RX MED GY IP 250 OP 250 PS 637: Performed by: SURGERY

## 2018-08-06 PROCEDURE — 25000128 H RX IP 250 OP 636: Performed by: ANESTHESIOLOGY

## 2018-08-06 PROCEDURE — 25000128 H RX IP 250 OP 636: Performed by: SURGERY

## 2018-08-06 PROCEDURE — 86900 BLOOD TYPING SEROLOGIC ABO: CPT | Performed by: ANESTHESIOLOGY

## 2018-08-06 PROCEDURE — 27210794 ZZH OR GENERAL SUPPLY STERILE: Performed by: SURGERY

## 2018-08-06 PROCEDURE — 37000009 ZZH ANESTHESIA TECHNICAL FEE, EACH ADDTL 15 MIN: Performed by: SURGERY

## 2018-08-06 PROCEDURE — 25000128 H RX IP 250 OP 636: Performed by: PLASTIC SURGERY

## 2018-08-06 PROCEDURE — 82565 ASSAY OF CREATININE: CPT | Performed by: ANESTHESIOLOGY

## 2018-08-06 PROCEDURE — 71000014 ZZH RECOVERY PHASE 1 LEVEL 2 FIRST HR: Performed by: SURGERY

## 2018-08-06 PROCEDURE — 38792 RA TRACER ID OF SENTINL NODE: CPT

## 2018-08-06 PROCEDURE — 25000566 ZZH SEVOFLURANE, EA 15 MIN: Performed by: SURGERY

## 2018-08-06 PROCEDURE — C9290 INJ, BUPIVACAINE LIPOSOME: HCPCS | Performed by: STUDENT IN AN ORGANIZED HEALTH CARE EDUCATION/TRAINING PROGRAM

## 2018-08-06 PROCEDURE — 88377 M/PHMTRC ALYS ISHQUANT/SEMIQ: CPT | Performed by: PATHOLOGY

## 2018-08-06 PROCEDURE — 82962 GLUCOSE BLOOD TEST: CPT

## 2018-08-06 PROCEDURE — 36415 COLL VENOUS BLD VENIPUNCTURE: CPT | Performed by: ANESTHESIOLOGY

## 2018-08-06 DEVICE — GRAFT ALLODERM 16X20CM 1519320P CHARGE PER SQ CM= 320 UNITS: Type: IMPLANTABLE DEVICE | Site: BREAST | Status: FUNCTIONAL

## 2018-08-06 DEVICE — IMPLANTABLE DEVICE: Type: IMPLANTABLE DEVICE | Site: BREAST | Status: FUNCTIONAL

## 2018-08-06 RX ORDER — HYDROMORPHONE HCL/0.9% NACL/PF 0.2MG/0.2
0.2 SYRINGE (ML) INTRAVENOUS
Status: DISCONTINUED | OUTPATIENT
Start: 2018-08-06 | End: 2018-08-07 | Stop reason: HOSPADM

## 2018-08-06 RX ORDER — LIDOCAINE 40 MG/G
CREAM TOPICAL
Status: DISCONTINUED | OUTPATIENT
Start: 2018-08-06 | End: 2018-08-06 | Stop reason: HOSPADM

## 2018-08-06 RX ORDER — ACETAMINOPHEN 325 MG/1
650 TABLET ORAL EVERY 6 HOURS PRN
Status: DISCONTINUED | OUTPATIENT
Start: 2018-08-06 | End: 2018-08-07 | Stop reason: HOSPADM

## 2018-08-06 RX ORDER — FENTANYL CITRATE 50 UG/ML
25-50 INJECTION, SOLUTION INTRAMUSCULAR; INTRAVENOUS
Status: DISCONTINUED | OUTPATIENT
Start: 2018-08-06 | End: 2018-08-06 | Stop reason: HOSPADM

## 2018-08-06 RX ORDER — LETROZOLE 2.5 MG/1
2.5 TABLET, FILM COATED ORAL DAILY
Status: DISCONTINUED | OUTPATIENT
Start: 2018-08-07 | End: 2018-08-07 | Stop reason: HOSPADM

## 2018-08-06 RX ORDER — NALOXONE HYDROCHLORIDE 0.4 MG/ML
.1-.4 INJECTION, SOLUTION INTRAMUSCULAR; INTRAVENOUS; SUBCUTANEOUS
Status: DISCONTINUED | OUTPATIENT
Start: 2018-08-06 | End: 2018-08-06 | Stop reason: HOSPADM

## 2018-08-06 RX ORDER — ISOSULFAN BLUE 50 MG/5ML
INJECTION, SOLUTION SUBCUTANEOUS PRN
Status: DISCONTINUED | OUTPATIENT
Start: 2018-08-06 | End: 2018-08-06 | Stop reason: HOSPADM

## 2018-08-06 RX ORDER — LABETALOL HYDROCHLORIDE 5 MG/ML
10 INJECTION, SOLUTION INTRAVENOUS
Status: DISCONTINUED | OUTPATIENT
Start: 2018-08-06 | End: 2018-08-06 | Stop reason: HOSPADM

## 2018-08-06 RX ORDER — FENTANYL CITRATE 50 UG/ML
INJECTION, SOLUTION INTRAMUSCULAR; INTRAVENOUS PRN
Status: DISCONTINUED | OUTPATIENT
Start: 2018-08-06 | End: 2018-08-06

## 2018-08-06 RX ORDER — SODIUM CHLORIDE, SODIUM LACTATE, POTASSIUM CHLORIDE, CALCIUM CHLORIDE 600; 310; 30; 20 MG/100ML; MG/100ML; MG/100ML; MG/100ML
INJECTION, SOLUTION INTRAVENOUS CONTINUOUS
Status: DISCONTINUED | OUTPATIENT
Start: 2018-08-06 | End: 2018-08-06 | Stop reason: HOSPADM

## 2018-08-06 RX ORDER — HYDROMORPHONE HYDROCHLORIDE 1 MG/ML
.3-.5 INJECTION, SOLUTION INTRAMUSCULAR; INTRAVENOUS; SUBCUTANEOUS EVERY 10 MIN PRN
Status: DISCONTINUED | OUTPATIENT
Start: 2018-08-06 | End: 2018-08-06 | Stop reason: HOSPADM

## 2018-08-06 RX ORDER — LIDOCAINE HYDROCHLORIDE 20 MG/ML
INJECTION, SOLUTION INFILTRATION; PERINEURAL PRN
Status: DISCONTINUED | OUTPATIENT
Start: 2018-08-06 | End: 2018-08-06

## 2018-08-06 RX ORDER — OXYCODONE HYDROCHLORIDE 5 MG/1
5-10 TABLET ORAL
Status: DISCONTINUED | OUTPATIENT
Start: 2018-08-06 | End: 2018-08-07 | Stop reason: HOSPADM

## 2018-08-06 RX ORDER — SULFAMETHOXAZOLE/TRIMETHOPRIM 800-160 MG
1 TABLET ORAL 2 TIMES DAILY
Status: DISCONTINUED | OUTPATIENT
Start: 2018-08-06 | End: 2018-08-07 | Stop reason: HOSPADM

## 2018-08-06 RX ORDER — CEFAZOLIN SODIUM 2 G/100ML
2 INJECTION, SOLUTION INTRAVENOUS
Status: DISCONTINUED | OUTPATIENT
Start: 2018-08-06 | End: 2018-08-06 | Stop reason: HOSPADM

## 2018-08-06 RX ORDER — NALOXONE HYDROCHLORIDE 0.4 MG/ML
.1-.4 INJECTION, SOLUTION INTRAMUSCULAR; INTRAVENOUS; SUBCUTANEOUS
Status: DISCONTINUED | OUTPATIENT
Start: 2018-08-06 | End: 2018-08-07 | Stop reason: HOSPADM

## 2018-08-06 RX ORDER — HYDRALAZINE HYDROCHLORIDE 20 MG/ML
2.5-5 INJECTION INTRAMUSCULAR; INTRAVENOUS EVERY 10 MIN PRN
Status: DISCONTINUED | OUTPATIENT
Start: 2018-08-06 | End: 2018-08-06 | Stop reason: HOSPADM

## 2018-08-06 RX ORDER — CEFAZOLIN SODIUM 2 G/100ML
2 INJECTION, SOLUTION INTRAVENOUS
Status: COMPLETED | OUTPATIENT
Start: 2018-08-06 | End: 2018-08-06

## 2018-08-06 RX ORDER — CEFAZOLIN SODIUM 1 G/3ML
1 INJECTION, POWDER, FOR SOLUTION INTRAMUSCULAR; INTRAVENOUS SEE ADMIN INSTRUCTIONS
Status: DISCONTINUED | OUTPATIENT
Start: 2018-08-06 | End: 2018-08-06 | Stop reason: HOSPADM

## 2018-08-06 RX ORDER — ONDANSETRON 4 MG/1
4 TABLET, ORALLY DISINTEGRATING ORAL EVERY 30 MIN PRN
Status: DISCONTINUED | OUTPATIENT
Start: 2018-08-06 | End: 2018-08-06 | Stop reason: HOSPADM

## 2018-08-06 RX ORDER — ONDANSETRON 4 MG/1
4 TABLET, ORALLY DISINTEGRATING ORAL EVERY 6 HOURS PRN
Status: DISCONTINUED | OUTPATIENT
Start: 2018-08-06 | End: 2018-08-07 | Stop reason: HOSPADM

## 2018-08-06 RX ORDER — PROCHLORPERAZINE MALEATE 5 MG
5 TABLET ORAL EVERY 6 HOURS PRN
Status: DISCONTINUED | OUTPATIENT
Start: 2018-08-06 | End: 2018-08-07 | Stop reason: HOSPADM

## 2018-08-06 RX ORDER — BUPIVACAINE HYDROCHLORIDE 2.5 MG/ML
INJECTION, SOLUTION EPIDURAL; INFILTRATION; INTRACAUDAL PRN
Status: DISCONTINUED | OUTPATIENT
Start: 2018-08-06 | End: 2018-08-06

## 2018-08-06 RX ORDER — ONDANSETRON 2 MG/ML
4 INJECTION INTRAMUSCULAR; INTRAVENOUS EVERY 30 MIN PRN
Status: DISCONTINUED | OUTPATIENT
Start: 2018-08-06 | End: 2018-08-06 | Stop reason: HOSPADM

## 2018-08-06 RX ORDER — FLUMAZENIL 0.1 MG/ML
0.2 INJECTION, SOLUTION INTRAVENOUS
Status: DISCONTINUED | OUTPATIENT
Start: 2018-08-06 | End: 2018-08-06 | Stop reason: HOSPADM

## 2018-08-06 RX ORDER — OXYCODONE HYDROCHLORIDE 5 MG/1
5 TABLET ORAL EVERY 4 HOURS PRN
Status: DISCONTINUED | OUTPATIENT
Start: 2018-08-06 | End: 2018-08-06

## 2018-08-06 RX ORDER — SODIUM CHLORIDE, SODIUM LACTATE, POTASSIUM CHLORIDE, CALCIUM CHLORIDE 600; 310; 30; 20 MG/100ML; MG/100ML; MG/100ML; MG/100ML
INJECTION, SOLUTION INTRAVENOUS CONTINUOUS
Status: DISCONTINUED | OUTPATIENT
Start: 2018-08-06 | End: 2018-08-06

## 2018-08-06 RX ORDER — DEXAMETHASONE SODIUM PHOSPHATE 4 MG/ML
INJECTION, SOLUTION INTRA-ARTICULAR; INTRALESIONAL; INTRAMUSCULAR; INTRAVENOUS; SOFT TISSUE PRN
Status: DISCONTINUED | OUTPATIENT
Start: 2018-08-06 | End: 2018-08-06

## 2018-08-06 RX ORDER — ONDANSETRON 2 MG/ML
INJECTION INTRAMUSCULAR; INTRAVENOUS PRN
Status: DISCONTINUED | OUTPATIENT
Start: 2018-08-06 | End: 2018-08-06

## 2018-08-06 RX ORDER — LIDOCAINE 40 MG/G
CREAM TOPICAL
Status: DISCONTINUED | OUTPATIENT
Start: 2018-08-06 | End: 2018-08-07 | Stop reason: HOSPADM

## 2018-08-06 RX ORDER — GLYCOPYRROLATE 0.2 MG/ML
INJECTION, SOLUTION INTRAMUSCULAR; INTRAVENOUS PRN
Status: DISCONTINUED | OUTPATIENT
Start: 2018-08-06 | End: 2018-08-06

## 2018-08-06 RX ORDER — INDOCYANINE GREEN AND WATER 25 MG
KIT INJECTION PRN
Status: DISCONTINUED | OUTPATIENT
Start: 2018-08-06 | End: 2018-08-06

## 2018-08-06 RX ORDER — PROPOFOL 10 MG/ML
INJECTION, EMULSION INTRAVENOUS PRN
Status: DISCONTINUED | OUTPATIENT
Start: 2018-08-06 | End: 2018-08-06

## 2018-08-06 RX ORDER — PROPOFOL 10 MG/ML
INJECTION, EMULSION INTRAVENOUS CONTINUOUS PRN
Status: DISCONTINUED | OUTPATIENT
Start: 2018-08-06 | End: 2018-08-06

## 2018-08-06 RX ORDER — MEPERIDINE HYDROCHLORIDE 50 MG/ML
12.5 INJECTION INTRAMUSCULAR; INTRAVENOUS; SUBCUTANEOUS
Status: DISCONTINUED | OUTPATIENT
Start: 2018-08-06 | End: 2018-08-06 | Stop reason: HOSPADM

## 2018-08-06 RX ORDER — ONDANSETRON 2 MG/ML
4 INJECTION INTRAMUSCULAR; INTRAVENOUS EVERY 6 HOURS PRN
Status: DISCONTINUED | OUTPATIENT
Start: 2018-08-06 | End: 2018-08-07 | Stop reason: HOSPADM

## 2018-08-06 RX ORDER — SULFAMETHOXAZOLE/TRIMETHOPRIM 800-160 MG
1 TABLET ORAL 2 TIMES DAILY
Qty: 28 TABLET | Refills: 0 | Status: SHIPPED | OUTPATIENT
Start: 2018-08-06 | End: 2018-08-20

## 2018-08-06 RX ADMIN — FENTANYL CITRATE 50 MCG: 50 INJECTION, SOLUTION INTRAMUSCULAR; INTRAVENOUS at 09:46

## 2018-08-06 RX ADMIN — FENTANYL CITRATE 50 MCG: 50 INJECTION INTRAMUSCULAR; INTRAVENOUS at 07:11

## 2018-08-06 RX ADMIN — ROCURONIUM BROMIDE 20 MG: 10 INJECTION INTRAVENOUS at 11:18

## 2018-08-06 RX ADMIN — ONDANSETRON 4 MG: 4 TABLET, ORALLY DISINTEGRATING ORAL at 18:28

## 2018-08-06 RX ADMIN — BUPIVACAINE 20 ML: 13.3 INJECTION, SUSPENSION, LIPOSOMAL INFILTRATION at 07:20

## 2018-08-06 RX ADMIN — CEFAZOLIN 1 G: 1 INJECTION, POWDER, FOR SOLUTION INTRAMUSCULAR; INTRAVENOUS at 09:55

## 2018-08-06 RX ADMIN — SUGAMMADEX 160 MG: 100 INJECTION, SOLUTION INTRAVENOUS at 13:11

## 2018-08-06 RX ADMIN — CEFAZOLIN SODIUM 2 G: 2 INJECTION, SOLUTION INTRAVENOUS at 07:55

## 2018-08-06 RX ADMIN — MIDAZOLAM 1 MG: 1 INJECTION INTRAMUSCULAR; INTRAVENOUS at 07:31

## 2018-08-06 RX ADMIN — BUPIVACAINE HYDROCHLORIDE 20 ML: 2.5 INJECTION, SOLUTION EPIDURAL; INFILTRATION; INTRACAUDAL at 07:20

## 2018-08-06 RX ADMIN — FENTANYL CITRATE 25 MCG: 50 INJECTION, SOLUTION INTRAMUSCULAR; INTRAVENOUS at 11:21

## 2018-08-06 RX ADMIN — ONDANSETRON 4 MG: 2 INJECTION INTRAMUSCULAR; INTRAVENOUS at 12:44

## 2018-08-06 RX ADMIN — ROCURONIUM BROMIDE 10 MG: 10 INJECTION INTRAVENOUS at 10:23

## 2018-08-06 RX ADMIN — SULFAMETHOXAZOLE AND TRIMETHOPRIM 1 TABLET: 800; 160 TABLET ORAL at 20:53

## 2018-08-06 RX ADMIN — INDOCYANINE GREEN 12.5 MG: KIT INTRAVENOUS at 11:26

## 2018-08-06 RX ADMIN — FENTANYL CITRATE 50 MCG: 50 INJECTION INTRAMUSCULAR; INTRAVENOUS at 07:05

## 2018-08-06 RX ADMIN — PROCHLORPERAZINE EDISYLATE 5 MG: 5 INJECTION INTRAMUSCULAR; INTRAVENOUS at 15:27

## 2018-08-06 RX ADMIN — SODIUM CHLORIDE, POTASSIUM CHLORIDE, SODIUM LACTATE AND CALCIUM CHLORIDE: 600; 310; 30; 20 INJECTION, SOLUTION INTRAVENOUS at 07:31

## 2018-08-06 RX ADMIN — PROPOFOL 25 MCG/KG/MIN: 10 INJECTION, EMULSION INTRAVENOUS at 08:10

## 2018-08-06 RX ADMIN — FENTANYL CITRATE 50 MCG: 50 INJECTION, SOLUTION INTRAMUSCULAR; INTRAVENOUS at 08:56

## 2018-08-06 RX ADMIN — MIDAZOLAM 1 MG: 1 INJECTION INTRAMUSCULAR; INTRAVENOUS at 07:05

## 2018-08-06 RX ADMIN — FENTANYL CITRATE 100 MCG: 50 INJECTION, SOLUTION INTRAMUSCULAR; INTRAVENOUS at 07:48

## 2018-08-06 RX ADMIN — Medication 0.3 MG: at 14:22

## 2018-08-06 RX ADMIN — CEFAZOLIN 1 G: 1 INJECTION, POWDER, FOR SOLUTION INTRAMUSCULAR; INTRAVENOUS at 11:50

## 2018-08-06 RX ADMIN — GLYCOPYRROLATE 0.2 MG: 0.2 INJECTION, SOLUTION INTRAMUSCULAR; INTRAVENOUS at 09:09

## 2018-08-06 RX ADMIN — DEXAMETHASONE SODIUM PHOSPHATE 6 MG: 4 INJECTION, SOLUTION INTRA-ARTICULAR; INTRALESIONAL; INTRAMUSCULAR; INTRAVENOUS; SOFT TISSUE at 07:57

## 2018-08-06 RX ADMIN — MIDAZOLAM 1 MG: 1 INJECTION INTRAMUSCULAR; INTRAVENOUS at 07:40

## 2018-08-06 RX ADMIN — FENTANYL CITRATE 25 MCG: 50 INJECTION, SOLUTION INTRAMUSCULAR; INTRAVENOUS at 12:56

## 2018-08-06 RX ADMIN — ONDANSETRON HYDROCHLORIDE 4 MG: 2 INJECTION, SOLUTION INTRAMUSCULAR; INTRAVENOUS at 13:59

## 2018-08-06 RX ADMIN — LIDOCAINE HYDROCHLORIDE 100 MG: 20 INJECTION, SOLUTION INFILTRATION; PERINEURAL at 07:48

## 2018-08-06 RX ADMIN — ROCURONIUM BROMIDE 50 MG: 10 INJECTION INTRAVENOUS at 07:48

## 2018-08-06 RX ADMIN — ROCURONIUM BROMIDE 10 MG: 10 INJECTION INTRAVENOUS at 09:34

## 2018-08-06 RX ADMIN — SODIUM CHLORIDE, POTASSIUM CHLORIDE, SODIUM LACTATE AND CALCIUM CHLORIDE: 600; 310; 30; 20 INJECTION, SOLUTION INTRAVENOUS at 14:57

## 2018-08-06 RX ADMIN — SODIUM CHLORIDE, POTASSIUM CHLORIDE, SODIUM LACTATE AND CALCIUM CHLORIDE: 600; 310; 30; 20 INJECTION, SOLUTION INTRAVENOUS at 10:44

## 2018-08-06 RX ADMIN — ROCURONIUM BROMIDE 10 MG: 10 INJECTION INTRAVENOUS at 08:19

## 2018-08-06 RX ADMIN — OXYCODONE HYDROCHLORIDE 5 MG: 5 TABLET ORAL at 21:22

## 2018-08-06 RX ADMIN — PROPOFOL 130 MG: 10 INJECTION, EMULSION INTRAVENOUS at 07:46

## 2018-08-06 NOTE — BRIEF OP NOTE
[facility] [place]    Brief Operative Note    Pre-operative diagnosis: Malignant Neoplasm Left Breast   Post-operative diagnosis: Same  Procedure:  Bilateral mastectomy, left sentinel lymph node biopsy  - Wound class: I-clean   Surgeon: Surgeon(s) and Role:  Panel 1:     * Antonio Andrews MD - Primary     * Milagro Quiles MD - Resident - Assisting  Anesthesia: Combined General with Block   Estimated blood loss: 30 mL  Drains: See Dr. Kidd's portion  Specimens:   ID Type Source Tests Collected by Time Destination   A : Left breast, 12 o' clock stitch Tissue Breast, Left SURGICAL PATHOLOGY EXAM Vandana Raza RN 8/6/2018  9:40 AM    B : Left axillary sentinel lymph node #1 Tissue Lymph Node, [place] SURGICAL PATHOLOGY EXAM Antonio Andrews MD 8/6/2018  9:42 AM    C : Left axillary sentinel lymph node #2 Tissue Lymph Node, [place] SURGICAL PATHOLOGY EXAM Antonio Andrews MD 8/6/2018  9:47 AM    D : Right breast Tissue Breast, Right SURGICAL PATHOLOGY EXAM Antonio Andrews MD 8/6/2018 10:41 AM      Findings:   None.  Complications: None.  Implants: See Dr. Kidd's portion.    Admit for obs  CLD, ADAT  Remove allen  Drain and wound care per Plastic team

## 2018-08-06 NOTE — OP NOTE
PREOPERATIVE DIAGNOSIS: Status post left breast cancer requiring bilateral nipple-non-sparing mastectomy and immediate reconstruction.     POSTOPERATIVE DIAGNOSIS: Status post left breast cancer requiring bilateral nipple-non-sparing mastectomy and immediate reconstruction.     PROCEDURES:   1. Bilateral immediate breast reconstruction using pre-pectoral expander (CPX4 anatomic Duncombe breast expander with a base diameter 13.5 cm and a total fill volume of 550 mL filled with air without tension for about a 500 gram mastectomy specimen. Medical Indication for Use in the Pre-pectoral plane: Prevent animation defect, decrease eduar-operative pain, decrease anatomical destruction/distortion of the pectoral muscle).   2. Bilateral placement of acellular dermal matrix (AlloDerm, Ready To Use, LARGE perforated thick to cover the expander anteriorly, 320 cm 2, all used: Medical  Indication for Use: Help thicken the subcutaneous tissues, prevent capsular contracture, and get a better breast reconstruction shape).   3. Intraoperative chemical angiography with injection of ICG dye and interpretation of the angiogram using the SPY Elite System (indication of use was the fact the patient had undergone a nipple-non-sparing mastectomy and we needed to evaluate the blood flow to the skin flaps and the  to decide appropriate reconstruction as well as debridement).    IMPLANTS:       Implant Name Type Inv. Item Serial No.  Lot No. LRB No. Used   GRAFT ALLODERM [id number] CHARGE PER SQ CM= 320 UNITS Bone/Tissue/Biologic GRAFT ALLODERM [id number] CHARGE PER SQ CM= 320 UNITS NA [employer], INC [id number] N/A 320   Duncombe CPX 4 with suture tabs, Tall Height Breast Tissue Expander.   [id number] [employer] [id number] Left 1   Duncombe CPX 4 with Suture tabs, Tall Height Breast Tissue     [id number] [employer] [id number] Right 1         SURGEON: Basim Kidd MD     RESIDENT: Octaviano Tinajero MD    ANESTHESIA:  General anesthesia with endotracheal intubation.     COMPLICATIONS: Nil.     DRAINS: One 15-[place] citizen channel MICHAEL drain on each side.    SPECIMENS: Nil.     BLOOD LOSS: 10 mL      DESCRIPTION OF PROCEDURE: After informed consent was taken from the patient, the proper site and procedure was ascertained with her and she was appropriately marked and taken to the operating room. She was placed in a supine position with her knees comfortably flexed, pillows underneath them and pneumoboots placed and running prior to induction of anesthesia. Preoperative antibiotics were given in the OR and appropriately redosed during the case. General anesthesia was administered without any complications. A Figueroa catheter was inserted. Her arms were abducted to about 50 degrees and appropriately padded. Surgical Oncology took over and began the procedure. The bilateral nipple-non sparing mastectomy was completed and I was called to the operating room. She was reprepped and draped and I began the procedure by first analyzing the skin flaps. Clinically everything looked good. I went ahead and carried out the preoperative chemical angiogram using the SPY Elite System and injection of ICG dye. A total of 5 mL was injected at this time and at 30 seconds we evaluated the blood flow to the surrounding skin flaps. Overall both skin flaps had some dark eduar-incisional areas especially in the superior flaps. These were marked out for later excision. Given the clinical findings, we decided to proceed with the immediate pre-pectoral reconstruction. Identical steps were performed bilaterally.The dead-space in the lateral areas were closed off and the LMF and IMF were recreated with 0-vicryl sutures. 0-Vicryl sutures were placed in an inturrupted fashion in the MMF and IMF for the Alloderm to be sewn to. We measured the base width at about 13.5 cm. The surgical pocket was then irrigated with triple antibiotic solution and betadine. We changed our gloves.  The expander was then removed from the package and a piece of large AlloDerm was opened, rinsed with triple antibiotic irrigation (1L), and then was wrapped around each expander with the Alloderm anteriorly while filled to the amount we chose. The Alloderm was held in place with 0 vicryl spanning sutures posteriorly. The expander was placed in the pockets. The medial and inferior edges of the Alloderm were sutured down to the present sutures. Air was removed to fill out the breast skin envelop well, without tension. A 15-Serbian channeled MICHAEL drain was then placed on each side and brought out through a separate stab incision and sewn into position. The edges of the wounds were refreshened per the SPY and there was bleeding seen at the edges of the wounds and then the skin was closed using 2-0 Monocryl suture in a deep dermal layer. We went ahead and then placed a 3-0 Monocryl suture in a running intracuticular manner followed by Wisam. Appropriate dressings were placed over the drain sites. The patient was wrapped not too tightly. The patient tolerated the procedure well. All counts were correct at the end of the case. The patient was extubated and sent to recovery room in stable condition.

## 2018-08-06 NOTE — ANESTHESIA CARE TRANSFER NOTE
Patient: Mary Chadwick    Procedure(s):  Bilateral Mastectomy With Immediate Reconstruction, Left [place] Lymph Node Biopsy, Anesthesia Block - Wound Class: I-Clean   - Wound Class: I-Clean    Diagnosis: Malignant Neoplasm Left Breast   Diagnosis Additional Information: No value filed.    Anesthesia Type:   General, ETT     Note:  Airway :Face Mask  Patient transferred to:PACU  Comments: VSS on arrival, report to RN. Handoff Report: Identifed the Patient, Identified the Reponsible Provider, Reviewed the pertinent medical history, Discussed the surgical course, Reviewed Intra-OP anesthesia mangement and issues during anesthesia, Set expectations for post-procedure period and Allowed opportunity for questions and acknowledgement of understanding      Vitals: (Last set prior to Anesthesia Care Transfer)    CRNA VITALS  8/6/2018 1309 - 8/6/2018 1339      8/6/2018             Resp Rate (set): 10                Electronically Signed By: REY Cowan CRNA  August 6, 2018  1:39 PM

## 2018-08-06 NOTE — IP AVS SNAPSHOT
[address] Observ[address] [address]    Phone:  [phone]    Fax:  [phone]                                       After Visit Summary   8/6/2018    Mary Chadwick    MRN: [id number]           After Visit Summary Signature Page     I have received my discharge instructions, and my questions have been answered. I have discussed any challenges I see with this plan with the nurse or doctor.    ..........................................................................................................................................  Patient/Patient Representative Signature      ..........................................................................................................................................  Patient Representative Print Name and Relationship to Patient    ..................................................               ................................................  Date                                            Time    ..........................................................................................................................................  Reviewed by Signature/Title    ...................................................              ..............................................  Date                                                            Time

## 2018-08-06 NOTE — IP AVS SNAPSHOT
MRN:[id number]                      After Visit Summary   8/6/2018    Mary Chadwick    MRN: [id number]           Thank you!     Thank you for choosing [facility] for your care. Our goal is always to provide you with excellent care. Hearing back from our patients is one way we can continue to improve our services. Please take a few minutes to complete the written survey that you may receive in the mail after you visit with us. Thank you!        Patient Information     Date Of Birth          1975        About your hospital stay     You were admitted on:  August 6, 2018 You last received care in the:  Unit [facility] Observation [facility]    You were discharged on:  August 7, 2018       Who to Call     For medical emergencies, please call 911.  For non-urgent questions about your medical care, please call your primary care provider or clinic, [phone]  For questions related to your surgery, please call your surgery clinic        Attending Provider     Provider Antonio Aguilar MD General Surgery       Primary Care Provider Office Phone #    Flora Crews, [phone]      After Care Instructions     Discharge Instructions       From Dr Andrews:    1. Patient to follow up with appointment in 2 weeks with Dr. Andrews. Follow up with Dr. Kidd in 1 week.   2. Do not drive while taking narcotic pain medication (oxycodone).   3. May take plain Tylenol as needed for pain.  4. Avoid non-steroidal anti-inflammatory medications (Advil, Ibuprofen, Naproxen, aspirin, etc) for 5-7 days or until approved by the Plastic Surgery team.   5. Caution with putting ice on the incision as it will be numb you will not realize it if you leave the ice for too long and can damage your skin.  6. If you develop any fever/chills, worsening pain, redness, swelling, or drainage from your wound please call the clinic (Monday through Friday 8:00am-5:00pm [phone] Elodia CONWAY) or on-call surgical  "oncology resident (nights and weekends [phone] and ask \"I would like to page the Surgical Oncology Resident on call.\")   7. No lifting over 5-10 lbs and no strenuous physical activity for 6 weeks.   8. Keep dressing clean and dry. Please refer to Dr Kidd's wound care instructions.   9. Monitor the drain output. Record the drain output per 24 hours. Your drain will be removed by Dr. Kidd at a follow up appointment.   Drain care:  Please keep drain site clean and dry.   Okay to shower with drain sites covered per Plastic Surgery.   Please call the clinic (see phone numbers above) if:  Your drain falls out.  The stitch that is holding the drain in place at the skin is coming loose or missing.  The drainage fluid is very thick and/or has a bad odor.  The squeeze bulb does not stay collapsed.  The drainage suddenly stops or dramatically decreases when the drain has been having steady amounts of drainage.  Please keep a log of the drainage amounts every time you empty the drain.    Please \"strip\" the drain 3 times per day:  Wash your hands with soap and water and dry.  Gently squeeze the tubing if you see a clot to loosen it up.   and pinch the drain where it comes out of the skin with one hand, to steady it.  With the other hand,  and pinch the drain and slide your fingers down the tubing, towards the drainage bulb.  Then release your  on the end where the tube comes out of your body, followed by releasing your  at the end of the drainage bulb.    Repeat several times.  It may be easier to 'strip' the drain with an alcohol swab or with hand cleanser on the hand that is moving along the tube.  Wash your hands again.            Discharge Instructions       Your appointment with Dr. Kidd is on 8/14.   Please strip drains 2-3 times/day and record the daily output. Bring the log of the the output volumes to clinic. The output determines when your drains will be removed.  Avoid taking showers " while drains are in place. Ideally sponge bath. May shower from post op day 2 if drains sites are completely occluded with Saran wrap.                  Your next 10 appointments already scheduled     Aug 14, 2018 11:00 AM CDT   (Arrive by 10:45 AM)   Post-Op with BASILIO Kidd MD   [facility] ([facility])    [address] [place] [address]  [address]  [facility] [address]   [phone]            Aug 14, 2018 11:50 AM CDT   (Arrive by 11:35 AM)   Return Visit with Paty Pandey PA-C   [facility] ([facility])    [address] [place] [address]  [address]  [facility] [address]   [phone]            Aug 14, 2018  1:00 PM CDT   Infusion 60 with UC ONCOLOGY INFUSION, UC 11 ATC   [facility] ([facility])    [address] [place] [address]  [address]  [facility] [address]   [phone]            Aug 17, 2018 11:45 AM CDT   (Arrive by 11:30 AM)   Post-Op with Antonio Andrews MD   [facility] ([facility])    [address] [place] [address]  [address]  [facility] [address]   [phone]            Sep 11, 2018 12:00 PM CDT   (Arrive by 11:45 AM)   Return Visit with Valeria Gann MD   [facility])    [address] [place] [address]  [address]  [facility] [address]   [phone]            Sep 11, 2018  1:30 PM CDT   Infusion 60 with UC ONCOLOGY INFUSION, UC 28 ATC   [facility] ([facility])    [address] [place] [address]  [address]  [facility] [address]   [phone]              Additional Information     If you use hormonal birth control (such as the pill, patch, ring or implants): You'll need a second form of birth control for 7 days (condoms, a diaphragm or contraceptive foam). While in the hospital, you received a medicine called Bridion. Your normal birth control  "will not work as well for a week after taking this medicine.          Pending Results     Date and Time Order Name Status Description    8/6/2018 0941 Surgical pathology exam In process             Statement of Approval     Ordered          08/07/18 0709  I have reviewed and agree with all the recommendations and orders detailed in this document.  EFFECTIVE NOW     Approved and electronically signed by:  Antonette Dove PA-C             Admission Information     Date & Time Provider Department Dept. Phone    8/6/2018 Antonio Andrews MD Unit 6D Observation [facility] [place] [phone]      Your Vitals Were     Blood Pressure Pulse Temperature Respirations Height Weight    109/70 (BP Location: Right arm) 80 97.9  F (36.6  C) (Oral) 15 1.753 m (5' 9\") 78 kg (171 lb 15.3 oz)    Last Period Pulse Oximetry BMI (Body Mass Index)             (Within Months) 95% 25.39 kg/m2         MyChart Information     Ex[employer] gives you secure access to your electronic health record. If you see a primary care provider, you can also send messages to your care team and make appointments. If you have questions, please call your primary care clinic.  If you do not have a primary care provider, please call [phone] and they will assist you.        Care EveryWhere ID     This is your Care EveryWhere ID. This could be used by other organizations to access your [place] medical records  [id number]        Equal Access to Services     SURESH CHAPARRO : Hadii aad ku hadasho Soconstantinoali, waaxda luqadaha, qaybta kaalmada kelle, naseem mendoza. So [facility] [phone].    ATENCIÓN: Si habla español, tiene a sparrow disposición servicios gratuitos de asistencia lingüística. Llame al [phone].    We comply with applicable federal civil rights laws and [place] laws. We do not discriminate on the basis of race, color, national origin, age, disability, sex, sexual orientation, or gender identity.               Review of your " medicines      START taking        Dose / Directions    acetaminophen 325 MG tablet   Commonly known as:  TYLENOL   Used for:  Acute post-operative pain        Dose:  650 mg   Take 2 tablets (650 mg) by mouth every 6 hours as needed for mild pain   Quantity:  50 tablet   Refills:  0       oxyCODONE IR 5 MG tablet   Commonly known as:  ROXICODONE   Used for:  Acute post-operative pain        Dose:  5-10 mg   Take 1-2 tablets (5-10 mg) by mouth every 4 hours as needed for other (pain control or improvement in physical function. Hold dose for analgesic side effects.)   Quantity:  30 tablet   Refills:  0       sulfamethoxazole-trimethoprim 800-160 MG per tablet   Commonly known as:  BACTRIM DS/SEPTRA DS   Indication:  Preventative Medication Therapy Used Around Surgery   Used for:  S/P breast reconstruction        Dose:  1 tablet   Take 1 tablet by mouth 2 times daily for 14 days   Quantity:  28 tablet   Refills:  0         CONTINUE these medicines which have NOT CHANGED        Dose / Directions    calcium citrate-vitamin D 315-250 MG-UNIT Tabs per tablet   Commonly known as:  CITRACAL        Dose:  2 tablet   Take 2 tablets by mouth   Refills:  0       goserelin 3.6 MG injection   Commonly known as:  ZOLADEX        Dose:  3.6 mg   Inject 3.6 mg Subcutaneous every 30 days   Refills:  0       letrozole 2.5 MG tablet   Commonly known as:  FEMARA   Used for:  Malignant neoplasm of upper-outer quadrant of left breast in female, estrogen receptor positive (H)        Dose:  2.5 mg   Take 1 tablet (2.5 mg) by mouth daily   Quantity:  30 tablet   Refills:  11       medium chain triglycerides oil   Commonly known as:  MCT OIL        Dose:  30 mL   Take 30 mLs by mouth daily   Refills:  0       NONFORMULARY   Indication:  Rosehip oil        Dose:  1 capsule   Take 1 capsule by mouth daily Rosehip oil   Refills:  0       OMEGA 3-6-9 FATTY ACIDS PO        Dose:  2 capsule   Take 2 capsules by mouth daily   Refills:  0        "Spirulina 500 MG Tabs   Used for:  Malignant neoplasm of left breast in female, estrogen receptor positive, unspecified site of breast (H)        6 Tabs daily.   Refills:  0            Where to get your medicines      These medications were sent to [facility] - [place], [place] - [address]  [address], [facility] [address]     Phone:  [phone]     acetaminophen 325 MG tablet    sulfamethoxazole-trimethoprim 800-160 MG per tablet         Some of these will need a paper prescription and others can be bought over the counter. Ask your nurse if you have questions.     Bring a paper prescription for each of these medications     oxyCODONE IR 5 MG tablet                Protect others around you: Learn how to safely use, store and throw away your medicines at www.disposemymeds.org.        Information about your nerve block     Today you received a block to numb the nerves near your surgery site.    This is a block using local anesthetic or \"numbing\" medication injected around the nerves to anesthetize or \"numb\" the area supplied by those nerves. This block is injected into the muscle layer near your surgical site. The type of anesthesia (Exparel) your anesthesia team used to numb your abdomen may give you relief for up to 72 hours.     Diet: There are no diet restrictions, but you should drink plenty of fluids, unless you are on a fluid-restricted diet.     Activity: If your surgical site is an arm or leg you should be careful with your affected limb, since it is possible to injure your limb without being aware of it due to the numbing. Until full feeling returns, you should guard against bumping or hitting your limb, and avoid extreme hot or cold temperatures on the skin.    Pain Medication: As the block wears off, the feeling will return as a tingling or prickly sensation near your surgical site. You will experience more discomfort from your incisions as the feeling returns. You " may want to take a pain pill (a narcotic or Tylenol if this was prescribed by your surgeon) when you start to experience mild pain, before the pain becomes more severe. If your pain medications do not control your pain, you should notify your surgeon. If you are taking narcotics for pain management, do not drink alcohol, drive a car, or perform hazardous activities.  If you have questions or concerns you may call your surgeon at the number provided with your discharge instructions.     Call your surgeon if you experience blurry vision, ringing in the ears or metallic taste in your mouth.         ANTIBIOTIC INSTRUCTION     You've Been Prescribed an Antibiotic - Now What?  Your healthcare team thinks that you or your loved one might have an infection. Some infections can be treated with antibiotics, which are powerful, life-saving drugs. Like all medications, antibiotics have side effects and should only be used when necessary. There are some important things you should know about your antibiotic treatment.      Your healthcare team may run tests before you start taking an antibiotic.    Your team may take samples (e.g., from your blood, urine or other areas) to run tests to look for bacteria. These test can be important to determine if you need an antibiotic at all and, if you do, which antibiotic will work best.      Within a few days, your healthcare team might change or even stop your antibiotic.    Your team may start you on an antibiotic while they are working to find out what is making you sick.    Your team might change your antibiotic because test results show that a different antibiotic would be better to treat your infection.    In some cases, once your team has more information, they learn that you do not need an antibiotic at all. They may find out that you don't have an infection, or that the antibiotic you're taking won't work against your infection. For example, an infection caused by a virus can't be  treated with antibiotics. Staying on an antibiotic when you don't need it is more likely to be harmful than helpful.      You may experience side effects from your antibiotic.    Like all medications, antibiotics have side effects. Some of these can be serious.    Let you healthcare team know if you have any known allergies when you are admitted to the hospital.    One significant side effect of nearly all antibiotics is the risk of severe and sometimes deadly diarrhea caused by Clostridium difficile (C. Difficile). This occurs when a person takes antibiotics because some good germs are destroyed. Antibiotic use allows C. diificile to take over, putting patients at high risk for this serious infection.    As a patient or caregiver, it is important to understand your or your loved one's antibiotic treatment. It is especially important for caregivers to speak up when patients can't speak for themselves. Here are some important questions to ask your healthcare team.    What infection is this antibiotic treating and how do you know I have that infection?    What side effects might occur from this antibiotic?    How long will I need to take this antibiotic?    Is it safe to take this antibiotic with other medications or supplements (e.g., vitamins) that I am taking?     Are there any special directions I need to know about taking this antibiotic? For example, should I take it with food?    How will I be monitored to know whether my infection is responding to the antibiotic?    What tests may help to make sure the right antibiotic is prescribed for me?      Information provided by:  www.cdc.gov/getsmart  U.S. Department of Health and Human Services  Centers for disease Control and Prevention  National Center for Emerging and Zoonotic Infectious Diseases  Division of Healthcare Quality Promotion        Information about OPIOIDS     PRESCRIPTION OPIOIDS: WHAT YOU NEED TO KNOW   We gave you an opioid (narcotic) pain  medicine. It is important to manage your pain, but opioids are not always the best choice. You should first try all the other options your care team gave you. Take this medicine for as short a time (and as few doses) as possible.     These medicines have risks:    DO NOT drive when on new or higher doses of pain medicine. These medicines can affect your alertness and reaction times, and you could be arrested for driving under the influence (DUI). If you need to use opioids long-term, talk to your care team about driving.    DO NOT operate heave machinery    DO NOT do any other dangerous activities while taking these medicines.     DO NOT drink any alcohol while taking these medicines.      If the opioid prescribed includes acetaminophen, DO NOT take with any other medicines that contain acetaminophen. Read all labels carefully. Look for the word  acetaminophen  or  Tylenol.  Ask your pharmacist if you have questions or are unsure.    You can get addicted to pain medicines, especially if you have a history of addiction (chemical, alcohol or substance dependence). Talk to your care team about ways to reduce this risk.    Store your pills in a secure place, locked if possible. We will not replace any lost or stolen medicine. If you don t finish your medicine, please throw away (dispose) as directed by your pharmacist. The [place] Pollution Control Agency has more information about safe disposal: https://www.pca.[facility].[place].us/living-green/managing-unwanted-medications.     All opioids tend to cause constipation. Drink plenty of water and eat foods that have a lot of fiber, such as fruits, vegetables, prune juice, apple juice and high-fiber cereal. Take a laxative (Miralax, milk of magnesia, Colace, Senna) if you don t move your bowels at least every other day.              Medication List: This is a list of all your medications and when to take them. Check marks below indicate your daily home schedule. Keep this list as  a reference.      Medications           Morning Afternoon Evening Bedtime As Needed    acetaminophen 325 MG tablet   Commonly known as:  TYLENOL   Take 2 tablets (650 mg) by mouth every 6 hours as needed for mild pain   Last time this was given:  650 mg on 8/7/2018  4:39 AM                                calcium citrate-vitamin D 315-250 MG-UNIT Tabs per tablet   Commonly known as:  CITRACAL   Take 2 tablets by mouth                                goserelin 3.6 MG injection   Commonly known as:  ZOLADEX   Inject 3.6 mg Subcutaneous every 30 days                                letrozole 2.5 MG tablet   Commonly known as:  FEMARA   Take 1 tablet (2.5 mg) by mouth daily   Last time this was given:  2.5 mg on 8/7/2018  8:13 AM                                medium chain triglycerides oil   Commonly known as:  MCT OIL   Take 30 mLs by mouth daily                                NONFORMULARY   Take 1 capsule by mouth daily Rosehip oil                                OMEGA 3-6-9 FATTY ACIDS PO   Take 2 capsules by mouth daily                                oxyCODONE IR 5 MG tablet   Commonly known as:  ROXICODONE   Take 1-2 tablets (5-10 mg) by mouth every 4 hours as needed for other (pain control or improvement in physical function. Hold dose for analgesic side effects.)   Last time this was given:  10 mg on 8/7/2018  8:13 AM                                Spirulina 500 MG Tabs   6 Tabs daily.                                sulfamethoxazole-trimethoprim 800-160 MG per tablet   Commonly known as:  BACTRIM DS/SEPTRA DS   Take 1 tablet by mouth 2 times daily for 14 days   Last time this was given:  1 tablet on 8/7/2018  8:13 AM

## 2018-08-06 NOTE — ANESTHESIA POSTPROCEDURE EVALUATION
Patient: Mary Chadwick    Procedure(s):  Bilateral Mastectomy With Immediate Reconstruction, Left [place] Lymph Node Biopsy, Anesthesia Block - Wound Class: I-Clean   - Wound Class: I-Clean    Diagnosis:Malignant Neoplasm Left Breast   Diagnosis Additional Information: No value filed.    Anesthesia Type:  General, ETT    Note:  Anesthesia Post Evaluation    Patient location during evaluation: PACU and Bedside  Patient participation: Able to fully participate in evaluation  Level of consciousness: awake and alert  Pain management: adequate  Airway patency: patent  Cardiovascular status: acceptable  Respiratory status: acceptable  Hydration status: acceptable  PONV: none     Anesthetic complications: None          Last vitals:  Vitals:    08/06/18 1345 08/06/18 1400 08/06/18 1415   BP:  115/57 113/70   Pulse:      Resp: 15 15 16   Temp: 35.9  C (96.6  F) 36  C (96.8  F) 36  C (96.8  F)   SpO2: 100% 99% 100%         Electronically Signed By: Halie Hebert MD  August 6, 2018  2:31 PM

## 2018-08-06 NOTE — OR NURSING
Pt positioned for bilat nerve block.  Placed on EKG, pulse oximetry, & blood pressure monitors.  MDA  present along with RMDA and pre-op RN.  Time out performed.  Sedation medications given as per MAR.  Procedure started at 0705.

## 2018-08-06 NOTE — ANESTHESIA PROCEDURE NOTES
Peripheral Nerve Block Procedure Note    Staff:     Anesthesiologist:  MARC ASHFORD    Resident/CRNA:  VY LOPEZ    Block performed by resident/CRNA in the presence of a teaching physician    Location: Pre-op  Procedure Start/Stop TImes:      8/6/2018 7:05 AM     8/6/2018 7:23 AM    patient identified, IV checked, site marked, risks and benefits discussed, informed consent, monitors and equipment checked, pre-op evaluation, at physician/surgeon's request and post-op pain management      Correct Patient: Yes      Correct Position: Yes      Correct Site: Yes      Correct Procedure: Yes      Correct Laterality:  Yes    Site Marked:  Yes  Procedure details:     Procedure:  Pectoralis    ASA:  1    Laterality:  Bilateral    Position:  Supine    Sterile Prep: chloraprep, patient draped, mask and sterile gloves      Local skin infiltration:  None    Needle:  Insulated    Needle gauge:  20    Needle length (inches):  3.1    Ultrasound: Yes      Ultrasound used to identify targeted nerve, plexus, or vascular structure and placed a needle adjacent to it      Permanent Image entered into patiient's record      Abnormal pain on injection: No      Blood Aspirated: No      Paresthesias:  No    Bleeding at site: No      Bolus via:  Needle    Infusion Method:  Single Shot    Complications:  None

## 2018-08-06 NOTE — ANESTHESIA PREPROCEDURE EVALUATION
Anesthesia Evaluation     . Pt has had prior anesthetic. Type: General    No history of anesthetic complications          ROS/MED HX    ENT/Pulmonary:  - neg pulmonary ROS     Neurologic:  - neg neurologic ROS     Cardiovascular:  - neg cardiovascular ROS       METS/Exercise Tolerance:     Hematologic:  - neg hematologic  ROS       Musculoskeletal:  - neg musculoskeletal ROS       GI/Hepatic:  - neg GI/hepatic ROS       Renal/Genitourinary:  - ROS Renal section negative       Endo:  - neg endo ROS       Psychiatric:  - neg psychiatric ROS       Infectious Disease:  - neg infectious disease ROS       Malignancy:   (+) Malignancy History of Breast  Breast CA Active status post Surgery.         Other:                     Physical Exam  Normal systems: cardiovascular, pulmonary and dental    Airway   Mallampati: II  TM distance: >3 FB  Neck ROM: full    Dental     Cardiovascular       Pulmonary                     Anesthesia Plan      History & Physical Review  History and physical reviewed and following examination; no interval change.    ASA Status:  1 .        Plan for General and ETT with Intravenous induction. Maintenance will be Balanced.    PONV prophylaxis:  Ondansetron (or other 5HT-3) and Dexamethasone or Solumedrol  Additional equipment: 2nd IV      Postoperative Care  Postoperative pain management:  IV analgesics, Oral pain medications and Multi-modal analgesia.      Consents  Anesthetic plan, risks, benefits and alternatives discussed with:  Patient.  Use of blood products discussed: Yes.   Use of blood products discussed with Patient.  Consented to blood products.  .                          .

## 2018-08-06 NOTE — OR NURSING
"\" Hi - we would like to start the block on Far[place]kh - are u able to come complete consent on pt.  thanks carlos manuel *[id number] \" This text message sent to MD Kidd and Darryl @ [phone].    "

## 2018-08-06 NOTE — PROGRESS NOTES
Returned to unit following thoracentesis. Received sedation. Alert and orient. No complaint of nausea or discomfort. Respiratory status stable. Vital signs within normal limits.

## 2018-08-06 NOTE — OR NURSING
Pt tolerated nerve block without incident.   See Regional BLOCK Timeout Tab.  Awaiting transfer to OR.  Family now in room with pt.  See flowsheet and EMAR for additional information.

## 2018-08-06 NOTE — BRIEF OP NOTE
[facility], [place]    Brief Operative Note    Pre-operative diagnosis: Malignant Neoplasm Left Breast   Post-operative diagnosis * No post-op diagnosis entered *  Procedure: Procedure(s):  Bilateral Mastectomy Breast Reconstruction with Tissue Expanders   Block - Wound Class: I-Clean   - Wound Class: I-Clean  Surgeon: Surgeon(s) and Role:       * BASILIO Kidd MD - Primary     * Octaviano Tinajero MD - Resident - Assisting  Anesthesia: Combined General with Block   Estimated blood loss: 15 ml  Drains:    MICHAEL x 2   Specimens:   ID Type Source Tests Collected by Time Destination   A : Left breast, 12 o' clock stitch Tissue Breast, Left SURGICAL PATHOLOGY EXAM Vandana Raza RN 8/6/2018  9:40 AM    B : Left axillary sentinel lymph node #1 Tissue Lymph Node, [place] SURGICAL PATHOLOGY EXAM Antonio Andrews MD 8/6/2018  9:42 AM    C : Left axillary sentinel lymph node #2 Tissue Lymph Node, [place] SURGICAL PATHOLOGY EXAM Antonio Andrews MD 8/6/2018  9:47 AM    D : Right breast Tissue Breast, Right SURGICAL PATHOLOGY EXAM Antonio Andrews MD 8/6/2018 10:41 AM      Findings:   See dictation  Complications: None.  Implants:  None.    Plan:   - Jordyn-operative antibiotics. Bactrim ordered.   - Routine drain cares.   - May be able to discharge tomorrow if stable.

## 2018-08-06 NOTE — PLAN OF CARE
Problem: Patient Care Overview  Goal: Plan of Care/Patient Progress Review  Outcome: No Change  Admitted to unit following Bilateral Mastectomy With Immediate Reconstruction, Left [place] Lymph Node Biopsy. Chest dressed with ace wrap. Dermabond in place on incisions. Alert and orient. No complaint of nausea or discomfort. Respiratory status stable. Vital signs within normal limits.

## 2018-08-07 VITALS
DIASTOLIC BLOOD PRESSURE: 70 MMHG | WEIGHT: 171.96 LBS | HEIGHT: 69 IN | HEART RATE: 80 BPM | SYSTOLIC BLOOD PRESSURE: 109 MMHG | TEMPERATURE: 97.9 F | OXYGEN SATURATION: 95 % | BODY MASS INDEX: 25.47 KG/M2 | RESPIRATION RATE: 15 BRPM

## 2018-08-07 PROCEDURE — 25000132 ZZH RX MED GY IP 250 OP 250 PS 637: Performed by: STUDENT IN AN ORGANIZED HEALTH CARE EDUCATION/TRAINING PROGRAM

## 2018-08-07 PROCEDURE — 25000132 ZZH RX MED GY IP 250 OP 250 PS 637: Performed by: SURGERY

## 2018-08-07 PROCEDURE — 99024 POSTOP FOLLOW-UP VISIT: CPT | Performed by: PHYSICIAN ASSISTANT

## 2018-08-07 RX ORDER — ACETAMINOPHEN 325 MG/1
650 TABLET ORAL EVERY 6 HOURS PRN
Qty: 50 TABLET | Refills: 0 | Status: SHIPPED | OUTPATIENT
Start: 2018-08-07 | End: 2019-06-04

## 2018-08-07 RX ORDER — OXYCODONE HYDROCHLORIDE 5 MG/1
5-10 TABLET ORAL EVERY 4 HOURS PRN
Qty: 30 TABLET | Refills: 0 | Status: SHIPPED | OUTPATIENT
Start: 2018-08-07 | End: 2018-10-15

## 2018-08-07 RX ADMIN — SULFAMETHOXAZOLE AND TRIMETHOPRIM 1 TABLET: 800; 160 TABLET ORAL at 08:13

## 2018-08-07 RX ADMIN — ACETAMINOPHEN 650 MG: 325 TABLET, FILM COATED ORAL at 04:39

## 2018-08-07 RX ADMIN — OXYCODONE HYDROCHLORIDE 10 MG: 5 TABLET ORAL at 11:06

## 2018-08-07 RX ADMIN — ACETAMINOPHEN 650 MG: 325 TABLET, FILM COATED ORAL at 10:12

## 2018-08-07 RX ADMIN — OXYCODONE HYDROCHLORIDE 10 MG: 5 TABLET ORAL at 08:13

## 2018-08-07 RX ADMIN — LETROZOLE 2.5 MG: 2.5 TABLET ORAL at 08:13

## 2018-08-07 NOTE — PLAN OF CARE
Problem: Patient Care Overview  Goal: Plan of Care/Patient Progress Review  Outpatient/Observation goals to be met before discharge home:      ~ Patient able to ambulate as they were prior to admission or with assist devices provided by therapies during their stay.-MET  ~ Vital signs at baseline. - MET  ~ Able to tolerate oral intake. -MET  ~ Documented spontaneous urine output -MET  ~ Return to baseline mental status.-MET  ~ Hypercapnia, hypoventilation or hypoxia resolved for at least 2 hours without supplemental oxygen.-MET   ~ Deficits in sensation, mobility or coordination have resolved if spinal or regional anesthesia was used.-MET  ~ Adequate pain control using oral analgesics.-MET  ~ Tolerates oral intake. -MET  ~ Cleared for discharge per provider.-MET   Nurse to Notify Provider when Outpatient in a Bed discharge goals have been met and patient is ready for discharge    Dr. Andrews assessed patient and discussed discharge. Circulator nurse discharged patient. Discharge instructions, medications, follow-up appointments discussed; all information understood. Pt understands how to strip and empty MICHAEL bulbs, and to keep track of output. Paperwork signed, copy given to patient. PIVs removed. Patient taken to front door in , driven home by friend.

## 2018-08-07 NOTE — PROGRESS NOTES
"Surgery Post-Op Check  Mary Chadwick  [id number]  8/6/2018    Subjective:   Transferred from PACU.  Doing well overall.  Pain well controlled. Tolerating regular diet with some nausea but no emesis.  Denies abdominal pain.  Denies chest pain, SOB.  Having some dizziness especially with getting out of bed. Denies numbness/tingling.    Objective:  /61 (BP Location: Left arm)  Pulse 65  Temp 97.8  F (36.6  C) (Oral)  Resp 16  Ht 1.753 m (5' 9\")  Wt 78 kg (171 lb 15.3 oz)  LMP  (Within Months)  SpO2 97%  BMI 25.39 kg/m2  General: no acute distress  HEENT: PERRL, EOMI, MMM  Pulmonary: clear to auscultation bilaterally  Cardiac: regular rate and rhythm, no murmur  Abdomen: soft, non tender, nondistended  Incision: clean, dry, intact with no erythema or drainage.  Drains: 2 MICHAEL drains both draining serosanguinous output    Assessment:  Mary Chadwick is a 42 year old female now POD#0 after bilateral mastectomy w/ Left SNL bx and reconstruction. Doing well post-op.    Plan:  Added compazine for ongoing nausea.     Discontinue fluids because of good oral intake.   No changes. Continue cares per primary team.    Xena Parrish MD  General Surgery PGY-1  [phone]      "

## 2018-08-07 NOTE — PROGRESS NOTES
"Problem: Patient Care Overview  Goal: Plan of Care/Patient Progress Review  Outpatient/Observation goals to be met before discharge home:    ~ Patient able to ambulate as they were prior to admission or with assist devices provided by therapies during their stay.-MET  ~ Vital signs at baseline. - MET  ~ Able to tolerate oral intake. -MET  ~ Documented spontaneous urine output -MET   ~ Placement of allen catheter after failed 2nd attempt at spontaneous voiding with documented retention of at least 150cc by bladder scan-NA  ~ Return to baseline mental status.-MET  ~ Hypercapnia, hypoventilation or hypoxia resolved for at least 2 hours without supplemental oxygen.-MET   ~ Deficits in sensation, mobility or coordination have resolved if spinal or regional anesthesia was used.-MET  ~ Adequate pain control using oral analgesics.-MET  ~ Tolerates oral intake. -MET  ~ Cleared for discharge per provider.-UNMET   ~ Nurse to Notify Provider when Outpatient in a Bed discharge goals have been met and patient is ready for discharge      Pt alert and oriented, reports moderate bilateral pain in breast incision sites managed with 5mg Oxycodone. Pt c/o nausea and near emesis episode when attempting to stand up to go for a walk but has since walked to the bathroom without nausea. Pt has bilateral MICHAEL Drains draining to bulb suction. Pt tolerated small dinner last night and is passing gas. Pt breast incisions covered with ace wraps. VSS.  /69 (BP Location: Right arm)  Pulse 88  Temp 98.3  F (36.8  C) (Oral)  Resp 15  Ht 1.753 m (5' 9\")  Wt 78 kg (171 lb 15.3 oz)  LMP  (Within Months)  SpO2 94%  BMI 25.39 kg/m2    "

## 2018-08-07 NOTE — PROGRESS NOTES
Plastic Surgery Progress Note    Subjective/Interval History:  No acute events.     Objective:  Temp:  [96.6  F (35.9  C)-98.3  F (36.8  C)] 97.9  F (36.6  C)  Pulse:  [80-88] 80  Heart Rate:  [] 79  Resp:  [14-20] 15  BP: (104-122)/(56-82) 109/70  SpO2:  [94 %-100 %] 95 %    General appearance: in NAD  Pulm: Non-labored breathing  CV: Hemodynamically stable  Breasts: Incisions c/d/i, MICHAEL output serosanguinous.   Skin: warm and well-perfused.     Assessment/Plan:   Mary Chadwick is a 42 year old female s/p bilateral mastectomies and reconstruction with [place]. Recovering as expected.     - May discharge from our standpoint.   - Course of bactrim while drains are in (prescribed).  - Discharge navigator updated with instructions.  - Follow up already made.     Octaviano Tinajero, PGY4  [phone]

## 2018-08-07 NOTE — PROGRESS NOTES
Surgical Oncology Progress Note    Interval History:  No acute events overnight. Pain better controlled this morning. Denies nausea.     Physical Exam:   Temp:  [96.6  F (35.9  C)-98.3  F (36.8  C)] 98.3  F (36.8  C)  Pulse:  [88] 88  Heart Rate:  [] 79  Resp:  [12-20] 15  BP: (104-132)/(56-82) 113/69  SpO2:  [94 %-100 %] 94 %  General: Alert, oriented, appears comfortable, NAD.  Respiratory: breathing non labored  Chest: Bilateral incisions are clean dry and intact. No sign of hematoma. MICHAEL drain with serosanguinous output.     Data:   All laboratory and imaging data in the past 24 hours reviewed  I/O last 3 completed shifts:  In: 2100 [P.O.:600; I.V.:1500]  Out: 935 [Urine:485; Drains:410; Blood:40]  Recent Labs   Lab Test  08/06/18   0605  06/15/18   1329  04/20/18   1310  03/06/18   1029   WBC   --   7.6  7.3  7.5   HGB  12.3  13.7  14.5  14.1   PLT   --   282  258  273   INR  1.02   --    --   1.02      Recent Labs   Lab Test  08/06/18   0605  06/15/18   1329  04/20/18   1310  03/06/18   1029   NA   --   138  138  136   POTASSIUM  3.5  4.1  3.8  3.9   CHLORIDE   --   101  104  105   CO2   --   28  29  24   BUN   --   16  17  9   CR  0.83  0.77  0.76  0.66   ANIONGAP   --   9  6  8   MARICHUY   --   10.0  9.1  9.1   GLC   --   85  89  95     Recent Labs   Lab Test  06/15/18   1329   PROTTOTAL  7.6   ALBUMIN  4.2   BILITOTAL  0.4   ALKPHOS  67   AST  22   ALT  27       Assessment and Plan:     Mary Chadwick is a 42 year old female POD 1 s/p bilateral mastectomy, left sentinel lymph node biopsy, and immediate reconstruction    - Regular diet  - oxycodone and acetaminophen as needed for pain  - Bactrim per Plastic surgery  - MICHAEL drain teaching  - Discharge to home today     Seen with Chief resident who will discuss with Staff.     JAMES JenkinsC   Surgical Oncology

## 2018-08-07 NOTE — PLAN OF CARE
Problem: Patient Care Overview  Goal: Plan of Care/Patient Progress Review  Outpatient/Observation goals to be met before discharge home:     ~ Patient able to ambulate as they were prior to admission or with assist devices provided by therapies during their stay.-MET  ~ Vital signs at baseline. - MET  ~ Able to tolerate oral intake. -MET  ~ Documented spontaneous urine output -MET   ~ Placement of allen catheter after failed 2nd attempt at spontaneous voiding with documented retention of at least 150cc by bladder scan-NA  ~ Return to baseline mental status.-MET  ~ Hypercapnia, hypoventilation or hypoxia resolved for at least 2 hours without supplemental oxygen.-MET   ~ Deficits in sensation, mobility or coordination have resolved if spinal or regional anesthesia was used.-MET  ~ Adequate pain control using oral analgesics.-MET  ~ Tolerates oral intake. -MET  ~ Cleared for discharge per provider.-MET, patient may discharge after Dr. Andrews rounds later this morning.   ~ Nurse to Notify Provider when Outpatient in a Bed discharge goals have been met and patient is ready for discharge

## 2018-08-08 ENCOUNTER — TELEPHONE (OUTPATIENT)
Dept: SURGERY | Facility: CLINIC | Age: 43
End: 2018-08-08

## 2018-08-08 DIAGNOSIS — R11.0 NAUSEA: Primary | ICD-10-CM

## 2018-08-08 RX ORDER — ONDANSETRON 4 MG/1
4 TABLET, FILM COATED ORAL EVERY 6 HOURS PRN
Qty: 30 TABLET | Refills: 1 | Status: SHIPPED | OUTPATIENT
Start: 2018-08-08 | End: 2019-01-17

## 2018-08-08 NOTE — TELEPHONE ENCOUNTER
POST-OP CALL  Aug 8, 2018    Mary Chadwick is a 42 year old female s/p bilateral mastectomy with reconstruction.     Patient was called to see how she is doing after surgery. Unable to reach her at the provided number and there was no voicemail available. Number tried several times.     Antonette Dove PA-C

## 2018-08-09 ENCOUNTER — TELEPHONE (OUTPATIENT)
Dept: ONCOLOGY | Facility: CLINIC | Age: 43
End: 2018-08-09

## 2018-08-09 NOTE — TELEPHONE ENCOUNTER
[facility] Telephone Triage Note    Assessment: Patient called in to triage reporting the following symptoms: Wet around around MICHAEL drainage site today. Drainage dripping down her side and her shirt was wet, not soaked, but wet. MICHAEL drain is patent and patient has consistent drainage at more than 25mL per day. Drainage is still bloody and has a few stringy clots. Continues to have post op pain around ribcage. Using oxycodone 1 Q 3-4 hours. Took 2 last night to be able to sleep. Negative for: pain/redness/swelling at drain insertion site. She is stripping the drain. Denies chills, unsure of temperature, does not have therometer at home, will pick one up today. She asked about stool softeners, had BM on Monday. Advised she can try Duclolax for gentle relief. However if she goes 3+ days with no BM should try Senna as stimulant laxative will produce more immediate results. Has zofran 4mg for nausea. This has been effective. .    Recommendations: Buy thermometer today, continue to monitor drainage and monitor for s/s of infection at drainage site. Try to space out oxycodone usage to 1 every 5-6 hrs today and alternating between oxycodone and acetaminophen. Dulcolax now and daily for bowel regularity. Drainage from MICHAEL could have been from loosening of tube. Should secure tube with dressing (patient said her friend has already done this). Will ask Elodia Bartlett, RNCC with Dr. Andrews to call patient later today as well.  .  ..    Follow-Up: Patient voiced understanding of advice and/or instructions given.

## 2018-08-09 NOTE — TELEPHONE ENCOUNTER
Call to pt @ her friend's home [phone] to discuss post-op recovery; she had bilateral mastectomies/reconstruction, sentinel node biopsy on 8/6 with rPiya Andrews/Marti. She had called into Nurse Triage with concerns about leaking around her dawna drain site. She said for now it is not leaking after applying a dressing. She is using less pain meds after talking with triage. She has return appts with both her surgeons.

## 2018-08-11 NOTE — OP NOTE
Procedure Date: 08/06/2018      DATE OF OPERATION: 08/06/2018      PREOPERATIVE DIAGNOSIS:  Left breast cancer.      POSTOPERATIVE DIAGNOSIS:  Left breast cancer.      PROCEDURE:  Lymphatic mapping, bilateral skin-sparing mastectomies and left axillary sentinel lymph node biopsy x 2.      ATTENDING SURGEON:  Antonio Andrews MD      RESIDENT SURGEON:  Milagro Quiles MD      ANESTHESIA:  General with endotracheal tube intubation.      INDICATIONS FOR SURGERY:  The patient presented with a T2 breast cancer.  She was treated with preoperative endocrine treatment.  She was found to have a p53 mutation and has now elected to undergo bilateral mastectomies with immediate reconstruction.      PROCEDURE IN DETAIL:  After informed consent, the patient was brought to the operating room, given general anesthetic and orotracheally intubated.  I injected technetium sulfur colloid beneath the left areola and isosulfan blue beneath the left areola.  The patient was prepped and draped in the usual fashion.  I made a small elliptical skin incision to include the nipple areolar complex on the left side.  Bovie cautery was used to incise the subcutaneous tissues.  Skin flaps were raised sharply with the Bovie cautery.  A superior skin flap was raised to the clavicle, a medial skin flap was raised to lateral border of the sternum, and an inferior skin flap was raised to the inframammary fold.  The breast and pectoralis fascia were removed from the pectoralis major muscle from superior to inferior and from medial to lateral.  After the specimen was dissected off the lateral border of the pectoralis major muscle, it was divided, oriented and sent to pathology.  Next, we incised the clavipectoral fascia and identified a blue-stained radioactive lymph node.  [place] lymph node #1 was removed and had ex vivo counts of 1600 counts per second.  We identified a second blue radioactive lymph node that had ex vivo counts of 200 counts per  second.  After removal of the second sentinel lymph node, there were no additional blue radioactive or palpable lymph nodes.  Strict hemostasis was obtained with the Bovie cautery.        We made a mirror image incision on the patient's right side.  The Bovie cautery was used to incise the subcutaneous tissues.  We raised flaps in a similar fashion.  A superior skin flap was raised to the clavicle, a medial skin flap was raised to the lateral border of the sternum, and an inferior skin flap was raised to the inframammary fold.  Next, the breast and pectoralis fascia were removed from the pectoralis major muscle from superior to inferior and from medial to lateral.  After the specimen was dissected off the lateral border of the pectoralis major muscle, it was divided and sent to pathology.  Strict hemostasis was obtained with Bovie cautery.  Dr. Kidd performed the reconstruction, which will be dictated in a separate note.         RANDY DUNN MD             D: 08/10/2018   T: 2018   MT:       Name:     JASMYNE HERNANDEZ   MRN:      [id number]        Account:        [id number]   :      1975           Procedure Date: 2018      Document: [id number]

## 2018-08-14 ENCOUNTER — INFUSION THERAPY VISIT (OUTPATIENT)
Dept: ONCOLOGY | Facility: CLINIC | Age: 43
End: 2018-08-14
Attending: INTERNAL MEDICINE
Payer: COMMERCIAL

## 2018-08-14 ENCOUNTER — ONCOLOGY VISIT (OUTPATIENT)
Dept: ONCOLOGY | Facility: CLINIC | Age: 43
End: 2018-08-14
Attending: PHYSICIAN ASSISTANT
Payer: COMMERCIAL

## 2018-08-14 ENCOUNTER — OFFICE VISIT (OUTPATIENT)
Dept: PLASTIC SURGERY | Facility: CLINIC | Age: 43
End: 2018-08-14
Attending: PLASTIC SURGERY
Payer: COMMERCIAL

## 2018-08-14 VITALS
DIASTOLIC BLOOD PRESSURE: 73 MMHG | HEIGHT: 69 IN | SYSTOLIC BLOOD PRESSURE: 123 MMHG | HEART RATE: 90 BPM | WEIGHT: 169.7 LBS | TEMPERATURE: 98.7 F | BODY MASS INDEX: 25.13 KG/M2 | RESPIRATION RATE: 16 BRPM | OXYGEN SATURATION: 98 %

## 2018-08-14 VITALS
HEIGHT: 69 IN | TEMPERATURE: 98.7 F | RESPIRATION RATE: 14 BRPM | BODY MASS INDEX: 25.14 KG/M2 | HEART RATE: 90 BPM | OXYGEN SATURATION: 98 % | WEIGHT: 169.75 LBS | SYSTOLIC BLOOD PRESSURE: 123 MMHG | DIASTOLIC BLOOD PRESSURE: 73 MMHG

## 2018-08-14 DIAGNOSIS — Z98.890 S/P BREAST RECONSTRUCTION, BILATERAL: ICD-10-CM

## 2018-08-14 DIAGNOSIS — Z17.0 MALIGNANT NEOPLASM OF UPPER-OUTER QUADRANT OF LEFT BREAST IN FEMALE, ESTROGEN RECEPTOR POSITIVE (H): Primary | ICD-10-CM

## 2018-08-14 DIAGNOSIS — C50.412 MALIGNANT NEOPLASM OF UPPER-OUTER QUADRANT OF LEFT BREAST IN FEMALE, ESTROGEN RECEPTOR POSITIVE (H): Primary | ICD-10-CM

## 2018-08-14 PROCEDURE — 96402 CHEMO HORMON ANTINEOPL SQ/IM: CPT

## 2018-08-14 PROCEDURE — 99214 OFFICE O/P EST MOD 30 MIN: CPT | Mod: ZP | Performed by: PHYSICIAN ASSISTANT

## 2018-08-14 PROCEDURE — G0463 HOSPITAL OUTPT CLINIC VISIT: HCPCS | Mod: ZF

## 2018-08-14 PROCEDURE — 25000125 ZZHC RX 250: Mod: ZF | Performed by: INTERNAL MEDICINE

## 2018-08-14 PROCEDURE — 25000128 H RX IP 250 OP 636: Mod: ZF | Performed by: INTERNAL MEDICINE

## 2018-08-14 PROCEDURE — G0463 HOSPITAL OUTPT CLINIC VISIT: HCPCS | Mod: 25

## 2018-08-14 RX ORDER — LETROZOLE 2.5 MG/1
2.5 TABLET, FILM COATED ORAL DAILY
Qty: 90 TABLET | Refills: 11 | Status: SHIPPED | OUTPATIENT
Start: 2018-08-14 | End: 2019-05-14

## 2018-08-14 RX ADMIN — GOSERELIN ACETATE 3.6 MG: 3.6 IMPLANT SUBCUTANEOUS at 12:50

## 2018-08-14 RX ADMIN — LIDOCAINE HYDROCHLORIDE 1 ML: 10 INJECTION, SOLUTION EPIDURAL; INFILTRATION; INTRACAUDAL; PERINEURAL at 12:44

## 2018-08-14 ASSESSMENT — PAIN SCALES - GENERAL
PAINLEVEL: MILD PAIN (2)
PAINLEVEL: MILD PAIN (2)

## 2018-08-14 NOTE — LETTER
8/14/2018       RE: Mary Chadwick  [address]  [facility] [address]     Dear Colleague,    Thank you for referring your patient, Mary Chadwick, to the [facility] at [facility]. Please see a copy of my visit note below.    PRESENTING COMPLAINT:  Postoperative visit, status post bilateral breast reconstruction after undergoing bilateral nipple-nonsparing mastectomy for left-sided breast cancer, reconstruction done with prepectoral expanders and AlloDerm, done 08/06/2018.      HISTORY OF PRESENTING COMPLAINT:  Ms. Chadwick is 42 years old.  She is a week out from surgery and has done extremely well.  She is very happy with the results, no major issues.  Her pathology is not back.  Her MICHAEL drainage is over 30 mL a day.      PHYSICAL EXAMINATION:  Vital signs are stable.  She is afebrile, in no obvious distress.  Both breasts are healing in well.  No evidence for infection, seroma or hematoma.      ASSESSMENT AND PLAN:  Based upon the above findings, a diagnosis of bilateral breast reconstruction for breast cancer was made.  The plan is to keep the MICHAEL drains in for another week.  I will see her back next Tuesday.  She will start moisturizing her chest.  We will plan expansions in another 1-2 weeks.  We will await the final decision regarding adjuvant therapies and then plan the next stage of reconstruction with implants.  All questions were answered.  She was happy with the visit.         Again, thank you for allowing me to participate in the care of your patient.      Sincerely,    BASILIO Kidd MD

## 2018-08-14 NOTE — NURSING NOTE
"Oncology Rooming Note    August 14, 2018 10:52 AM   Mary Chadwick is a 42 year old female who presents for:    Chief Complaint   Patient presents with     Oncology Clinic Visit     Post-Op     Initial Vitals: /73 (BP Location: Right arm, Patient Position: Sitting, Cuff Size: Adult Regular)  Pulse 90  Temp 98.7  F (37.1  C) (Oral)  Resp 16  Ht 1.753 m (5' 9.02\")  Wt 77 kg (169 lb 11.2 oz)  SpO2 98%  BMI 25.05 kg/m2 Estimated body mass index is 25.05 kg/(m^2) as calculated from the following:    Height as of this encounter: 1.753 m (5' 9.02\").    Weight as of this encounter: 77 kg (169 lb 11.2 oz). Body surface area is 1.94 meters squared.  Mild Pain (2) Comment: both breast/upper ribs   No LMP recorded. Patient is not currently having periods (Reason: Premenopausal).  Allergies reviewed: Yes  Medications reviewed: Yes    Medications: Medication refills not needed today.  Pharmacy name entered into [employer]:    [facility] [place], [place] - [address] [place], [place] - [address]    Clinical concerns: Patient states she would like to talk to a dietitian about the best diet/nutrition to help her heal better. She was also interested in what a  could offer,  notified. Patient stated to MAN Delaney, that she was not interested in meeting with  today.    10 minutes for nursing intake (face to face time)     Erica Jean CMA              "

## 2018-08-14 NOTE — NURSING NOTE
"Oncology Rooming Note    August 14, 2018 11:52 AM   Mary Chadwick is a 42 year old female who presents for:    Chief Complaint   Patient presents with     Oncology Clinic Visit     [facility] RETURN- BREAST CA     Initial Vitals: /73  Pulse 90  Temp 98.7  F (37.1  C) (Oral)  Resp 14  Ht 1.753 m (5' 9.02\")  Wt 77 kg (169 lb 12.1 oz)  SpO2 98%  BMI 25.06 kg/m2 Estimated body mass index is 25.06 kg/(m^2) as calculated from the following:    Height as of this encounter: 1.753 m (5' 9.02\").    Weight as of this encounter: 77 kg (169 lb 12.1 oz). Body surface area is 1.94 meters squared.  Mild Pain (2) Comment: Data Unavailable   No LMP recorded. Patient is not currently having periods (Reason: Premenopausal).  Allergies reviewed: Yes  Medications reviewed: Yes    Medications: Medication refills not needed today.  Pharmacy name entered into [facility]:    [place], [place] - [address] [facility] [place], [place] - [address] [facility]    Clinical concerns: No new concerns. Katherin was notified.    10 minutes for nursing intake (face to face time)     Christian Stone LPN            "

## 2018-08-14 NOTE — PROGRESS NOTES
PRESENTING COMPLAINT:  Postoperative visit, status post bilateral breast reconstruction after undergoing bilateral nipple-nonsparing mastectomy for left-sided breast cancer, reconstruction done with prepectoral expanders and AlloDerm, done 08/06/2018.      HISTORY OF PRESENTING COMPLAINT:  Ms. Chadwick is 42 years old.  She is a week out from surgery and has done extremely well.  She is very happy with the results, no major issues.  Her pathology is not back.  Her MICHAEL drainage is over 30 mL a day.      PHYSICAL EXAMINATION:  Vital signs are stable.  She is afebrile, in no obvious distress.  Both breasts are healing in well.  No evidence for infection, seroma or hematoma.      ASSESSMENT AND PLAN:  Based upon the above findings, a diagnosis of bilateral breast reconstruction for breast cancer was made.  The plan is to keep the MICHAEL drains in for another week.  I will see her back next Tuesday.  She will start moisturizing her chest.  We will plan expansions in another 1-2 weeks.  We will await the final decision regarding adjuvant therapies and then plan the next stage of reconstruction with implants.  All questions were answered.  She was happy with the visit.

## 2018-08-14 NOTE — PATIENT INSTRUCTIONS
[facility] Main Line: [phone]    Call triage nurse with chills and/or temperature greater than or equal to 100.4, uncontrolled nausea/vomiting, diarrhea, constipation, dizziness, shortness of breath, chest pain, bleeding, unexplained bruising, or any new/concerning symptoms, questions/concerns.     If you are having any concerning symptoms or wish to speak to a provider before your next infusion visit, please call your care coordinator or triage to notify them so we can adequately serve you.     For triage nurse, after hours, weekends, and holidays: [phone]          August 2018 Sunday Monday Tuesday Wednesday Thursday Friday Saturday                  1     2     3     4       5     6     Admission    5:24 AM   Antonio Andrews MD   Unit 6D Observation [facility]   (Discharge: 8/7/2018)     NM SENTINAL NODE INJ    7:00 AM   (30 min.)   [id number]   [facility], [place], Nuclear Medicine     COMBINED MASTECTOMY SIMPLE BILATERAL, SENTINEL NODE BILATERAL    7:30 AM   Antonio Andrews MD   UU OR 7     8     9     10     11       12     13     14     UMP POST-OP   10:45 AM   (15 min.)   BASILIO Kidd MD   [facility]     UMP RETURN   11:35 AM   (50 min.)   Paty Pandey PA-C   [facility] ONC INFUSION 60    1:00 PM   (60 min.)   UC ONCOLOGY INFUSION   [facility] 15     16     17     UMP POST-OP   11:30 AM   (15 min.)   Antonio Andrews MD   [facility] 18       19     20     21     UMP POST-OP    9:15 AM   (15 min.)   BASILIO Kidd MD   [facility] 22     23     24     25       26     27     28     29     30     31 September 2018 Sunday Monday Tuesday Wednesday Thursday Friday Saturday                                 1       2     3     4     5     6     7     8       9     10     11     UMP RETURN   11:45 AM   (30 min.)   Valeria Gann MD   [facility]     [facility] ONC INFUSION 60    1:30 PM   (60 min.)    ONCOLOGY INFUSION   [facility] 12     13     14     15       16     17     18     19     20     21     22       23     24     25     26     27     28     29       30                                                Lab Results:  No results found for this or any previous visit (from the past 12 hour(s)).

## 2018-08-14 NOTE — MR AVS SNAPSHOT
After Visit Summary   8/14/2018    Mary Chadwick    MRN: [id number]           Patient Information     Date Of Birth          1975        Visit Information        Provider Department      8/14/2018 11:50 AM Paty Pandey PA-C [facility]        Today's Diagnoses     Malignant neoplasm of upper-outer quadrant of left breast in female, estrogen receptor positive (H)    -  1       Follow-ups after your visit        Your next 10 appointments already scheduled     Aug 14, 2018  1:00 PM CDT   Infusion 60 with UC ONCOLOGY INFUSION, UC 11 ATC   [facility] ([facility])    [address] [place] [address]  [address]  [facility] [address]   [phone]            Aug 17, 2018 11:45 AM CDT   (Arrive by 11:30 AM)   Post-Op with Antonio Andrews MD   [facility] ([facility])    [address] [place] [address]  [address]  [facility] [address] [phone]            Aug 21, 2018  9:30 AM CDT   (Arrive by 9:15 AM)   Post-Op with BASILIO Kidd MD   [facility] ([facility])    [address] [place] [address]  [address]  [facility] [address] [phone]            Sep 11, 2018 12:00 PM CDT   (Arrive by 11:45 AM)   Return Visit with Valeria Gann MD   [facility] ([facility])    [address] [place] [address]  [address]  [facility] [address] [phone]            Sep 11, 2018  1:30 PM CDT   Infusion 60 with UC ONCOLOGY INFUSION, UC 28 ATC   [facility] ([facility])    [address] [place] [address]  [address]  [facility] [address] [phone]              Who to contact     If you have questions or need follow up information about today's clinic visit or your schedule please contact [facility] directly at [phone].  Normal or non-critical lab and imaging  "results will be communicated to you by MyChart, letter or phone within 4 business days after the clinic has received the results. If you do not hear from us within 7 days, please contact the clinic through Casabu or phone. If you have a critical or abnormal lab result, we will notify you by phone as soon as possible.  Submit refill requests through Casabu or call your pharmacy and they will forward the refill request to us. Please allow 3 business days for your refill to be completed.          Additional Information About Your Visit        Casabu Information     Casabu gives you secure access to your electronic health record. If you see a primary care provider, you can also send messages to your care team and make appointments. If you have questions, please call your primary care clinic.  If you do not have a primary care provider, please call [phone] and they will assist you.        Care EveryWhere ID     This is your Care EveryWhere ID. This could be used by other organizations to access your [place] medical records  [id number]        Your Vitals Were     Pulse Temperature Respirations Height Pulse Oximetry BMI (Body Mass Index)    90 98.7  F (37.1  C) (Oral) 14 1.753 m (5' 9.02\") 98% 25.06 kg/m2       Blood Pressure from Last 3 Encounters:   08/14/18 123/73   08/14/18 123/73   08/07/18 109/70    Weight from Last 3 Encounters:   08/14/18 77 kg (169 lb 12.1 oz)   08/14/18 77 kg (169 lb 11.2 oz)   08/06/18 78 kg (171 lb 15.3 oz)              Today, you had the following     No orders found for display       Primary Care Provider Office Phone #    Flora Crews, [phone]       [facility] [address] [facility] [address]  [facility] [address]        Equal Access to Services     SURESH CHAPARRO : Michelle Herndon, gary black, naseem aguirre. So [facility] [phone].    ATENCIÓN: Si habla español, tiene a sparrow disposición servicios " nancy de asistencia lingüística. Karsten samaniego [phone].    We comply with applicable federal civil rights laws and [place] laws. We do not discriminate on the basis of race, color, national origin, age, disability, sex, sexual orientation, or gender identity.            Thank you!     Thank you for choosing [facility]  for your care. Our goal is always to provide you with excellent care. Hearing back from our patients is one way we can continue to improve our services. Please take a few minutes to complete the written survey that you may receive in the mail after your visit with us. Thank you!             Your Updated Medication List - Protect others around you: Learn how to safely use, store and throw away your medicines at www.disposemymeds.org.          This list is accurate as of 8/14/18 12:16 PM.  Always use your most recent med list.                   Brand Name Dispense Instructions for use Diagnosis    acetaminophen 325 MG tablet    TYLENOL    50 tablet    Take 2 tablets (650 mg) by mouth every 6 hours as needed for mild pain    Acute post-operative pain       calcium citrate-vitamin D 315-250 MG-UNIT Tabs per tablet    CITRACAL     Take 2 tablets by mouth        goserelin 3.6 MG injection    ZOLADEX     Inject 3.6 mg Subcutaneous every 30 days        letrozole 2.5 MG tablet    FEMARA    30 tablet    Take 1 tablet (2.5 mg) by mouth daily    Malignant neoplasm of upper-outer quadrant of left breast in female, estrogen receptor positive (H)       medium chain triglycerides oil    MCT OIL     Take 30 mLs by mouth daily        NONFORMULARY      Take 1 capsule by mouth daily Rosehip oil        OMEGA 3-6-9 FATTY ACIDS PO      Take 2 capsules by mouth daily        ondansetron 4 MG tablet    ZOFRAN    30 tablet    Take 1 tablet (4 mg) by mouth every 6 hours as needed for nausea    Nausea       oxyCODONE IR 5 MG tablet    ROXICODONE    30 tablet    Take 1-2 tablets (5-10 mg) by mouth every 4  hours as needed for other (pain control or improvement in physical function. Hold dose for analgesic side effects.)    Acute post-operative pain       Spirulina 500 MG Tabs      6 Tabs daily.    Malignant neoplasm of left breast in female, estrogen receptor positive, unspecified site of breast (H)       sulfamethoxazole-trimethoprim 800-160 MG per tablet    BACTRIM DS/SEPTRA DS    28 tablet    Take 1 tablet by mouth 2 times daily for 14 days    S/P breast reconstruction
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
## 2018-08-14 NOTE — PROGRESS NOTES
Infusion Nursing Note:  Mary Chadwick presents today for Zoladex injection.    Patient seen by provider today: Yes: Paty Pandey PA-C   present during visit today: Not Applicable.    Note: Mary reports to infusion after her visit with Paty in clinic. She is here for her Zoladex injection. She reports feeling well and offers no new complaints.    Post Infusion Assessment:  Ice applied to injection site per patient request followed by 1cc lidocaine subcutaneous injection.  Patient tolerated Zoladex injection to abdomen RLQ without incident.   Site covered with gauze and bandage.     Discharge Plan:   Prescription refills given for letrozole, sent to patient's preferred pharmacy.  Discharge instructions reviewed with: Patient and friend, Lay.  Patient and/or family verbalized understanding of discharge instructions and all questions answered.  AVS to patient via QWiPS.  Patient will return 9/11/2018 for next appointment.   Patient discharged in stable condition accompanied by: self and friend.  Departure Mode: Ambulatory.    Alec Ramos RN

## 2018-08-14 NOTE — MR AVS SNAPSHOT
After Visit Summary   8/14/2018    Mary Chadwick    MRN: [id number]           Patient Information     Date Of Birth          1975        Visit Information        Provider Department      8/14/2018 11:00 AM BASILIO Kidd MD [facility]        Today's Diagnoses     S/P breast reconstruction, bilateral           Follow-ups after your visit        Follow-up notes from your care team     Return in about 1 week (around 8/21/2018).      Your next 10 appointments already scheduled     Aug 14, 2018 11:50 AM CDT   (Arrive by 11:35 AM)   Return Visit with Paty Pandey PA-C   [facility] ([facility])    [address] [place] [address]  [address]  [facility] [address]   [phone]            Aug 14, 2018  1:00 PM CDT   Infusion 60 with UC ONCOLOGY INFUSION, UC 11 ATC   [facility] ([facility])    [address] [place] [address]  [address]  [facility] [address]   [phone]            Aug 17, 2018 11:45 AM CDT   (Arrive by 11:30 AM)   Post-Op with Antonio Andrews MD   [facility] ([facility])    [address] [place] [address]  [address]  [facility] [address]   [phone]            Aug 21, 2018  9:30 AM CDT   (Arrive by 9:15 AM)   Post-Op with BASILIO Kidd MD   [facility] ([facility])    [address] [place] [address] [address]  [address]  [facility] [address]   [phone]            Sep 11, 2018 12:00 PM CDT   (Arrive by 11:45 AM)   Return Visit with Valeria Gann MD   [facility] ([facility])    [address] [place] [address]  [address]  [facility] [address]   [phone]            Sep 11, 2018  1:30 PM CDT   Infusion 60 with UC ONCOLOGY INFUSION, UC 28 ATC   [facility] ([facility])    [address] [place] [address]  [address]  [facility]  "[id number] [phone]              Who to contact     If you have questions or need follow up information about today's clinic visit or your schedule please contact [facility] directly at [phone].  Normal or non-critical lab and imaging results will be communicated to you by MyChart, letter or phone within 4 business days after the clinic has received the results. If you do not hear from us within 7 days, please contact the clinic through OnAir Playerhart or phone. If you have a critical or abnormal lab result, we will notify you by phone as soon as possible.  Submit refill requests through InfoReach or call your pharmacy and they will forward the refill request to us. Please allow 3 business days for your refill to be completed.          Additional Information About Your Visit        InfoReach Information     InfoReach gives you secure access to your electronic health record. If you see a primary care provider, you can also send messages to your care team and make appointments. If you have questions, please call your primary care clinic.  If you do not have a primary care provider, please call [phone] and they will assist you.        Care EveryWhere ID     This is your Care EveryWhere ID. This could be used by other organizations to access your [place] medical records  [id number]        Your Vitals Were     Pulse Temperature Respirations Height Pulse Oximetry BMI (Body Mass Index)    90 98.7  F (37.1  C) (Oral) 16 1.753 m (5' 9.02\") 98% 25.05 kg/m2       Blood Pressure from Last 3 Encounters:   08/14/18 123/73   08/07/18 109/70   07/13/18 114/75    Weight from Last 3 Encounters:   08/14/18 77 kg (169 lb 11.2 oz)   08/06/18 78 kg (171 lb 15.3 oz)   07/13/18 77.3 kg (170 lb 7 oz)              Today, you had the following     No orders found for display       Primary Care Provider Office Phone #    Flora Crews, [phone]       [facility] [address] [place] [address]  [facility] [address]   "      Equal Access to Services     SURESH CHAPARRO : Hadii aad ku hadlindaaletha Rosabharati, wajaceyda luqadaha, qaybta parrishkamnaseem gant. So [facility] [phone].    ATENCIÓN: Si habla español, tiene a sparrow disposición servicios gratuitos de asistencia lingüística. Angellaame al [phone].    We comply with applicable federal civil rights laws and [place] laws. We do not discriminate on the basis of race, color, national origin, age, disability, sex, sexual orientation, or gender identity.            Thank you!     Thank you for choosing [facility]  for your care. Our goal is always to provide you with excellent care. Hearing back from our patients is one way we can continue to improve our services. Please take a few minutes to complete the written survey that you may receive in the mail after your visit with us. Thank you!             Your Updated Medication List - Protect others around you: Learn how to safely use, store and throw away your medicines at www.disposemymeds.org.          This list is accurate as of 8/14/18 11:21 AM.  Always use your most recent med list.                   Brand Name Dispense Instructions for use Diagnosis    acetaminophen 325 MG tablet    TYLENOL    50 tablet    Take 2 tablets (650 mg) by mouth every 6 hours as needed for mild pain    Acute post-operative pain       calcium citrate-vitamin D 315-250 MG-UNIT Tabs per tablet    CITRACAL     Take 2 tablets by mouth        goserelin 3.6 MG injection    ZOLADEX     Inject 3.6 mg Subcutaneous every 30 days        letrozole 2.5 MG tablet    FEMARA    30 tablet    Take 1 tablet (2.5 mg) by mouth daily    Malignant neoplasm of upper-outer quadrant of left breast in female, estrogen receptor positive (H)       medium chain triglycerides oil    MCT OIL     Take 30 mLs by mouth daily        NONFORMULARY      Take 1 capsule by mouth daily Rosehip oil        OMEGA 3-6-9 FATTY ACIDS PO      Take 2 capsules by  mouth daily        ondansetron 4 MG tablet    ZOFRAN    30 tablet    Take 1 tablet (4 mg) by mouth every 6 hours as needed for nausea    Nausea       oxyCODONE IR 5 MG tablet    ROXICODONE    30 tablet    Take 1-2 tablets (5-10 mg) by mouth every 4 hours as needed for other (pain control or improvement in physical function. Hold dose for analgesic side effects.)    Acute post-operative pain       Spirulina 500 MG Tabs      6 Tabs daily.    Malignant neoplasm of left breast in female, estrogen receptor positive, unspecified site of breast (H)       sulfamethoxazole-trimethoprim 800-160 MG per tablet    BACTRIM DS/SEPTRA DS    28 tablet    Take 1 tablet by mouth 2 times daily for 14 days    S/P breast reconstruction
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
## 2018-08-14 NOTE — MR AVS SNAPSHOT
After Visit Summary   8/14/2018    Mary Chadwick    MRN: [id number]           Patient Information     Date Of Birth          1975        Visit Information        Provider Department      8/14/2018 1:00 PM UC 11 ATC; UC ONCOLOGY INFUSION [facility]        Today's Diagnoses     Malignant neoplasm of upper-outer quadrant of left breast in female, estrogen receptor positive (H)    -  1      Care Instructions    [facility] Main Line: [phone]    Call triage nurse with chills and/or temperature greater than or equal to 100.4, uncontrolled nausea/vomiting, diarrhea, constipation, dizziness, shortness of breath, chest pain, bleeding, unexplained bruising, or any new/concerning symptoms, questions/concerns.     If you are having any concerning symptoms or wish to speak to a provider before your next infusion visit, please call your care coordinator or triage to notify them so we can adequately serve you.     For triage nurse, after hours, weekends, and holidays: [phone]          August 2018 Sunday Monday Tuesday Wednesday Thursday Friday Saturday                  1     2     3     4       5     6     Admission    5:24 AM   Antonio Andrews MD   Unit 6D Observation [facility]   (Discharge: 8/7/2018)     NM SENTINAL NODE INJ    7:00 AM   (30 min.)   [id number]   [facility] [place], Nuclear Medicine     COMBINED MASTECTOMY SIMPLE BILATERAL, SENTINEL NODE BILATERAL    7:30 AM   Antonio Andrews MD   [facility] OR 7     8     9     10     11       12     13     14     [facility] POST-OP   10:45 AM   (15 min.)   BASILIO Kidd MD   [facility]     UMP RETURN   11:35 AM   (50 min.)   Paty Pandey PA-C   [facility] ONC INFUSION 60    1:00 PM   (60 min.)   UC ONCOLOGY INFUSION   [facility] 15     16     17     UMP POST-OP   11:30 AM   (15 min.)   Antonio Andrews MD   [facility] 18        19     20     21     [facility] POST-OP    9:15 AM   (15 min.)   BASILIO Kidd MD   [facility] 22     23     24     25       26     27     28     29     30     31 September 2018 Sunday Monday Tuesday Wednesday Thursday Friday Saturday                                 1       2     3     4     5     6     7     8       9     10     11     UMP RETURN   11:45 AM   (30 min.)   Valeria Gann MD   [facility] ONC INFUSION 60    1:30 PM   (60 min.)   UC ONCOLOGY INFUSION   formerly [facility] 12     13     14     15       16     17     18     19     20     21     22       23     24     25     26     27     28     29       30                                                Lab Results:  No results found for this or any previous visit (from the past 12 hour(s)).                Follow-ups after your visit        Your next 10 appointments already scheduled     Aug 17, 2018 11:45 AM CDT   (Arrive by 11:30 AM)   Post-Op with Antonio Andrews MD   [facility] ([facility])    [address] [place] [address]  [address]  [facility] [address]   [phone]            Aug 21, 2018  9:30 AM CDT   (Arrive by 9:15 AM)   Post-Op with BASILIO Kidd MD   [facility] ([facility])    [address] [place] [address]  [address]  [facility] [address]   [phone]            Sep 11, 2018 12:00 PM CDT   (Arrive by 11:45 AM)   Return Visit with Valeria Gann MD   formerly [facility] ([facility])    [address] [place] [address]  [address]  [facility] [address]   [phone]            Sep 11, 2018  1:30 PM CDT   Infusion 60 with UC ONCOLOGY INFUSION, UC 28 ATC   [facility] ([facility])    [address] [place] [address]  [address]  [facility] [address]   [phone]              Who to contact     If you have questions or  need follow up information about today's clinic visit or your schedule please contact [facility] directly at [phone].  Normal or non-critical lab and imaging results will be communicated to you by [email or web address], letter or phone within 4 business days after the clinic has received the results. If you do not hear from us within 7 days, please contact the clinic through [email or web address] or phone. If you have a critical or abnormal lab result, we will notify you by phone as soon as possible.  Submit refill requests through [employer] or call your pharmacy and they will forward the refill request to us. Please allow 3 business days for your refill to be completed.          Additional Information About Your Visit        [email or web address] Information     [employer] gives you secure access to your electronic health record. If you see a primary care provider, you can also send messages to your care team and make appointments. If you have questions, please call your primary care clinic.  If you do not have a primary care provider, please call [phone] and they will assist you.        Care EveryWhere ID     This is your Care EveryWhere ID. This could be used by other organizations to access your [place] medical records  [id number]         Blood Pressure from Last 3 Encounters:   08/14/18 123/73   08/14/18 123/73   08/07/18 109/70    Weight from Last 3 Encounters:   08/14/18 77 kg (169 lb 12.1 oz)   08/14/18 77 kg (169 lb 11.2 oz)   08/06/18 78 kg (171 lb 15.3 oz)              Today, you had the following     No orders found for display         Today's Medication Changes          These changes are accurate as of 8/14/18  1:02 PM.  If you have any questions, ask your nurse or doctor.               These medicines have changed or have updated prescriptions.        Dose/Directions    * letrozole 2.5 MG tablet   Commonly known as:  FEMARA   This may have changed:  Another medication with the same name was added. Make sure you  understand how and when to take each.   Used for:  Malignant neoplasm of upper-outer quadrant of left breast in female, estrogen receptor positive (H)        Dose:  2.5 mg   Take 1 tablet (2.5 mg) by mouth daily   Quantity:  30 tablet   Refills:  11       * letrozole 2.5 MG tablet   Commonly known as:  FEMARA   This may have changed:  You were already taking a medication with the same name, and this prescription was added. Make sure you understand how and when to take each.   Used for:  Malignant neoplasm of upper-outer quadrant of left breast in female, estrogen receptor positive (H)        Dose:  2.5 mg   Take 1 tablet (2.5 mg) by mouth daily   Quantity:  90 tablet   Refills:  11       * Notice:  This list has 2 medication(s) that are the same as other medications prescribed for you. Read the directions carefully, and ask your doctor or other care provider to review them with you.         Where to get your medicines      These medications were sent to [address]  [address] [place] [address]     Phone:  [phone]     letrozole 2.5 MG tablet                Primary Care Provider Office Phone #    Flora Crews, [phone]       [facility] [address] [facility] [address]  [facility] [address]        Equal Access to Services     SURESH CHAPARRO : Michelle Herndon, gary black, qaraineta kaalmada kelle, naseem mendoza. So [facility] [phone].    ATENCIÓN: Si habla español, tiene a sparrow disposición servicios gratuitos de asistencia lingüística. Llame al [phone].    We comply with applicable federal civil rights laws and [place] laws. We do not discriminate on the basis of race, color, national origin, age, disability, sex, sexual orientation, or gender identity.            Thank you!     Thank you for choosing [facility]  for your care. Our goal is always to provide you with excellent  care. Hearing back from our patients is one way we can continue to improve our services. Please take a few minutes to complete the written survey that you may receive in the mail after your visit with us. Thank you!             Your Updated Medication List - Protect others around you: Learn how to safely use, store and throw away your medicines at www.disposemymeds.org.          This list is accurate as of 8/14/18  1:02 PM.  Always use your most recent med list.                   Brand Name Dispense Instructions for use Diagnosis    acetaminophen 325 MG tablet    TYLENOL    50 tablet    Take 2 tablets (650 mg) by mouth every 6 hours as needed for mild pain    Acute post-operative pain       calcium citrate-vitamin D 315-250 MG-UNIT Tabs per tablet    CITRACAL     Take 2 tablets by mouth        goserelin 3.6 MG injection    ZOLADEX     Inject 3.6 mg Subcutaneous every 30 days        * letrozole 2.5 MG tablet    FEMARA    30 tablet    Take 1 tablet (2.5 mg) by mouth daily    Malignant neoplasm of upper-outer quadrant of left breast in female, estrogen receptor positive (H)       * letrozole 2.5 MG tablet    FEMARA    90 tablet    Take 1 tablet (2.5 mg) by mouth daily    Malignant neoplasm of upper-outer quadrant of left breast in female, estrogen receptor positive (H)       medium chain triglycerides oil    MCT OIL     Take 30 mLs by mouth daily        NONFORMULARY      Take 1 capsule by mouth daily Rosehip oil        OMEGA 3-6-9 FATTY ACIDS PO      Take 2 capsules by mouth daily        ondansetron 4 MG tablet    ZOFRAN    30 tablet    Take 1 tablet (4 mg) by mouth every 6 hours as needed for nausea    Nausea       oxyCODONE IR 5 MG tablet    ROXICODONE    30 tablet    Take 1-2 tablets (5-10 mg) by mouth every 4 hours as needed for other (pain control or improvement in physical function. Hold dose for analgesic side effects.)    Acute post-operative pain       Spirulina 500 MG Tabs      6 Tabs daily.    Malignant  neoplasm of left breast in female, estrogen receptor positive, unspecified site of breast (H)       sulfamethoxazole-trimethoprim 800-160 MG per tablet    BACTRIM DS/SEPTRA DS    28 tablet    Take 1 tablet by mouth 2 times daily for 14 days    S/P breast reconstruction       * Notice:  This list has 2 medication(s) that are the same as other medications prescribed for you. Read the directions carefully, and ask your doctor or other care provider to review them with you.

## 2018-08-17 ENCOUNTER — OFFICE VISIT (OUTPATIENT)
Dept: ONCOLOGY | Facility: CLINIC | Age: 43
End: 2018-08-17
Attending: SURGERY
Payer: COMMERCIAL

## 2018-08-17 VITALS
TEMPERATURE: 97.9 F | SYSTOLIC BLOOD PRESSURE: 111 MMHG | BODY MASS INDEX: 25.27 KG/M2 | WEIGHT: 170.6 LBS | HEIGHT: 69 IN | HEART RATE: 83 BPM | RESPIRATION RATE: 14 BRPM | OXYGEN SATURATION: 97 % | DIASTOLIC BLOOD PRESSURE: 70 MMHG

## 2018-08-17 DIAGNOSIS — C50.412 MALIGNANT NEOPLASM OF UPPER-OUTER QUADRANT OF LEFT BREAST IN FEMALE, ESTROGEN RECEPTOR POSITIVE (H): Primary | ICD-10-CM

## 2018-08-17 DIAGNOSIS — Z17.0 MALIGNANT NEOPLASM OF UPPER-OUTER QUADRANT OF LEFT BREAST IN FEMALE, ESTROGEN RECEPTOR POSITIVE (H): Primary | ICD-10-CM

## 2018-08-17 ASSESSMENT — PAIN SCALES - GENERAL: PAINLEVEL: NO PAIN (0)

## 2018-08-17 NOTE — NURSING NOTE
"Oncology Rooming Note    August 17, 2018 11:57 AM   Mary Chadwick is a 42 year old female who presents for:    Chief Complaint   Patient presents with     Oncology Clinic Visit     [facility] RETURN- BREAST CA     Initial Vitals: /70 (BP Location: Right arm, Patient Position: Chair, Cuff Size: Adult Regular)  Pulse 83  Temp 97.9  F (36.6  C)  Resp 14  Ht 1.753 m (5' 9.02\")  Wt 77.4 kg (170 lb 9.6 oz)  SpO2 97%  BMI 25.18 kg/m2 Estimated body mass index is 25.18 kg/(m^2) as calculated from the following:    Height as of this encounter: 1.753 m (5' 9.02\").    Weight as of this encounter: 77.4 kg (170 lb 9.6 oz). Body surface area is 1.94 meters squared.  No Pain (0) Comment: Data Unavailable   No LMP recorded. Patient is not currently having periods (Reason: Premenopausal).  Allergies reviewed: Yes  Medications reviewed: Yes    Medications: MEDICATION REFILLS NEEDED TODAY. Provider was NOT notified.  Pharmacy name entered into [facility]:    [facility] [place], [place] - [address]  [facility] [place], [place] - [address]    Clinical concerns: Oxycodone and acetaminophen needs to be refilled. Provider walked in while discussing medication refills. Dr. Andrews was NOT notified.    10 minutes for nursing intake (face to face time)     Christian Stone LPN            "

## 2018-08-17 NOTE — LETTER
8/17/2018       RE: Mary Chadwick  [address]  [facility] [address]     Dear Colleague,    Thank you for referring your patient, Mary Chadwick, to the [facility] BREAST CENTER at [facility]. Please see a copy of my visit note below.    HISTORY OF PRESENT ILLNESS:  Mary Chadwick is here for a postoperative visit after undergoing a bilateral mastectomy and sentinel lymph node biopsy.  Her final pathology report is not entirely done but she still had residual disease in her breast and she had 2 sentinel lymph nodes.  One had a 4 mm metastasis without extranodal extension.  The other was benign.  Today, we talked about the treatment options.  I told her that some physicians would recommend an axillary node dissection in this setting, others would recommend radiation and others would recommend simple lymph node observation.  I told her that our radiation oncologist would not give her radiation therapy because of secondary malignancies.  I told her I do not think that she would have a survival benefit by having her clinically negative lymph nodes removed and she would certainly have side effects.  I recommended simple observation.  She is going to follow up with Dr. Gann and continue her endocrine therapy.  She is going to see Dr. Kidd next week to have her drains removed.  I will see her in the future if any problems arise.         Again, thank you for allowing me to participate in the care of your patient.      Sincerely,    Antonio Andrews MD

## 2018-08-17 NOTE — MR AVS SNAPSHOT
After Visit Summary   8/17/2018    Mary Chadwick    MRN: [id number]           Patient Information     Date Of Birth          1975        Visit Information        Provider Department      8/17/2018 11:45 AM Antonio Andrews MD [facility]        Today's Diagnoses     Malignant neoplasm of upper-outer quadrant of left breast in female, estrogen receptor positive (H)    -  1       Follow-ups after your visit        Your next 10 appointments already scheduled     Aug 21, 2018  9:30 AM CDT   (Arrive by 9:15 AM)   Post-Op with BASILIO Kidd MD   [facility] ([facility])    [address] [place] [address]  [address]  [facility] [id number] [phone]            Sep 11, 2018 12:00 PM CDT   (Arrive by 11:45 AM)   Return Visit with Valeria Gann MD   [facility] ([facility])    [address] [place] [address]  [address]  [facility] [id number] [phone]            Sep 11, 2018  1:30 PM CDT   Infusion 60 with UC ONCOLOGY INFUSION, UC 28 ATC   [facility] ([facility])    [address] [place] [address]  [address]  [facility] [id number] [phone]              Who to contact     If you have questions or need follow up information about today's clinic visit or your schedule please contact [facility] directly at [phone].  Normal or non-critical lab and imaging results will be communicated to you by MyChart, letter or phone within 4 business days after the clinic has received the results. If you do not hear from us within 7 days, please contact the clinic through MyChart or phone. If you have a critical or abnormal lab result, we will notify you by phone as soon as possible.  Submit refill requests through Amiare or call your pharmacy and they will forward the refill request to us. Please allow 3 business days for your refill to be completed.           "Additional Information About Your Visit        MyChart Information     [employer] gives you secure access to your electronic health record. If you see a primary care provider, you can also send messages to your care team and make appointments. If you have questions, please call your primary care clinic.  If you do not have a primary care provider, please call [phone] and they will assist you.        Care EveryWhere ID     This is your Care EveryWhere ID. This could be used by other organizations to access your [place] medical records  [id number]        Your Vitals Were     Pulse Temperature Respirations Height Pulse Oximetry BMI (Body Mass Index)    83 97.9  F (36.6  C) 14 1.753 m (5' 9.02\") 97% 25.18 kg/m2       Blood Pressure from Last 3 Encounters:   08/17/18 111/70   08/14/18 123/73   08/14/18 123/73    Weight from Last 3 Encounters:   08/17/18 77.4 kg (170 lb 9.6 oz)   08/14/18 77 kg (169 lb 12.1 oz)   08/14/18 77 kg (169 lb 11.2 oz)              Today, you had the following     No orders found for display       Primary Care Provider Office Phone #    Flora Crews, [phone]       [facility] [address] [facility] [address]  [facility] [address]        Equal Access to Services     SURESH CHAPARRO AH: Hadii olivier ku hadasho Soomaali, waaxda luqadaha, qaybta kaalmada adeegyada, naseem riverain hayangelinen lorena lugo . So [facility] [phone].    ATENCIÓN: Si habla español, tiene a sparrow disposición servicios gratuitos de asistencia lingüística. Llame al [phone].    We comply with applicable federal civil rights laws and [place] laws. We do not discriminate on the basis of race, color, national origin, age, disability, sex, sexual orientation, or gender identity.            Thank you!     Thank you for choosing [facility]  for your care. Our goal is always to provide you with excellent care. Hearing back from our patients is one way we can continue to improve our services. Please take a few " minutes to complete the written survey that you may receive in the mail after your visit with us. Thank you!             Your Updated Medication List - Protect others around you: Learn how to safely use, store and throw away your medicines at www.disposemymeds.org.          This list is accurate as of 8/17/18 11:59 PM.  Always use your most recent med list.                   Brand Name Dispense Instructions for use Diagnosis    acetaminophen 325 MG tablet    TYLENOL    50 tablet    Take 2 tablets (650 mg) by mouth every 6 hours as needed for mild pain    Acute post-operative pain       calcium citrate-vitamin D 315-250 MG-UNIT Tabs per tablet    CITRACAL     Take 2 tablets by mouth        goserelin 3.6 MG injection    ZOLADEX     Inject 3.6 mg Subcutaneous every 30 days        * letrozole 2.5 MG tablet    FEMARA    30 tablet    Take 1 tablet (2.5 mg) by mouth daily    Malignant neoplasm of upper-outer quadrant of left breast in female, estrogen receptor positive (H)       * letrozole 2.5 MG tablet    FEMARA    90 tablet    Take 1 tablet (2.5 mg) by mouth daily    Malignant neoplasm of upper-outer quadrant of left breast in female, estrogen receptor positive (H)       medium chain triglycerides oil    MCT OIL     Take 30 mLs by mouth daily        NONFORMULARY      Take 1 capsule by mouth daily Rosehip oil        OMEGA 3-6-9 FATTY ACIDS PO      Take 2 capsules by mouth daily        ondansetron 4 MG tablet    ZOFRAN    30 tablet    Take 1 tablet (4 mg) by mouth every 6 hours as needed for nausea    Nausea       oxyCODONE IR 5 MG tablet    ROXICODONE    30 tablet    Take 1-2 tablets (5-10 mg) by mouth every 4 hours as needed for other (pain control or improvement in physical function. Hold dose for analgesic side effects.)    Acute post-operative pain       Spirulina 500 MG Tabs      6 Tabs daily.    Malignant neoplasm of left breast in female, estrogen receptor positive, unspecified site of breast (H)        sulfamethoxazole-trimethoprim 800-160 MG per tablet    BACTRIM DS/SEPTRA DS    28 tablet    Take 1 tablet by mouth 2 times daily for 14 days    S/P breast reconstruction       * Notice:  This list has 2 medication(s) that are the same as other medications prescribed for you. Read the directions carefully, and ask your doctor or other care provider to review them with you.

## 2018-08-17 NOTE — PROGRESS NOTES
HISTORY OF PRESENT ILLNESS:  Mary Chadwick is here for a postoperative visit after undergoing a bilateral mastectomy and sentinel lymph node biopsy.  Her final pathology report is not entirely done but she still had residual disease in her breast and she had 2 sentinel lymph nodes.  One had a 4 mm metastasis without extranodal extension.  The other was benign.  Today, we talked about the treatment options.  I told her that some physicians would recommend an axillary node dissection in this setting, others would recommend radiation and others would recommend simple lymph node observation.  I told her that our radiation oncologist would not give her radiation therapy because of secondary malignancies.  I told her I do not think that she would have a survival benefit by having her clinically negative lymph nodes removed and she would certainly have side effects.  I recommended simple observation.  She is going to follow up with Dr. Gann and continue her endocrine therapy.  She is going to see Dr. Kidd next week to have her drains removed.  I will see her in the future if any problems arise.

## 2018-08-18 LAB — COPATH REPORT: NORMAL

## 2018-08-20 LAB — COPATH REPORT: NORMAL

## 2018-08-21 ENCOUNTER — OFFICE VISIT (OUTPATIENT)
Dept: PLASTIC SURGERY | Facility: CLINIC | Age: 43
End: 2018-08-21
Attending: PLASTIC SURGERY
Payer: COMMERCIAL

## 2018-08-21 DIAGNOSIS — Z98.890 S/P BREAST RECONSTRUCTION, BILATERAL: Primary | ICD-10-CM

## 2018-08-21 NOTE — PROGRESS NOTES
PRESENTING COMPLAINT:  Postoperative visit, status post bilateral breast reconstruction after undergoing bilateral nipple-nonsparing mastectomy for left-sided breast cancer, reconstruction done with prepectoral expanders and AlloDerm, done 08/06/2018.       HISTORY OF PRESENTING COMPLAINT:  Ms. Chadwick is 42 years old.  She is 2 weeks out from surgery and has done extremely well.  She is very happy with the results, no major issues.  Her pathology is not back.  Her MICHAEL drainage is 20-30 mL a day.       PHYSICAL EXAMINATION:  Vital signs are stable.  She is afebrile, in no obvious distress.  Both breasts are healing in well.  No evidence for infection, seroma or hematoma.       ASSESSMENT AND PLAN:  Based upon the above findings, a diagnosis of bilateral breast reconstruction for breast cancer was made.  Removed the JPs.  She will start moisturizing her chest.  RTC 1 week to start expansions. No need for Chemo or XRT. Will plan next stages next week.  All questions were answered.  She was happy with the visit.

## 2018-08-21 NOTE — MR AVS SNAPSHOT
After Visit Summary   8/21/2018    Mary Chadwick    MRN: [id number]           Patient Information     Date Of Birth          1975        Visit Information        Provider Department      8/21/2018 11:15 AM BASILIO Kidd MD [facility]         Follow-ups after your visit        Your next 10 appointments already scheduled     Aug 28, 2018  8:15 AM CDT   (Arrive by 8:00 AM)   Post-Op with BASILIO Kidd MD   [facility] ([facility])    [address] [place] [address]  [address]  [facility] [id number] [phone]            Sep 11, 2018 12:00 PM CDT   (Arrive by 11:45 AM)   Return Visit with Valeria Gann MD   [facility] ([facility])    [address] [place] [address]  [address]  [facility] [address] [phone]            Sep 11, 2018  1:30 PM CDT   Infusion 60 with UC ONCOLOGY INFUSION, UC 28 ATC   [facility] ([facility])    [address] [place] [address]  [address]  [facility] [address] [phone]              Who to contact     If you have questions or need follow up information about today's clinic visit or your schedule please contact [facility] directly at [phone].  Normal or non-critical lab and imaging results will be communicated to you by MyChart, letter or phone within 4 business days after the clinic has received the results. If you do not hear from us within 7 days, please contact the clinic through MyChart or phone. If you have a critical or abnormal lab result, we will notify you by phone as soon as possible.  Submit refill requests through lmbang or call your pharmacy and they will forward the refill request to us. Please allow 3 business days for your refill to be completed.          Additional Information About Your Visit        Chaologixhart Information     [employer]ang gives you secure access to your electronic health  record. If you see a primary care provider, you can also send messages to your care team and make appointments. If you have questions, please call your primary care clinic.  If you do not have a primary care provider, please call [phone] and they will assist you.        Care EveryWhere ID     This is your Care EveryWhere ID. This could be used by other organizations to access your [place] medical records  [id number]         Blood Pressure from Last 3 Encounters:   08/17/18 111/70   08/14/18 123/73   08/14/18 123/73    Weight from Last 3 Encounters:   08/17/18 77.4 kg (170 lb 9.6 oz)   08/14/18 77 kg (169 lb 12.1 oz)   08/14/18 77 kg (169 lb 11.2 oz)              Today, you had the following     No orders found for display       Primary Care Provider Office Phone #    Flora Crews, [phone]       [facility] [address] [facility] [address]  [facility] [address]        Equal Access to Services     SURESH CHAPARRO : Hadii aad ku hadasho Soomaali, waaxda luqadaha, qaybta kaalmada adeegyada, waxay idiin hayaan kvngeg geovanni lugo . So [facility] [phone].    ATENCIÓN: Si habla español, tiene a sparrow disposición servicios gratuitos de asistencia lingüística. Llame al [phone].    We comply with applicable federal civil rights laws and [place] laws. We do not discriminate on the basis of race, color, national origin, age, disability, sex, sexual orientation, or gender identity.            Thank you!     Thank you for choosing [facility]  for your care. Our goal is always to provide you with excellent care. Hearing back from our patients is one way we can continue to improve our services. Please take a few minutes to complete the written survey that you may receive in the mail after your visit with us. Thank you!             Your Updated Medication List - Protect others around you: Learn how to safely use, store and throw away your medicines at www.disposemymeds.org.          This list is  accurate as of 8/21/18 11:27 AM.  Always use your most recent med list.                   Brand Name Dispense Instructions for use Diagnosis    acetaminophen 325 MG tablet    TYLENOL    50 tablet    Take 2 tablets (650 mg) by mouth every 6 hours as needed for mild pain    Acute post-operative pain       calcium citrate-vitamin D 315-250 MG-UNIT Tabs per tablet    CITRACAL     Take 2 tablets by mouth        goserelin 3.6 MG injection    ZOLADEX     Inject 3.6 mg Subcutaneous every 30 days        * letrozole 2.5 MG tablet    FEMARA    30 tablet    Take 1 tablet (2.5 mg) by mouth daily    Malignant neoplasm of upper-outer quadrant of left breast in female, estrogen receptor positive (H)       * letrozole 2.5 MG tablet    FEMARA    90 tablet    Take 1 tablet (2.5 mg) by mouth daily    Malignant neoplasm of upper-outer quadrant of left breast in female, estrogen receptor positive (H)       medium chain triglycerides oil    MCT OIL     Take 30 mLs by mouth daily        NONFORMULARY      Take 1 capsule by mouth daily Rosehip oil        OMEGA 3-6-9 FATTY ACIDS PO      Take 2 capsules by mouth daily        ondansetron 4 MG tablet    ZOFRAN    30 tablet    Take 1 tablet (4 mg) by mouth every 6 hours as needed for nausea    Nausea       oxyCODONE IR 5 MG tablet    ROXICODONE    30 tablet    Take 1-2 tablets (5-10 mg) by mouth every 4 hours as needed for other (pain control or improvement in physical function. Hold dose for analgesic side effects.)    Acute post-operative pain       Spirulina 500 MG Tabs      6 Tabs daily.    Malignant neoplasm of left breast in female, estrogen receptor positive, unspecified site of breast (H)       * Notice:  This list has 2 medication(s) that are the same as other medications prescribed for you. Read the directions carefully, and ask your doctor or other care provider to review them with you.

## 2018-08-21 NOTE — LETTER
8/21/2018       RE: Mary Chadwick  [address]  [facility] [address]     Dear Colleague,    Thank you for referring your patient, Mary Chadwick, to the [facility] at [facility]. Please see a copy of my visit note below.    PRESENTING COMPLAINT:  Postoperative visit, status post bilateral breast reconstruction after undergoing bilateral nipple-nonsparing mastectomy for left-sided breast cancer, reconstruction done with prepectoral expanders and AlloDerm, done 08/06/2018.       HISTORY OF PRESENTING COMPLAINT:  Ms. Chadwick is 42 years old.  She is 2 weeks out from surgery and has done extremely well.  She is very happy with the results, no major issues.  Her pathology is not back.  Her MICHAEL drainage is 20-30 mL a day.       PHYSICAL EXAMINATION:  Vital signs are stable.  She is afebrile, in no obvious distress.  Both breasts are healing in well.  No evidence for infection, seroma or hematoma.       ASSESSMENT AND PLAN:  Based upon the above findings, a diagnosis of bilateral breast reconstruction for breast cancer was made.   Removed the JPs.  She will start moisturizing her chest.   RTC 1 week to start expansions. No need for Chemo or XRT. Will plan next stages next week.  All questions were answered.  She was happy with the visit.     Again, thank you for allowing me to participate in the care of your patient.      Sincerely,    BASILIO Kidd MD

## 2018-08-22 ASSESSMENT — ENCOUNTER SYMPTOMS
FATIGUE: 1
NIGHT SWEATS: 1
WEIGHT LOSS: 0
POLYDIPSIA: 1
FEVER: 0
POLYPHAGIA: 1
INCREASED ENERGY: 1
DECREASED APPETITE: 0
ALTERED TEMPERATURE REGULATION: 1
WEIGHT GAIN: 1

## 2018-08-28 ENCOUNTER — OFFICE VISIT (OUTPATIENT)
Dept: PLASTIC SURGERY | Facility: CLINIC | Age: 43
End: 2018-08-28
Attending: PLASTIC SURGERY
Payer: COMMERCIAL

## 2018-08-28 DIAGNOSIS — Z98.890 S/P BREAST RECONSTRUCTION, BILATERAL: Primary | ICD-10-CM

## 2018-08-28 NOTE — PROGRESS NOTES
PRESENTING COMPLAINT:  Postoperative visit, status post bilateral breast reconstruction after undergoing bilateral nipple-nonsparing mastectomy for left-sided breast cancer, reconstruction done with prepectoral expanders and AlloDerm, done 08/06/2018.       HISTORY OF PRESENTING COMPLAINT:  Ms. Chadwick is 42 years old.  She is 3 weeks out from surgery and has done extremely well.  She is very happy with the results, no major issues.       PHYSICAL EXAMINATION:  Vital signs are stable.  She is afebrile, in no obvious distress.  Both breasts are healing in well.  No evidence for infection, seroma or hematoma.       ASSESSMENT AND PLAN:  Based upon the above findings, a diagnosis of bilateral breast reconstruction for breast cancer was made. Under sterile conditions we removed the air and replaced with R: 300cc and Left 300 ml of saline. RTC 1 week to start expansions. No need for Chemo or XRT. Will plan next stages next week.  All questions were answered.  She was happy with the visit.

## 2018-08-28 NOTE — LETTER
8/28/2018       RE: Mary Chadwick  [address]  [facility] [address]     Dear Colleague,    Thank you for referring your patient, Mary Chadwick, to the [facility] at [facility]. Please see a copy of my visit note below.    PRESENTING COMPLAINT:  Postoperative visit, status post bilateral breast reconstruction after undergoing bilateral nipple-nonsparing mastectomy for left-sided breast cancer, reconstruction done with prepectoral expanders and AlloDerm, done 08/06/2018.       HISTORY OF PRESENTING COMPLAINT:  Ms. Chadwick is 42 years old.  She is 3 weeks out from surgery and has done extremely well.  She is very happy with the results, no major issues.       PHYSICAL EXAMINATION:  Vital signs are stable.  She is afebrile, in no obvious distress.  Both breasts are healing in well.  No evidence for infection, seroma or hematoma.       ASSESSMENT AND PLAN:  Based upon the above findings, a diagnosis of bilateral breast reconstruction for breast cancer was made. Under sterile conditions we removed the air and replaced with R: 300cc and Left 300 ml of saline. RTC 1 week to start expansions. No need for Chemo or XRT. Will plan next stages next week.  All questions were answered.  She was happy with the visit.     Again, thank you for allowing me to participate in the care of your patient.      Sincerely,    BASILIO Kidd MD

## 2018-08-28 NOTE — MR AVS SNAPSHOT
After Visit Summary   8/28/2018    Mary Chadwick    MRN: [id number]           Patient Information     Date Of Birth          1975        Visit Information        Provider Department      8/28/2018 8:15 AM BASILIO Kidd MD [facility]        Today's Diagnoses     S/P breast reconstruction, bilateral    -  1       Follow-ups after your visit        Follow-up notes from your care team     Return in about 1 year (around 8/28/2019).      Your next 10 appointments already scheduled     Sep 11, 2018 12:00 PM CDT   (Arrive by 11:45 AM)   Return Visit with Valeria Gann MD   [facility] [place] [facility] ([facility])    [address] [place] [address]  [address]  [facility] [address] [phone]            Sep 11, 2018  1:30 PM CDT   Infusion 60 with UC ONCOLOGY INFUSION, UC 28 ATC   [facility] [place] [facility] ([facility])    [address] [place] [address]  [address]  [facility] [address] [phone]              Who to contact     If you have questions or need follow up information about today's clinic visit or your schedule please contact [facility] directly at [phone].  Normal or non-critical lab and imaging results will be communicated to you by OriginGP[employer]art, letter or phone within 4 business days after the clinic has received the results. If you do not hear from us within 7 days, please contact the clinic through OriginGPShart or phone. If you have a critical or abnormal lab result, we will notify you by phone as soon as possible.  Submit refill requests through [employer] or call your pharmacy and they will forward the refill request to us. Please allow 3 business days for your refill to be completed.          Additional Information About Your Visit        MyChart Information     [employer] gives you secure access to your electronic health record. If you see a primary care provider, you can also send messages to your  care team and make appointments. If you have questions, please call your primary care clinic.  If you do not have a primary care provider, please call [phone] and they will assist you.        Care EveryWhere ID     This is your Care EveryWhere ID. This could be used by other organizations to access your [place] medical records  [id number]         Blood Pressure from Last 3 Encounters:   08/17/18 111/70   08/14/18 123/73   08/14/18 123/73    Weight from Last 3 Encounters:   08/17/18 170 lb 9.6 oz   08/14/18 169 lb 12.1 oz   08/14/18 169 lb 11.2 oz              Today, you had the following     No orders found for display       Primary Care Provider Office Phone #    Flora Crews, [phone]       [facility] [address] [facility] [address]  [facility] [address]        Equal Access to Services     SURESH CHAPARRO : Hadii olivier lazaro hadmartha Sobharati, waaxda luqadaha, qaybta kaalmada lorenayada, naseem lugo . So [facility] [phone].    ATENCIÓN: Si habla español, tiene a sparrow disposición servicios gratuitos de asistencia lingüística. Angellaame al [phone].    We comply with applicable federal civil rights laws and [place] laws. We do not discriminate on the basis of race, color, national origin, age, disability, sex, sexual orientation, or gender identity.            Thank you!     Thank you for choosing [facility]  for your care. Our goal is always to provide you with excellent care. Hearing back from our patients is one way we can continue to improve our services. Please take a few minutes to complete the written survey that you may receive in the mail after your visit with us. Thank you!             Your Updated Medication List - Protect others around you: Learn how to safely use, store and throw away your medicines at www.disposemymeds.org.          This list is accurate as of 8/28/18  9:08 AM.  Always use your most recent med list.                   Brand Name Dispense  Instructions for use Diagnosis    acetaminophen 325 MG tablet    TYLENOL    50 tablet    Take 2 tablets (650 mg) by mouth every 6 hours as needed for mild pain    Acute post-operative pain       calcium citrate-vitamin D 315-250 MG-UNIT Tabs per tablet    CITRACAL     Take 2 tablets by mouth        goserelin 3.6 MG injection    ZOLADEX     Inject 3.6 mg Subcutaneous every 30 days        * letrozole 2.5 MG tablet    FEMARA    30 tablet    Take 1 tablet (2.5 mg) by mouth daily    Malignant neoplasm of upper-outer quadrant of left breast in female, estrogen receptor positive (H)       * letrozole 2.5 MG tablet    FEMARA    90 tablet    Take 1 tablet (2.5 mg) by mouth daily    Malignant neoplasm of upper-outer quadrant of left breast in female, estrogen receptor positive (H)       medium chain triglycerides oil    MCT OIL     Take 30 mLs by mouth daily        NONFORMULARY      Take 1 capsule by mouth daily Rosehip oil        OMEGA 3-6-9 FATTY ACIDS PO      Take 2 capsules by mouth daily        ondansetron 4 MG tablet    ZOFRAN    30 tablet    Take 1 tablet (4 mg) by mouth every 6 hours as needed for nausea    Nausea       oxyCODONE IR 5 MG tablet    ROXICODONE    30 tablet    Take 1-2 tablets (5-10 mg) by mouth every 4 hours as needed for other (pain control or improvement in physical function. Hold dose for analgesic side effects.)    Acute post-operative pain       Spirulina 500 MG Tabs      6 Tabs daily.    Malignant neoplasm of left breast in female, estrogen receptor positive, unspecified site of breast (H)       * Notice:  This list has 2 medication(s) that are the same as other medications prescribed for you. Read the directions carefully, and ask your doctor or other care provider to review them with you.

## 2018-09-04 ENCOUNTER — OFFICE VISIT (OUTPATIENT)
Dept: PLASTIC SURGERY | Facility: CLINIC | Age: 43
End: 2018-09-04
Attending: PLASTIC SURGERY
Payer: COMMERCIAL

## 2018-09-04 VITALS
WEIGHT: 174 LBS | BODY MASS INDEX: 25.77 KG/M2 | HEART RATE: 72 BPM | SYSTOLIC BLOOD PRESSURE: 120 MMHG | OXYGEN SATURATION: 97 % | RESPIRATION RATE: 16 BRPM | TEMPERATURE: 98.9 F | DIASTOLIC BLOOD PRESSURE: 76 MMHG | HEIGHT: 69 IN

## 2018-09-04 DIAGNOSIS — Z98.890 S/P BREAST RECONSTRUCTION, BILATERAL: Primary | ICD-10-CM

## 2018-09-04 PROCEDURE — G0463 HOSPITAL OUTPT CLINIC VISIT: HCPCS | Mod: ZF

## 2018-09-04 RX ORDER — CEFAZOLIN SODIUM 1 G/50ML
1 INJECTION, SOLUTION INTRAVENOUS SEE ADMIN INSTRUCTIONS
Status: CANCELLED | OUTPATIENT
Start: 2018-09-04 | End: 2019-09-04

## 2018-09-04 ASSESSMENT — PAIN SCALES - GENERAL: PAINLEVEL: NO PAIN (0)

## 2018-09-04 NOTE — NURSING NOTE
"Oncology Rooming Note    September 4, 2018 8:31 AM   Mary Chadwick is a 42 year old female who presents for:    Chief Complaint   Patient presents with     Oncology Clinic Visit     return -      Initial Vitals: /76 (BP Location: Right arm, Patient Position: Chair, Cuff Size: Adult Regular)  Pulse 72  Temp 98.9  F (37.2  C) (Oral)  Resp 16  Ht 1.753 m (5' 9.02\")  Wt 78.9 kg (174 lb)  SpO2 97%  BMI 25.68 kg/m2 Estimated body mass index is 25.68 kg/(m^2) as calculated from the following:    Height as of this encounter: 1.753 m (5' 9.02\").    Weight as of this encounter: 78.9 kg (174 lb). Body surface area is 1.96 meters squared.  No Pain (0) Comment: Data Unavailable   No LMP recorded. Patient is not currently having periods (Reason: Premenopausal).  Allergies reviewed: Yes  Medications reviewed: Yes    Medications: Medication refills not needed today.  Pharmacy name entered into [facility]:    [facility] [place], [place] - [address] [place], [place] [address]E S  [facility] - [place], [place] - [address]    Clinical concerns: return      6 minutes for nursing intake (face to face time)     Marie Willoughby CMA            "

## 2018-09-04 NOTE — LETTER
9/4/2018       RE: Mary Chadwick  [address]  [facility] [address]     Dear Colleague,    Thank you for referring your patient, Mary Chadwick, to the [facility] at [facility]. Please see a copy of my visit note below.    PRESENTING COMPLAINT:  Postoperative visit, status post bilateral breast reconstruction after undergoing bilateral nipple-nonsparing mastectomy for left-sided breast cancer, reconstruction done with prepectoral expanders and AlloDerm, done 08/06/2018.       HISTORY OF PRESENTING COMPLAINT:  Ms. Cahdwick is 42 years old.  She is 4 weeks out from surgery and has done extremely well.  She is very happy with the results, no major issues. Wants to be larger.      PHYSICAL EXAMINATION:  Vital signs are stable.  She is afebrile, in no obvious distress.  Both breasts are healing in well.  No evidence for infection, seroma or hematoma.       ASSESSMENT AND PLAN:  Based upon the above findings, a diagnosis of bilateral breast reconstruction for breast cancer was made. Under sterile conditions injected 180 mL bilaterally: Totals: R: 480 cc and Left 480 ml of saline. Will plan next stage: Impalnt exchange and revision. All risks, benefits and alternatives were explained to the patient in detail, they were understood and agreed upon by the patient, they were acknowledged by the patient,   all the patient's questions were answered in detail to the patient's fullest understanding that they acknowledged, the team approach for treatment in the operating room was agreed upon by the patient, and proceeding with surgery was agreed upon by the patient. All questions were answered.  She was happy with the visit.     Again, thank you for allowing me to participate in the care of your patient.      Sincerely,    BASILIO Kidd MD

## 2018-09-04 NOTE — PROGRESS NOTES
PRESENTING COMPLAINT:  Postoperative visit, status post bilateral breast reconstruction after undergoing bilateral nipple-nonsparing mastectomy for left-sided breast cancer, reconstruction done with prepectoral expanders and AlloDerm, done 08/06/2018.       HISTORY OF PRESENTING COMPLAINT:  Ms. Chadwick is 42 years old.  She is 4 weeks out from surgery and has done extremely well.  She is very happy with the results, no major issues. Wants to be larger.      PHYSICAL EXAMINATION:  Vital signs are stable.  She is afebrile, in no obvious distress.  Both breasts are healing in well.  No evidence for infection, seroma or hematoma.       ASSESSMENT AND PLAN:  Based upon the above findings, a diagnosis of bilateral breast reconstruction for breast cancer was made. Under sterile conditions injected 180 mL bilaterally: Totals: R: 480 cc and Left 480 ml of saline. Will plan next stage: Impalnt exchange and revision. All risks, benefits and alternatives were explained to the patient in detail, they were understood and agreed upon by the patient, they were acknowledged by the patient,   all the patient's questions were answered in detail to the patient's fullest understanding that they acknowledged, the team approach for treatment in the operating room was agreed upon by the patient, and proceeding with surgery was agreed upon by the patient. All questions were answered.  She was happy with the visit.

## 2018-09-04 NOTE — MR AVS SNAPSHOT
After Visit Summary   9/4/2018    Mary Chadwick    MRN: [id number]           Patient Information     Date Of Birth          1975        Visit Information        Provider Department      9/4/2018 8:15 AM BASILIO Kidd MD [facility]        Today's Diagnoses     S/P breast reconstruction, bilateral    -  1       Follow-ups after your visit        Follow-up notes from your care team     Return if symptoms worsen or fail to improve.      Your next 10 appointments already scheduled     Sep 11, 2018 12:00 PM CDT   (Arrive by 11:45 AM)   Return Visit with Valeria Gann MD   [facility])    [address] [place] [address]  [address]  [facility] [address] [phone]            Sep 11, 2018  1:30 PM CDT   Infusion 60 with UC ONCOLOGY INFUSION, UC 28 ATC   [facility])    [address] [place] [address]  [address]  [facility] [address] [phone]              Who to contact     If you have questions or need follow up information about today's clinic visit or your schedule please contact [facility] directly at [phone].  Normal or non-critical lab and imaging results will be communicated to you by MyChart, letter or phone within 4 business days after the clinic has received the results. If you do not hear from us within 7 days, please contact the clinic through MyChart or phone. If you have a critical or abnormal lab result, we will notify you by phone as soon as possible.  Submit refill requests through [employer] or call your pharmacy and they will forward the refill request to us. Please allow 3 business days for your refill to be completed.          Additional Information About Your Visit        MyChart Information     [employer] gives you secure access to your electronic health record. If you see a primary care provider, you can also send messages to your  "care team and make appointments. If you have questions, please call your primary care clinic.  If you do not have a primary care provider, please call [phone] and they will assist you.        Care EveryWhere ID     This is your Care EveryWhere ID. This could be used by other organizations to access your [place] medical records  [id number]        Your Vitals Were     Pulse Temperature Respirations Height Pulse Oximetry BMI (Body Mass Index)    72 98.9  F (37.2  C) (Oral) 16 1.753 m (5' 9.02\") 97% 25.68 kg/m2       Blood Pressure from Last 3 Encounters:   09/04/18 120/76   08/17/18 111/70   08/14/18 123/73    Weight from Last 3 Encounters:   09/04/18 78.9 kg (174 lb)   08/17/18 77.4 kg (170 lb 9.6 oz)   08/14/18 77 kg (169 lb 12.1 oz)              Today, you had the following     No orders found for display       Primary Care Provider Office Phone #    Flora Crews, [phone]       [facility] [address] [facility] [address]  [facility] [address]        Equal Access to Services     SURESH CHAPARRO AH: Hadii aad ku hadlindao Sobharati, waaxda luqadaha, qaybta kaalmada adeegyada, naseem lugo . So [facility] [phone].    ATENCIÓN: Si habla español, tiene a sparrow disposición servicios gratuitos de asistencia lingüística. Llame al [phone].    We comply with applicable federal civil rights laws and [place] laws. We do not discriminate on the basis of race, color, national origin, age, disability, sex, sexual orientation, or gender identity.            Thank you!     Thank you for choosing [facility]  for your care. Our goal is always to provide you with excellent care. Hearing back from our patients is one way we can continue to improve our services. Please take a few minutes to complete the written survey that you may receive in the mail after your visit with us. Thank you!             Your Updated Medication List - Protect others around you: Learn how to safely use, " store and throw away your medicines at www.disposemymeds.org.          This list is accurate as of 9/4/18  9:28 AM.  Always use your most recent med list.                   Brand Name Dispense Instructions for use Diagnosis    acetaminophen 325 MG tablet    TYLENOL    50 tablet    Take 2 tablets (650 mg) by mouth every 6 hours as needed for mild pain    Acute post-operative pain       calcium citrate-vitamin D 315-250 MG-UNIT Tabs per tablet    CITRACAL     Take 2 tablets by mouth        goserelin 3.6 MG injection    ZOLADEX     Inject 3.6 mg Subcutaneous every 30 days        * letrozole 2.5 MG tablet    FEMARA    30 tablet    Take 1 tablet (2.5 mg) by mouth daily    Malignant neoplasm of upper-outer quadrant of left breast in female, estrogen receptor positive (H)       * letrozole 2.5 MG tablet    FEMARA    90 tablet    Take 1 tablet (2.5 mg) by mouth daily    Malignant neoplasm of upper-outer quadrant of left breast in female, estrogen receptor positive (H)       medium chain triglycerides oil    MCT OIL     Take 30 mLs by mouth daily        NONFORMULARY      Take 1 capsule by mouth daily Rosehip oil        OMEGA 3-6-9 FATTY ACIDS PO      Take 2 capsules by mouth daily        ondansetron 4 MG tablet    ZOFRAN    30 tablet    Take 1 tablet (4 mg) by mouth every 6 hours as needed for nausea    Nausea       oxyCODONE IR 5 MG tablet    ROXICODONE    30 tablet    Take 1-2 tablets (5-10 mg) by mouth every 4 hours as needed for other (pain control or improvement in physical function. Hold dose for analgesic side effects.)    Acute post-operative pain       Spirulina 500 MG Tabs      6 Tabs daily.    Malignant neoplasm of left breast in female, estrogen receptor positive, unspecified site of breast (H)       * Notice:  This list has 2 medication(s) that are the same as other medications prescribed for you. Read the directions carefully, and ask your doctor or other care provider to review them with you.

## 2018-09-06 ENCOUNTER — TELEPHONE (OUTPATIENT)
Dept: PLASTIC SURGERY | Facility: CLINIC | Age: 43
End: 2018-09-06

## 2018-09-10 ENCOUNTER — MYC MEDICAL ADVICE (OUTPATIENT)
Dept: PLASTIC SURGERY | Facility: CLINIC | Age: 43
End: 2018-09-10

## 2018-09-10 ENCOUNTER — TELEPHONE (OUTPATIENT)
Dept: PLASTIC SURGERY | Facility: CLINIC | Age: 43
End: 2018-09-10

## 2018-09-10 NOTE — TELEPHONE ENCOUNTER
Patient is scheduled for surgery with Dr. Kidd    Spoke or left message with: Spoke w/ patient on 09/10/18    Date of Surgery: 09/20/18    Location: [facility]    Informed patient they will need an adult      Pre-op with surgeon (if applicable):     H&P: Scheduled with Patient will get one    Additional imaging/appointments:   Surgery packet:     Additional comments: Implants ordered

## 2018-09-11 ENCOUNTER — INFUSION THERAPY VISIT (OUTPATIENT)
Dept: ONCOLOGY | Facility: CLINIC | Age: 43
End: 2018-09-11
Attending: INTERNAL MEDICINE
Payer: COMMERCIAL

## 2018-09-11 ENCOUNTER — OFFICE VISIT (OUTPATIENT)
Dept: PLASTIC SURGERY | Facility: CLINIC | Age: 43
End: 2018-09-11
Attending: PLASTIC SURGERY
Payer: COMMERCIAL

## 2018-09-11 VITALS
OXYGEN SATURATION: 98 % | WEIGHT: 178.2 LBS | BODY MASS INDEX: 26.39 KG/M2 | TEMPERATURE: 98.6 F | HEART RATE: 98 BPM | RESPIRATION RATE: 16 BRPM | DIASTOLIC BLOOD PRESSURE: 72 MMHG | HEIGHT: 69 IN | SYSTOLIC BLOOD PRESSURE: 108 MMHG

## 2018-09-11 DIAGNOSIS — C50.912 MALIGNANT NEOPLASM OF LEFT BREAST IN FEMALE, ESTROGEN RECEPTOR POSITIVE, UNSPECIFIED SITE OF BREAST (H): Primary | ICD-10-CM

## 2018-09-11 DIAGNOSIS — Z15.09 MONOALLELIC MUTATION OF TP53 GENE: ICD-10-CM

## 2018-09-11 DIAGNOSIS — Z15.89 MONOALLELIC MUTATION OF TP53 GENE: ICD-10-CM

## 2018-09-11 DIAGNOSIS — Z15.01 MONOALLELIC MUTATION OF TP53 GENE: ICD-10-CM

## 2018-09-11 DIAGNOSIS — Z98.890 S/P BREAST RECONSTRUCTION, BILATERAL: Primary | ICD-10-CM

## 2018-09-11 DIAGNOSIS — Z17.0 MALIGNANT NEOPLASM OF UPPER-OUTER QUADRANT OF LEFT BREAST IN FEMALE, ESTROGEN RECEPTOR POSITIVE (H): Primary | ICD-10-CM

## 2018-09-11 DIAGNOSIS — Z17.0 MALIGNANT NEOPLASM OF LEFT BREAST IN FEMALE, ESTROGEN RECEPTOR POSITIVE, UNSPECIFIED SITE OF BREAST (H): Primary | ICD-10-CM

## 2018-09-11 DIAGNOSIS — Z15.01 LI-FRAUMENI SYNDROME: ICD-10-CM

## 2018-09-11 DIAGNOSIS — Z15.89 MONOALLELIC MUTATION OF MUTYH GENE: ICD-10-CM

## 2018-09-11 DIAGNOSIS — C50.412 MALIGNANT NEOPLASM OF UPPER-OUTER QUADRANT OF LEFT BREAST IN FEMALE, ESTROGEN RECEPTOR POSITIVE (H): Primary | ICD-10-CM

## 2018-09-11 PROCEDURE — 25000128 H RX IP 250 OP 636: Mod: ZF | Performed by: INTERNAL MEDICINE

## 2018-09-11 PROCEDURE — 99214 OFFICE O/P EST MOD 30 MIN: CPT | Mod: ZP | Performed by: INTERNAL MEDICINE

## 2018-09-11 PROCEDURE — G0463 HOSPITAL OUTPT CLINIC VISIT: HCPCS | Mod: ZF

## 2018-09-11 PROCEDURE — 25000125 ZZHC RX 250: Mod: JW,ZF | Performed by: INTERNAL MEDICINE

## 2018-09-11 PROCEDURE — 96402 CHEMO HORMON ANTINEOPL SQ/IM: CPT

## 2018-09-11 RX ADMIN — GOSERELIN ACETATE 3.6 MG: 3.6 IMPLANT SUBCUTANEOUS at 13:56

## 2018-09-11 RX ADMIN — LIDOCAINE HYDROCHLORIDE 1 ML: 10 INJECTION, SOLUTION EPIDURAL; INFILTRATION; INTRACAUDAL; PERINEURAL at 13:56

## 2018-09-11 ASSESSMENT — PAIN SCALES - GENERAL: PAINLEVEL: NO PAIN (0)

## 2018-09-11 NOTE — PROGRESS NOTES
PRESENTING COMPLAINT:  Postoperative visit, status post bilateral breast reconstruction after undergoing bilateral nipple-nonsparing mastectomy for left-sided breast cancer, reconstruction done with prepectoral expanders and AlloDerm, done 08/06/2018.       HISTORY OF PRESENTING COMPLAINT:  Ms. Chadwick is 42 years old.  She is 5 weeks out from surgery and has done extremely well.  She is very happy with the results, no major issues. Wants to be larger.      PHYSICAL EXAMINATION:  Vital signs are stable.  She is afebrile, in no obvious distress.  Both breasts are healing in well.  No evidence for infection, seroma or hematoma.       ASSESSMENT AND PLAN:  Based upon the above findings, a diagnosis of bilateral breast reconstruction for breast cancer was made. Under sterile conditions injected 150 mL bilaterally: Totals: R: 530 cc and Left 530 ml of saline. Will plan next stage: Impalnt exchange and revision. All risks, benefits and alternatives were explained to the patient in detail, they were understood and agreed upon by the patient, they were acknowledged by the patient,   all the patient's questions were answered in detail to the patient's fullest understanding that they acknowledged, the team approach for treatment in the operating room was agreed upon by the patient, and proceeding with surgery was agreed upon by the patient. All questions were answered.  She was happy with the visit.

## 2018-09-11 NOTE — MR AVS SNAPSHOT
After Visit Summary   9/11/2018    Mary Chadwick    MRN: [id number]           Patient Information     Date Of Birth          1975        Visit Information        Provider Department      9/11/2018 12:00 PM Valeria Gann MD [facility]        Today's Diagnoses     Malignant neoplasm of left breast in female, estrogen receptor positive, unspecified site of breast (H)    -  1    Monoallelic mutation of TP53 gene        Monoallelic mutation of MUTYH gene        Li-Fraumeni syndrome           Follow-ups after your visit        Additional Services     GASTROENTEROLOGY ADULT REF PROCEDURE ONLY       Last Lab Result: Creatinine (mg/dL)       Date                     Value                 08/06/2018               0.83             ----------  Body mass index is 26.3 kg/(m^2).     Needed:  No  Language:  English    Patient will be contacted to schedule procedure.     Please be aware that coverage of these services is subject to the terms and limitations of your health insurance plan.  Call member services at your health plan with any benefit or coverage questions.  Any procedures must be performed at a [place] facility OR coordinated by your clinic's referral office.    Please bring the following with you to your appointment:    (1) Any X-Rays, CTs or MRIs which have been performed.  Contact the facility where they were done to arrange for  prior to your scheduled appointment.    (2) List of current medications   (3) This referral request   (4) Any documents/labs given to you for this referral            OB/GYN REFERRAL       Your provider has referred you to:  [facility]: [facility] [phone]   [email or web address]    Please be aware that coverage of these services is subject to the terms and limitations of your health insurance plan.  Call member services at your health plan with any benefit or coverage  questions.      Please bring the following with you to your appointment:    (1) Any X-Rays, CTs or MRIs which have been performed.  Contact the facility where they were done to arrange for  prior to your scheduled appointment.   (2) List of current medications   (3) This referral request   (4) Any documents/labs given to you for this referral                  Your next 10 appointments already scheduled     Sep 14, 2018 10:30 AM CDT   Office Visit with Yenny Hidalgo MD   [facility] ([facility])    [address] [place] [address]  [address]  [facility] [id number] [phone]           Bring a current list of meds and any records pertaining to this visit. For Physicals, please bring immunization records and any forms needing to be filled out. Please arrive 10 minutes early to complete paperwork.            Sep 20, 2018   Procedure with BASILIO Kidd MD   [facility] ([facility])    [address] [place] [address]  5th Floor  [facility] [address] [phone]           Located in the Clinics and Surgery Center at [address] [place] [address].   parking is very convenient and highly recommended.  is a $6 flat rate fee.  Both  and self parkers should enter the main arrival plaza from [facility]; parking attendants will direct you based on your parking preference.            Sep 25, 2018  9:30 AM CDT   (Arrive by 9:15 AM)   Post-Op with BASILIO Kidd MD   [facility] ([facility])    [address] [place] [address]  [address]  [facility] [address]   [phone]            Oct 09, 2018  1:00 PM CDT   Infusion 60 with UC ONCOLOGY INFUSION, UC 11 ATC   [facility] ([facility])    [address] [place] [address]  [address]  [facility] [address]   [phone]            Nov 06, 2018  1:30 PM CST   Infusion 60 with UC  ONCOLOGY INFUSION,  28 ATC   [facility] ([facility])    [address]  [address]  [facility] [id number] [phone]            Nov 30, 2018  8:30 AM CST   MR BREAST BILATERAL W/O & W CONTRAST with UCMR1   [facility] ([facility])    [address]  1st Floor  [facility] [id number] [phone]           Take your medicines as usual, unless your doctor tells you not to. Bring a list of your current medicines to your exam (including vitamins, minerals and over-the-counter drugs).  The timing of your exam may depend on the start of your last period. If you re in menopause, you may have the exam anytime.  Please bring any previous mammograms or breast MRIs from other facilities to the MRI dept. Do not mail these items to us.   You will have IV contrast for this exam.  You do not need to do anything special to prepare.  The MRI machine uses a strong magnet. Please wear clothes without metal (snaps, zippers). A sweatsuit works well, or we may give you a hospital gown.  Please remove any body piercings and hair extensions before you arrive. You will also remove watches, jewelry, hairpins, wallets, dentures, partial dental plates and hearing aids. You may wear contact lenses, and you may be able to wear your rings. We have a safe place to keep your personal items, but it is safer to leave them at home.  **IMPORTANT** THE INSTRUCTIONS BELOW ARE ONLY FOR THOSE PATIENTS WHO HAVE BEEN PRESCRIBED SEDATION OR GENERAL ANESTHESIA DURING THEIR MRI PROCEDURE:  IF YOUR DOCTOR PRESCRIBED ORAL SEDATION (take medicine to help you relax during your exam):   You must get the medicine from your doctor (oral medication) before you arrive. Bring the medicine to the exam. Do not take it at home. You ll be told when to take it upon arriving for your exam.   Arrive one hour early. Bring someone who can take you home after the test.  Your medicine will make you sleepy. After the exam, you may not drive, take a bus or take a taxi by yourself.  IF YOUR DOCTOR PRESCRIBED IV SEDATION:   Arrive one hour early. Bring someone who can take you home after the test. Your medicine will make you sleepy. After the exam, you may not drive, take a bus or take a taxi by yourself.   No eating 6 hours before your exam. You may have clear liquids up until 4 hours before your exam. (Clear liquids include water, clear tea, black coffee and fruit juice without pulp.)  IF YOUR DOCTOR PRESCRIBED ANESTHESIA (be asleep for your exam):   Arrive 1 1/2 hours early. Bring someone who can take you home after the test. You may not drive, take a bus or take a taxi by yourself.   No eating 8 hours before your exam. You may have clear liquids up until 4 hours before your exam. (Clear liquids include water, clear tea, black coffee and fruit juice without pulp.)   You will spend four to five hours in the recovery room.  If you have any questions, please contact your Imaging Department exam site.            Nov 30, 2018  9:15 AM CST   MR CHEST W/O & W CONTRAST with [address] MRI ([facility])    [address] [address] [phone]           Take your medicines as usual, unless your doctor tells you not to. Bring a list of your current medicines to your exam (including vitamins, minerals and over-the-counter drugs).  You may or may not receive intravenous (IV) contrast for this exam pending the discretion of the Radiologist.  You do not need to do anything special to prepare.  The MRI machine uses a strong magnet. Please wear clothes without metal (snaps, zippers). A sweatsuit works well, or we may give you a hospital gown.  Please remove any body piercings and hair extensions before you arrive. You will also remove watches, jewelry, hairpins, wallets, dentures, partial dental plates and hearing aids.  You may wear contact lenses, and you may be able to wear your rings. We have a safe place to keep your personal items, but it is safer to leave them at home.  **IMPORTANT** THE INSTRUCTIONS BELOW ARE ONLY FOR THOSE PATIENTS WHO HAVE BEEN PRESCRIBED SEDATION OR GENERAL ANESTHESIA DURING THEIR MRI PROCEDURE:  IF YOUR DOCTOR PRESCRIBED ORAL SEDATION (take medicine to help you relax during your exam):   You must get the medicine from your doctor (oral medication) before you arrive. Bring the medicine to the exam. Do not take it at home. You ll be told when to take it upon arriving for your exam.   Arrive one hour early. Bring someone who can take you home after the test. Your medicine will make you sleepy. After the exam, you may not drive, take a bus or take a taxi by yourself.  IF YOUR DOCTOR PRESCRIBED IV SEDATION:   Arrive one hour early. Bring someone who can take you home after the test. Your medicine will make you sleepy. After the exam, you may not drive, take a bus or take a taxi by yourself.   No eating 6 hours before your exam. You may have clear liquids up until 4 hours before your exam. (Clear liquids include water, clear tea, black coffee and fruit juice without pulp.)  IF YOUR DOCTOR PRESCRIBED ANESTHESIA (be asleep for your exam):   Arrive 1 1/2 hours early. Bring someone who can take you home after the test. You may not drive, take a bus or take a taxi by yourself.   No eating 8 hours before your exam. You may have clear liquids up until 4 hours before your exam. (Clear liquids include water, clear tea, black coffee and fruit juice without pulp.)   You will spend four to five hours in the recovery room.  Please call the Imaging Department at your exam site with any questions.            Nov 30, 2018 10:00 AM CST   MR ABDOMEN & PELVIS W/O & W CONTRAST with [address] [facility] ([facility])    [address] [address] [phone]            Take your medicines as usual, unless your doctor tells you not to. Bring a list of your current medicines to your exam (including vitamins, minerals and over-the-counter drugs). Also bring the results of similar scans you may have had.    You may or may not receive IV contrast for this exam pending the discretion of the Radiologist.   Do not eat or drink for 6 hours prior to exam.  The MRI machine uses a strong magnet. Please wear clothes without metal (snaps, zippers). A sweatsuit works well, or we may give you a hospital gown.  Please remove any body piercings and hair extensions before you arrive. You will also remove watches, jewelry, hairpins, wallets, dentures, partial dental plates and hearing aids. You may wear contact lenses, and you may be able to wear your rings. We have a safe place to keep your personal items, but it is safer to leave them at home.  **IMPORTANT** THE INSTRUCTIONS BELOW ARE ONLY FOR THOSE PATIENTS WHO HAVE BEEN PRESCRIBED SEDATION OR GENERAL ANESTHESIA DURING THEIR MRI PROCEDURE:  IF YOUR DOCTOR PRESCRIBED ORAL SEDATION (take medicine to help you relax during your exam):   You must get the medicine from your doctor (oral medication) before you arrive. Bring the medicine to the exam. Do not take it at home. You ll be told when to take it upon arriving for your exam.   Arrive one hour early. Bring someone who can take you home after the test. Your medicine will make you sleepy. After the exam, you may not drive, take a bus or take a taxi by yourself.  IF YOUR DOCTOR PRESCRIBED IV SEDATION:   Arrive one hour early. Bring someone who can take you home after the test. Your medicine will make you sleepy. After the exam, you may not drive, take a bus or take a taxi by yourself.   No eating 6 hours before your exam. You may have clear liquids up until 4 hours before your exam. (Clear liquids include water, clear tea, black coffee and fruit juice without pulp.)  IF YOUR DOCTOR  PRESCRIBED ANESTHESIA (be asleep for your exam):   Arrive 1 1/2 hours early. Bring someone who can take you home after the test. You may not drive, take a bus or take a taxi by yourself.   No eating 8 hours before your exam. You may have clear liquids up until 4 hours before your exam. (Clear liquids include water, clear tea, black coffee and fruit juice without pulp.)   You will spend four to five hours in the recovery room.  If you have any questions, please contact your Imaging Department exam site.              Future tests that were ordered for you today     Open Future Orders        Priority Expected Expires Ordered    DX Hip/Pelvis/Spine Routine  4/9/2019 9/11/2018    MR Chest w/o & w Contrast Routine  9/11/2019 9/11/2018    MRI Abdomen & pelvis w & wo contrast Routine  12/10/2018 9/11/2018    MRI Brain w/o & w Contrast Routine  4/9/2019 9/11/2018            Who to contact     If you have questions or need follow up information about today's clinic visit or your schedule please contact [facility] directly at [phone].  Normal or non-critical lab and imaging results will be communicated to you by Palettehart, letter or phone within 4 business days after the clinic has received the results. If you do not hear from us within 7 days, please contact the clinic through [employer] or phone. If you have a critical or abnormal lab result, we will notify you by phone as soon as possible.  Submit refill requests through [email or web address] or call your pharmacy and they will forward the refill request to us. Please allow 3 business days for your refill to be completed.          Additional Information About Your Visit        PaletteharPlayroom Information     [email or web address] gives you secure access to your electronic health record. If you see a primary care provider, you can also send messages to your care team and make appointments. If you have questions, please call your primary care clinic.  If you do not have a primary care  "provider, please call [phone] and they will assist you.        Care EveryWhere ID     This is your Care EveryWhere ID. This could be used by other organizations to access your [facility] medical records  [id number]        Your Vitals Were     Pulse Temperature Respirations Height Pulse Oximetry BMI (Body Mass Index)    98 98.6  F (37  C) (Oral) 16 1.753 m (5' 9.02\") 98% 26.3 kg/m2       Blood Pressure from Last 3 Encounters:   09/11/18 108/72   09/04/18 120/76   08/17/18 111/70    Weight from Last 3 Encounters:   09/11/18 80.8 kg (178 lb 3.2 oz)   09/04/18 78.9 kg (174 lb)   08/17/18 77.4 kg (170 lb 9.6 oz)              We Performed the Following     GASTROENTEROLOGY ADULT REF PROCEDURE ONLY     OB/GYN REFERRAL        Primary Care Provider Office Phone #    Flora Crews, [phone]       [facility] [address] [facility] [address]  [facility] [address]        Equal Access to Services     [facility] Hospital[facility] ALBERTO : Hadii aad ku hadasho Soomaali, waaxda luqadaha, qaybta kaalmada adeegyada, waxtala craig hayveronica lugo . So [facility] [phone].    ATENCIÓN: Si habla español, tiene a sparrow disposición servicios gratuitos de asistencia lingüística. Llame al [phone].    We comply with applicable federal civil rights laws and [place] laws. We do not discriminate on the basis of race, color, national origin, age, disability, sex, sexual orientation, or gender identity.            Thank you!     Thank you for choosing [facility]  for your care. Our goal is always to provide you with excellent care. Hearing back from our patients is one way we can continue to improve our services. Please take a few minutes to complete the written survey that you may receive in the mail after your visit with us. Thank you!             Your Updated Medication List - Protect others around you: Learn how to safely use, store and throw away your medicines at www.disposemymeds.org.          This list is accurate " as of 9/11/18  1:34 PM.  Always use your most recent med list.                   Brand Name Dispense Instructions for use Diagnosis    acetaminophen 325 MG tablet    TYLENOL    50 tablet    Take 2 tablets (650 mg) by mouth every 6 hours as needed for mild pain    Acute post-operative pain       calcium citrate-vitamin D 315-250 MG-UNIT Tabs per tablet    CITRACAL     Take 2 tablets by mouth        goserelin 3.6 MG injection    ZOLADEX     Inject 3.6 mg Subcutaneous every 30 days        * letrozole 2.5 MG tablet    FEMARA    30 tablet    Take 1 tablet (2.5 mg) by mouth daily    Malignant neoplasm of upper-outer quadrant of left breast in female, estrogen receptor positive (H)       * letrozole 2.5 MG tablet    FEMARA    90 tablet    Take 1 tablet (2.5 mg) by mouth daily    Malignant neoplasm of upper-outer quadrant of left breast in female, estrogen receptor positive (H)       medium chain triglycerides oil    MCT OIL     Take 30 mLs by mouth daily        NONFORMULARY      Take 1 capsule by mouth daily Rosehip oil        OMEGA 3-6-9 FATTY ACIDS PO      Take 2 capsules by mouth daily        ondansetron 4 MG tablet    ZOFRAN    30 tablet    Take 1 tablet (4 mg) by mouth every 6 hours as needed for nausea    Nausea       oxyCODONE IR 5 MG tablet    ROXICODONE    30 tablet    Take 1-2 tablets (5-10 mg) by mouth every 4 hours as needed for other (pain control or improvement in physical function. Hold dose for analgesic side effects.)    Acute post-operative pain       Spirulina 500 MG Tabs      6 Tabs daily.    Malignant neoplasm of left breast in female, estrogen receptor positive, unspecified site of breast (H)       * Notice:  This list has 2 medication(s) that are the same as other medications prescribed for you. Read the directions carefully, and ask your doctor or other care provider to review them with you.

## 2018-09-11 NOTE — NURSING NOTE
"Oncology Rooming Note    September 11, 2018 12:07 PM   Mary Chadwick is a 42 year old female who presents for:    Chief Complaint   Patient presents with     Oncology Clinic Visit     Return : Breast Cancer     Initial Vitals: /72 (BP Location: Right arm, Patient Position: Sitting, Cuff Size: Adult Regular)  Pulse 98  Temp 98.6  F (37  C) (Oral)  Resp 16  Ht 1.753 m (5' 9.02\")  Wt 80.8 kg (178 lb 3.2 oz)  SpO2 98%  BMI 26.3 kg/m2 Estimated body mass index is 26.3 kg/(m^2) as calculated from the following:    Height as of this encounter: 1.753 m (5' 9.02\").    Weight as of this encounter: 80.8 kg (178 lb 3.2 oz). Body surface area is 1.98 meters squared.  No Pain (0) Comment: Data Unavailable   No LMP recorded. Patient is not currently having periods (Reason: Premenopausal).  Allergies reviewed: Yes  Medications reviewed: Yes    Medications: Medication refills not needed today.  Pharmacy name entered into [facility] [employer]:    [facility] [place], [place] - [address]  [facility] [place], [place] - [address]    Clinical concerns: Patient states no further concerns at this time.     10 minutes for nursing intake (face to face time)     Erica Jean CMA              "

## 2018-09-11 NOTE — PROGRESS NOTES
"September 11, 2018    HISTORY OF PRESENT ILLNESS:  I am seeing Ms. Omaira Chadwick in follow up for low risk mammaprint at least stage 2 breast cancer.     Mary, or \"Omaira,\" comes in for follow up of breast cancer.  She is originally [place]  [place]  She underwent a screening mammography this week.  This screening mammogram was performed on 02/09/2018 followed by diagnostic breast ultrasound which revealed a 2.6 x 1.1 x 1.7 cm irregular shaped mass at the 2 o'clock position involving the left breast.  There were indeterminate microcalcifications involving the left breast and a biopsy was recommended.  A stereotactic biopsy occurred on 02/20/2018 revealing an invasive ductal carcinoma, grade 2, ER positive, HI positive, HER2 negative by immunohistochemistry histochemistry at 1+.      Oamira had a breast MRI which revealed a 3.5-cm mass in her left breast with an area of non-mass-like enhancement measuring approximately 8 cm.  On this imaging, she had an equivocal lymph node in the left axilla.  This was not biopsied.       She was screened for the I-SPY-2 clinical trial.  She came back as a low-risk MammaPrint.  She was not enrolled on the therapeutic portion of the trial, as a result.  She was also diagnosed with Li-Fraumeni syndrome.       She started on neoadjuvant endocrine therapy with monthly Zoladex on 3/22/18, with letrozole added in April 2018.     She underwent bilateral mastectomies with sentinel node evaluation and breast reconstruction on 8/6/18 with Carissa Andrews and Marti.      Pathology reviewed revealing T2N1 with treatment related changed.    She is recovering well.  She has gained weight and has questions about this today.      She is contemplating going back to the [facility] and has questions about surveillance.    Her 10-point review of systems is otherwise negative.     PAST MEDICAL HISTORY:  Breast cancer, p53 mutation, MUTHY mutation     Current " "Outpatient Prescriptions   Medication     acetaminophen (TYLENOL) 325 MG tablet     calcium citrate-vitamin D (CITRACAL) 315-250 MG-UNIT TABS per tablet     goserelin (ZOLADEX) 3.6 MG injection     letrozole (FEMARA) 2.5 MG tablet     letrozole (FEMARA) 2.5 MG tablet     medium chain triglycerides (MCT OIL) oil     NONFORMULARY     OMEGA 3-6-9 FATTY ACIDS PO     ondansetron (ZOFRAN) 4 MG tablet     oxyCODONE IR (ROXICODONE) 5 MG tablet     Spirulina 500 MG TABS     No current facility-administered medications for this visit.           PHYSICAL EXAMINATION:  /72 (BP Location: Right arm, Patient Position: Sitting, Cuff Size: Adult Regular)  Pulse 98  Temp 98.6  F (37  C) (Oral)  Resp 16  Ht 1.753 m (5' 9.02\")  Wt 80.8 kg (178 lb 3.2 oz)  SpO2 98%  BMI 26.3 kg/m2  Gen: well appearing, nad  Heent: no icterus o/p clear  Cv: rrr, nl S1S2  Lungs: clear  Abd: soft, nt, nd + bs  Ext: no edema  Skin: no rashes  Breast exam: s/p bilateral mastectomy.  Well healed.  No erythema or warmth.     LABORATORY:  Pathology was reviewed.      ADDENDUM DIAGNOSIS:   A: Breast, left, skin sparing mastectomy:   -INVASIVE MAMMARY CARCINOMA OF NO SPECIAL TYPE (INVASIVE DUCTAL   CARCINOMA), JENN GRADE 1, two foci        -Larger focus in the lower outer quadrant (29 x 28 mm)        -Smaller focus in the lower inner quadrant (4 x 1.5 mm)   -Extensive ductal carcinoma in situ (DCIS), nuclear grades 2 and 3, solid   and cribriform types, with lobular   involvement   -DCIS is admixed with and extends beyond the larger focus of invasive   carcinoma   -DCIS is adjacent to the smaller focus of invasive carcinoma   -See comments and tumor synoptic     Lymph nodes with 1/2 + invasive disease measuring 4 mm with associated treated related changes.  RCB class 2.    YpT2(m)N1a(sn)     ASSESSMENT AND PLAN:  This is a 42-year-old female with stage T2 N1a, invasive ductal carcinoma, ER positive, CT positive, HER2 negative, involving the " left breast in the setting of Li-Fraumeni syndrome.      1. Breast cancer - she has now had bilateral mastectomy and a SNLB.  We reviewed there is disease in one lymph node.  We reviewed her case at our multidisciplinary conference.  Given her Li-Fraumeni, I would not recommend radiation.  We discussed possible full node dissection, and decided on close observation.    Continue zoladex and AI.        We reviewed the pros/cons of having a prophylactic oophorectomy for permanent ovarian suppression; she is interested in this.  Will refer to GYN.    She will need a bone density scan.    She has occasional hot flashes that are well-tolerated.      She is coping reasonably well.      Li-Fraumeni Cancer Screening (NCCN v.1.2018, Angely et al., 2016, Jesse et al, reviewed.  - arrange for whole body MRI and brain MRI  - she had a colonoscopy  - she needs EGD  - Had a derm exam.

## 2018-09-11 NOTE — PATIENT INSTRUCTIONS
Contact Numbers    [facility] Main Line: [phone]  [facility] Triage and after hours / weekends / holidays:  [phone]      Please call the triage or after hours line if you experience a temperature greater than or equal to 100.5, shaking chills, have uncontrolled nausea, vomiting and/or diarrhea, dizziness, shortness of breath, chest pain, bleeding, unexplained bruising, or if you have any other new/concerning symptoms, questions or concerns.      If you are having any concerning symptoms or wish to speak to a provider before your next infusion visit, please call your care coordinator or triage to notify them so we can adequately serve you.     If you need a refill on a narcotic prescription or other medication, please call before your infusion appointment.                   September 2018 Sunday Monday Tuesday Wednesday Thursday Friday Saturday                                 1       2     3     4     UMP RETURN    8:00 AM   (15 min.)   BASILIO Kidd MD   [facility] 5     6     7     8       9     10     11     UMP RETURN    9:15 AM   (15 min.)   BASILIO Kidd MD   [facility]     UMP RETURN   11:45 AM   (30 min.)   Valeria Gann MD   formerly [facility] ONC INFUSION 60    1:30 PM   (60 min.)   UC ONCOLOGY INFUSION   [facility] 12     13     14     LONG   10:30 AM   (30 min.)   Yenny Hidalgo MD   [facility] 15       16     17     18     19     20     REMOVE AND REPLACE BREAST IMPLANT PROSTHESIS    1:40 PM   BASILIO Kidd MD    OR     Outpatient Visit    1:40 PM   [facility] 21     22       23     24     25     UMP POST-OP    9:15 AM   (15 min.)   BASILIO Kidd MD   [facility] 26     27     28     29       30                                              October 2018 Sunday Monday Tuesday Wednesday Thursday Friday Saturday        1     2     3      4     5     6       7     8     9     P ONC INFUSION 60    1:00 PM   (60 min.)   UC ONCOLOGY INFUSION   [facility] 10     11     12     13       14     15     COMBINED ESOPHAGOSCOPY, GASTROSCOPY, DUODENOSCOPY (EGD)   12:15 PM   Edwardo Beatty MD   UC OR     Outpatient Visit   12:15 PM   [facility] 16     17     18     19     20       21     22     23     24     25     26     27       28     29     30     31                              No results found for this or any previous visit (from the past 24 hour(s)).

## 2018-09-11 NOTE — MR AVS SNAPSHOT
After Visit Summary   9/11/2018    Mary Chadwick    MRN: [id number]           Patient Information     Date Of Birth          1975        Visit Information        Provider Department      9/11/2018 1:30 PM  28 ATC;  ONCOLOGY INFUSION [facility]        Today's Diagnoses     Malignant neoplasm of upper-outer quadrant of left breast in female, estrogen receptor positive (H)    -  1      Care Instructions    Contact Numbers    [facility] Main Line: [phone]  [facility] Triage and after hours / weekends / holidays:  [phone]      Please call the triage or after hours line if you experience a temperature greater than or equal to 100.5, shaking chills, have uncontrolled nausea, vomiting and/or diarrhea, dizziness, shortness of breath, chest pain, bleeding, unexplained bruising, or if you have any other new/concerning symptoms, questions or concerns.      If you are having any concerning symptoms or wish to speak to a provider before your next infusion visit, please call your care coordinator or triage to notify them so we can adequately serve you.     If you need a refill on a narcotic prescription or other medication, please call before your infusion appointment.                   September 2018 Sunday Monday Tuesday Wednesday Thursday Friday Saturday                                 1       2     3     4     UM RETURN    8:00 AM   (15 min.)   BASILIO Kidd MD   [facility] 5     6     7     8       9     10     11     UMP RETURN    9:15 AM   (15 min.)   BASILIO Kidd MD   [facility] RETURN   11:45 AM   (30 min.)   Valeria Gann MD   [facility] ONC INFUSION 60    1:30 PM   (60 min.)    ONCOLOGY INFUSION   [facility] 12     13     14     LONG   10:30 AM   (30 min.)   Yenny Hidalgo MD   [facility] 15       16     17     18     19     20     REMOVE  AND REPLACE BREAST IMPLANT PROSTHESIS    1:40 PM   BASILIO Kidd MD   [facility] OR     Outpatient Visit    1:40 PM   [facility] 21     22       23     24     25     UMP POST-OP    9:15 AM   (15 min.)   BASILIO Kidd MD   [facility] 26     27 28 29 30 October 2018 Sunday Monday Tuesday Wednesday Thursday Friday Saturday        1     2     3     4     5     6       7     8     9     UMP ONC INFUSION 60    1:00 PM   (60 min.)   UC ONCOLOGY INFUSION   [facility] 10     11     12     13       14     15     COMBINED ESOPHAGOSCOPY, GASTROSCOPY, DUODENOSCOPY (EGD)   12:15 PM   Edwardo Beatty MD    OR     Outpatient Visit   12:15 PM   [facility] 16     17     18     19     20       21     22     23     24     25     26     27       28     29     30     31                              No results found for this or any previous visit (from the past 24 hour(s)).              Follow-ups after your visit        Your next 10 appointments already scheduled     Sep 14, 2018 10:30 AM CDT   Office Visit with Yenny Hidalgo MD   [facility] ([facility])    [address] [place] [address] [phone]           Bring a current list of meds and any records pertaining to this visit. For Physicals, please bring immunization records and any forms needing to be filled out. Please arrive 10 minutes early to complete paperwork.            Sep 20, 2018   Procedure with BASILIO Kidd MD   [facility] ([facility] [place])    [address] [place] [address] [phone]           Located in the Clinics and Surgery Center at [address] [place] [address].   parking is very convenient and highly recommended.  is a $6 flat rate  fee.  Both  and self parkers should enter the main arrival plaza from [facility]; parking attendants will direct you based on your parking preference.            Sep 25, 2018  9:30 AM CDT   (Arrive by 9:15 AM)   Post-Op with BASILIO Kidd MD   [facility] [place] ([facility] [place])    [address] [place] [address] [address]  [address]  [facility] [address]   [phone]            Oct 09, 2018  1:00 PM CDT   Infusion 60 with UC ONCOLOGY INFUSION, UC 11 ATC   [facility] ([facility] [place])    [address] [facility] [address]  [address]  [facility] [address]   [phone]            Oct 15, 2018   Procedure with Edwardo Beatty MD   [facility] ([facility])    [address] [place] [address]  5th Floor  [facility] [address] [phone]           Located in the Clinics and Surgery Center at [address] [place] [address].   parking is very convenient and highly recommended.  is a $6 flat rate fee.  Both  and self parkers should enter the main arrival plaza from [facility]; parking attendants will direct you based on your parking preference.            Nov 06, 2018  1:30 PM CST   Infusion 60 with UC ONCOLOGY INFUSION, UC 28 ATC   [facility] ([facility] [place])    [address] [facility] [address]  [address]  [facility] [address]   [phone]            Nov 30, 2018  8:30 AM CST   MR BREAST BILATERAL W/O & W CONTRAST with UCMR1   [facility] [place] [facility] ([facility])    [address] [place] [address]  1st Floor  [facility] [address] [phone]           Take your medicines as usual, unless your doctor tells you not to. Bring a list of your current medicines to your exam (including vitamins, minerals and over-the-counter drugs).  The timing of your exam may depend on the start of your last period. If you re in menopause, you may have  the exam anytime.  Please bring any previous mammograms or breast MRIs from other facilities to the MRI dept. Do not mail these items to us.   You will have IV contrast for this exam.  You do not need to do anything special to prepare.  The MRI machine uses a strong magnet. Please wear clothes without metal (snaps, zippers). A sweatsuit works well, or we may give you a hospital gown.  Please remove any body piercings and hair extensions before you arrive. You will also remove watches, jewelry, hairpins, wallets, dentures, partial dental plates and hearing aids. You may wear contact lenses, and you may be able to wear your rings. We have a safe place to keep your personal items, but it is safer to leave them at home.  **IMPORTANT** THE INSTRUCTIONS BELOW ARE ONLY FOR THOSE PATIENTS WHO HAVE BEEN PRESCRIBED SEDATION OR GENERAL ANESTHESIA DURING THEIR MRI PROCEDURE:  IF YOUR DOCTOR PRESCRIBED ORAL SEDATION (take medicine to help you relax during your exam):   You must get the medicine from your doctor (oral medication) before you arrive. Bring the medicine to the exam. Do not take it at home. You ll be told when to take it upon arriving for your exam.   Arrive one hour early. Bring someone who can take you home after the test. Your medicine will make you sleepy. After the exam, you may not drive, take a bus or take a taxi by yourself.  IF YOUR DOCTOR PRESCRIBED IV SEDATION:   Arrive one hour early. Bring someone who can take you home after the test. Your medicine will make you sleepy. After the exam, you may not drive, take a bus or take a taxi by yourself.   No eating 6 hours before your exam. You may have clear liquids up until 4 hours before your exam. (Clear liquids include water, clear tea, black coffee and fruit juice without pulp.)  IF YOUR DOCTOR PRESCRIBED ANESTHESIA (be asleep for your exam):   Arrive 1 1/2 hours early. Bring someone who can take you home after the test. You may not drive, take a bus or take  a taxi by yourself.   No eating 8 hours before your exam. You may have clear liquids up until 4 hours before your exam. (Clear liquids include water, clear tea, black coffee and fruit juice without pulp.)   You will spend four to five hours in the recovery room.  If you have any questions, please contact your Imaging Department exam site.            Nov 30, 2018  9:15 AM CST   MR CHEST W/O & W CONTRAST with UC1   [facility] ([facility] [place])    [address] [address] [phone]           Take your medicines as usual, unless your doctor tells you not to. Bring a list of your current medicines to your exam (including vitamins, minerals and over-the-counter drugs).  You may or may not receive intravenous (IV) contrast for this exam pending the discretion of the Radiologist.  You do not need to do anything special to prepare.  The MRI machine uses a strong magnet. Please wear clothes without metal (snaps, zippers). A sweatsuit works well, or we may give you a hospital gown.  Please remove any body piercings and hair extensions before you arrive. You will also remove watches, jewelry, hairpins, wallets, dentures, partial dental plates and hearing aids. You may wear contact lenses, and you may be able to wear your rings. We have a safe place to keep your personal items, but it is safer to leave them at home.  **IMPORTANT** THE INSTRUCTIONS BELOW ARE ONLY FOR THOSE PATIENTS WHO HAVE BEEN PRESCRIBED SEDATION OR GENERAL ANESTHESIA DURING THEIR MRI PROCEDURE:  IF YOUR DOCTOR PRESCRIBED ORAL SEDATION (take medicine to help you relax during your exam):   You must get the medicine from your doctor (oral medication) before you arrive. Bring the medicine to the exam. Do not take it at home. You ll be told when to take it upon arriving for your exam.   Arrive one hour early. Bring someone who can take you home after the test. Your medicine will make you  sleepy. After the exam, you may not drive, take a bus or take a taxi by yourself.  IF YOUR DOCTOR PRESCRIBED IV SEDATION:   Arrive one hour early. Bring someone who can take you home after the test. Your medicine will make you sleepy. After the exam, you may not drive, take a bus or take a taxi by yourself.   No eating 6 hours before your exam. You may have clear liquids up until 4 hours before your exam. (Clear liquids include water, clear tea, black coffee and fruit juice without pulp.)  IF YOUR DOCTOR PRESCRIBED ANESTHESIA (be asleep for your exam):   Arrive 1 1/2 hours early. Bring someone who can take you home after the test. You may not drive, take a bus or take a taxi by yourself.   No eating 8 hours before your exam. You may have clear liquids up until 4 hours before your exam. (Clear liquids include water, clear tea, black coffee and fruit juice without pulp.)   You will spend four to five hours in the recovery room.  Please call the Imaging Department at your exam site with any questions.              Future tests that were ordered for you today     Open Future Orders        Priority Expected Expires Ordered    DX Hip/Pelvis/Spine Routine  4/9/2019 9/11/2018    MR Chest w/o & w Contrast Routine  9/11/2019 9/11/2018    MRI Abdomen & pelvis w & wo contrast Routine  12/10/2018 9/11/2018    MRI Brain w/o & w Contrast Routine  4/9/2019 9/11/2018            Who to contact     If you have questions or need follow up information about today's clinic visit or your schedule please contact [facility] directly at [phone].  Normal or non-critical lab and imaging results will be communicated to you by MyChart, letter or phone within 4 business days after the clinic has received the results. If you do not hear from us within 7 days, please contact the clinic through MyChart or phone. If you have a critical or abnormal lab result, we will notify you by phone as soon as possible.  Submit refill  requests through [employer] or call your pharmacy and they will forward the refill request to us. Please allow 3 business days for your refill to be completed.          Additional Information About Your Visit        Sporhart Information     [employer] gives you secure access to your electronic health record. If you see a primary care provider, you can also send messages to your care team and make appointments. If you have questions, please call your primary care clinic.  If you do not have a primary care provider, please call [phone] and they will assist you.        Care EveryWhere ID     This is your Care EveryWhere ID. This could be used by other organizations to access your [place] medical records  [id number]         Blood Pressure from Last 3 Encounters:   09/11/18 108/72   09/04/18 120/76   08/17/18 111/70    Weight from Last 3 Encounters:   09/11/18 80.8 kg (178 lb 3.2 oz)   09/04/18 78.9 kg (174 lb)   08/17/18 77.4 kg (170 lb 9.6 oz)              Today, you had the following     No orders found for display       Primary Care Provider Office Phone #    Flora Crews, [phone]       [facility] [address] [facility] [address]  [facility] [address]        Equal Access to Services     SURESH CHAPARRO : Hadii olivier dotsono Sobharati, waaxda luqadaha, qaybta kaalmada adeegyada, naseem mendoza. So [facility] [phone].    ATENCIÓN: Si habla español, tiene a sparrow disposición servicios gratuitos de asistencia lingüística. Llame al [phone].    We comply with applicable federal civil rights laws and [place] laws. We do not discriminate on the basis of race, color, national origin, age, disability, sex, sexual orientation, or gender identity.            Thank you!     Thank you for choosing [facility]  for your care. Our goal is always to provide you with excellent care. Hearing back from our patients is one way we can continue to improve our services. Please take  a few minutes to complete the written survey that you may receive in the mail after your visit with us. Thank you!             Your Updated Medication List - Protect others around you: Learn how to safely use, store and throw away your medicines at www.disposemymeds.org.          This list is accurate as of 9/11/18  3:03 PM.  Always use your most recent med list.                   Brand Name Dispense Instructions for use Diagnosis    acetaminophen 325 MG tablet    TYLENOL    50 tablet    Take 2 tablets (650 mg) by mouth every 6 hours as needed for mild pain    Acute post-operative pain       calcium citrate-vitamin D 315-250 MG-UNIT Tabs per tablet    CITRACAL     Take 2 tablets by mouth        goserelin 3.6 MG injection    ZOLADEX     Inject 3.6 mg Subcutaneous every 30 days        * letrozole 2.5 MG tablet    FEMARA    30 tablet    Take 1 tablet (2.5 mg) by mouth daily    Malignant neoplasm of upper-outer quadrant of left breast in female, estrogen receptor positive (H)       * letrozole 2.5 MG tablet    FEMARA    90 tablet    Take 1 tablet (2.5 mg) by mouth daily    Malignant neoplasm of upper-outer quadrant of left breast in female, estrogen receptor positive (H)       medium chain triglycerides oil    MCT OIL     Take 30 mLs by mouth daily        NONFORMULARY      Take 1 capsule by mouth daily Rosehip oil        OMEGA 3-6-9 FATTY ACIDS PO      Take 2 capsules by mouth daily        ondansetron 4 MG tablet    ZOFRAN    30 tablet    Take 1 tablet (4 mg) by mouth every 6 hours as needed for nausea    Nausea       oxyCODONE IR 5 MG tablet    ROXICODONE    30 tablet    Take 1-2 tablets (5-10 mg) by mouth every 4 hours as needed for other (pain control or improvement in physical function. Hold dose for analgesic side effects.)    Acute post-operative pain       Spirulina 500 MG Tabs      6 Tabs daily.    Malignant neoplasm of left breast in female, estrogen receptor positive, unspecified site of breast (H)       *  Notice:  This list has 2 medication(s) that are the same as other medications prescribed for you. Read the directions carefully, and ask your doctor or other care provider to review them with you.

## 2018-09-11 NOTE — PROGRESS NOTES
Infusion Nursing Note:  Mary Chadwick presents today for Cycle 7 Zoladex Injection.    Patient seen and examined by Dr Gann in clinic prior to infusion.    Note:     Zoladex injected to LLQ of abdomen without incident.      Lidocaine and ice used prior to injection.         Discharge Plan:   Patient declined prescription refills  Pt states that she has femara at home and does not need refills at this time.  AVS to patient via [employer].  Patient will return 10/9/18 for cycle 8  Face to Face time: 0.    Carline Calvo RN

## 2018-09-11 NOTE — LETTER
9/11/2018       RE: Mary Chadwick  [address]  [facility] [address]     Dear Colleague,    Thank you for referring your patient, Mary Chadwick, to the [facility] at [facility]. Please see a copy of my visit note below.    PRESENTING COMPLAINT:  Postoperative visit, status post bilateral breast reconstruction after undergoing bilateral nipple-nonsparing mastectomy for left-sided breast cancer, reconstruction done with prepectoral expanders and AlloDerm, done 08/06/2018.       HISTORY OF PRESENTING COMPLAINT:  Ms. Chadwick is 42 years old.  She is 5 weeks out from surgery and has done extremely well.  She is very happy with the results, no major issues. Wants to be larger.      PHYSICAL EXAMINATION:  Vital signs are stable.  She is afebrile, in no obvious distress.  Both breasts are healing in well.  No evidence for infection, seroma or hematoma.       ASSESSMENT AND PLAN:  Based upon the above findings, a diagnosis of bilateral breast reconstruction for breast cancer was made. Under sterile conditions injected 150 mL bilaterally: Totals: R: 530 cc and Left 530 ml of saline. Will plan next stage: Impalnt exchange and revision. All risks, benefits and alternatives were explained to the patient in detail, they were understood and agreed upon by the patient, they were acknowledged by the patient,   all the patient's questions were answered in detail to the patient's fullest understanding that they acknowledged, the team approach for treatment in the operating room was agreed upon by the patient, and proceeding with surgery was agreed upon by the patient. All questions were answered.  She was happy with the visit.     Again, thank you for allowing me to participate in the care of your patient.      Sincerely,    BASILIO Kidd MD

## 2018-09-11 NOTE — LETTER
"9/11/2018       RE: Mary Chadwick  [address]  [facility] [address]     Dear Colleague,    Thank you for referring your patient, Mary Chadwick, to the [facility]. Please see a copy of my visit note below.    September 11, 2018    HISTORY OF PRESENT ILLNESS:  I am seeing Ms. Omaira Chadwick in follow up for low risk mammaprint at least stage 2 breast cancer.     Mary, or \"Omaira,Scarlett" comes in for follow up of breast cancer.  [place] is originally [place]  [place]  She underwent a screening mammography this week.  This screening mammogram was performed on 02/09/2018 followed by diagnostic breast ultrasound which revealed a 2.6 x 1.1 x 1.7 cm irregular shaped mass at the 2 o'clock position involving the left breast.  There were indeterminate microcalcifications involving the left breast and a biopsy was recommended.  A stereotactic biopsy occurred on 02/20/2018 revealing an invasive ductal carcinoma, grade 2, ER positive, NH positive, HER2 negative by immunohistochemistry histochemistry at 1+.      Omaira had a breast MRI which revealed a 3.5-cm mass in her left breast with an area of non-mass-like enhancement measuring approximately 8 cm.  On this imaging, she had an equivocal lymph node in the left axilla.  This was not biopsied.       She was screened for the I-SPY-2 clinical trial.  She came back as a low-risk MammaPrint.  She was not enrolled on the therapeutic portion of the trial, as a result.  She was also diagnosed with Li-Fraumeni syndrome.       She started on neoadjuvant endocrine therapy with monthly Zoladex on 3/22/18, with letrozole added in April 2018.     She underwent bilateral mastectomies with sentinel node evaluation and breast reconstruction on 8/6/18 with Carissa Andrews and Marti.      Pathology reviewed revealing T2N1 with treatment related changed.    She is recovering well.  She has gained weight and has " "questions about this today.      She is contemplating going back to the [place] and has questions about surveillance.    Her 10-point review of systems is otherwise negative.     PAST MEDICAL HISTORY:  Breast cancer, p53 mutation, MUTHY mutation     Current Outpatient Prescriptions   Medication     acetaminophen (TYLENOL) 325 MG tablet     calcium citrate-vitamin D (CITRACAL) 315-250 MG-UNIT TABS per tablet     goserelin (ZOLADEX) 3.6 MG injection     letrozole (FEMARA) 2.5 MG tablet     letrozole (FEMARA) 2.5 MG tablet     medium chain triglycerides (MCT OIL) oil     NONFORMULARY     OMEGA 3-6-9 FATTY ACIDS PO     ondansetron (ZOFRAN) 4 MG tablet     oxyCODONE IR (ROXICODONE) 5 MG tablet     Spirulina 500 MG TABS     No current facility-administered medications for this visit.           PHYSICAL EXAMINATION:  /72 (BP Location: Right arm, Patient Position: Sitting, Cuff Size: Adult Regular)  Pulse 98  Temp 98.6  F (37  C) (Oral)  Resp 16  Ht 1.753 m (5' 9.02\")  Wt 80.8 kg (178 lb 3.2 oz)  SpO2 98%  BMI 26.3 kg/m2  Gen: well appearing, nad  Heent: no icterus o/p clear  Cv: rrr, nl S1S2  Lungs: clear  Abd: soft, nt, nd + bs  Ext: no edema  Skin: no rashes  Breast exam: s/p bilateral mastectomy.  Well healed.  No erythema or warmth.     LABORATORY:  Pathology was reviewed.      ADDENDUM DIAGNOSIS:   A: Breast, left, skin sparing mastectomy:   -INVASIVE MAMMARY CARCINOMA OF NO SPECIAL TYPE (INVASIVE DUCTAL   CARCINOMA), JENN GRADE 1, two foci        -Larger focus in the lower outer quadrant (29 x 28 mm)        -Smaller focus in the lower inner quadrant (4 x 1.5 mm)   -Extensive ductal carcinoma in situ (DCIS), nuclear grades 2 and 3, solid   and cribriform types, with lobular   involvement   -DCIS is admixed with and extends beyond the larger focus of invasive   carcinoma   -DCIS is adjacent to the smaller focus of invasive carcinoma   -See comments and tumor synoptic     Lymph nodes with 1/2 + invasive " disease measuring 4 mm with associated treated related changes.  RCB class 2.    YpT2(m)N1a(sn)     ASSESSMENT AND PLAN:  This is a 42-year-old female with stage T2 N1a, invasive ductal carcinoma, ER positive, AZ positive, HER2 negative, involving the left breast in the setting of Li-Fraumeni syndrome.      1. Breast cancer - she has now had bilateral mastectomy and a SNLB.  We reviewed there is disease in one lymph node.  We reviewed her case at our multidisciplinary conference.  Given her Li-Fraumeni, I would not recommend radiation.  We discussed possible full node dissection, and decided on close observation.    Continue zoladex and AI.        We reviewed the pros/cons of having a prophylactic oophorectomy for permanent ovarian suppression; she is interested in this.  Will refer to GYN.    She will need a bone density scan.    She has occasional hot flashes that are well-tolerated.      She is coping reasonably well.      Li-Fraumeni Cancer Screening (NCCN v.1.2018, Angely et al., 2016, Jesse et al, reviewed.  - arrange for whole body MRI and brain MRI  - she had a colonoscopy  - she needs EGD  - Had a derm exam.    Again, thank you for allowing me to participate in the care of your patient.      Sincerely,     Valeria Gann MD

## 2018-09-11 NOTE — MR AVS SNAPSHOT
After Visit Summary   9/11/2018    Mary Chadwick    MRN: [id number]           Patient Information     Date Of Birth          1975        Visit Information        Provider Department      9/11/2018 9:30 AM BASILIO Kidd MD [facility]        Today's Diagnoses     S/P breast reconstruction, bilateral    -  1       Follow-ups after your visit        Follow-up notes from your care team     Return if symptoms worsen or fail to improve.      Your next 10 appointments already scheduled     Sep 11, 2018  1:30 PM CDT   Infusion 60 with UC ONCOLOGY INFUSION, UC 28 ATC   [facility] ([facility])    [address] [place] [address]  [address]  [facility] [address] [phone]            Sep 20, 2018   Procedure with BASILIO Kidd MD   [facility] ([facility])    [address] [place] [address]  5th Floor  [facility] [address] [phone]           Located in the Clinics and Surgery Center at [address] [place] [address].   parking is very convenient and highly recommended.  is a $6 flat rate fee.  Both  and self parkers should enter the main arrival plaza from [facility]; parking attendants will direct you based on your parking preference.            Sep 25, 2018  9:30 AM CDT   (Arrive by 9:15 AM)   Post-Op with BASILIO Kidd MD   [facility] ([facility])    [address] [place] [address]  [address]  [facility] [address] [phone]              Who to contact     If you have questions or need follow up information about today's clinic visit or your schedule please contact [facility] directly at [phone].  Normal or non-critical lab and imaging results will be communicated to you by MyChart, letter or phone within 4 business days after the clinic has received the results. If you do not hear from us  within 7 days, please contact the clinic through [employer] or phone. If you have a critical or abnormal lab result, we will notify you by phone as soon as possible.  Submit refill requests through [employer] or call your pharmacy and they will forward the refill request to us. Please allow 3 business days for your refill to be completed.          Additional Information About Your Visit        [employer] Information     [employer] gives you secure access to your electronic health record. If you see a primary care provider, you can also send messages to your care team and make appointments. If you have questions, please call your primary care clinic.  If you do not have a primary care provider, please call [phone] and they will assist you.        Care EveryWhere ID     This is your Care EveryWhere ID. This could be used by other organizations to access your [facility] medical records  [id number]         Blood Pressure from Last 3 Encounters:   09/11/18 108/72   09/04/18 120/76   08/17/18 111/70    Weight from Last 3 Encounters:   09/11/18 178 lb 3.2 oz   09/04/18 174 lb   08/17/18 170 lb 9.6 oz              Today, you had the following     No orders found for display       Primary Care Provider Office Phone #    Flora Crews, [phone]       [facility] [address]  [facility] [address]        Equal Access to Services     SURESH CHAPARRO : Hadii aad ku hadasho Soomaali, waaxda luqadaha, qaybta kaalmada adeegyada, naseem craig hayangelinen lorena mendoza. So [facility] [phone].    ATENCIÓN: Si habla español, tiene a sparrow disposición servicios gratuitos de asistencia lingüística. Llame al [phone].    We comply with applicable federal civil rights laws and [place] laws. We do not discriminate on the basis of race, color, national origin, age, disability, sex, sexual orientation, or gender identity.            Thank you!     Thank you for choosing [facility]  for your care. Our goal  is always to provide you with excellent care. Hearing back from our patients is one way we can continue to improve our services. Please take a few minutes to complete the written survey that you may receive in the mail after your visit with us. Thank you!             Your Updated Medication List - Protect others around you: Learn how to safely use, store and throw away your medicines at www.disposemymeds.org.          This list is accurate as of 9/11/18 12:23 PM.  Always use your most recent med list.                   Brand Name Dispense Instructions for use Diagnosis    acetaminophen 325 MG tablet    TYLENOL    50 tablet    Take 2 tablets (650 mg) by mouth every 6 hours as needed for mild pain    Acute post-operative pain       calcium citrate-vitamin D 315-250 MG-UNIT Tabs per tablet    CITRACAL     Take 2 tablets by mouth        goserelin 3.6 MG injection    ZOLADEX     Inject 3.6 mg Subcutaneous every 30 days        * letrozole 2.5 MG tablet    FEMARA    30 tablet    Take 1 tablet (2.5 mg) by mouth daily    Malignant neoplasm of upper-outer quadrant of left breast in female, estrogen receptor positive (H)       * letrozole 2.5 MG tablet    FEMARA    90 tablet    Take 1 tablet (2.5 mg) by mouth daily    Malignant neoplasm of upper-outer quadrant of left breast in female, estrogen receptor positive (H)       medium chain triglycerides oil    MCT OIL     Take 30 mLs by mouth daily        NONFORMULARY      Take 1 capsule by mouth daily Rosehip oil        OMEGA 3-6-9 FATTY ACIDS PO      Take 2 capsules by mouth daily        ondansetron 4 MG tablet    ZOFRAN    30 tablet    Take 1 tablet (4 mg) by mouth every 6 hours as needed for nausea    Nausea       oxyCODONE IR 5 MG tablet    ROXICODONE    30 tablet    Take 1-2 tablets (5-10 mg) by mouth every 4 hours as needed for other (pain control or improvement in physical function. Hold dose for analgesic side effects.)    Acute post-operative pain       Spirulina 500 MG  Tabs      6 Tabs daily.    Malignant neoplasm of left breast in female, estrogen receptor positive, unspecified site of breast (H)       * Notice:  This list has 2 medication(s) that are the same as other medications prescribed for you. Read the directions carefully, and ask your doctor or other care provider to review them with you.

## 2018-09-14 ENCOUNTER — OFFICE VISIT (OUTPATIENT)
Dept: OBGYN | Facility: CLINIC | Age: 43
End: 2018-09-14
Payer: COMMERCIAL

## 2018-09-14 VITALS
SYSTOLIC BLOOD PRESSURE: 113 MMHG | WEIGHT: 178 LBS | BODY MASS INDEX: 26.27 KG/M2 | HEART RATE: 74 BPM | TEMPERATURE: 97.5 F | DIASTOLIC BLOOD PRESSURE: 69 MMHG

## 2018-09-14 DIAGNOSIS — Z15.01 LI-FRAUMENI SYNDROME: ICD-10-CM

## 2018-09-14 DIAGNOSIS — Z15.01 HEREDITARY BREAST AND OVARIAN CANCER SYNDROME: Primary | ICD-10-CM

## 2018-09-14 PROCEDURE — 99204 OFFICE O/P NEW MOD 45 MIN: CPT | Performed by: OBSTETRICS & GYNECOLOGY

## 2018-09-14 NOTE — MR AVS SNAPSHOT
After Visit Summary   9/14/2018    Mary Chadwick    MRN: [id number]           Patient Information     Date Of Birth          1975        Visit Information        Provider Department      9/14/2018 10:30 AM Yenny Hidalgo MD [facility]        Today's Diagnoses     Hereditary breast and ovarian cancer syndrome    -  1    Li-Fraumeni syndrome           Follow-ups after your visit        Your next 10 appointments already scheduled     Sep 20, 2018   Procedure with BASILIO Kidd MD   [facility] ([facility])    [address] [place] [address]  5th Floor  [facility] [address] [phone]           Sent to  9/12   [id number] 415 cc 11.5 cm 5.8 cm [id number] 450 cc 11.9 cm 5.9 cm [id number] 465 cc 12.1 cm 5.8 cm [id number] [id number] 490 cc 12.5 cm 5.8 cm [id number] [id number] 535 cc 12.7 cm 6.0 cm [id number] [id number] 560 cc 12.7 cm 6.3 cm [id number] 595 cc 13.1 cm 6.3 cm [id number] [id number] 650 cc 13.5 cm 6.5 cm [id number] [id number] 700 cc 14.1 cm 6.5 cm [id number] [id number] 755 cc 14.4 cm 6.7 cm [id number] 790 cc 14.8 cm 6.7 cm [id number]     Located in the Clinics and Surgery Center at [address] [place] [address].   parking is very convenient and highly recommended.  is a $6 flat rate fee.  Both  and self parkers should enter the main arrival plaza from [facility]; parking attendants will direct you based on your parking preference.            Sep 25, 2018  9:30 AM CDT   (Arrive by 9:15 AM)   Post-Op with BASILIO Kidd MD   [facility] ([facility] [place])    [address] [place] [address]  [address]  [facility] [address]   [phone]            Oct 09, 2018  1:00 PM CDT   Infusion 60 with UC ONCOLOGY INFUSION, UC 11 [facility] ([facility])    [address]  [address]  [facility] [address] [phone]            Oct 15, 2018   Procedure with Edwardo Beatty MD   [facility] ([facility] [place])    [address]  5th Floor  [facility] [address] [phone]           Located in the Clinics and Surgery Center at [address] [place] [address], [place] [address].   parking is very convenient and highly recommended.  is a $6 flat rate fee.  Both  and self parkers should enter the main arrival plaza from [address]; parking attendants will direct you based on your parking preference.            Nov 06, 2018  1:30 PM CST   Infusion 60 with  ONCOLOGY INFUSION, UC 28 ATC   [facility] ([facility])    [address] [place] [address]  [address]  [facility] [address] [phone]            Nov 30, 2018  8:30 AM CST   MR BREAST BILATERAL W/O & W CONTRAST with MR1   [facility] [place] [facility] ([facility])    [address]  1st Floor  [facility] [address] [phone]           Take your medicines as usual, unless your doctor tells you not to. Bring a list of your current medicines to your exam (including vitamins, minerals and over-the-counter drugs).  The timing of your exam may depend on the start of your last period. If you re in menopause, you may have the exam anytime.  Please bring any previous mammograms or breast MRIs from other facilities to the MRI dept. Do not mail these items to us.   You will have IV contrast for this exam.  You do not need to do anything special to prepare.  The MRI machine uses a strong magnet. Please wear clothes without metal (snaps, zippers). A sweatsuit works well, or we may give you a hospital gown.  Please remove any body piercings and hair extensions before you arrive. You will also remove watches, jewelry, hairpins, wallets, dentures, partial dental plates and hearing aids. You may wear contact  lenses, and you may be able to wear your rings. We have a safe place to keep your personal items, but it is safer to leave them at home.  **IMPORTANT** THE INSTRUCTIONS BELOW ARE ONLY FOR THOSE PATIENTS WHO HAVE BEEN PRESCRIBED SEDATION OR GENERAL ANESTHESIA DURING THEIR MRI PROCEDURE:  IF YOUR DOCTOR PRESCRIBED ORAL SEDATION (take medicine to help you relax during your exam):   You must get the medicine from your doctor (oral medication) before you arrive. Bring the medicine to the exam. Do not take it at home. You ll be told when to take it upon arriving for your exam.   Arrive one hour early. Bring someone who can take you home after the test. Your medicine will make you sleepy. After the exam, you may not drive, take a bus or take a taxi by yourself.  IF YOUR DOCTOR PRESCRIBED IV SEDATION:   Arrive one hour early. Bring someone who can take you home after the test. Your medicine will make you sleepy. After the exam, you may not drive, take a bus or take a taxi by yourself.   No eating 6 hours before your exam. You may have clear liquids up until 4 hours before your exam. (Clear liquids include water, clear tea, black coffee and fruit juice without pulp.)  IF YOUR DOCTOR PRESCRIBED ANESTHESIA (be asleep for your exam):   Arrive 1 1/2 hours early. Bring someone who can take you home after the test. You may not drive, take a bus or take a taxi by yourself.   No eating 8 hours before your exam. You may have clear liquids up until 4 hours before your exam. (Clear liquids include water, clear tea, black coffee and fruit juice without pulp.)   You will spend four to five hours in the recovery room.  If you have any questions, please contact your Imaging Department exam site.            Nov 30, 2018  9:15 AM CST   MR CHEST W/O & W CONTRAST with [address] [facility] ([facility])    [address] [facility]  1st [facility] [address] [phone]           Take your  medicines as usual, unless your doctor tells you not to. Bring a list of your current medicines to your exam (including vitamins, minerals and over-the-counter drugs).  You may or may not receive intravenous (IV) contrast for this exam pending the discretion of the Radiologist.  You do not need to do anything special to prepare.  The MRI machine uses a strong magnet. Please wear clothes without metal (snaps, zippers). A sweatsuit works well, or we may give you a hospital gown.  Please remove any body piercings and hair extensions before you arrive. You will also remove watches, jewelry, hairpins, wallets, dentures, partial dental plates and hearing aids. You may wear contact lenses, and you may be able to wear your rings. We have a safe place to keep your personal items, but it is safer to leave them at home.  **IMPORTANT** THE INSTRUCTIONS BELOW ARE ONLY FOR THOSE PATIENTS WHO HAVE BEEN PRESCRIBED SEDATION OR GENERAL ANESTHESIA DURING THEIR MRI PROCEDURE:  IF YOUR DOCTOR PRESCRIBED ORAL SEDATION (take medicine to help you relax during your exam):   You must get the medicine from your doctor (oral medication) before you arrive. Bring the medicine to the exam. Do not take it at home. You ll be told when to take it upon arriving for your exam.   Arrive one hour early. Bring someone who can take you home after the test. Your medicine will make you sleepy. After the exam, you may not drive, take a bus or take a taxi by yourself.  IF YOUR DOCTOR PRESCRIBED IV SEDATION:   Arrive one hour early. Bring someone who can take you home after the test. Your medicine will make you sleepy. After the exam, you may not drive, take a bus or take a taxi by yourself.   No eating 6 hours before your exam. You may have clear liquids up until 4 hours before your exam. (Clear liquids include water, clear tea, black coffee and fruit juice without pulp.)  IF YOUR DOCTOR PRESCRIBED ANESTHESIA (be asleep for your exam):   Arrive 1 1/2 hours  early. Bring someone who can take you home after the test. You may not drive, take a bus or take a taxi by yourself.   No eating 8 hours before your exam. You may have clear liquids up until 4 hours before your exam. (Clear liquids include water, clear tea, black coffee and fruit juice without pulp.)   You will spend four to five hours in the recovery room.  Please call the Imaging Department at your exam site with any questions.            Nov 30, 2018 10:00 AM CST   MR ABDOMEN & PELVIS W/O & W CONTRAST with [address] MRI ([facility] [place])    [address] [facility]  1st Floor  [facility] [address] [phone]           Take your medicines as usual, unless your doctor tells you not to. Bring a list of your current medicines to your exam (including vitamins, minerals and over-the-counter drugs). Also bring the results of similar scans you may have had.    You may or may not receive IV contrast for this exam pending the discretion of the Radiologist.   Do not eat or drink for 6 hours prior to exam.  The MRI machine uses a strong magnet. Please wear clothes without metal (snaps, zippers). A sweatsuit works well, or we may give you a hospital gown.  Please remove any body piercings and hair extensions before you arrive. You will also remove watches, jewelry, hairpins, wallets, dentures, partial dental plates and hearing aids. You may wear contact lenses, and you may be able to wear your rings. We have a safe place to keep your personal items, but it is safer to leave them at home.  **IMPORTANT** THE INSTRUCTIONS BELOW ARE ONLY FOR THOSE PATIENTS WHO HAVE BEEN PRESCRIBED SEDATION OR GENERAL ANESTHESIA DURING THEIR MRI PROCEDURE:  IF YOUR DOCTOR PRESCRIBED ORAL SEDATION (take medicine to help you relax during your exam):   You must get the medicine from your doctor (oral medication) before you arrive. Bring the medicine to the exam. Do not take it at home. You ll be told  when to take it upon arriving for your exam.   Arrive one hour early. Bring someone who can take you home after the test. Your medicine will make you sleepy. After the exam, you may not drive, take a bus or take a taxi by yourself.  IF YOUR DOCTOR PRESCRIBED IV SEDATION:   Arrive one hour early. Bring someone who can take you home after the test. Your medicine will make you sleepy. After the exam, you may not drive, take a bus or take a taxi by yourself.   No eating 6 hours before your exam. You may have clear liquids up until 4 hours before your exam. (Clear liquids include water, clear tea, black coffee and fruit juice without pulp.)  IF YOUR DOCTOR PRESCRIBED ANESTHESIA (be asleep for your exam):   Arrive 1 1/2 hours early. Bring someone who can take you home after the test. You may not drive, take a bus or take a taxi by yourself.   No eating 8 hours before your exam. You may have clear liquids up until 4 hours before your exam. (Clear liquids include water, clear tea, black coffee and fruit juice without pulp.)   You will spend four to five hours in the recovery room.  If you have any questions, please contact your Imaging Department exam site.              Who to contact     If you have questions or need follow up information about today's clinic visit or your schedule please contact [facility] directly at [phone].  Normal or non-critical lab and imaging results will be communicated to you by MyChart, letter or phone within 4 business days after the clinic has received the results. If you do not hear from us within 7 days, please contact the clinic through "[employer]hart or phone. If you have a critical or abnormal lab result, we will notify you by phone as soon as possible.  Submit refill requests through [employer] or call your pharmacy and they will forward the refill request to us. Please allow 3 business days for your refill to be completed.          Additional Information About Your Visit         [employer] Information     [employer] gives you secure access to your electronic health record. If you see a primary care provider, you can also send messages to your care team and make appointments. If you have questions, please call your primary care clinic.  If you do not have a primary care provider, please call [phone] and they will assist you.        Care EveryWhere ID     This is your Care EveryWhere ID. This could be used by other organizations to access your [facility] medical records  [id number]        Your Vitals Were     Pulse Temperature BMI (Body Mass Index)             74 97.5  F (36.4  C) (Oral) 26.27 kg/m2          Blood Pressure from Last 3 Encounters:   09/14/18 113/69   09/11/18 108/72   09/04/18 120/76    Weight from Last 3 Encounters:   09/14/18 178 lb (80.7 kg)   09/11/18 178 lb 3.2 oz (80.8 kg)   09/04/18 174 lb (78.9 kg)               Primary Care Provider Office Phone #    Flora Connollynicolás, [phone]       [facility] [address] [place] [address]  [facility] [place] [address]        Equal Access to Services     SURESH CHAPARRO AH: Hadii aad ku hadasho Soomaali, waaxda luqadaha, qaybta kaalmada adeegyada, naseem craig hayveronica lugo . So [facility] [phone].    ATENCIÓN: Si habla español, tiene a sparrow disposición servicios gratuitos de asistencia lingüística. Llame al [phone].    We comply with applicable federal civil rights laws and [place] laws. We do not discriminate on the basis of race, color, national origin, age, disability, sex, sexual orientation, or gender identity.            Thank you!     Thank you for choosing [facility]  for your care. Our goal is always to provide you with excellent care. Hearing back from our patients is one way we can continue to improve our services. Please take a few minutes to complete the written survey that you may receive in the mail after your visit with us. Thank you!             Your Updated Medication List - Protect  others around you: Learn how to safely use, store and throw away your medicines at www.disposemymeds.org.          This list is accurate as of 9/14/18 11:59 PM.  Always use your most recent med list.                   Brand Name Dispense Instructions for use Diagnosis    acetaminophen 325 MG tablet    TYLENOL    50 tablet    Take 2 tablets (650 mg) by mouth every 6 hours as needed for mild pain    Acute post-operative pain       calcium citrate-vitamin D 315-250 MG-UNIT Tabs per tablet    CITRACAL     Take 2 tablets by mouth        goserelin 3.6 MG injection    ZOLADEX     Inject 3.6 mg Subcutaneous every 30 days        * letrozole 2.5 MG tablet    FEMARA    30 tablet    Take 1 tablet (2.5 mg) by mouth daily    Malignant neoplasm of upper-outer quadrant of left breast in female, estrogen receptor positive (H)       * letrozole 2.5 MG tablet    FEMARA    90 tablet    Take 1 tablet (2.5 mg) by mouth daily    Malignant neoplasm of upper-outer quadrant of left breast in female, estrogen receptor positive (H)       medium chain triglycerides oil    MCT OIL     Take 30 mLs by mouth daily        NONFORMULARY      Take 1 capsule by mouth daily Rosehip oil        OMEGA 3-6-9 FATTY ACIDS PO      Take 2 capsules by mouth daily        ondansetron 4 MG tablet    ZOFRAN    30 tablet    Take 1 tablet (4 mg) by mouth every 6 hours as needed for nausea    Nausea       oxyCODONE IR 5 MG tablet    ROXICODONE    30 tablet    Take 1-2 tablets (5-10 mg) by mouth every 4 hours as needed for other (pain control or improvement in physical function. Hold dose for analgesic side effects.)    Acute post-operative pain       Spirulina 500 MG Tabs      6 Tabs daily.    Malignant neoplasm of left breast in female, estrogen receptor positive, unspecified site of breast (H)       * Notice:  This list has 2 medication(s) that are the same as other medications prescribed for you. Read the directions carefully, and ask your doctor or other care  provider to review them with you.

## 2018-09-14 NOTE — NURSING NOTE
"Chief Complaint   Patient presents with     Consult     discuss getting ovaries removed       Initial /69  Pulse 74  Temp 97.5  F (36.4  C) (Oral)  Wt 178 lb (80.7 kg)  BMI 26.27 kg/m2 Estimated body mass index is 26.27 kg/(m^2) as calculated from the following:    Height as of 18: 5' 9.02\" (1.753 m).    Weight as of this encounter: 178 lb (80.7 kg).  BP completed using cuff size: regular        The following HM Due: NONE      The following patient reported/Care Every where data was sent to:  P ABSTRACT QUALITY INITIATIVES [[id number]]        patient has appointment for today  Kathi Washington                "

## 2018-09-14 NOTE — Clinical Note
Hi Dr. Gann.  I saw Omaira last week to discuss oophorectomy. We also discussed the possibility of hysterectomy due to increased risk of soft tissue sarcomas. She may be at higher risk of uterine sarcoma than other patients and the benefits of retaining the uterus seem small.  I'm getting an ultrasound and will see her back to discuss further.  Let me know if you have any thoughts on this.  Thanks, Yenny Hidalgo MD/[facility] Obstetrics and Gynecology [facility]
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
## 2018-09-19 ENCOUNTER — ANESTHESIA EVENT (OUTPATIENT)
Dept: SURGERY | Facility: AMBULATORY SURGERY CENTER | Age: 43
End: 2018-09-19

## 2018-09-20 ENCOUNTER — HOSPITAL ENCOUNTER (OUTPATIENT)
Facility: AMBULATORY SURGERY CENTER | Age: 43
End: 2018-09-20
Attending: PLASTIC SURGERY
Payer: COMMERCIAL

## 2018-09-20 ENCOUNTER — SURGERY (OUTPATIENT)
Age: 43
End: 2018-09-20

## 2018-09-20 ENCOUNTER — ANESTHESIA (OUTPATIENT)
Dept: SURGERY | Facility: AMBULATORY SURGERY CENTER | Age: 43
End: 2018-09-20

## 2018-09-20 VITALS
OXYGEN SATURATION: 97 % | SYSTOLIC BLOOD PRESSURE: 112 MMHG | RESPIRATION RATE: 16 BRPM | HEART RATE: 66 BPM | DIASTOLIC BLOOD PRESSURE: 72 MMHG | TEMPERATURE: 97.6 F

## 2018-09-20 DIAGNOSIS — Z98.890 S/P BREAST RECONSTRUCTION, BILATERAL: Primary | ICD-10-CM

## 2018-09-20 LAB
HCG UR QL: NEGATIVE
INTERNAL QC OK POCT: NO

## 2018-09-20 DEVICE — IMPLANTABLE DEVICE: Type: IMPLANTABLE DEVICE | Site: BREAST | Status: FUNCTIONAL

## 2018-09-20 RX ORDER — PROPOFOL 10 MG/ML
INJECTION, EMULSION INTRAVENOUS CONTINUOUS PRN
Status: DISCONTINUED | OUTPATIENT
Start: 2018-09-20 | End: 2018-09-20

## 2018-09-20 RX ORDER — NALOXONE HYDROCHLORIDE 0.4 MG/ML
.1-.4 INJECTION, SOLUTION INTRAMUSCULAR; INTRAVENOUS; SUBCUTANEOUS
Status: DISCONTINUED | OUTPATIENT
Start: 2018-09-20 | End: 2018-09-20 | Stop reason: HOSPADM

## 2018-09-20 RX ORDER — OXYCODONE HYDROCHLORIDE 5 MG/1
5-10 TABLET ORAL EVERY 6 HOURS PRN
Qty: 30 TABLET | Refills: 0 | Status: SHIPPED | OUTPATIENT
Start: 2018-09-20 | End: 2018-10-15

## 2018-09-20 RX ORDER — LIDOCAINE HYDROCHLORIDE 20 MG/ML
INJECTION, SOLUTION INFILTRATION; PERINEURAL PRN
Status: DISCONTINUED | OUTPATIENT
Start: 2018-09-20 | End: 2018-09-20

## 2018-09-20 RX ORDER — ONDANSETRON 4 MG/1
4 TABLET, ORALLY DISINTEGRATING ORAL EVERY 30 MIN PRN
Status: DISCONTINUED | OUTPATIENT
Start: 2018-09-20 | End: 2018-09-21 | Stop reason: HOSPADM

## 2018-09-20 RX ORDER — PROPOFOL 10 MG/ML
INJECTION, EMULSION INTRAVENOUS PRN
Status: DISCONTINUED | OUTPATIENT
Start: 2018-09-20 | End: 2018-09-20

## 2018-09-20 RX ORDER — FLUMAZENIL 0.1 MG/ML
0.2 INJECTION, SOLUTION INTRAVENOUS
Status: DISCONTINUED | OUTPATIENT
Start: 2018-09-20 | End: 2018-09-20 | Stop reason: HOSPADM

## 2018-09-20 RX ORDER — OXYCODONE HYDROCHLORIDE 5 MG/1
5 TABLET ORAL EVERY 4 HOURS PRN
Status: DISCONTINUED | OUTPATIENT
Start: 2018-09-20 | End: 2018-09-21 | Stop reason: HOSPADM

## 2018-09-20 RX ORDER — MIDAZOLAM HYDROCHLORIDE 5 MG/ML
1 INJECTION, SOLUTION INTRAMUSCULAR; INTRAVENOUS ONCE
Status: DISCONTINUED | OUTPATIENT
Start: 2018-09-20 | End: 2018-09-21 | Stop reason: HOSPADM

## 2018-09-20 RX ORDER — KETOROLAC TROMETHAMINE 30 MG/ML
INJECTION, SOLUTION INTRAMUSCULAR; INTRAVENOUS PRN
Status: DISCONTINUED | OUTPATIENT
Start: 2018-09-20 | End: 2018-09-20

## 2018-09-20 RX ORDER — ONDANSETRON 2 MG/ML
INJECTION INTRAMUSCULAR; INTRAVENOUS PRN
Status: DISCONTINUED | OUTPATIENT
Start: 2018-09-20 | End: 2018-09-20

## 2018-09-20 RX ORDER — FENTANYL CITRATE 50 UG/ML
25-50 INJECTION, SOLUTION INTRAMUSCULAR; INTRAVENOUS EVERY 5 MIN PRN
Status: DISCONTINUED | OUTPATIENT
Start: 2018-09-20 | End: 2018-09-20 | Stop reason: HOSPADM

## 2018-09-20 RX ORDER — LIDOCAINE 40 MG/G
CREAM TOPICAL
Status: DISCONTINUED | OUTPATIENT
Start: 2018-09-20 | End: 2018-09-20 | Stop reason: HOSPADM

## 2018-09-20 RX ORDER — NALOXONE HYDROCHLORIDE 0.4 MG/ML
.1-.4 INJECTION, SOLUTION INTRAMUSCULAR; INTRAVENOUS; SUBCUTANEOUS
Status: DISCONTINUED | OUTPATIENT
Start: 2018-09-20 | End: 2018-09-21 | Stop reason: HOSPADM

## 2018-09-20 RX ORDER — SODIUM CHLORIDE, SODIUM LACTATE, POTASSIUM CHLORIDE, CALCIUM CHLORIDE 600; 310; 30; 20 MG/100ML; MG/100ML; MG/100ML; MG/100ML
INJECTION, SOLUTION INTRAVENOUS CONTINUOUS
Status: DISCONTINUED | OUTPATIENT
Start: 2018-09-20 | End: 2018-09-20 | Stop reason: HOSPADM

## 2018-09-20 RX ORDER — MEPERIDINE HYDROCHLORIDE 25 MG/ML
12.5 INJECTION INTRAMUSCULAR; INTRAVENOUS; SUBCUTANEOUS
Status: DISCONTINUED | OUTPATIENT
Start: 2018-09-20 | End: 2018-09-21 | Stop reason: HOSPADM

## 2018-09-20 RX ORDER — FENTANYL CITRATE 50 UG/ML
25-50 INJECTION, SOLUTION INTRAMUSCULAR; INTRAVENOUS
Status: DISCONTINUED | OUTPATIENT
Start: 2018-09-20 | End: 2018-09-20 | Stop reason: HOSPADM

## 2018-09-20 RX ORDER — BUPIVACAINE HYDROCHLORIDE AND EPINEPHRINE 2.5; 5 MG/ML; UG/ML
INJECTION, SOLUTION INFILTRATION; PERINEURAL PRN
Status: DISCONTINUED | OUTPATIENT
Start: 2018-09-20 | End: 2018-09-20

## 2018-09-20 RX ORDER — SODIUM CHLORIDE, SODIUM LACTATE, POTASSIUM CHLORIDE, CALCIUM CHLORIDE 600; 310; 30; 20 MG/100ML; MG/100ML; MG/100ML; MG/100ML
INJECTION, SOLUTION INTRAVENOUS CONTINUOUS
Status: DISCONTINUED | OUTPATIENT
Start: 2018-09-20 | End: 2018-09-21 | Stop reason: HOSPADM

## 2018-09-20 RX ORDER — DEXAMETHASONE SODIUM PHOSPHATE 4 MG/ML
INJECTION, SOLUTION INTRA-ARTICULAR; INTRALESIONAL; INTRAMUSCULAR; INTRAVENOUS; SOFT TISSUE PRN
Status: DISCONTINUED | OUTPATIENT
Start: 2018-09-20 | End: 2018-09-20

## 2018-09-20 RX ORDER — SCOLOPAMINE TRANSDERMAL SYSTEM 1 MG/1
1 PATCH, EXTENDED RELEASE TRANSDERMAL
Status: COMPLETED | OUTPATIENT
Start: 2018-09-20 | End: 2018-09-20

## 2018-09-20 RX ORDER — GABAPENTIN 300 MG/1
300 CAPSULE ORAL ONCE
Status: COMPLETED | OUTPATIENT
Start: 2018-09-20 | End: 2018-09-20

## 2018-09-20 RX ORDER — ACETAMINOPHEN 325 MG/1
975 TABLET ORAL ONCE
Status: COMPLETED | OUTPATIENT
Start: 2018-09-20 | End: 2018-09-20

## 2018-09-20 RX ORDER — ONDANSETRON 4 MG/1
4 TABLET, ORALLY DISINTEGRATING ORAL EVERY 6 HOURS PRN
Qty: 8 TABLET | Refills: 0 | Status: SHIPPED | OUTPATIENT
Start: 2018-09-20 | End: 2018-10-15

## 2018-09-20 RX ORDER — FENTANYL CITRATE 50 UG/ML
1 INJECTION, SOLUTION INTRAMUSCULAR; INTRAVENOUS
Status: DISCONTINUED | OUTPATIENT
Start: 2018-09-20 | End: 2018-09-20 | Stop reason: HOSPADM

## 2018-09-20 RX ORDER — AMOXICILLIN 250 MG
1-2 CAPSULE ORAL 2 TIMES DAILY
Qty: 30 TABLET | Refills: 0 | Status: SHIPPED | OUTPATIENT
Start: 2018-09-20 | End: 2018-10-15

## 2018-09-20 RX ORDER — ONDANSETRON 2 MG/ML
4 INJECTION INTRAMUSCULAR; INTRAVENOUS EVERY 30 MIN PRN
Status: DISCONTINUED | OUTPATIENT
Start: 2018-09-20 | End: 2018-09-21 | Stop reason: HOSPADM

## 2018-09-20 RX ADMIN — SCOLOPAMINE TRANSDERMAL SYSTEM 1 PATCH: 1 PATCH, EXTENDED RELEASE TRANSDERMAL at 12:54

## 2018-09-20 RX ADMIN — GABAPENTIN 300 MG: 300 CAPSULE ORAL at 12:41

## 2018-09-20 RX ADMIN — BUPIVACAINE HYDROCHLORIDE AND EPINEPHRINE 20 ML: 2.5; 5 INJECTION, SOLUTION INFILTRATION; PERINEURAL at 13:50

## 2018-09-20 RX ADMIN — KETOROLAC TROMETHAMINE 30 MG: 30 INJECTION, SOLUTION INTRAMUSCULAR; INTRAVENOUS at 16:22

## 2018-09-20 RX ADMIN — FENTANYL CITRATE 50 MCG: 50 INJECTION, SOLUTION INTRAMUSCULAR; INTRAVENOUS at 14:10

## 2018-09-20 RX ADMIN — DEXAMETHASONE SODIUM PHOSPHATE 4 MG: 4 INJECTION, SOLUTION INTRA-ARTICULAR; INTRALESIONAL; INTRAMUSCULAR; INTRAVENOUS; SOFT TISSUE at 14:59

## 2018-09-20 RX ADMIN — PROPOFOL 200 MG: 10 INJECTION, EMULSION INTRAVENOUS at 14:59

## 2018-09-20 RX ADMIN — SODIUM CHLORIDE, SODIUM LACTATE, POTASSIUM CHLORIDE, CALCIUM CHLORIDE: 600; 310; 30; 20 INJECTION, SOLUTION INTRAVENOUS at 14:53

## 2018-09-20 RX ADMIN — PROPOFOL 200 MCG/KG/MIN: 10 INJECTION, EMULSION INTRAVENOUS at 15:08

## 2018-09-20 RX ADMIN — FENTANYL CITRATE 50 MCG: 50 INJECTION, SOLUTION INTRAMUSCULAR; INTRAVENOUS at 15:25

## 2018-09-20 RX ADMIN — ONDANSETRON 4 MG: 2 INJECTION INTRAMUSCULAR; INTRAVENOUS at 14:59

## 2018-09-20 RX ADMIN — PROPOFOL: 10 INJECTION, EMULSION INTRAVENOUS at 15:56

## 2018-09-20 RX ADMIN — OXYCODONE HYDROCHLORIDE 5 MG: 5 TABLET ORAL at 16:49

## 2018-09-20 RX ADMIN — LIDOCAINE HYDROCHLORIDE 80 MG: 20 INJECTION, SOLUTION INFILTRATION; PERINEURAL at 14:59

## 2018-09-20 RX ADMIN — FENTANYL CITRATE 50 MCG: 50 INJECTION, SOLUTION INTRAMUSCULAR; INTRAVENOUS at 15:04

## 2018-09-20 RX ADMIN — ACETAMINOPHEN 975 MG: 325 TABLET ORAL at 12:41

## 2018-09-20 NOTE — ANESTHESIA PROCEDURE NOTES
Peripheral Nerve Block Procedure Note    Staff:     Anesthesiologist:  MOY MICHELLE  Location: Pre-op  Procedure Start/Stop TImes:      9/20/2018 1:34 PM     9/20/2018 1:50 PM    patient identified, IV checked, site marked, risks and benefits discussed, informed consent, monitors and equipment checked, pre-op evaluation, at physician/surgeon's request and post-op pain management      Correct Patient: Yes      Correct Position: Yes      Correct Site: Yes      Correct Procedure: Yes      Correct Laterality:  N/A    Site Marked:  Yes  Procedure details:     Procedure:  Pectoralis    Laterality:  Bilateral    Position:  Supine    Sterile Prep: chloraprep, mask and sterile gloves      Needle:  Short bevel    Needle gauge:  21    Needle length (inches):  4    Ultrasound: Yes      Ultrasound used to identify targeted nerve, plexus, or vascular structure and placed a needle adjacent to it      Permanent Image entered into patiient's record      Abnormal pain on injection: No      Blood Aspirated: No      Paresthesias:  No    Bleeding at site: No      Test dose negative for signs of intravascular injection: Yes      Bolus via:  Needle    Infusion Method:  Single Shot    Complications:  None  Assessment/Narrative:     Injection made incrementally with aspirations every (mL):  5

## 2018-09-20 NOTE — IP AVS SNAPSHOT
[facility]    [address] [place] [id number]    Phone:  [phone]    Fax:  [phone]                                       After Visit Summary   9/20/2018    Mary Chadwick    MRN: [id number]           After Visit Summary Signature Page     I have received my discharge instructions, and my questions have been answered. I have discussed any challenges I see with this plan with the nurse or doctor.    ..........................................................................................................................................  Patient/Patient Representative Signature      ..........................................................................................................................................  Patient Representative Print Name and Relationship to Patient    ..................................................               ................................................  Date                                   Time    ..........................................................................................................................................  Reviewed by Signature/Title    ...................................................              ..............................................  Date                                               Time          22EPIC Rev 08/18

## 2018-09-20 NOTE — ANESTHESIA PREPROCEDURE EVALUATION
Anesthesia Evaluation     . Pt has had prior anesthetic.     History of anesthetic complications   - PONV        ROS/MED HX    ENT/Pulmonary:  - neg pulmonary ROS     Neurologic:  - neg neurologic ROS     Cardiovascular:  - neg cardiovascular ROS       METS/Exercise Tolerance:     Hematologic:  - neg hematologic  ROS       Musculoskeletal:  - neg musculoskeletal ROS       GI/Hepatic:  - neg GI/hepatic ROS       Renal/Genitourinary:  - ROS Renal section negative       Endo:  - neg endo ROS       Psychiatric:  - neg psychiatric ROS       Infectious Disease:  - neg infectious disease ROS       Malignancy:   (+) Malignancy History of Breast  Breast CA Remission status post Surgery. Li Fraumeni syndrome: predisposed to rare cancers     - no malignancy   Other:    - neg other ROS                 Physical Exam  Normal systems: cardiovascular, pulmonary and dental    Airway   Mallampati: II  TM distance: >3 FB  Neck ROM: full    Dental     Cardiovascular       Pulmonary                     Anesthesia Plan      History & Physical Review  History and physical reviewed and following examination; no interval change.    ASA Status:  2 .    NPO Status:  > 8 hours    Plan for General, Periph. Nerve Block for postop pain and LMA with Intravenous induction. Maintenance will be Balanced.    PONV prophylaxis:  Ondansetron (or other 5HT-3) and Dexamethasone or Solumedrol       Postoperative Care  Postoperative pain management:  IV analgesics, Oral pain medications and Multi-modal analgesia.      Consents  Anesthetic plan, risks, benefits and alternatives discussed with:  Patient..                          .

## 2018-09-20 NOTE — IP AVS SNAPSHOT
MRN:[id number]                      After Visit Summary   9/20/2018    Mary Chadwick    MRN: [id number]           Thank you!     Thank you for choosing [facility] for your care. Our goal is always to provide you with excellent care. Hearing back from our patients is one way we can continue to improve our services. Please take a few minutes to complete the written survey that you may receive in the mail after you visit with us. Thank you!        Patient Information     Date Of Birth          1975        About your hospital stay     You were admitted on:  September 20, 2018 You last received care in the:  [facility]    You were discharged on:  September 20, 2018       Who to Call     For medical emergencies, please call 911.  For non-urgent questions about your medical care, please call your primary care provider or clinic, [phone]  For questions related to your surgery, please call your surgery clinic        Attending Provider     Provider Specialty    BASILIO Kidd MD Plastic Surgery       Primary Care Provider Office Phone #    Flora Crews, [phone]      Your next 10 appointments already scheduled     Sep 25, 2018  9:30 AM CDT   (Arrive by 9:15 AM)   Post-Op with BASILIO Kidd MD   [facility] ([facility])    [address]  [address]  [facility] [address] [phone]            Oct 09, 2018  1:00 PM CDT   Infusion 60 with UC ONCOLOGY INFUSION, UC 11 ATC   [facility] ([facility])    [address]  [address]  [facility] [id number] [phone]            Oct 12, 2018 10:20 AM CDT   US PELVIC COMPLETE W TRANSVAGINAL with RDUS1   [facility] ([facility])    [address] [place] [address]  [address]  [facility] [id number] [phone]           How do I prepare for my exam? (Food and drink  instructions) Adults: Drink four 8-ounce glasses of fluid an hour before your exam. If you need to empty your bladder before your exam, try to release only a little urine. Then, drink another glass of fluid.  Children: * Children who are potty trained up to 6 years old should drink at least 2 cups (16 oz) of water/non-carbonated beverage 30 minutes prior to the exam. * Children who are 6-10 years should drink at least 3 cups (24 oz) of water/non-carbonated beverage 45 minutes prior to the exam. * Children who are 10 years or older should drink at least 4 cups (32 oz) of water/non-carbonated beverage 45 minutes prior to the exam.  If your child is very uncomfortable or has an urgent need to pee, please notify a technologist; they will try to find out how much longer the wait may be and provide instructions to help relieve the pressure.  What should I wear: Wear comfortable clothes.  How long does the exam take: Most ultrasounds take 30 to 60 minutes.  What should I bring: Bring a list of your medicines, including vitamins, minerals and over-the-counter drugs. It is safest to leave personal items at home.  Do I need a :  No  is needed.  What do I need to tell my doctor: Tell your doctor about any allergies you may have.  What should I do after the exam: No restrictions, You may resume normal activities.  What is this test: An ultrasound uses sound waves to make pictures of the body. Sound waves do not cause pain. The only discomfort may be the pressure of the wand against your skin or full bladder.  Who should I call with questions: If you have any questions, please call the Imaging Department where you will have your exam. Directions, parking instructions, and other information is available on our website, [email or web address].            Oct 12, 2018 11:15 AM CDT   SHORT with Yenny Hidalgo MD   [facility] ([facility])    [address] [place] [address]  [address]  [facility] [address]   [phone]            Oct 15, 2018   Procedure with Edwardo Beatty MD   [facility] ([facility])    [address]  5th [address]  [facility] [address] [phone]           Located in the Clinics and Surgery Center at [address] [place] [address], [place] [address].   parking is very convenient and highly recommended.  is a $6 flat rate fee.  Both  and self parkers should enter the main arrival plaza from [address]; parking attendants will direct you based on your parking preference.            Nov 06, 2018  1:30 PM CST   Infusion 60 with  ONCOLOGY INFUSION, UC 28 ATC   [facility] ([facility])    [address] [place] [address]  [address]  [facility] [address] [phone]            Nov 30, 2018  8:30 AM CST   MR BREAST BILATERAL W/O & W CONTRAST with MR1   [facility] ([facility])    [address]  1st [address]  [facility] [address] [phone]           How do I prepare for my exam? (Food and drink instructions) **If you will be receiving sedation or general anesthesia, please see special notes below.**  How do I prepare for my exam? (Other instructions) Take your medicines as usual, unless your doctor tells you not to. The timing of your exam may depend on the start of your last period. If you re in menopause, you may have the exam anytime.  You will have IV contrast for this exam.  You do not need to do anything special to prepare. **If you will be receiving sedation or general anesthesia, please see special notes below.**  What should I wear:  The MRI machine uses a strong magnet. Please wear clothes without metal (snaps, zippers). A sweatsuit works well, or we may give you a hospital gown. Please remove any body piercings and hair extensions before you arrive. You will also remove watches, jewelry, hairpins,  wallets, dentures, partial dental plates and hearing aids. You may wear contact lenses, and you may be able to wear your rings. We have a safe place to keep your personal items, but it is safer to leave them at home.  How long does the exam take:  Most tests take 30 to 60 minutes.  HOWEVER, IF YOUR DOCTOR PRESCRIBES ANESTHESIA please plan on spending four to five hours in the recovery room.  What should I bring: Bring a list of your current medicines to your exam (including vitamins, minerals and over-the-counter drugs). Please bring any previous mammograms or breast MRIs from other facilities to the MRI dept. Do not mail these items to us.  Do I need a : **If you will be receiving sedation or general anesthesia, please see special notes below.**  What should I do after the exam: No Restrictions, You may resume normal activities.  What is this test: MRI (magnetic resonance imaging) uses a strong magnet and radio waves to look inside the body. An MRA (magnetic resonance angiogram) does the same thing, but it lets us look at your blood vessels. A computer turns the radio waves into pictures showing cross sections of the body, much like slices of bread. This helps us see any problems more clearly. You may receive fluid (called  contrast ) before or during your scan. The fluid helps us see the pictures better. We give the fluid through an IV (small needle in your arm).  Who should I call with questions: If you have any questions, please contact your Imaging Department exam site. Directions, parking instructions, and other information is available on our website, [email or web address].  How do I prepare if I m having sedation or anesthesia? **IMPORTANT** THE INSTRUCTIONS BELOW ARE ONLY FOR THOSE PATIENTS WHO HAVE BEEN TOLD THEY WILL RECEIVE SEDATION OR GENERAL ANESTHESIA DURING THEIR MRI PROCEDURE:  IF YOU WILL RECEIVE SEDATION (take medicine to help you relax during your exam) You must get the medicine from your  doctor before you arrive. Bring the medicine to the exam. Do not take it at home. Arrive one hour early. Bring someone who can take you home after the test. Your medicine will make you sleepy. After the exam, you may not drive, take a bus or take a taxi by yourself. No eating 8 hours before your exam. You may have clear liquids up until 4 hours before your exam. (Clear liquids include water, clear tea, black coffee and fruit juice without pulp.)  IF YOU WILL RECEIVE ANESTHESIA (be asleep for your exam) Arrive 1 1/2 hours early. Bring someone who can take you home after the test. You may not drive, take a bus or take a taxi by yourself. No eating 8 hours before your exam. You may have clear liquids up until 4 hours before your exam. (Clear liquids include water, clear tea, black coffee and fruit juice without pulp.)            Nov 30, 2018  9:15 AM CST   MR CHEST W/O & W CONTRAST with [address] MRI ([facility])    [address] [place] [address] [phone]           How do I prepare for my exam? (Food and drink instructions) **If you will be receiving sedation or general anesthesia, please see special notes below.**  How do I prepare for my exam? (Other instructions) Take your medicines as usual, unless your doctor tells you not to. You may or may not receive intravenous (IV) contrast for this exam pending the discretion of the Radiologist.  You do not need to do anything special to prepare.  **If you will be receiving sedation or general anesthesia, please see special notes below.**  What should I wear: The MRI machine uses a strong magnet. Please wear clothes without metal (snaps, zippers). A sweatsuit works well, or we may give you a hospital gown. Please remove any body piercings and hair extensions before you arrive. You will also remove watches, jewelry, hairpins, wallets, dentures, partial dental plates and hearing aids. You may wear  contact lenses, and you may be able to wear your rings. We have a safe place to keep your personal items, but it is safer to leave them at home.  How long does the exam take: Most tests take 30 to 60 minutes.  HOWEVER, IF YOUR DOCTOR PRESCRIBES ANESTHESIA please plan on spending four to five hours in the recovery room.  What should I bring:  Bring a list of your current medicines to your exam (including vitamins, minerals and over-the-counter drugs).  Do I need a :  **If you will be receiving sedation or general anesthesia, please see special notes below.**  What should I do after the exam: No Restrictions, You may resume normal activities.  What is this test: MRI (magnetic resonance imaging) uses a strong magnet and radio waves to look inside the body. An MRA (magnetic resonance angiogram) does the same thing, but it lets us look at your blood vessels. A computer turns the radio waves into pictures showing cross sections of the body, much like slices of bread. This helps us see any problems more clearly. You may receive fluid (called  contrast ) before or during your scan. The fluid helps us see the pictures better. We give the fluid through an IV (small needle in your arm).  Who should I call with questions:  Please call the Imaging Department at your exam site with any questions. Directions, parking instructions, and other information is available on our website, [email or web address].  How do I prepare if I m having sedation or anesthesia? **IMPORTANT** THE INSTRUCTIONS BELOW ARE ONLY FOR THOSE PATIENTS WHO HAVE BEEN TOLD THEY WILL RECEIVE SEDATION OR GENERAL ANESTHESIA DURING THEIR MRI PROCEDURE:  IF YOU WILL RECEIVE SEDATION (take medicine to help you relax during your exam): You must get the medicine from your doctor before you arrive. Bring the medicine to the exam. Do not take it at home. Arrive one hour early. Bring someone who can take you home after the test. Your medicine will make you sleepy.  After the exam, you may not drive, take a bus or take a taxi by yourself. No eating 8 hours before your exam. You may have clear liquids up until 4 hours before your exam. (Clear liquids include water, clear tea, black coffee and fruit juice without pulp.)  IF YOU WILL RECEIVE ANESTHESIA (be asleep for your exam): Arrive 1 1/2 hours early. Bring someone who can take you home after the test. You may not drive, take a bus or take a taxi by yourself. No eating 8 hours before your exam. You may have clear liquids up until 4 hours before your exam. (Clear liquids include water, clear tea, black coffee and fruit juice without pulp.)              Further instructions from your care team             Scopolamine Patch- (Absorbed through the skin)    This medicine prevents nausea and vomiting caused by motion sickness or anesthesia.  The medicine is in a patch worn behind the ear.      Do NOT use the Scopolamine Patch if you have glaucoma or are allergic to scopolamine.    How to Use This Medicine:    The patch is applied behind the ear.    Keep the patch dry to prevent it from falling off.  Limit contact with water (no bathing or swimming).      If the patch is loose or falls off throw it away.  You do not need to apply a new patch.    After you take off the patch or if it falls off, wash your hands and the area behind your ear with soap and water.      You can remove the patch tomorrow, or leave on for up to 3 days.    Only one patch should be used at any time.    How to Dispose of This Medicine:    Fold the used patch in half with the sticky sides together. Throw any used patch away so that children or pets cannot get to it. You will also need to throw away old patches after the expiration date has passed.    Keep all medicine away from children and never share your medicine with anyone.    Warnings While Using This Medicine:    This medicine can make you sleepy.  Avoid taking sleeping pills and other medicines that can  make you sleepy while the patch is on.    Do not drink alcohol while the patch is on.    This medicine can cause temporary blurring and other vision problems if it comes in contact with the eyes.  This is not serious unless accompanied by eye pain and redness.     This medicine may cause problems with urination. If you have problems with urinating, remove the patch.  If you are unable to urinate, call your doctor.      This medicine may make you dizzy or drowsy. Avoid driving, using machines, or doing anything else that could be dangerous if the patch is on.    This medicine may make you sweat less and cause your body to get too hot. Be careful in hot weather or if you are exercising.    Make sure any doctor or dentist who treats you knows that you have the patch on. This medicine may affect the results of certain medical tests.    Skin burns have been reported at the patch site in several patients wearing an aluminized transdermal system during a magnetic resonance imaging scan (MRI).  Since this patch contains aluminum, it is recommended to remove the patch if you are having an MRI.    Possible Side Effects While Using This Medicine:    Dry mouth    Drowsiness    Temporary blurring of vision and widening of the pupils    Call your doctor right away if you notice any of these side effects:    Allergic reaction: Itching or hives, swelling in your face or hands, swelling or tingling in your mouth or throat, chest tightness, trouble breathing.    Blurred vision that does not go away after the patch is removed    Confusion or memory loss    Fast,slow, or uneven heartbeat    Lightheadedness, dizziness, drowsiness, or fainting    Seeing, hearing, or feeling things that are not there    Restlessness    Severe eye pain    Trouble urinating    If you notice other side effects that you think are caused by this medicine, call your doctor immediately.        BREAST RECONSTRUCTION WITH IMPLANTS  POST-OPERATIVE  INSTRUCTIONS    Instructions     Have someone drive you home after surgery and help you at home for 1-2     days.     Get plenty of rest and follow balanced diet.       Decreased activity may promote constipation, so you may want to add     more raw fruit to your diet, and be sure to increase fluid intake.     Take pain medication as prescribed. Do not take aspirin or any products      containing aspirin for one week after surgery.     Do not drink alcohol when taking pain medications.     Do not smoke, as smoking delays healing and increases the risk of complications.    Activities     Start walking as soon as possible, this helps to reduce swelling and      lowers the chance of blood clots.     Do not drive until you are no longer taking narcotic pain medications.     Do not drive until you have full range of motion with your arms.     No overhead lifting, strenuous sports, or lifting > 5 pounds for 3-6 weeks.    Incision Care     You may shower after 24 hours. The ACE wrap (if used) may be rewrapped as needed (if too tight or loose). Use it for support and may subtitute with a sports bra if preferred.      Avoid exposing scars to sun for at least 12 months.     Always use a strong sunblock, if sun exposure is unavoidable (SPF 50 or     greater).     Keep surgical tape on until it peels off.     Keep incisions clean and inspect daily for signs of infection.     No tub soaking, bathing or swimming until healed.     If used, empty drains twice a day and record the output separately for each drain. Record 24 hour output for each drain.    What to Expect     You are likely to feel tired and sore for 1-2 weeks     Normal sensation to the breast cannot be restored; in time, some feeling may return.     You may feel muscle spasms in the chest.    Appearance     Most scars will fade substantially over time, 1-2 years.     Reconstructed breast(s) may feel firmer and look rounder or flatter that the natural            Breast.     Reconstructed breast may not match the natural breast.    Follow-Up Care     If surgical drain was used, it will be removed in 1-2 weeks when the output is less            than 30 ml for 2 days.        Please note my office will call you 1-2 business days after your procedure to check up on how you're doing and to schedule your post-operative appointment.     When to Call     If you have increased swelling or bruising.     If swelling and redness persist after a few days.     If you have increased redness along the incision.     If you have severe or increased pain not relieved by medication.     If you have any side effects to medications; such as, rash, nausea,      headache, vomiting.     If you have an oral temperature over 100.4 degrees.     If you have any yellowish or greenish drainage from the incisions or     notice a foul odor.     If you have bleeding from the incisions that is difficult to control with      light pressure.     If you have loss of feeling or motion.    For Medical Questions, Please Call:     [phone], Monday - Friday, 8 a.m. - 4:30 p.m.     After hours and on weekends, please call Hospital Paging at [phone] and ask       for the Plastic Surgeon on call.    [facility]  Home Care Following Anesthesia  For 24 hours after surgery:  1. Get plenty of rest.  A responsible adult must stay with you for at least 24 hours after you leave the surgery center.  2. Do not drive or use heavy equipment.  If you have weakness or tingling, don't drive or use heavy equipment until this feeling goes away.   3. Do not drink alcohol.   4. Avoid strenuous or risky activities.  Ask for help when climbing stairs.  5. You may feel lightheaded.  IF so, sit for a few minutes before standing.  Have someone help you get up.   6. If you have nausea (feel sick to your stomach): Drink only clear liquids such as apple juice, ginger ale, broth or 7-Up.  Rest may  also help.  Be sure to drink enough fluids.  Move to a regular diet as you feel able.   7. You may have a slight fever.  Call the doctor if your fever is over 100 F (37.7 C) (taken under the tongue) or lasts longer than 24 hours.  8. You may have a dry mouth, a sore throat, muscle aches or trouble sleeping. These should go away after 24 hours.  9. Do not make important or legal decisions.     Tips for taking pain medications  To get the best pain relief possible, remember these points:    Take pain medications as directed, before pain becomes severe.    Pain medication can upset your stomach: taking it with food may help.    Constipation is a common side effect of pain medication. Drink plenty of  fluids.    Eat foods high in fiber. Take a stool softener if recommended by your doctor or pharmacist.    Do not drink alcohol, drive or operate machinery while taking pain medications.    Ask about other ways to control pain, such as with heat, ice or relaxation.    Tylenol/Acetaminophen Consumption  To help encourage the safe use of acetaminophen, the makers of TYLENOL  have lowered the maximum daily dose for single-ingredient Extra Strength TYLENOL  (acetaminophen) products sold in the [place] from 8 pills per day (4,000 mg) to 6 pills per day (3,000 mg). The dosing interval has also changed from 2 pills every 4-6 hours to 2 pills every 6 hours.    If you feel your pain relief is insufficient, you may take Tylenol/Acetaminophen in addition to your narcotic pain medication.     Be careful not to exceed 3,000 mg of Tylenol/Acetaminophen in a 24 hour period from all sources.    If you are taking extra strength Tylenol/acetaminophen (500 mg), the maximum dose is 6 tablets in 24 hours.    If you are taking regular strength acetaminophen (325 mg), the maximum dose is 9 tablets in 24 hours.    Call a doctor for any of the followin. Signs of infection (fever, growing tenderness at the surgery site, a large amount of drainage  "or bleeding, severe pain, foul-smelling drainage, redness, swelling).  2. It has been over 8 to 10 hours since surgery and you are still not able to urinate (pass water).  3. Headache for over 24 hours.  Your doctor is:  Dr. Basim Kidd, Plastic Surgery: [phone]                    Or dial [phone] and ask for the resident on call for:  Plastics  For emergency care, call the:  [facility]:  [phone] (TTY for hearing impaired: [phone])  Information about liposomal bupivacaine (Exparel)    What is Liposomal Bupivacaine?    Liposomal Bupivacaine is a numbing medication that can help you manage your pain after surgery.  This medication is similar to \"novacaine,\" which is often used by the dentist.  Liposomal bupivacaine is released slowly and can help control pain for up to 72 hours.    What is the purpose of Liposomal Bupivacaine?    To manage your pain after surgery    To help you sleep better, take deep breaths, walk more comfortable, and feel up to visiting with others    How is the procedure done?    Liposomal bupivacaine is a medication given by an injection.    It is usually given right before your surgery.  If this is the case, you will be awake or sedated, but you should experience minimal pain during the procedure.    For some people, the injection may be given at the very end of your surgery.  It all depends on the type of surgery and your situation.    The procedure usually takes about 5-15 minutes.  An ultrasound machine will help the anesthesiologist insert it in the right place or the surgeon will inject it under direct vision.     A needle is used to place the numbing medication under your skin.  It provides pain relief by numbing the tissue in the area where your surgeon will make the incision.    What can I expect?    You may experience numbness, tingling, or a feeling of heaviness around the area that was injected.    If you experience any of the follow symptoms " IMMEDIATELY CALL THE REGIONAL ANESTHESIA PAIN SERVICE:    Numbness or tingling occurs in areas other than around the injection site    Blurry vision    Ringing in your ears    A metallic taste in your mouth    PAGE: Dial [phone].  When prompted, enter the following 4-digit ID number:  [id number].  You will be prompted to enter your phone number; and then enter the # sign.  The clinician on call will call you back.    OR  CALL: Dial [phone].  Let the hospital  know that you are having a problem with a nerve block and that you would like to speak to the regional anesthesia pain service right away.    You should not receive any other type of numbing medication within 4 days after receiving liposomal bupivacaine unless your anesthesiologist approves.    Post Operative Instructions: Regional Anesthetic with Liposomal Bupivacaine for Chest and Abdominal Surgery  General Information:   Regional anesthesia is when local anesthetic or  numbing  medication is injected around the nerves to anesthetize or  numb  the area supplied by that set of nerves.     Types of Regional Blocks:  Transversus Abdominis Plane (TAP): A block injected beneath the covering of a muscle layer of the abdomen for abdominal surgery  Pectoral: A block injected near the breast for surgery on the breast and armpit  Paravertebral: A block injected in the back for surgery on the chest, ribs, and breast    Procedure:  The type of anesthesia your doctor used to numb your chest or abdomen will usually not wear off for 24-48 hours, but may last as long as 72 hours.     Diet:  There are no restrictions on your diet. You should drink plenty of fluids.     Discomfort:  You will have a tingling and prickly sensation in your chest or abdomen as the feeling begins to return. You can also expect some discomfort. The amount of discomfort is unpredictable, but if you have more pain than can be controlled with pain medication you should notify your  physician.     Pain Medicine:   Begin taking your oral pain pills before bedtime and during the night to avoid a sudden onset of pain as part of the block wears off.  Do not engage in drinking, driving, or hazardous occupations while taking pain medication.     Stitches:   You may have stitches or special skin closures. You doctor will inform you when to return to the office to have them removed.                   Pending Results     No orders found from 9/18/2018 to 9/21/2018.            Admission Information     Date & Time Provider Department Dept. Phone    9/20/2018 BASILIO Kidd MD [facility] [phone]      Your Vitals Were     Blood Pressure Pulse Temperature Respirations Pulse Oximetry       110/59 (Cuff Size: Adult Regular) 77 98.1  F (36.7  C) (Oral) 14 96%       MyChart Information     [employer] gives you secure access to your electronic health record. If you see a primary care provider, you can also send messages to your care team and make appointments. If you have questions, please call your primary care clinic.  If you do not have a primary care provider, please call [phone] and they will assist you.      [employer] is an electronic gateway that provides easy, online access to your medical records. With [employer], you can request a clinic appointment, read your test results, renew a prescription or communicate with your care team.     To access your existing account, please contact your [facility] or call [phone] for assistance.        Care EveryWhere ID     This is your Care EveryWhere ID. This could be used by other organizations to access your [facility] medical records  [id number]        Equal Access to Services     SURESH CHAPARRO : Michelle Herndon, watamera black, qaybta kanaseem nuno. So [facility] [phone].    ATENCIÓN: Si habla español, tiene a sparrow disposición  servicios gratuitos de asistencia lingüística. Karsten samaniego [phone].    We comply with applicable federal civil rights laws and [place] laws. We do not discriminate on the basis of race, color, national origin, age, disability, sex, sexual orientation, or gender identity.               Review of your medicines      START taking        Dose / Directions    ondansetron 4 MG ODT tab   Commonly known as:  ZOFRAN-ODT   Used for:  S/P breast reconstruction, bilateral        Dose:  4 mg   Take 1 tablet (4 mg) by mouth every 6 hours as needed for nausea   Quantity:  8 tablet   Refills:  0       senna-docusate 8.6-50 MG per tablet   Commonly known as:  SENOKOT-S;PERICOLACE   Used for:  S/P breast reconstruction, bilateral        Dose:  1-2 tablet   Take 1-2 tablets by mouth 2 times daily   Quantity:  30 tablet   Refills:  0         CONTINUE these medicines which may have CHANGED, or have new prescriptions. If we are uncertain of the size of tablets/capsules you have at home, strength may be listed as something that might have changed.        Dose / Directions    * oxyCODONE IR 5 MG tablet   Commonly known as:  ROXICODONE   This may have changed:  Another medication with the same name was added. Make sure you understand how and when to take each.   Used for:  Acute post-operative pain        Dose:  5-10 mg   Take 1-2 tablets (5-10 mg) by mouth every 4 hours as needed for other (pain control or improvement in physical function. Hold dose for analgesic side effects.)   Quantity:  30 tablet   Refills:  0       * oxyCODONE IR 5 MG tablet   Commonly known as:  ROXICODONE   This may have changed:  You were already taking a medication with the same name, and this prescription was added. Make sure you understand how and when to take each.   Used for:  S/P breast reconstruction, bilateral        Dose:  5-10 mg   Take 1-2 tablets (5-10 mg) by mouth every 6 hours as needed (Moderate to Severe Pain)   Quantity:  30 tablet   Refills:   0       * Notice:  This list has 2 medication(s) that are the same as other medications prescribed for you. Read the directions carefully, and ask your doctor or other care provider to review them with you.      CONTINUE these medicines which have NOT CHANGED        Dose / Directions    acetaminophen 325 MG tablet   Commonly known as:  TYLENOL   Used for:  Acute post-operative pain        Dose:  650 mg   Take 2 tablets (650 mg) by mouth every 6 hours as needed for mild pain   Quantity:  50 tablet   Refills:  0       calcium citrate-vitamin D 315-250 MG-UNIT Tabs per tablet   Commonly known as:  CITRACAL        Dose:  2 tablet   Take 2 tablets by mouth   Refills:  0       goserelin 3.6 MG injection   Commonly known as:  ZOLADEX        Dose:  3.6 mg   Inject 3.6 mg Subcutaneous every 30 days   Refills:  0       * letrozole 2.5 MG tablet   Commonly known as:  FEMARA   Used for:  Malignant neoplasm of upper-outer quadrant of left breast in female, estrogen receptor positive (H)        Dose:  2.5 mg   Take 1 tablet (2.5 mg) by mouth daily   Quantity:  30 tablet   Refills:  11       * letrozole 2.5 MG tablet   Commonly known as:  FEMARA   Used for:  Malignant neoplasm of upper-outer quadrant of left breast in female, estrogen receptor positive (H)        Dose:  2.5 mg   Take 1 tablet (2.5 mg) by mouth daily   Quantity:  90 tablet   Refills:  11       medium chain triglycerides (MCT OIL) oil        Dose:  30 mL   Take 30 mLs by mouth daily   Refills:  0       NONFORMULARY   Indication:  Rosehip oil        Dose:  1 capsule   Take 1 capsule by mouth daily Rosehip oil   Refills:  0       OMEGA 3-6-9 FATTY ACIDS PO        Dose:  2 capsule   Take 2 capsules by mouth daily   Refills:  0       ondansetron 4 MG tablet   Commonly known as:  ZOFRAN   Used for:  Nausea        Dose:  4 mg   Take 1 tablet (4 mg) by mouth every 6 hours as needed for nausea   Quantity:  30 tablet   Refills:  1       Spirulina 500 MG Tabs   Used for:   "Malignant neoplasm of left breast in female, estrogen receptor positive, unspecified site of breast (H)        6 Tabs daily.   Refills:  0       * Notice:  This list has 2 medication(s) that are the same as other medications prescribed for you. Read the directions carefully, and ask your doctor or other care provider to review them with you.         Where to get your medicines      These medications were sent to [place], [place] - [address]  [address], [facility] [address]    Hours:  TRANSPLANT PHONE NUMBER [phone] Phone:  [phone]     ondansetron 4 MG ODT tab    senna-docusate 8.6-50 MG per tablet         Some of these will need a paper prescription and others can be bought over the counter. Ask your nurse if you have questions.     Bring a paper prescription for each of these medications     oxyCODONE IR 5 MG tablet                Protect others around you: Learn how to safely use, store and throw away your medicines at www.disposemymeds.org.        Information about your nerve block     Today you received a block to numb the nerves near your surgery site.    This is a block using local anesthetic or \"numbing\" medication injected around the nerves to anesthetize or \"numb\" the area supplied by those nerves. This block is injected into the muscle layer near your surgical site. The type of anesthesia (Exparel) your anesthesia team used to numb your abdomen may give you relief for up to 72 hours.     Diet: There are no diet restrictions, but you should drink plenty of fluids, unless you are on a fluid-restricted diet.     Activity: If your surgical site is an arm or leg you should be careful with your affected limb, since it is possible to injure your limb without being aware of it due to the numbing. Until full feeling returns, you should guard against bumping or hitting your limb, and avoid extreme hot or cold temperatures on the " skin.    Pain Medication: As the block wears off, the feeling will return as a tingling or prickly sensation near your surgical site. You will experience more discomfort from your incisions as the feeling returns. You may want to take a pain pill (a narcotic or Tylenol if this was prescribed by your surgeon) when you start to experience mild pain, before the pain becomes more severe. If your pain medications do not control your pain, you should notify your surgeon. If you are taking narcotics for pain management, do not drink alcohol, drive a car, or perform hazardous activities.  If you have questions or concerns you may call your surgeon at the number provided with your discharge instructions.     Call your surgeon if you experience blurry vision, ringing in the ears or metallic taste in your mouth.         Information about OPIOIDS     PRESCRIPTION OPIOIDS: WHAT YOU NEED TO KNOW   We gave you an opioid (narcotic) pain medicine. It is important to manage your pain, but opioids are not always the best choice. You should first try all the other options your care team gave you. Take this medicine for as short a time (and as few doses) as possible.    Some activities can increase your pain, such as bandage changes or therapy sessions. It may help to take your pain medicine 30 to 60 minutes before these activities. Reduce your stress by getting enough sleep, working on hobbies you enjoy and practicing relaxation or meditation. Talk to your care team about ways to manage your pain beyond prescription opioids.    These medicines have risks:    DO NOT drive when on new or higher doses of pain medicine. These medicines can affect your alertness and reaction times, and you could be arrested for driving under the influence (DUI). If you need to use opioids long-term, talk to your care team about driving.    DO NOT operate heavy machinery    DO NOT do any other dangerous activities while taking these medicines.    DO NOT  drink any alcohol while taking these medicines.     If the opioid prescribed includes acetaminophen, DO NOT take with any other medicines that contain acetaminophen. Read all labels carefully. Look for the word  acetaminophen  or  Tylenol.  Ask your pharmacist if you have questions or are unsure.    You can get addicted to pain medicines, especially if you have a history of addiction (chemical, alcohol or substance dependence). Talk to your care team about ways to reduce this risk.    All opioids tend to cause constipation. Drink plenty of water and eat foods that have a lot of fiber, such as fruits, vegetables, prune juice, apple juice and high-fiber cereal. Take a laxative (Miralax, milk of magnesia, Colace, Senna) if you don t move your bowels at least every other day. Other side effects include upset stomach, sleepiness, dizziness, throwing up, tolerance (needing more of the medicine to have the same effect), physical dependence and slowed breathing.    Store your pills in a secure place, locked if possible. We will not replace any lost or stolen medicine. If you don t finish your medicine, please throw away (dispose) as directed by your pharmacist. The [place] [employer] has more information about safe disposal: https://www.pca.[facility].[place].us/living-green/managing-unwanted-medications             Medication List: This is a list of all your medications and when to take them. Check marks below indicate your daily home schedule. Keep this list as a reference.      Medications           Morning Afternoon Evening Bedtime As Needed    acetaminophen 325 MG tablet   Commonly known as:  TYLENOL   Take 2 tablets (650 mg) by mouth every 6 hours as needed for mild pain   Last time this was given:  975 mg on 9/20/2018 12:41 PM                                calcium citrate-vitamin D 315-250 MG-UNIT Tabs per tablet   Commonly known as:  CITRACAL   Take 2 tablets by mouth                                goserelin  3.6 MG injection   Commonly known as:  ZOLADEX   Inject 3.6 mg Subcutaneous every 30 days                                * letrozole 2.5 MG tablet   Commonly known as:  FEMARA   Take 1 tablet (2.5 mg) by mouth daily                                * letrozole 2.5 MG tablet   Commonly known as:  FEMARA   Take 1 tablet (2.5 mg) by mouth daily                                medium chain triglycerides (MCT OIL) oil   Take 30 mLs by mouth daily                                NONFORMULARY   Take 1 capsule by mouth daily Rosehip oil                                OMEGA 3-6-9 FATTY ACIDS PO   Take 2 capsules by mouth daily                                ondansetron 4 MG ODT tab   Commonly known as:  ZOFRAN-ODT   Take 1 tablet (4 mg) by mouth every 6 hours as needed for nausea                                ondansetron 4 MG tablet   Commonly known as:  ZOFRAN   Take 1 tablet (4 mg) by mouth every 6 hours as needed for nausea                                * oxyCODONE IR 5 MG tablet   Commonly known as:  ROXICODONE   Take 1-2 tablets (5-10 mg) by mouth every 4 hours as needed for other (pain control or improvement in physical function. Hold dose for analgesic side effects.)                                * oxyCODONE IR 5 MG tablet   Commonly known as:  ROXICODONE   Take 1-2 tablets (5-10 mg) by mouth every 6 hours as needed (Moderate to Severe Pain)                                senna-docusate 8.6-50 MG per tablet   Commonly known as:  SENOKOT-S;PERICOLACE   Take 1-2 tablets by mouth 2 times daily                                Spirulina 500 MG Tabs   6 Tabs daily.                                * Notice:  This list has 4 medication(s) that are the same as other medications prescribed for you. Read the directions carefully, and ask your doctor or other care provider to review them with you.

## 2018-09-20 NOTE — DISCHARGE INSTRUCTIONS
Scopolamine Patch- (Absorbed through the skin)    This medicine prevents nausea and vomiting caused by motion sickness or anesthesia.  The medicine is in a patch worn behind the ear.      Do NOT use the Scopolamine Patch if you have glaucoma or are allergic to scopolamine.    How to Use This Medicine:    The patch is applied behind the ear.    Keep the patch dry to prevent it from falling off.  Limit contact with water (no bathing or swimming).      If the patch is loose or falls off throw it away.  You do not need to apply a new patch.    After you take off the patch or if it falls off, wash your hands and the area behind your ear with soap and water.      You can remove the patch tomorrow, or leave on for up to 3 days.    Only one patch should be used at any time.    How to Dispose of This Medicine:    Fold the used patch in half with the sticky sides together. Throw any used patch away so that children or pets cannot get to it. You will also need to throw away old patches after the expiration date has passed.    Keep all medicine away from children and never share your medicine with anyone.    Warnings While Using This Medicine:    This medicine can make you sleepy.  Avoid taking sleeping pills and other medicines that can make you sleepy while the patch is on.    Do not drink alcohol while the patch is on.    This medicine can cause temporary blurring and other vision problems if it comes in contact with the eyes.  This is not serious unless accompanied by eye pain and redness.     This medicine may cause problems with urination. If you have problems with urinating, remove the patch.  If you are unable to urinate, call your doctor.      This medicine may make you dizzy or drowsy. Avoid driving, using machines, or doing anything else that could be dangerous if the patch is on.    This medicine may make you sweat less and cause your body to get too hot. Be careful in hot weather or if you are  exercising.    Make sure any doctor or dentist who treats you knows that you have the patch on. This medicine may affect the results of certain medical tests.    Skin burns have been reported at the patch site in several patients wearing an aluminized transdermal system during a magnetic resonance imaging scan (MRI).  Since this patch contains aluminum, it is recommended to remove the patch if you are having an MRI.    Possible Side Effects While Using This Medicine:    Dry mouth    Drowsiness    Temporary blurring of vision and widening of the pupils    Call your doctor right away if you notice any of these side effects:    Allergic reaction: Itching or hives, swelling in your face or hands, swelling or tingling in your mouth or throat, chest tightness, trouble breathing.    Blurred vision that does not go away after the patch is removed    Confusion or memory loss    Fast,slow, or uneven heartbeat    Lightheadedness, dizziness, drowsiness, or fainting    Seeing, hearing, or feeling things that are not there    Restlessness    Severe eye pain    Trouble urinating    If you notice other side effects that you think are caused by this medicine, call your doctor immediately.        BREAST RECONSTRUCTION WITH IMPLANTS  POST-OPERATIVE INSTRUCTIONS    Instructions     Have someone drive you home after surgery and help you at home for 1-2     days.     Get plenty of rest and follow balanced diet.       Decreased activity may promote constipation, so you may want to add     more raw fruit to your diet, and be sure to increase fluid intake.     Take pain medication as prescribed. Do not take aspirin or any products      containing aspirin for one week after surgery.     Do not drink alcohol when taking pain medications.     Do not smoke, as smoking delays healing and increases the risk of complications.    Activities     Start walking as soon as possible, this helps to reduce swelling and      lowers the chance of blood  clots.     Do not drive until you are no longer taking narcotic pain medications.     Do not drive until you have full range of motion with your arms.     No overhead lifting, strenuous sports, or lifting > 5 pounds for 3-6 weeks.    Incision Care     You may shower after 24 hours. The ACE wrap (if used) may be rewrapped as needed (if too tight or loose). Use it for support and may subtitute with a sports bra if preferred.      Avoid exposing scars to sun for at least 12 months.     Always use a strong sunblock, if sun exposure is unavoidable (SPF 50 or     greater).     Keep surgical tape on until it peels off.     Keep incisions clean and inspect daily for signs of infection.     No tub soaking, bathing or swimming until healed.     If used, empty drains twice a day and record the output separately for each drain. Record 24 hour output for each drain.    What to Expect     You are likely to feel tired and sore for 1-2 weeks     Normal sensation to the breast cannot be restored; in time, some feeling may return.     You may feel muscle spasms in the chest.    Appearance     Most scars will fade substantially over time, 1-2 years.     Reconstructed breast(s) may feel firmer and look rounder or flatter that the natural           Breast.     Reconstructed breast may not match the natural breast.    Follow-Up Care     If surgical drain was used, it will be removed in 1-2 weeks when the output is less            than 30 ml for 2 days.        Please note my office will call you 1-2 business days after your procedure to check up on how you're doing and to schedule your post-operative appointment.     When to Call     If you have increased swelling or bruising.     If swelling and redness persist after a few days.     If you have increased redness along the incision.     If you have severe or increased pain not relieved by medication.     If you have any side effects to medications; such as, rash, nausea,      headache,  vomiting.     If you have an oral temperature over 100.4 degrees.     If you have any yellowish or greenish drainage from the incisions or     notice a foul odor.     If you have bleeding from the incisions that is difficult to control with      light pressure.     If you have loss of feeling or motion.    For Medical Questions, Please Call:     [phone], Monday - Friday, 8 a.m. - 4:30 p.m.     After hours and on weekends, please call Hospital Paging at [phone] and ask       for the Plastic Surgeon on call.    [facility]  Home Care Following Anesthesia  For 24 hours after surgery:  1. Get plenty of rest.  A responsible adult must stay with you for at least 24 hours after you leave the surgery center.  2. Do not drive or use heavy equipment.  If you have weakness or tingling, don't drive or use heavy equipment until this feeling goes away.   3. Do not drink alcohol.   4. Avoid strenuous or risky activities.  Ask for help when climbing stairs.  5. You may feel lightheaded.  IF so, sit for a few minutes before standing.  Have someone help you get up.   6. If you have nausea (feel sick to your stomach): Drink only clear liquids such as apple juice, ginger ale, broth or 7-Up.  Rest may also help.  Be sure to drink enough fluids.  Move to a regular diet as you feel able.   7. You may have a slight fever.  Call the doctor if your fever is over 100 F (37.7 C) (taken under the tongue) or lasts longer than 24 hours.  8. You may have a dry mouth, a sore throat, muscle aches or trouble sleeping. These should go away after 24 hours.  9. Do not make important or legal decisions.     Tips for taking pain medications  To get the best pain relief possible, remember these points:    Take pain medications as directed, before pain becomes severe.    Pain medication can upset your stomach: taking it with food may help.    Constipation is a common side effect of pain medication. Drink plenty  "of  fluids.    Eat foods high in fiber. Take a stool softener if recommended by your doctor or pharmacist.    Do not drink alcohol, drive or operate machinery while taking pain medications.    Ask about other ways to control pain, such as with heat, ice or relaxation.    Tylenol/Acetaminophen Consumption  To help encourage the safe use of acetaminophen, the makers of TYLENOL  have lowered the maximum daily dose for single-ingredient Extra Strength TYLENOL  (acetaminophen) products sold in the [place] from 8 pills per day (4,000 mg) to 6 pills per day (3,000 mg). The dosing interval has also changed from 2 pills every 4-6 hours to 2 pills every 6 hours.    If you feel your pain relief is insufficient, you may take Tylenol/Acetaminophen in addition to your narcotic pain medication.     Be careful not to exceed 3,000 mg of Tylenol/Acetaminophen in a 24 hour period from all sources.    If you are taking extra strength Tylenol/acetaminophen (500 mg), the maximum dose is 6 tablets in 24 hours.    If you are taking regular strength acetaminophen (325 mg), the maximum dose is 9 tablets in 24 hours.    Call a doctor for any of the followin. Signs of infection (fever, growing tenderness at the surgery site, a large amount of drainage or bleeding, severe pain, foul-smelling drainage, redness, swelling).  2. It has been over 8 to 10 hours since surgery and you are still not able to urinate (pass water).  3. Headache for over 24 hours.  Your doctor is:  Dr. Basim Kidd, Plastic Surgery: [phone]                    Or dial [phone] and ask for the resident on call for:  Plastics  For emergency care, call the:  [facility]:  [phone] (TTY for hearing impaired: [phone])  Information about liposomal bupivacaine (Exparel)    What is Liposomal Bupivacaine?    Liposomal Bupivacaine is a numbing medication that can help you manage your pain after surgery.  This medication is similar to \"novacaine,\" " which is often used by the dentist.  Liposomal bupivacaine is released slowly and can help control pain for up to 72 hours.    What is the purpose of Liposomal Bupivacaine?    To manage your pain after surgery    To help you sleep better, take deep breaths, walk more comfortable, and feel up to visiting with others    How is the procedure done?    Liposomal bupivacaine is a medication given by an injection.    It is usually given right before your surgery.  If this is the case, you will be awake or sedated, but you should experience minimal pain during the procedure.    For some people, the injection may be given at the very end of your surgery.  It all depends on the type of surgery and your situation.    The procedure usually takes about 5-15 minutes.  An ultrasound machine will help the anesthesiologist insert it in the right place or the surgeon will inject it under direct vision.     A needle is used to place the numbing medication under your skin.  It provides pain relief by numbing the tissue in the area where your surgeon will make the incision.    What can I expect?    You may experience numbness, tingling, or a feeling of heaviness around the area that was injected.    If you experience any of the follow symptoms IMMEDIATELY CALL THE REGIONAL ANESTHESIA PAIN SERVICE:    Numbness or tingling occurs in areas other than around the injection site    Blurry vision    Ringing in your ears    A metallic taste in your mouth    PAGE: Dial [phone].  When prompted, enter the following 4-digit ID number:  [id number].  You will be prompted to enter your phone number; and then enter the # sign.  The clinician on call will call you back.    OR  CALL: Dial [phone].  Let the hospital  know that you are having a problem with a nerve block and that you would like to speak to the regional anesthesia pain service right away.    You should not receive any other type of numbing medication within 4 days after  receiving liposomal bupivacaine unless your anesthesiologist approves.    Post Operative Instructions: Regional Anesthetic with Liposomal Bupivacaine for Chest and Abdominal Surgery  General Information:   Regional anesthesia is when local anesthetic or  numbing  medication is injected around the nerves to anesthetize or  numb  the area supplied by that set of nerves.     Types of Regional Blocks:  Transversus Abdominis Plane (TAP): A block injected beneath the covering of a muscle layer of the abdomen for abdominal surgery  Pectoral: A block injected near the breast for surgery on the breast and armpit  Paravertebral: A block injected in the back for surgery on the chest, ribs, and breast    Procedure:  The type of anesthesia your doctor used to numb your chest or abdomen will usually not wear off for 24-48 hours, but may last as long as 72 hours.     Diet:  There are no restrictions on your diet. You should drink plenty of fluids.     Discomfort:  You will have a tingling and prickly sensation in your chest or abdomen as the feeling begins to return. You can also expect some discomfort. The amount of discomfort is unpredictable, but if you have more pain than can be controlled with pain medication you should notify your physician.     Pain Medicine:   Begin taking your oral pain pills before bedtime and during the night to avoid a sudden onset of pain as part of the block wears off.  Do not engage in drinking, driving, or hazardous occupations while taking pain medication.     Stitches:   You may have stitches or special skin closures. You doctor will inform you when to return to the office to have them removed.

## 2018-09-20 NOTE — OP NOTE
PREOPERATIVE DIAGNOSIS:  Status post bilateral breast reconstruction for left breast cancer, now ready for stage II reconstruction.          POSTOPERATIVE DIAGNOSES:   Status post bilateral breast reconstruction for left breast cancer, now ready for stage II reconstruction.          PROCEDURES:    1.  Bilateral breast expander removal with replacement with permanent gel implant ([place] smooth, round, high profile XTRA gel implant, 700 mL).    2.  Open capsulotomy and elevation of the right inframammary fold.    3. x3 standing cutaneous cones excised, each 1 cm, with layered closure.         SURGEON:  Basim Kidd MD        Resident:  America Hart MD      IMPLANTS:      Implant Name Type Inv. Item Serial No.  Lot No. LRB No. Used   Smooth high profile Xtra silicone gel-filled breast implant   [id number] Truzip [id number] Right 1   Smooth high profile Xtra silicone gel-filled breast implant     [id number] Truzip [id number] Left 1         ANESTHESIA:  General anesthesia with endotracheal intubation.        COMPLICATIONS:  Nil.        DRAINS:  Nil.        SPECIMENS:  Nil.      BLOOD LOSS: 25 mL      DESCRIPTION OF PROCEDURE:  After informed consent was taken from the patient, the proper site and procedure were ascertained with her and she was appropriately marked, she was taken to the operating room.  She was placed in a supine position with the knees comfortably flexed, pillows underneath them and pneumoboots placed and running prior to induction of anesthesia.  Preoperative antibiotics were given. General anesthesia was administered without any complications.  She was appropriately prepped and draped and appropriately padded.  She was placed with the arms abducted to about 50 degrees position.     We began by first making incisions in the IMF of both breasts. We cut through down to the underlying capsule, and opened it on each side. We then went ahead and removed the expanders  on each side.  The capsule was very smooth and non-irritated on both sides. Appropriate open capsulotomies were performed superiorly and medially bilaterally. A number of different sizers were placed and the patient sat up to ensure symmetry of size and shape. The right IMF was lower, so we placed 0-PDS sutures to raise the IMF on the right. Once we had a symmetrical size and shape on each side, we then thoroughly irrigated both breast pockets with triple antibiotic irrigation and betadine. Then we reprepped, draped, changed our gloves.  We came to the conclusion that 700 mL implants would be used.  We went ahead and opened up the appropriate implants as listed above and placed them in the pocket. The original incision on each side was freshened and then the incisions were closed using 2-0 Monocryl suture to close the capsule, and then followed by 2-0 Monocryl suture in a deep dermal layer followed by 3-0 Monocryl suture in a running intracuticular manner. 3 sanding cutaneous cones, all 1 cm from the right lateral mastectomy scar, right medial mastectomy scar and left lateral mastectomy scar was excised and closed with 3-0 Monocryl and 5-0 fast gut sutures in layered fashion. Then all the wounds ere covered with Prineo.    An ACE wrap was placed. The patient tolerated the procedure well.  All counts correct at the end of the case.  The patient was extubated and sent to recovery room in stable condition.

## 2018-09-20 NOTE — ANESTHESIA CARE TRANSFER NOTE
Patient: Mary Chadwick    Procedure(s):  Bilateral Breast Implant Exchange and Revision - Wound Class: I-Clean    Diagnosis: Breast Cancer  Diagnosis Additional Information: No value filed.    Anesthesia Type:   General, Periph. Nerve Block for postop pain, LMA     Note:  Airway :Face Mask  Patient transferred to:PACU  Comments: 117/69  99%  97.0-62-20  Handoff Report: Identifed the Patient, Identified the Reponsible Provider, Reviewed the pertinent medical history, Discussed the surgical course, Reviewed Intra-OP anesthesia mangement and issues during anesthesia, Set expectations for post-procedure period and Allowed opportunity for questions and acknowledgement of understanding      Vitals: (Last set prior to Anesthesia Care Transfer)    CRNA VITALS  9/20/2018 1603 - 9/20/2018 1639      9/20/2018             Pulse: 72    SpO2: 99 %    Resp Rate (observed): (!)  1    Resp Rate (set): 10                Electronically Signed By: REY Smith CRNA  September 20, 2018  4:39 PM

## 2018-09-20 NOTE — OR NURSING
Patient received Pectoralis nerve block with Exparel. Fentanyl 50 mcg andVersed 1mg given. Tolerated procedure well.

## 2018-09-20 NOTE — ANESTHESIA POSTPROCEDURE EVALUATION
Patient: Mary Chadwick    Procedure(s):  Bilateral Breast Implant Exchange and Revision - Wound Class: I-Clean    Diagnosis:Breast Cancer  Diagnosis Additional Information: No value filed.    Anesthesia Type:  General, Periph. Nerve Block for postop pain, LMA    Note:  Anesthesia Post Evaluation    Patient location during evaluation: PACU  Patient participation: Able to fully participate in evaluation  Level of consciousness: awake  Pain management: adequate  Airway patency: patent  Cardiovascular status: acceptable  Respiratory status: acceptable  Hydration status: balanced  PONV: none     Anesthetic complications: None          Last vitals:  Vitals:    09/20/18 1645 09/20/18 1651 09/20/18 1700   BP: 110/77 110/76 112/72   Pulse: 73 64 66   Resp: 14 14 16   Temp:  36.5  C (97.7  F) 36.4  C (97.6  F)   SpO2: 96% 97% 97%         Electronically Signed By: Pascual Strong MD  September 20, 2018  5:44 PM

## 2018-09-25 ENCOUNTER — OFFICE VISIT (OUTPATIENT)
Dept: PLASTIC SURGERY | Facility: CLINIC | Age: 43
End: 2018-09-25
Attending: PLASTIC SURGERY
Payer: COMMERCIAL

## 2018-09-25 VITALS
BODY MASS INDEX: 27.12 KG/M2 | SYSTOLIC BLOOD PRESSURE: 132 MMHG | DIASTOLIC BLOOD PRESSURE: 83 MMHG | RESPIRATION RATE: 16 BRPM | OXYGEN SATURATION: 99 % | HEART RATE: 89 BPM | TEMPERATURE: 99.4 F | WEIGHT: 183.1 LBS | HEIGHT: 69 IN

## 2018-09-25 DIAGNOSIS — Z98.890 S/P BREAST RECONSTRUCTION, BILATERAL: Primary | ICD-10-CM

## 2018-09-25 PROCEDURE — G0463 HOSPITAL OUTPT CLINIC VISIT: HCPCS | Mod: ZF

## 2018-09-25 ASSESSMENT — PAIN SCALES - GENERAL: PAINLEVEL: NO PAIN (0)

## 2018-09-25 NOTE — LETTER
9/25/2018       RE: Mary Chadwick  [address]  [facility] [address]     Dear Colleague,    Thank you for referring your patient, Mary Chadwick, to the [facility] at [facility]. Please see a copy of my visit note below.    PRESENTING COMPLAINT:  Postoperative visit, status post bilateral breast reconstruction for left-sided breast cancer, underwent second-stage reconstruction 09/20/2018.      HISTORY OF PRESENT ILLNESS:  Ms. Chadwick is 42 years old, about a week out from her surgery.  She has done extremely well, very happy with the results, no major issues.      PHYSICAL EXAMINATION:  Vital signs stable.  She was afebrile, in obvious distress.  Both breasts are symmetric, aesthetic and healing in well.      ASSESSMENT AND PLAN:  Based on above findings, a diagnosis of bilateral breast reconstruction for breast cancer was made.  Advised moisturizing.  No heavy lifting more than 5 pounds for a total of about 2-4 weeks.  I will see her back in 4 weeks.  We will plan potential fat grafting and nipple reconstruction.  All questions were answered.  She was happy with the visit.         Again, thank you for allowing me to participate in the care of your patient.      Sincerely,    BASILIO Kidd MD

## 2018-09-25 NOTE — NURSING NOTE
"Oncology Rooming Note    September 25, 2018 9:24 AM   Mary Chadwick is a 42 year old female who presents for:    Chief Complaint   Patient presents with     Oncology Clinic Visit     Return : Post-Op Breast Cancer     Initial Vitals: /83 (BP Location: Right arm, Patient Position: Sitting, Cuff Size: Adult Regular)  Pulse 89  Temp 99.4  F (37.4  C) (Oral)  Resp 16  Ht 1.753 m (5' 9.02\")  Wt 83.1 kg (183 lb 1.6 oz)  SpO2 99%  BMI 27.03 kg/m2 Estimated body mass index is 27.03 kg/(m^2) as calculated from the following:    Height as of this encounter: 1.753 m (5' 9.02\").    Weight as of this encounter: 83.1 kg (183 lb 1.6 oz). Body surface area is 2.01 meters squared.  No Pain (0) Comment: Data Unavailable   No LMP recorded. Patient is not currently having periods (Reason: Premenopausal).  Allergies reviewed: Yes  Medications reviewed: Yes    Medications: Medication refills not needed today.  Pharmacy name entered into EPIC: [facility] - [place], [place] - [address] [place] [address]    Clinical concerns: Patient states no further concerns at this time.     8 minutes for nursing intake (face to face time)     Erica Jean CMA              "

## 2018-09-25 NOTE — PROGRESS NOTES
PRESENTING COMPLAINT:  Postoperative visit, status post bilateral breast reconstruction for left-sided breast cancer, underwent second-stage reconstruction 09/20/2018.      HISTORY OF PRESENT ILLNESS:  Ms. Chadwick is 42 years old, about a week out from her surgery.  She has done extremely well, very happy with the results, no major issues.      PHYSICAL EXAMINATION:  Vital signs stable.  She was afebrile, in obvious distress.  Both breasts are symmetric, aesthetic and healing in well.      ASSESSMENT AND PLAN:  Based on above findings, a diagnosis of bilateral breast reconstruction for breast cancer was made.  Advised moisturizing.  No heavy lifting more than 5 pounds for a total of about 2-4 weeks.  I will see her back in 4 weeks.  We will plan potential fat grafting and nipple reconstruction.  All questions were answered.  She was happy with the visit.

## 2018-09-25 NOTE — MR AVS SNAPSHOT
After Visit Summary   9/25/2018    Mary Chadwick    MRN: [id number]           Patient Information     Date Of Birth          1975        Visit Information        Provider Department      9/25/2018 9:30 AM BASILIO Kidd MD [facility]        Today's Diagnoses     S/P breast reconstruction, bilateral    -  1       Follow-ups after your visit        Follow-up notes from your care team     Return in about 4 weeks (around 10/23/2018).      Your next 10 appointments already scheduled     Oct 09, 2018  1:00 PM CDT   Infusion 60 with UC ONCOLOGY INFUSION, UC 11 ATC   [facility] ([facility] [place])    [address]  [address]  [facility] [address] [phone]            Oct 12, 2018 10:20 AM CDT   US PELVIC COMPLETE W TRANSVAGINAL with [email or web address]   [facility] ([facility])    [address] [place] [address] S  [address]  [facility] [address] [phone]           How do I prepare for my exam? (Food and drink instructions) Adults: Drink four 8-ounce glasses of fluid an hour before your exam. If you need to empty your bladder before your exam, try to release only a little urine. Then, drink another glass of fluid.  Children: * Children who are potty trained up to 6 years old should drink at least 2 cups (16 oz) of water/non-carbonated beverage 30 minutes prior to the exam. * Children who are 6-10 years should drink at least 3 cups (24 oz) of water/non-carbonated beverage 45 minutes prior to the exam. * Children who are 10 years or older should drink at least 4 cups (32 oz) of water/non-carbonated beverage 45 minutes prior to the exam.  If your child is very uncomfortable or has an urgent need to pee, please notify a technologist; they will try to find out how much longer the wait may be and provide instructions to help relieve the pressure.  What should I wear: Wear comfortable clothes.  How long does the exam  take: Most ultrasounds take 30 to 60 minutes.  What should I bring: Bring a list of your medicines, including vitamins, minerals and over-the-counter drugs. It is safest to leave personal items at home.  Do I need a :  No  is needed.  What do I need to tell my doctor: Tell your doctor about any allergies you may have.  What should I do after the exam: No restrictions, You may resume normal activities.  What is this test: An ultrasound uses sound waves to make pictures of the body. Sound waves do not cause pain. The only discomfort may be the pressure of the wand against your skin or full bladder.  Who should I call with questions: If you have any questions, please call the Imaging Department where you will have your exam. Directions, parking instructions, and other information is available on our website, [email or web address].            Oct 12, 2018 11:15 AM CDT   SHORT with Yenny Hidalgo MD   [facility] ([facility]    [address] [place] [address]  [address]  [facility] [id number] [phone]            Oct 15, 2018   Procedure with Edwardo Beatty MD   [facility] ([facility] [place])    [address] [place] [address]  5th Floor  [facility] [address] [phone]           Located in the Clinics and Surgery Center at [address] [place] [address].   parking is very convenient and highly recommended.  is a $6 flat rate fee.  Both  and self parkers should enter the main arrival plaza from [facility]; parking attendants will direct you based on your parking preference.            Nov 06, 2018  1:30 PM CST   Infusion 62 with  ONCOLOGY INFUSION,  [address] [facility] ([facility])    [address] [place] [address]  [address]  [facility] [address] [phone]            Nov 30, 2018  8:30 AM CST   MR BREAST BILATERAL W/O & W CONTRAST with [email or web address]   [facility]  Imaging Center MRI ([facility])    [address] [facility]  [address]st [facility] [address] [phone]           How do I prepare for my exam? (Food and drink instructions) **If you will be receiving sedation or general anesthesia, please see special notes below.**  How do I prepare for my exam? (Other instructions) Take your medicines as usual, unless your doctor tells you not to. The timing of your exam may depend on the start of your last period. If you re in menopause, you may have the exam anytime.  You will have IV contrast for this exam.  You do not need to do anything special to prepare. **If you will be receiving sedation or general anesthesia, please see special notes below.**  What should I wear:  The MRI machine uses a strong magnet. Please wear clothes without metal (snaps, zippers). A sweatsuit works well, or we may give you a hospital gown. Please remove any body piercings and hair extensions before you arrive. You will also remove watches, jewelry, hairpins, wallets, dentures, partial dental plates and hearing aids. You may wear contact lenses, and you may be able to wear your rings. We have a safe place to keep your personal items, but it is safer to leave them at home.  How long does the exam take:  Most tests take 30 to 60 minutes.  HOWEVER, IF YOUR DOCTOR PRESCRIBES ANESTHESIA please plan on spending four to five hours in the recovery room.  What should I bring: Bring a list of your current medicines to your exam (including vitamins, minerals and over-the-counter drugs). Please bring any previous mammograms or breast MRIs from other facilities to the MRI dept. Do not mail these items to us.  Do I need a : **If you will be receiving sedation or general anesthesia, please see special notes below.**  What should I do after the exam: No Restrictions, You may resume normal activities.  What is this test: MRI (magnetic resonance imaging) uses a strong magnet and  radio waves to look inside the body. An MRA (magnetic resonance angiogram) does the same thing, but it lets us look at your blood vessels. A computer turns the radio waves into pictures showing cross sections of the body, much like slices of bread. This helps us see any problems more clearly. You may receive fluid (called  contrast ) before or during your scan. The fluid helps us see the pictures better. We give the fluid through an IV (small needle in your arm).  Who should I call with questions: If you have any questions, please contact your Imaging Department exam site. Directions, parking instructions, and other information is available on our website, [email or web address].  How do I prepare if I m having sedation or anesthesia? **IMPORTANT** THE INSTRUCTIONS BELOW ARE ONLY FOR THOSE PATIENTS WHO HAVE BEEN TOLD THEY WILL RECEIVE SEDATION OR GENERAL ANESTHESIA DURING THEIR MRI PROCEDURE:  IF YOU WILL RECEIVE SEDATION (take medicine to help you relax during your exam) You must get the medicine from your doctor before you arrive. Bring the medicine to the exam. Do not take it at home. Arrive one hour early. Bring someone who can take you home after the test. Your medicine will make you sleepy. After the exam, you may not drive, take a bus or take a taxi by yourself. No eating 8 hours before your exam. You may have clear liquids up until 4 hours before your exam. (Clear liquids include water, clear tea, black coffee and fruit juice without pulp.)  IF YOU WILL RECEIVE ANESTHESIA (be asleep for your exam) Arrive 1 1/2 hours early. Bring someone who can take you home after the test. You may not drive, take a bus or take a taxi by yourself. No eating 8 hours before your exam. You may have clear liquids up until 4 hours before your exam. (Clear liquids include water, clear tea, black coffee and fruit juice without pulp.)            Nov 30, 2018  9:15 AM CST   MR CHEST W/O & W CONTRAST with [address]  MRI ([facility] [place])    [address] [facility]  1st Floor  [facility] [id number] [phone]           How do I prepare for my exam? (Food and drink instructions) **If you will be receiving sedation or general anesthesia, please see special notes below.**  How do I prepare for my exam? (Other instructions) Take your medicines as usual, unless your doctor tells you not to. You may or may not receive intravenous (IV) contrast for this exam pending the discretion of the Radiologist.  You do not need to do anything special to prepare.  **If you will be receiving sedation or general anesthesia, please see special notes below.**  What should I wear: The MRI machine uses a strong magnet. Please wear clothes without metal (snaps, zippers). A sweatsuit works well, or we may give you a hospital gown. Please remove any body piercings and hair extensions before you arrive. You will also remove watches, jewelry, hairpins, wallets, dentures, partial dental plates and hearing aids. You may wear contact lenses, and you may be able to wear your rings. We have a safe place to keep your personal items, but it is safer to leave them at home.  How long does the exam take: Most tests take 30 to 60 minutes.  HOWEVER, IF YOUR DOCTOR PRESCRIBES ANESTHESIA please plan on spending four to five hours in the recovery room.  What should I bring:  Bring a list of your current medicines to your exam (including vitamins, minerals and over-the-counter drugs).  Do I need a :  **If you will be receiving sedation or general anesthesia, please see special notes below.**  What should I do after the exam: No Restrictions, You may resume normal activities.  What is this test: MRI (magnetic resonance imaging) uses a strong magnet and radio waves to look inside the body. An MRA (magnetic resonance angiogram) does the same thing, but it lets us look at your blood vessels. A computer turns the radio waves into pictures showing  cross sections of the body, much like slices of bread. This helps us see any problems more clearly. You may receive fluid (called  contrast ) before or during your scan. The fluid helps us see the pictures better. We give the fluid through an IV (small needle in your arm).  Who should I call with questions:  Please call the Imaging Department at your exam site with any questions. Directions, parking instructions, and other information is available on our website, [email or web address].  How do I prepare if I m having sedation or anesthesia? **IMPORTANT** THE INSTRUCTIONS BELOW ARE ONLY FOR THOSE PATIENTS WHO HAVE BEEN TOLD THEY WILL RECEIVE SEDATION OR GENERAL ANESTHESIA DURING THEIR MRI PROCEDURE:  IF YOU WILL RECEIVE SEDATION (take medicine to help you relax during your exam): You must get the medicine from your doctor before you arrive. Bring the medicine to the exam. Do not take it at home. Arrive one hour early. Bring someone who can take you home after the test. Your medicine will make you sleepy. After the exam, you may not drive, take a bus or take a taxi by yourself. No eating 8 hours before your exam. You may have clear liquids up until 4 hours before your exam. (Clear liquids include water, clear tea, black coffee and fruit juice without pulp.)  IF YOU WILL RECEIVE ANESTHESIA (be asleep for your exam): Arrive 1 1/2 hours early. Bring someone who can take you home after the test. You may not drive, take a bus or take a taxi by yourself. No eating 8 hours before your exam. You may have clear liquids up until 4 hours before your exam. (Clear liquids include water, clear tea, black coffee and fruit juice without pulp.)            Nov 30, 2018 10:00 AM CST   MR ABDOMEN & PELVIS W/O & W CONTRAST with [address] Imaging Center MRI ([facility])    [address] [facility]  1st Floor  [facility] [address] [phone]           How do I prepare for my exam? (Food and drink  instructions) Do not eat or drink for 6 hours prior to exam. **If you will be receiving sedation or general anesthesia, please see special notes below.**  How do I prepare for my exam? (Other instructions) Take your medicines as usual, unless your doctor tells you not to. You may or may not receive IV contrast for this exam pending the discretion of the Radiologist.  **If you will be receiving sedation or general anesthesia, please see special notes below.**  What should I wear: The MRI machine uses a strong magnet. Please wear clothes without metal (snaps, zippers). A sweatsuit works well, or we may give you a hospital gown. Please remove any body piercings and hair extensions before you arrive. You will also remove watches, jewelry, hairpins, wallets, dentures, partial dental plates and hearing aids. You may wear contact lenses, and you may be able to wear your rings. We have a safe place to keep your personal items, but it is safer to leave them at home.  How long does the exam take: Most tests take 30 to 60 minutes.  HOWEVER, IF YOUR DOCTOR PRESCRIBES ANESTHESIA please plan on spending four to five hours in the recovery room.  What should I bring: Bring a list of your current medicines to your exam (including vitamins, minerals and over-the-counter drugs). Also bring the results of similar scans you may have had.  Do I need a : **If you will be receiving sedation or general anesthesia, please see special notes below.**  What should I do after the exam: No Restrictions, You may resume normal activities.  What is this test: MRI (magnetic resonance imaging) uses a strong magnet and radio waves to look inside the body. An MRA (magnetic resonance angiogram) does the same thing, but it lets us look at your blood vessels. A computer turns the radio waves into pictures showing cross sections of the body, much like slices of bread. This helps us see any problems more clearly. You may receive fluid (called  " contrast ) before or during your scan. The fluid helps us see the pictures better. We give the fluid through an IV (small needle in your arm).  Who should I call with questions: If you have any questions, please contact your Imaging Department exam site. Directions, parking instructions, and other information is available on our website, [email or web address].              Who to contact     If you have questions or need follow up information about today's clinic visit or your schedule please contact [facility] directly at [phone].  Normal or non-critical lab and imaging results will be communicated to you by [employer], letter or phone within 4 business days after the clinic has received the results. If you do not hear from us within 7 days, please contact the clinic through [employer] or phone. If you have a critical or abnormal lab result, we will notify you by phone as soon as possible.  Submit refill requests through [employer] or call your pharmacy and they will forward the refill request to us. Please allow 3 business days for your refill to be completed.          Additional Information About Your Visit        [employer] Information     [employer] gives you secure access to your electronic health record. If you see a primary care provider, you can also send messages to your care team and make appointments. If you have questions, please call your primary care clinic.  If you do not have a primary care provider, please call [phone] and they will assist you.        Care EveryWhere ID     This is your Care EveryWhere ID. This could be used by other organizations to access your [facility] medical records  [id number]        Your Vitals Were     Pulse Temperature Respirations Height Pulse Oximetry BMI (Body Mass Index)    89 99.4  F (37.4  C) (Oral) 16 5' 9.02\" 99% 27.03 kg/m2       Blood Pressure from Last 3 Encounters:   09/25/18 132/83   09/20/18 112/72   09/14/18 113/69    Weight from Last 3 Encounters: "   09/25/18 183 lb 1.6 oz   09/14/18 178 lb   09/11/18 178 lb 3.2 oz              Today, you had the following     No orders found for display       Primary Care Provider Office Phone #    Flora Crews, [phone]       [facility] [address] [facility] [address]  [facility] [address]        Equal Access to Services     SURESH CHAPARRO : Hadii aad ku hadasho Soomaali, waaxda luqadaha, qaybta kaalmada adeegyada, waxay nicolein hayaan adesonny liceashahnazjoby lugo . So [facility] [phone].    ATENCIÓN: Si habla español, tiene a sparrow disposición servicios gratuitos de asistencia lingüística. Llame al [phone].    We comply with applicable federal civil rights laws and [place] laws. We do not discriminate on the basis of race, color, national origin, age, disability, sex, sexual orientation, or gender identity.            Thank you!     Thank you for choosing [facility] [place]  for your care. Our goal is always to provide you with excellent care. Hearing back from our patients is one way we can continue to improve our services. Please take a few minutes to complete the written survey that you may receive in the mail after your visit with us. Thank you!             Your Updated Medication List - Protect others around you: Learn how to safely use, store and throw away your medicines at www.disposemymeds.org.          This list is accurate as of 9/25/18 11:59 PM.  Always use your most recent med list.                   Brand Name Dispense Instructions for use Diagnosis    acetaminophen 325 MG tablet    TYLENOL    50 tablet    Take 2 tablets (650 mg) by mouth every 6 hours as needed for mild pain    Acute post-operative pain       calcium citrate-vitamin D 315-250 MG-UNIT Tabs per tablet    CITRACAL     Take 2 tablets by mouth        goserelin 3.6 MG injection    ZOLADEX     Inject 3.6 mg Subcutaneous every 30 days        * letrozole 2.5 MG tablet    FEMARA    30 tablet    Take 1 tablet (2.5 mg) by mouth daily    Malignant  neoplasm of upper-outer quadrant of left breast in female, estrogen receptor positive (H)       * letrozole 2.5 MG tablet    FEMARA    90 tablet    Take 1 tablet (2.5 mg) by mouth daily    Malignant neoplasm of upper-outer quadrant of left breast in female, estrogen receptor positive (H)       medium chain triglycerides (MCT OIL) oil      Take 30 mLs by mouth daily        NONFORMULARY      Take 1 capsule by mouth daily Rosehip oil        OMEGA 3-6-9 FATTY ACIDS PO      Take 2 capsules by mouth daily        ondansetron 4 MG ODT tab    ZOFRAN-ODT    8 tablet    Take 1 tablet (4 mg) by mouth every 6 hours as needed for nausea    S/P breast reconstruction, bilateral       ondansetron 4 MG tablet    ZOFRAN    30 tablet    Take 1 tablet (4 mg) by mouth every 6 hours as needed for nausea    Nausea       * oxyCODONE IR 5 MG tablet    ROXICODONE    30 tablet    Take 1-2 tablets (5-10 mg) by mouth every 4 hours as needed for other (pain control or improvement in physical function. Hold dose for analgesic side effects.)    Acute post-operative pain       * oxyCODONE IR 5 MG tablet    ROXICODONE    30 tablet    Take 1-2 tablets (5-10 mg) by mouth every 6 hours as needed (Moderate to Severe Pain)    S/P breast reconstruction, bilateral       senna-docusate 8.6-50 MG per tablet    SENOKOT-S;PERICOLACE    30 tablet    Take 1-2 tablets by mouth 2 times daily    S/P breast reconstruction, bilateral       Spirulina 500 MG Tabs      6 Tabs daily.    Malignant neoplasm of left breast in female, estrogen receptor positive, unspecified site of breast (H)       * Notice:  This list has 4 medication(s) that are the same as other medications prescribed for you. Read the directions carefully, and ask your doctor or other care provider to review them with you.

## 2018-10-08 ENCOUNTER — MYC MEDICAL ADVICE (OUTPATIENT)
Dept: PLASTIC SURGERY | Facility: CLINIC | Age: 43
End: 2018-10-08

## 2018-10-08 ENCOUNTER — TELEPHONE (OUTPATIENT)
Dept: GASTROENTEROLOGY | Facility: CLINIC | Age: 43
End: 2018-10-08

## 2018-10-09 ENCOUNTER — INFUSION THERAPY VISIT (OUTPATIENT)
Dept: ONCOLOGY | Facility: CLINIC | Age: 43
End: 2018-10-09
Attending: INTERNAL MEDICINE
Payer: COMMERCIAL

## 2018-10-09 VITALS — SYSTOLIC BLOOD PRESSURE: 114 MMHG | DIASTOLIC BLOOD PRESSURE: 71 MMHG | HEART RATE: 96 BPM

## 2018-10-09 DIAGNOSIS — Z17.0 MALIGNANT NEOPLASM OF UPPER-OUTER QUADRANT OF LEFT BREAST IN FEMALE, ESTROGEN RECEPTOR POSITIVE (H): Primary | ICD-10-CM

## 2018-10-09 DIAGNOSIS — C50.412 MALIGNANT NEOPLASM OF UPPER-OUTER QUADRANT OF LEFT BREAST IN FEMALE, ESTROGEN RECEPTOR POSITIVE (H): Primary | ICD-10-CM

## 2018-10-09 PROCEDURE — 25000125 ZZHC RX 250: Mod: JW,ZF | Performed by: INTERNAL MEDICINE

## 2018-10-09 PROCEDURE — 96402 CHEMO HORMON ANTINEOPL SQ/IM: CPT

## 2018-10-09 PROCEDURE — 25000128 H RX IP 250 OP 636: Mod: ZF | Performed by: INTERNAL MEDICINE

## 2018-10-09 RX ADMIN — LIDOCAINE HYDROCHLORIDE 1 ML: 10 INJECTION, SOLUTION EPIDURAL; INFILTRATION; INTRACAUDAL; PERINEURAL at 13:40

## 2018-10-09 RX ADMIN — GOSERELIN ACETATE 3.6 MG: 3.6 IMPLANT SUBCUTANEOUS at 13:45

## 2018-10-09 ASSESSMENT — PAIN DESCRIPTION - DESCRIPTORS: DESCRIPTORS: ACHING

## 2018-10-09 NOTE — MR AVS SNAPSHOT
After Visit Summary   10/9/2018    Mary Chadwick    MRN: [id number]           Patient Information     Date Of Birth          1975        Visit Information        Provider Department      10/9/2018 1:00 PM UC 11 ATC; UC ONCOLOGY INFUSION [facility]        Today's Diagnoses     Malignant neoplasm of upper-outer quadrant of left breast in female, estrogen receptor positive (H)    -  1      Care Instructions          October 2018 Sunday Monday Tuesday Wednesday Thursday Friday Saturday        1     2     3     4     5     6       7     8     9     UMP ONC INFUSION 60    1:00 PM   (60 min.)   UC ONCOLOGY INFUSION   [facility] 10     11     12     US PELVIC COMP W TRANSVAGINAL   10:20 AM   (40 min.)   RDUS1   [facility]     SHORT   11:15 AM   (15 min.)   Yenny Hidalgo MD   [facility] 13       14     15     COMBINED ESOPHAGOSCOPY, GASTROSCOPY, DUODENOSCOPY (EGD)   12:15 PM   Edwardo Beatty MD   UC OR     Outpatient Visit   12:15 PM   [facility] 16     17     18     19     20       21     22     23     UMP POST-OP    9:15 AM   (15 min.)   BASILIO Kidd MD   [facility] 24     25     26     27       28     29     30     31 November 2018 Sunday Monday Tuesday Wednesday Thursday Friday Saturday                       1     2     3       4     5     6     UMP ONC INFUSION 60    1:30 PM   (60 min.)   UC ONCOLOGY INFUSION   [facility] 7     8     9     10       11     12     13     14     15     16     17       18     19     20     21     22     23     24       25     26     27     28     29     30  Happy Birthday!     MR BREAST BILATERAL WWO    8:30 AM   (45 min.)   [address] MRI     MR CHEST WWO    9:15 AM   (45 min.)   [address] MRI     MR ABDOMEN & PELVIS WWO   10:00 AM   (45  min.)   [address] [facility] [place] [facility]     DX HIP/PELVIS/SPINE   11:00 AM   (30 min.)   [address] [facility]                   Lab Results:  No results found for this or any previous visit (from the past 12 hour(s)).          Follow-ups after your visit        Your next 10 appointments already scheduled     Oct 12, 2018 10:20 AM CDT   US PELVIC COMPLETE W TRANSVAGINAL with RDUS1   [facility] ([facility])    [address] [place] [address]  [address] [place] [address] [phone]           How do I prepare for my exam? (Food and drink instructions) Adults: Drink four 8-ounce glasses of fluid an hour before your exam. If you need to empty your bladder before your exam, try to release only a little urine. Then, drink another glass of fluid.  Children: * Children who are potty trained up to 6 years old should drink at least 2 cups (16 oz) of water/non-carbonated beverage 30 minutes prior to the exam. * Children who are 6-10 years should drink at least 3 cups (24 oz) of water/non-carbonated beverage 45 minutes prior to the exam. * Children who are 10 years or older should drink at least 4 cups (32 oz) of water/non-carbonated beverage 45 minutes prior to the exam.  If your child is very uncomfortable or has an urgent need to pee, please notify a technologist; they will try to find out how much longer the wait may be and provide instructions to help relieve the pressure.  What should I wear: Wear comfortable clothes.  How long does the exam take: Most ultrasounds take 30 to 60 minutes.  What should I bring: Bring a list of your medicines, including vitamins, minerals and over-the-counter drugs. It is safest to leave personal items at home.  Do I need a :  No  is needed.  What do I need to tell my doctor: Tell your doctor about any allergies you may have.  What should I do after the exam: No restrictions, You may resume normal activities.  What is this test: An  ultrasound uses sound waves to make pictures of the body. Sound waves do not cause pain. The only discomfort may be the pressure of the wand against your skin or full bladder.  Who should I call with questions: If you have any questions, please call the Imaging Department where you will have your exam. Directions, parking instructions, and other information is available on our website, [email or web address].            Oct 12, 2018 11:15 AM CDT   SHORT with Yenny Hidalgo MD   [facility] ([facility])    [address] [place] [address]  [address]  [facility] [id number] [phone]            Oct 15, 2018   Procedure with Edwardo Beatty MD   [facility] ([facility] [place])    [address] [place] [address]  5th Floor  [facility] [id number] [phone]           Located in the Clinics and Surgery Center at [address] [place] [address].   parking is very convenient and highly recommended.  is a $6 flat rate fee.  Both  and self parkers should enter the main arrival plaza from [facility]; parking attendants will direct you based on your parking preference.            Oct 23, 2018  9:30 AM CDT   (Arrive by 9:15 AM)   Post-Op with BASILIO Kidd MD   [facility] ([facility] [place])    [address] [place] [address]  [address]  [facility] [address] [phone]            Nov 06, 2018  1:30 PM CST   Infusion 60 with UC ONCOLOGY INFUSION, UC 28 ATC   [facility] ([facility] [place])    [address] [place] [address]  [address]  [facility] [address] [phone]            Nov 30, 2018  8:30 AM CST   MR BREAST BILATERAL W/O & W CONTRAST with UCMR1   [facility] [place] [facility] ([facility])    [address] [place] [address]  1st Floor  [facility] [id number] [phone]           How do I prepare for my exam? (Food and drink  instructions) **If you will be receiving sedation or general anesthesia, please see special notes below.**  How do I prepare for my exam? (Other instructions) Take your medicines as usual, unless your doctor tells you not to. The timing of your exam may depend on the start of your last period. If you re in menopause, you may have the exam anytime.  You will have IV contrast for this exam.  You do not need to do anything special to prepare. **If you will be receiving sedation or general anesthesia, please see special notes below.**  What should I wear:  The MRI machine uses a strong magnet. Please wear clothes without metal (snaps, zippers). A sweatsuit works well, or we may give you a hospital gown. Please remove any body piercings and hair extensions before you arrive. You will also remove watches, jewelry, hairpins, wallets, dentures, partial dental plates and hearing aids. You may wear contact lenses, and you may be able to wear your rings. We have a safe place to keep your personal items, but it is safer to leave them at home.  How long does the exam take:  Most tests take 30 to 60 minutes.  HOWEVER, IF YOUR DOCTOR PRESCRIBES ANESTHESIA please plan on spending four to five hours in the recovery room.  What should I bring: Bring a list of your current medicines to your exam (including vitamins, minerals and over-the-counter drugs). Please bring any previous mammograms or breast MRIs from other facilities to the MRI dept. Do not mail these items to us.  Do I need a : **If you will be receiving sedation or general anesthesia, please see special notes below.**  What should I do after the exam: No Restrictions, You may resume normal activities.  What is this test: MRI (magnetic resonance imaging) uses a strong magnet and radio waves to look inside the body. An MRA (magnetic resonance angiogram) does the same thing, but it lets us look at your blood vessels. A computer turns the radio waves into pictures showing  cross sections of the body, much like slices of bread. This helps us see any problems more clearly. You may receive fluid (called  contrast ) before or during your scan. The fluid helps us see the pictures better. We give the fluid through an IV (small needle in your arm).  Who should I call with questions: If you have any questions, please contact your Imaging Department exam site. Directions, parking instructions, and other information is available on our website, [email or web address].  How do I prepare if I m having sedation or anesthesia? **IMPORTANT** THE INSTRUCTIONS BELOW ARE ONLY FOR THOSE PATIENTS WHO HAVE BEEN TOLD THEY WILL RECEIVE SEDATION OR GENERAL ANESTHESIA DURING THEIR MRI PROCEDURE:  IF YOU WILL RECEIVE SEDATION (take medicine to help you relax during your exam) You must get the medicine from your doctor before you arrive. Bring the medicine to the exam. Do not take it at home. Arrive one hour early. Bring someone who can take you home after the test. Your medicine will make you sleepy. After the exam, you may not drive, take a bus or take a taxi by yourself. No eating 8 hours before your exam. You may have clear liquids up until 4 hours before your exam. (Clear liquids include water, clear tea, black coffee and fruit juice without pulp.)  IF YOU WILL RECEIVE ANESTHESIA (be asleep for your exam) Arrive 1 1/2 hours early. Bring someone who can take you home after the test. You may not drive, take a bus or take a taxi by yourself. No eating 8 hours before your exam. You may have clear liquids up until 4 hours before your exam. (Clear liquids include water, clear tea, black coffee and fruit juice without pulp.)            Nov 30, 2018  9:15 AM CST   MR CHEST W/O & W CONTRAST with [address] [facility] ([facility])    [address]  [facility] [address] [phone]           How do I prepare for my exam? (Food and drink instructions) **If  you will be receiving sedation or general anesthesia, please see special notes below.**  How do I prepare for my exam? (Other instructions) Take your medicines as usual, unless your doctor tells you not to. You may or may not receive intravenous (IV) contrast for this exam pending the discretion of the Radiologist.  You do not need to do anything special to prepare.  **If you will be receiving sedation or general anesthesia, please see special notes below.**  What should I wear: The MRI machine uses a strong magnet. Please wear clothes without metal (snaps, zippers). A sweatsuit works well, or we may give you a hospital gown. Please remove any body piercings and hair extensions before you arrive. You will also remove watches, jewelry, hairpins, wallets, dentures, partial dental plates and hearing aids. You may wear contact lenses, and you may be able to wear your rings. We have a safe place to keep your personal items, but it is safer to leave them at home.  How long does the exam take: Most tests take 30 to 60 minutes.  HOWEVER, IF YOUR DOCTOR PRESCRIBES ANESTHESIA please plan on spending four to five hours in the recovery room.  What should I bring:  Bring a list of your current medicines to your exam (including vitamins, minerals and over-the-counter drugs).  Do I need a :  **If you will be receiving sedation or general anesthesia, please see special notes below.**  What should I do after the exam: No Restrictions, You may resume normal activities.  What is this test: MRI (magnetic resonance imaging) uses a strong magnet and radio waves to look inside the body. An MRA (magnetic resonance angiogram) does the same thing, but it lets us look at your blood vessels. A computer turns the radio waves into pictures showing cross sections of the body, much like slices of bread. This helps us see any problems more clearly. You may receive fluid (called  contrast ) before or during your scan. The fluid helps us see  the pictures better. We give the fluid through an IV (small needle in your arm).  Who should I call with questions:  Please call the Imaging Department at your exam site with any questions. Directions, parking instructions, and other information is available on our website, [email or web address].  How do I prepare if I m having sedation or anesthesia? **IMPORTANT** THE INSTRUCTIONS BELOW ARE ONLY FOR THOSE PATIENTS WHO HAVE BEEN TOLD THEY WILL RECEIVE SEDATION OR GENERAL ANESTHESIA DURING THEIR MRI PROCEDURE:  IF YOU WILL RECEIVE SEDATION (take medicine to help you relax during your exam): You must get the medicine from your doctor before you arrive. Bring the medicine to the exam. Do not take it at home. Arrive one hour early. Bring someone who can take you home after the test. Your medicine will make you sleepy. After the exam, you may not drive, take a bus or take a taxi by yourself. No eating 8 hours before your exam. You may have clear liquids up until 4 hours before your exam. (Clear liquids include water, clear tea, black coffee and fruit juice without pulp.)  IF YOU WILL RECEIVE ANESTHESIA (be asleep for your exam): Arrive 1 1/2 hours early. Bring someone who can take you home after the test. You may not drive, take a bus or take a taxi by yourself. No eating 8 hours before your exam. You may have clear liquids up until 4 hours before your exam. (Clear liquids include water, clear tea, black coffee and fruit juice without pulp.)            Nov 30, 2018 10:00 AM CST   MR ABDOMEN & PELVIS W/O & W CONTRAST with [address] [facility] [place] [facility] ([facility])    [address] [address] [phone]           How do I prepare for my exam? (Food and drink instructions) Do not eat or drink for 6 hours prior to exam. **If you will be receiving sedation or general anesthesia, please see special notes below.**  How do I prepare for my exam? (Other  instructions) Take your medicines as usual, unless your doctor tells you not to. You may or may not receive IV contrast for this exam pending the discretion of the Radiologist.  **If you will be receiving sedation or general anesthesia, please see special notes below.**  What should I wear: The MRI machine uses a strong magnet. Please wear clothes without metal (snaps, zippers). A sweatsuit works well, or we may give you a hospital gown. Please remove any body piercings and hair extensions before you arrive. You will also remove watches, jewelry, hairpins, wallets, dentures, partial dental plates and hearing aids. You may wear contact lenses, and you may be able to wear your rings. We have a safe place to keep your personal items, but it is safer to leave them at home.  How long does the exam take: Most tests take 30 to 60 minutes.  HOWEVER, IF YOUR DOCTOR PRESCRIBES ANESTHESIA please plan on spending four to five hours in the recovery room.  What should I bring: Bring a list of your current medicines to your exam (including vitamins, minerals and over-the-counter drugs). Also bring the results of similar scans you may have had.  Do I need a : **If you will be receiving sedation or general anesthesia, please see special notes below.**  What should I do after the exam: No Restrictions, You may resume normal activities.  What is this test: MRI (magnetic resonance imaging) uses a strong magnet and radio waves to look inside the body. An MRA (magnetic resonance angiogram) does the same thing, but it lets us look at your blood vessels. A computer turns the radio waves into pictures showing cross sections of the body, much like slices of bread. This helps us see any problems more clearly. You may receive fluid (called  contrast ) before or during your scan. The fluid helps us see the pictures better. We give the fluid through an IV (small needle in your arm).  Who should I call with questions: If you have any  questions, please contact your Imaging Department exam site. Directions, parking instructions, and other information is available on our website, [email or web address].            Nov 30, 2018 11:00 AM CST   DX HIP/PELVIS/SPINE with UCDX1   [facility] ([facility])    [address] [address] [phone]           How do I prepare for my exam? (Food and drink instructions) No Food and Drink Restrictions.  How do I prepare for my exam? (Other instructions) Please do not take any of the following 24 hours prior to the day of your exam: vitamins, calcium tablets, antacids.  What should I wear: If possible, please wear clothes without metal (snaps, zippers). A sweat suit works well.  How long does the exam take: The exam takes about 20 minutes.  What should I bring: Bring a list of your current medicines to your exam (including vitamins, minerals and over-the-counter drugs).  Do I need a :  No  is needed.  What should I do after the exam: No restrictions, You may resume normal activities.  How do I prepare for my exam? (Food and drink instructions) A DEXA scan is a bone-density scan. It uses a low level of radiation to check the strength of your bones. As you lie on a padded table, a machine will take X-rays. We most often scan the hips and lower spine.  Who should I call with questions: If you have any questions, please call the Imaging Department where you will have your exam. Directions, parking instructions, and other information is available on our website, [email or web address].              Who to contact     If you have questions or need follow up information about today's clinic visit or your schedule please contact [facility] directly at [phone].  Normal or non-critical lab and imaging results will be communicated to you by MyChart, letter or phone within 4 business days after the clinic has  received the results. If you do not hear from us within 7 days, please contact the clinic through [employer] or phone. If you have a critical or abnormal lab result, we will notify you by phone as soon as possible.  Submit refill requests through [employer] or call your pharmacy and they will forward the refill request to us. Please allow 3 business days for your refill to be completed.          Additional Information About Your Visit        [employer] Information     [employer] gives you secure access to your electronic health record. If you see a primary care provider, you can also send messages to your care team and make appointments. If you have questions, please call your primary care clinic.  If you do not have a primary care provider, please call [phone] and they will assist you.        Care EveryWhere ID     This is your Care EveryWhere ID. This could be used by other organizations to access your [place] medical records  [id number]        Your Vitals Were     Pulse                   96            Blood Pressure from Last 3 Encounters:   10/09/18 114/71   09/25/18 132/83   09/20/18 112/72    Weight from Last 3 Encounters:   09/25/18 83.1 kg (183 lb 1.6 oz)   09/14/18 80.7 kg (178 lb)   09/11/18 80.8 kg (178 lb 3.2 oz)              Today, you had the following     No orders found for display       Primary Care Provider Office Phone #    Flora Crews, [phone]       [facility] [address] [facility] [address]  [facility] [address]        Equal Access to Services     SURESH CHAPARRO AH: Hadii olivier Herndon, waaxda luqadaha, qaybta kaalmada lorenayashani, naseem mendoza. So [facility] [phone].    ATENCIÓN: Si habla español, tiene a sparrow disposición servicios gratuitos de asistencia lingüística. Llame al [phone].    We comply with applicable federal civil rights laws and [place] laws. We do not discriminate on the basis of race, color, national origin, age, disability, sex,  sexual orientation, or gender identity.            Thank you!     Thank you for choosing [facility]  for your care. Our goal is always to provide you with excellent care. Hearing back from our patients is one way we can continue to improve our services. Please take a few minutes to complete the written survey that you may receive in the mail after your visit with us. Thank you!             Your Updated Medication List - Protect others around you: Learn how to safely use, store and throw away your medicines at www.disposemymeds.org.          This list is accurate as of 10/9/18  3:31 PM.  Always use your most recent med list.                   Brand Name Dispense Instructions for use Diagnosis    acetaminophen 325 MG tablet    TYLENOL    50 tablet    Take 2 tablets (650 mg) by mouth every 6 hours as needed for mild pain    Acute post-operative pain       calcium citrate-vitamin D 315-250 MG-UNIT Tabs per tablet    CITRACAL     Take 2 tablets by mouth        goserelin 3.6 MG injection    ZOLADEX     Inject 3.6 mg Subcutaneous every 30 days        * letrozole 2.5 MG tablet    FEMARA    30 tablet    Take 1 tablet (2.5 mg) by mouth daily    Malignant neoplasm of upper-outer quadrant of left breast in female, estrogen receptor positive (H)       * letrozole 2.5 MG tablet    FEMARA    90 tablet    Take 1 tablet (2.5 mg) by mouth daily    Malignant neoplasm of upper-outer quadrant of left breast in female, estrogen receptor positive (H)       medium chain triglycerides (MCT OIL) oil      Take 30 mLs by mouth daily        NONFORMULARY      Take 1 capsule by mouth daily Rosehip oil        OMEGA 3-6-9 FATTY ACIDS PO      Take 2 capsules by mouth daily        ondansetron 4 MG ODT tab    ZOFRAN-ODT    8 tablet    Take 1 tablet (4 mg) by mouth every 6 hours as needed for nausea    S/P breast reconstruction, bilateral       ondansetron 4 MG tablet    ZOFRAN    30 tablet    Take 1 tablet (4 mg) by mouth every 6  hours as needed for nausea    Nausea       * oxyCODONE IR 5 MG tablet    ROXICODONE    30 tablet    Take 1-2 tablets (5-10 mg) by mouth every 4 hours as needed for other (pain control or improvement in physical function. Hold dose for analgesic side effects.)    Acute post-operative pain       * oxyCODONE IR 5 MG tablet    ROXICODONE    30 tablet    Take 1-2 tablets (5-10 mg) by mouth every 6 hours as needed (Moderate to Severe Pain)    S/P breast reconstruction, bilateral       senna-docusate 8.6-50 MG per tablet    SENOKOT-S;PERICOLACE    30 tablet    Take 1-2 tablets by mouth 2 times daily    S/P breast reconstruction, bilateral       Spirulina 500 MG Tabs      6 Tabs daily.    Malignant neoplasm of left breast in female, estrogen receptor positive, unspecified site of breast (H)       * Notice:  This list has 4 medication(s) that are the same as other medications prescribed for you. Read the directions carefully, and ask your doctor or other care provider to review them with you.

## 2018-10-09 NOTE — PATIENT INSTRUCTIONS
October 2018 Sunday Monday Tuesday Wednesday Thursday Friday Saturday        1     2     3     4     5     6       7     8     9     UMP ONC INFUSION 60    1:00 PM   (60 min.)   [facility] ONCOLOGY INFUSION   [facility] 10     11     12     US PELVIC COMP W TRANSVAGINAL   10:20 AM   (40 min.)   RDUS1   [facility] [place] [place]     SHORT   11:15 AM   (15 min.)   Yenny Hidalgo MD   [facility] [place] [place] 13       14     15     COMBINED ESOPHAGOSCOPY, GASTROSCOPY, DUODENOSCOPY (EGD)   12:15 PM   Edwardo Beatty MD   [facility] OR     Outpatient Visit   12:15 PM   [facility] 16     17     18     19     20       21     22     23     UMP POST-OP    9:15 AM   (15 min.)   BASILIO Kidd MD   [facility] 24     25     26     27       28     29     30     31 November 2018 Sunday Monday Tuesday Wednesday Thursday Friday Saturday                       1     2     3       4     5     6     UMP ONC INFUSION 60    1:30 PM   (60 min.)   UC ONCOLOGY INFUSION   [facility] 7     8     9     10       11     12     13     14     15     16     17       18     19     20     21     22     23     24       25     26     27     28     29     30  Happy Birthday!     MR BREAST BILATERAL WWO    8:30 AM   (45 min.)   [address] MRI     MR CHEST WWO    9:15 AM   (45 min.)   [address] MRI     MR ABDOMEN & PELVIS WWO   10:00 AM   (45 min.)   [address] MRI     DX HIP/PELVIS/SPINE   11:00 AM   (30 min.)   [address] Dexa                   Lab Results:  No results found for this or any previous visit (from the past 12 hour(s)).

## 2018-10-09 NOTE — PROGRESS NOTES
Infusion Nursing Note:  Marygypsy Chadwick presents today for Zoladex.    Patient seen by provider today: No   present during visit today: Not Applicable.    Note: N/A.    Intravenous Access:  No Intravenous access/labs at this visit.    Treatment Conditions:  Not Applicable.      Post Infusion Assessment:  Patient tolerated Zoladex injection in right abd with some pain. Lidocaine and ice to area before injection    Discharge Plan:   Patient declined prescription refills.  Discharge instructions reviewed with: Patient.  Patient and/or family verbalized understanding of discharge instructions and all questions answered.  Copy of AVS reviewed with patient and/or family.  Patient will return 10/12 scan/MD for next appointment.  Patient discharged in stable condition accompanied by: self.  Departure Mode: Ambulatory.    Samanta Barros RN

## 2018-10-10 PROBLEM — C50.919 BREAST CANCER (H): Status: RESOLVED | Noted: 2018-08-06 | Resolved: 2018-10-10

## 2018-10-11 ENCOUNTER — TELEPHONE (OUTPATIENT)
Dept: GASTROENTEROLOGY | Facility: CLINIC | Age: 43
End: 2018-10-11

## 2018-10-11 NOTE — TELEPHONE ENCOUNTER
Patient scheduled for EGD     Indication for procedure. Malignant neoplasm of left breast in female, estrogen receptor positive, unspecified site of breast, Li-Fraumeni syndrome, Monoallelic mutation of TP53 gene, Monoallelic mutation of MUTYH gene    Referring Provider. Valeria Gann MD    ? No     Arrival time verified? 1115 am     Facility location verified? [address] [facility], 5th floor     Instructions given regarding prep and procedure    Prep Type NPO     Are you taking any anticoagulants or blood thinners? Denies     Instructions given? Yes     Electronic implanted devices? Denies     Pre procedure teaching completed? Yes    Transportation from procedure? Yes, [place]     H&P / Pre op physical completed? N/A    Tera Thomas RN

## 2018-10-12 ENCOUNTER — OFFICE VISIT (OUTPATIENT)
Dept: OBGYN | Facility: CLINIC | Age: 43
End: 2018-10-12
Attending: OBSTETRICS & GYNECOLOGY
Payer: COMMERCIAL

## 2018-10-12 ENCOUNTER — RADIANT APPOINTMENT (OUTPATIENT)
Dept: ULTRASOUND IMAGING | Facility: CLINIC | Age: 43
End: 2018-10-12
Attending: OBSTETRICS & GYNECOLOGY
Payer: COMMERCIAL

## 2018-10-12 VITALS
TEMPERATURE: 98.3 F | DIASTOLIC BLOOD PRESSURE: 66 MMHG | SYSTOLIC BLOOD PRESSURE: 103 MMHG | BODY MASS INDEX: 26.1 KG/M2 | WEIGHT: 176.8 LBS | HEART RATE: 79 BPM

## 2018-10-12 DIAGNOSIS — Z32.00 PREGNANCY EXAMINATION OR TEST, PREGNANCY UNCONFIRMED: ICD-10-CM

## 2018-10-12 DIAGNOSIS — Z15.01 HEREDITARY BREAST AND OVARIAN CANCER SYNDROME: ICD-10-CM

## 2018-10-12 DIAGNOSIS — Z15.01 LI-FRAUMENI SYNDROME: Primary | ICD-10-CM

## 2018-10-12 LAB — BETA HCG QUAL IFA URINE: NEGATIVE

## 2018-10-12 PROCEDURE — 99214 OFFICE O/P EST MOD 30 MIN: CPT | Performed by: OBSTETRICS & GYNECOLOGY

## 2018-10-12 PROCEDURE — 84703 CHORIONIC GONADOTROPIN ASSAY: CPT | Performed by: OBSTETRICS & GYNECOLOGY

## 2018-10-12 PROCEDURE — 76830 TRANSVAGINAL US NON-OB: CPT | Performed by: OBSTETRICS & GYNECOLOGY

## 2018-10-12 NOTE — MR AVS SNAPSHOT
After Visit Summary   10/12/2018    Mary Chadwick    MRN: [id number]           Patient Information     Date Of Birth          1975        Visit Information        Provider Department      10/12/2018 11:15 AM Yenny Hidalgo MD [facility]        Today's Diagnoses     Li-Fraumeni syndrome    -  1    Pregnancy examination or test, pregnancy unconfirmed           Follow-ups after your visit        Your next 10 appointments already scheduled     Oct 23, 2018  9:30 AM CDT   (Arrive by 9:15 AM)   Post-Op with BASILIO Kidd MD   [facility] [place] ([facility] [place])    [address]  [address]  [facility] [address]   [phone]            Nov 06, 2018  1:30 PM CST   Infusion 60 with UC ONCOLOGY INFUSION, UC 28 ATC   [facility] ([facility])    [address]  [address]  [place] [facility] [address]   [phone]            Nov 30, 2018  8:30 AM CST   MR BREAST BILATERAL W/O & W CONTRAST with UCMR1   [facility] [place] [facility] ([facility])    [address]  1st Floor  [facility] [facility] [address]   [phone]           How do I prepare for my exam? (Food and drink instructions) **If you will be receiving sedation or general anesthesia, please see special notes below.**  How do I prepare for my exam? (Other instructions) Take your medicines as usual, unless your doctor tells you not to. The timing of your exam may depend on the start of your last period. If you re in menopause, you may have the exam anytime.  You will have IV contrast for this exam.  You do not need to do anything special to prepare. **If you will be receiving sedation or general anesthesia, please see special notes below.**  What should I wear:  The MRI machine uses a strong magnet. Please wear clothes without metal (snaps, zippers). A sweatsuit works well, or we may give you a  [facility] gown. Please remove any body piercings and hair extensions before you arrive. You will also remove watches, jewelry, hairpins, wallets, dentures, partial dental plates and hearing aids. You may wear contact lenses, and you may be able to wear your rings. We have a safe place to keep your personal items, but it is safer to leave them at home.  How long does the exam take:  Most tests take 30 to 60 minutes.  HOWEVER, IF YOUR DOCTOR PRESCRIBES ANESTHESIA please plan on spending four to five hours in the recovery room.  What should I bring: Bring a list of your current medicines to your exam (including vitamins, minerals and over-the-counter drugs). Please bring any previous mammograms or breast MRIs from other facilities to the MRI dept. Do not mail these items to us.  Do I need a : **If you will be receiving sedation or general anesthesia, please see special notes below.**  What should I do after the exam: No Restrictions, You may resume normal activities.  What is this test: MRI (magnetic resonance imaging) uses a strong magnet and radio waves to look inside the body. An MRA (magnetic resonance angiogram) does the same thing, but it lets us look at your blood vessels. A computer turns the radio waves into pictures showing cross sections of the body, much like slices of bread. This helps us see any problems more clearly. You may receive fluid (called  contrast ) before or during your scan. The fluid helps us see the pictures better. We give the fluid through an IV (small needle in your arm).  Who should I call with questions: If you have any questions, please contact your Imaging Department exam site. Directions, parking instructions, and other information is available on our website, [email or web address].  How do I prepare if I m having sedation or anesthesia? **IMPORTANT** THE INSTRUCTIONS BELOW ARE ONLY FOR THOSE PATIENTS WHO HAVE BEEN TOLD THEY WILL RECEIVE SEDATION OR GENERAL ANESTHESIA DURING  THEIR MRI PROCEDURE:  IF YOU WILL RECEIVE SEDATION (take medicine to help you relax during your exam) You must get the medicine from your doctor before you arrive. Bring the medicine to the exam. Do not take it at home. Arrive one hour early. Bring someone who can take you home after the test. Your medicine will make you sleepy. After the exam, you may not drive, take a bus or take a taxi by yourself. No eating 8 hours before your exam. You may have clear liquids up until 4 hours before your exam. (Clear liquids include water, clear tea, black coffee and fruit juice without pulp.)  IF YOU WILL RECEIVE ANESTHESIA (be asleep for your exam) Arrive 1 1/2 hours early. Bring someone who can take you home after the test. You may not drive, take a bus or take a taxi by yourself. No eating 8 hours before your exam. You may have clear liquids up until 4 hours before your exam. (Clear liquids include water, clear tea, black coffee and fruit juice without pulp.)            Nov 30, 2018  9:15 AM CST   MR CHEST W/O & W CONTRAST with [address] MRI ([facility])    [address] [address] [phone]           How do I prepare for my exam? (Food and drink instructions) **If you will be receiving sedation or general anesthesia, please see special notes below.**  How do I prepare for my exam? (Other instructions) Take your medicines as usual, unless your doctor tells you not to. You may or may not receive intravenous (IV) contrast for this exam pending the discretion of the Radiologist.  You do not need to do anything special to prepare.  **If you will be receiving sedation or general anesthesia, please see special notes below.**  What should I wear: The MRI machine uses a strong magnet. Please wear clothes without metal (snaps, zippers). A sweatsuit works well, or we may give you a hospital gown. Please remove any body piercings and hair extensions  before you arrive. You will also remove watches, jewelry, hairpins, wallets, dentures, partial dental plates and hearing aids. You may wear contact lenses, and you may be able to wear your rings. We have a safe place to keep your personal items, but it is safer to leave them at home.  How long does the exam take: Most tests take 30 to 60 minutes.  HOWEVER, IF YOUR DOCTOR PRESCRIBES ANESTHESIA please plan on spending four to five hours in the recovery room.  What should I bring:  Bring a list of your current medicines to your exam (including vitamins, minerals and over-the-counter drugs).  Do I need a :  **If you will be receiving sedation or general anesthesia, please see special notes below.**  What should I do after the exam: No Restrictions, You may resume normal activities.  What is this test: MRI (magnetic resonance imaging) uses a strong magnet and radio waves to look inside the body. An MRA (magnetic resonance angiogram) does the same thing, but it lets us look at your blood vessels. A computer turns the radio waves into pictures showing cross sections of the body, much like slices of bread. This helps us see any problems more clearly. You may receive fluid (called  contrast ) before or during your scan. The fluid helps us see the pictures better. We give the fluid through an IV (small needle in your arm).  Who should I call with questions:  Please call the Imaging Department at your exam site with any questions. Directions, parking instructions, and other information is available on our website, [email or web address].  How do I prepare if I m having sedation or anesthesia? **IMPORTANT** THE INSTRUCTIONS BELOW ARE ONLY FOR THOSE PATIENTS WHO HAVE BEEN TOLD THEY WILL RECEIVE SEDATION OR GENERAL ANESTHESIA DURING THEIR MRI PROCEDURE:  IF YOU WILL RECEIVE SEDATION (take medicine to help you relax during your exam): You must get the medicine from your doctor before you arrive. Bring the medicine to the exam.  Do not take it at home. Arrive one hour early. Bring someone who can take you home after the test. Your medicine will make you sleepy. After the exam, you may not drive, take a bus or take a taxi by yourself. No eating 8 hours before your exam. You may have clear liquids up until 4 hours before your exam. (Clear liquids include water, clear tea, black coffee and fruit juice without pulp.)  IF YOU WILL RECEIVE ANESTHESIA (be asleep for your exam): Arrive 1 1/2 hours early. Bring someone who can take you home after the test. You may not drive, take a bus or take a taxi by yourself. No eating 8 hours before your exam. You may have clear liquids up until 4 hours before your exam. (Clear liquids include water, clear tea, black coffee and fruit juice without pulp.)            Nov 30, 2018 10:00 AM CST   MR ABDOMEN & PELVIS W/O & W CONTRAST with [address] MRI ([facility] [place])    [address] [place] [address] [phone]           How do I prepare for my exam? (Food and drink instructions) Do not eat or drink for 6 hours prior to exam. **If you will be receiving sedation or general anesthesia, please see special notes below.**  How do I prepare for my exam? (Other instructions) Take your medicines as usual, unless your doctor tells you not to. You may or may not receive IV contrast for this exam pending the discretion of the Radiologist.  **If you will be receiving sedation or general anesthesia, please see special notes below.**  What should I wear: The MRI machine uses a strong magnet. Please wear clothes without metal (snaps, zippers). A sweatsuit works well, or we may give you a hospital gown. Please remove any body piercings and hair extensions before you arrive. You will also remove watches, jewelry, hairpins, wallets, dentures, partial dental plates and hearing aids. You may wear contact lenses, and you may be able to wear your rings. We have  a safe place to keep your personal items, but it is safer to leave them at home.  How long does the exam take: Most tests take 30 to 60 minutes.  HOWEVER, IF YOUR DOCTOR PRESCRIBES ANESTHESIA please plan on spending four to five hours in the recovery room.  What should I bring: Bring a list of your current medicines to your exam (including vitamins, minerals and over-the-counter drugs). Also bring the results of similar scans you may have had.  Do I need a : **If you will be receiving sedation or general anesthesia, please see special notes below.**  What should I do after the exam: No Restrictions, You may resume normal activities.  What is this test: MRI (magnetic resonance imaging) uses a strong magnet and radio waves to look inside the body. An MRA (magnetic resonance angiogram) does the same thing, but it lets us look at your blood vessels. A computer turns the radio waves into pictures showing cross sections of the body, much like slices of bread. This helps us see any problems more clearly. You may receive fluid (called  contrast ) before or during your scan. The fluid helps us see the pictures better. We give the fluid through an IV (small needle in your arm).  Who should I call with questions: If you have any questions, please contact your Imaging Department exam site. Directions, parking instructions, and other information is available on our website, [email or web address].            Nov 30, 2018 11:00 AM CST   DX HIP/PELVIS/SPINE with UCDX1   [facility] ([facility])    [address] [address] [phone]           How do I prepare for my exam? (Food and drink instructions) No Food and Drink Restrictions.  How do I prepare for my exam? (Other instructions) Please do not take any of the following 24 hours prior to the day of your exam: vitamins, calcium tablets, antacids.  What should I wear: If possible, please wear  clothes without metal (snaps, zippers). A sweat suit works well.  How long does the exam take: The exam takes about 20 minutes.  What should I bring: Bring a list of your current medicines to your exam (including vitamins, minerals and over-the-counter drugs).  Do I need a :  No  is needed.  What should I do after the exam: No restrictions, You may resume normal activities.  How do I prepare for my exam? (Food and drink instructions) A DEXA scan is a bone-density scan. It uses a low level of radiation to check the strength of your bones. As you lie on a padded table, a machine will take X-rays. We most often scan the hips and lower spine.  Who should I call with questions: If you have any questions, please call the Imaging Department where you will have your exam. Directions, parking instructions, and other information is available on our website, [email or web address].            Dec 04, 2018 12:30 PM CST   (Arrive by 12:15 PM)   Return Visit with Valeira Gann MD   [facility] ([facility])    [address] [place] [address]  [address] [place] [address] [phone]            Dec 04, 2018  1:00 PM CST   Infusion 60 with  ONCOLOGY INFUSION, UC 11 ATC   [facility])    [address] [place] [address]  [address] [place] [address] [phone]              Who to contact     If you have questions or need follow up information about today's clinic visit or your schedule please contact [facility] directly at [phone].  Normal or non-critical lab and imaging results will be communicated to you by MyChart, letter or phone within 4 business days after the clinic has received the results. If you do not hear from us within 7 days, please contact the clinic through MyChart or phone. If you have a critical or abnormal lab result, we will notify you by phone as soon as possible.  Submit  "refill requests through [employer] or call your pharmacy and they will forward the refill request to us. Please allow 3 business days for your refill to be completed.          Additional Information About Your Visit        [employer] Information     [employer] lets you send messages to your doctor, view your test results, renew your prescriptions, schedule appointments and more. To sign up, go to [email or web address] . Click on \"Log in\" on the left side of the screen, which will take you to the Welcome page. Then click on \"Sign up Now\" on the right side of the page.     You will be asked to enter the access code listed below, as well as some personal information. Please follow the directions to create your username and password.     Your access code is: [email or web address]  Expires: 2019  7:32 AM     Your access code will  in 90 days. If you need help or a new code, please call your [facility] clinic or [phone].        Care EveryWhere ID     This is your Care EveryWhere ID. This could be used by other organizations to access your [facility] medical records  [id number]        Your Vitals Were     Pulse Temperature BMI (Body Mass Index)             79 98.3  F (36.8  C) (Oral) 26.1 kg/m2          Blood Pressure from Last 3 Encounters:   10/15/18 105/67   10/12/18 103/66   10/09/18 114/71    Weight from Last 3 Encounters:   10/12/18 176 lb 12.8 oz (80.2 kg)   18 183 lb 1.6 oz (83.1 kg)   18 178 lb (80.7 kg)              We Performed the Following     Beta HCG qual IFA urine        Primary Care Provider Office Phone #    Flora Crews, [phone]       [facility] [address] [facility] [address]  [facility] [address]        Equal Access to Services     SURESH CHAPARRO : Michelle Herndon, watamera black, qaybta kaalnaseem toscano. So [facility] [phone].    ATENCIÓN: Si habla español, tiene a sparrow disposición servicios gratuitos de asistencia " lingüística. Karsten al [phone].    We comply with applicable federal civil rights laws and [place] laws. We do not discriminate on the basis of race, color, national origin, age, disability, sex, sexual orientation, or gender identity.            Thank you!     Thank you for choosing [facility]  for your care. Our goal is always to provide you with excellent care. Hearing back from our patients is one way we can continue to improve our services. Please take a few minutes to complete the written survey that you may receive in the mail after your visit with us. Thank you!             Your Updated Medication List - Protect others around you: Learn how to safely use, store and throw away your medicines at www.disposemymeds.org.          This list is accurate as of 10/12/18 11:59 PM.  Always use your most recent med list.                   Brand Name Dispense Instructions for use Diagnosis    acetaminophen 325 MG tablet    TYLENOL    50 tablet    Take 2 tablets (650 mg) by mouth every 6 hours as needed for mild pain    Acute post-operative pain       calcium citrate-vitamin D 315-250 MG-UNIT Tabs per tablet    CITRACAL     Take 2 tablets by mouth        goserelin 3.6 MG injection    ZOLADEX     Inject 3.6 mg Subcutaneous every 30 days        * letrozole 2.5 MG tablet    FEMARA    30 tablet    Take 1 tablet (2.5 mg) by mouth daily    Malignant neoplasm of upper-outer quadrant of left breast in female, estrogen receptor positive (H)       * letrozole 2.5 MG tablet    FEMARA    90 tablet    Take 1 tablet (2.5 mg) by mouth daily    Malignant neoplasm of upper-outer quadrant of left breast in female, estrogen receptor positive (H)       medium chain triglycerides (MCT OIL) oil      Take 30 mLs by mouth daily        NONFORMULARY      Take 1 capsule by mouth daily Rosehip oil        OMEGA 3-6-9 FATTY ACIDS PO      Take 2 capsules by mouth daily        ondansetron 4 MG tablet    ZOFRAN    30 tablet    Take  1 tablet (4 mg) by mouth every 6 hours as needed for nausea    Nausea       Spirulina 500 MG Tabs      6 Tabs daily.    Malignant neoplasm of left breast in female, estrogen receptor positive, unspecified site of breast (H)       * Notice:  This list has 2 medication(s) that are the same as other medications prescribed for you. Read the directions carefully, and ask your doctor or other care provider to review them with you.

## 2018-10-15 ENCOUNTER — SURGERY (OUTPATIENT)
Age: 43
End: 2018-10-15

## 2018-10-15 ENCOUNTER — HOSPITAL ENCOUNTER (OUTPATIENT)
Facility: AMBULATORY SURGERY CENTER | Age: 43
End: 2018-10-15
Attending: INTERNAL MEDICINE
Payer: COMMERCIAL

## 2018-10-15 VITALS
SYSTOLIC BLOOD PRESSURE: 105 MMHG | RESPIRATION RATE: 16 BRPM | OXYGEN SATURATION: 98 % | DIASTOLIC BLOOD PRESSURE: 67 MMHG | TEMPERATURE: 98.1 F

## 2018-10-15 LAB — UPPER GI ENDOSCOPY: NORMAL

## 2018-10-15 RX ORDER — SIMETHICONE
LIQUID (ML) MISCELLANEOUS PRN
Status: DISCONTINUED | OUTPATIENT
Start: 2018-10-15 | End: 2018-10-15 | Stop reason: HOSPADM

## 2018-10-15 RX ORDER — FENTANYL CITRATE 50 UG/ML
INJECTION, SOLUTION INTRAMUSCULAR; INTRAVENOUS PRN
Status: DISCONTINUED | OUTPATIENT
Start: 2018-10-15 | End: 2018-10-15 | Stop reason: HOSPADM

## 2018-10-15 RX ORDER — ONDANSETRON 2 MG/ML
4 INJECTION INTRAMUSCULAR; INTRAVENOUS
Status: DISCONTINUED | OUTPATIENT
Start: 2018-10-15 | End: 2018-10-16 | Stop reason: HOSPADM

## 2018-10-15 RX ORDER — LIDOCAINE 40 MG/G
CREAM TOPICAL
Status: DISCONTINUED | OUTPATIENT
Start: 2018-10-15 | End: 2018-10-16 | Stop reason: HOSPADM

## 2018-10-15 RX ADMIN — Medication 2 ML: at 12:57

## 2018-10-15 RX ADMIN — FENTANYL CITRATE 50 MCG: 50 INJECTION, SOLUTION INTRAMUSCULAR; INTRAVENOUS at 13:02

## 2018-10-15 RX ADMIN — FENTANYL CITRATE 50 MCG: 50 INJECTION, SOLUTION INTRAMUSCULAR; INTRAVENOUS at 13:08

## 2018-10-21 NOTE — PROGRESS NOTES
S:  Omaira presents for f/u consultation on hysterectomy/BSO and ultrasound review.    O:  Vitals:    10/12/18 1140   BP: 103/66   Pulse: 79   Temp: 98.3  F (36.8  C)   TempSrc: Oral   Weight: 176 lb 12.8 oz (80.2 kg)     Gen: well appearing    Gynecological Ultrasound Report  Pelvic U/S - Transvaginal  [facility]  Referring Provider: Yenny Hidalgo MD  Sonographer:  Portia Cobb RDMS  Indication: pre op assessment  LMP: No LMP recorded. Patient is not currently having periods         Gynecological Ultrasonography:   Uterus: anteverted  Size: 7.4 x 4.9 x 2.6cm  Findings: wnl   Endometrium: Thickness total 2.3 mm  Findings: wnl  Right Ovary: 2.1 x 1.8 x 1.2 cm   Left Ovary: 2.4 x 1.5 x 2.1 cm  Cul de Sac/Pouch of Tyrese: no free fluid       Impression:   The uterus and ovaries were visualized and no abnormalities were seen.  The endometrium appeared normal.      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  - Discussed normal ultrasound findings. No evidence of uterine or ovarian pathology at this time.   - Discussed hysterectomy/BSO. I have communicated with her oncologist who agrees with plan for hyst along with BSO to reduce future risk of uterine sarcoma given that she has completed reproduction and there is no advantage to retaining her uterus  - We discussed BSO. We feel she will overall tolerate this well given that she is already hormonally suppressed and having no major adverse effects.   - Discussed hysterectomy procedure and recovery. Anticipate 4-6 weeks off without lifting more than 10-15 pounds for 6 weeks and no yoga during that time.   - Omaira would like to proceed with hysterectomy, but likely in the spring as she has had other surgeries this year and would like to recover a little more before another surgery  - We will plan office visit and endometrial biopsy in the spring prior to hysterectomy. Likely TLH/BSO to ensure ovaries and tubes completely resected.      Greater than 50% of this 30 minute appointment was spent in counseling on risk of uterine cancer and Li-Fraumeni syndrome

## 2018-10-23 ENCOUNTER — OFFICE VISIT (OUTPATIENT)
Dept: PLASTIC SURGERY | Facility: CLINIC | Age: 43
End: 2018-10-23
Attending: PLASTIC SURGERY
Payer: COMMERCIAL

## 2018-10-23 VITALS
HEART RATE: 90 BPM | RESPIRATION RATE: 14 BRPM | OXYGEN SATURATION: 99 % | SYSTOLIC BLOOD PRESSURE: 127 MMHG | BODY MASS INDEX: 26.23 KG/M2 | WEIGHT: 177.1 LBS | DIASTOLIC BLOOD PRESSURE: 69 MMHG | TEMPERATURE: 97.4 F | HEIGHT: 69 IN

## 2018-10-23 DIAGNOSIS — Z98.890 S/P BREAST RECONSTRUCTION, BILATERAL: Primary | ICD-10-CM

## 2018-10-23 PROCEDURE — G0463 HOSPITAL OUTPT CLINIC VISIT: HCPCS | Mod: ZF

## 2018-10-23 RX ORDER — CEFAZOLIN SODIUM 1 G/50ML
1 INJECTION, SOLUTION INTRAVENOUS SEE ADMIN INSTRUCTIONS
Status: CANCELLED | OUTPATIENT
Start: 2018-10-23

## 2018-10-23 RX ORDER — CEFAZOLIN SODIUM 2 G/50ML
2 SOLUTION INTRAVENOUS
Status: CANCELLED | OUTPATIENT
Start: 2018-10-23

## 2018-10-23 ASSESSMENT — PAIN SCALES - GENERAL: PAINLEVEL: NO PAIN (0)

## 2018-10-23 NOTE — NURSING NOTE
"Oncology Rooming Note    October 23, 2018 9:47 AM   Mary Chadwick is a 42 year old female who presents for:    Chief Complaint   Patient presents with     Oncology Clinic Visit     [facility]     Initial Vitals: /69 (BP Location: Right arm, Patient Position: Chair, Cuff Size: Adult Regular)  Pulse 90  Temp 97.4  F (36.3  C) (Oral)  Resp 14  Ht 1.753 m (5' 9.02\")  Wt 80.3 kg (177 lb 1.6 oz)  SpO2 99%  BMI 26.14 kg/m2 Estimated body mass index is 26.14 kg/(m^2) as calculated from the following:    Height as of this encounter: 1.753 m (5' 9.02\").    Weight as of this encounter: 80.3 kg (177 lb 1.6 oz). Body surface area is 1.98 meters squared.  No Pain (0) Comment: Data Unavailable   No LMP recorded. Patient is not currently having periods (Reason: Perimenopausal).  Allergies reviewed: Yes  Medications reviewed: Yes    Medications: Medication refills not needed today.  Pharmacy name entered into EPIC: [facility] - [address]    Clinical concerns: No new concerns.  Marti was notified.    10 minutes for nursing intake (face to face time)     Christian Stone LPN            "

## 2018-10-23 NOTE — LETTER
10/23/2018       RE: Mary Chadwick  [address]  [facility] [address]     Dear Colleague,    Thank you for referring your patient, Mary Chadwick, to the [facility] at [facility]. Please see a copy of my visit note below.    POSTOPERATIVE VISIT      PRESENTING COMPLAINT:  Postoperative visit, status post bilateral breast reconstruction for left-sided breast cancer with second stage done 09/20/2018 with silicone implants.      HISTORY OF PRESENT ILLNESS:  Ms. Chadwick is 42 years old.  She is about 5 weeks out from surgery, completely healed, very happy with the results, no major issues, no pain.  She does have some upper pole indentations that she would like to address as well as the fact that she does not have nipples.      PHYSICAL EXAMINATION:  On exam vital signs stable.  She is afebrile in no obvious distress.  Both breasts are symmetric, aesthetic, healing in well.  No rippling.  Upper pole indentations bilaterally.      ASSESSMENT AND PLAN:  Based on the above diagnosis bilateral breast reconstruction for breast cancer was made.  I think she is a great candidate for upper pole fat grafting from abdomen and thighs to give her more natural appearing breasts.  Additionally, she can have nipples reconstructed at that time.  Plan is to do this in the next few weeks and then down the road to areolar tattooing.  I went over the planned procedure with her in detail, explained to her the risks, benefits and alternatives including pain, infection, bleeding, scarring, asymmetry, seromas, hematomas, wound breakdown, dehiscence, injury to the implant, injury to bowel, contour irregularities, sensation changes in the area, asymmetry, DVT, PE, MI, CVA, pneumonia and death.  She also understood that fat grafting may require a number of stages.  All questions were answered.  She was happy with the visit.  Consent obtained.  Photographs obtained.  Look forward  to helping her out in the near future.         Again, thank you for allowing me to participate in the care of your patient.      Sincerely,    BASILIO Kidd MD

## 2018-10-23 NOTE — PROGRESS NOTES
POSTOPERATIVE VISIT      PRESENTING COMPLAINT:  Postoperative visit, status post bilateral breast reconstruction for left-sided breast cancer with second stage done 09/20/2018 with silicone implants.      HISTORY OF PRESENT ILLNESS:  Ms. Chadwick is 42 years old.  She is about 5 weeks out from surgery, completely healed, very happy with the results, no major issues, no pain.  She does have some upper pole indentations that she would like to address as well as the fact that she does not have nipples.      PHYSICAL EXAMINATION:  On exam vital signs stable.  She is afebrile in no obvious distress.  Both breasts are symmetric, aesthetic, healing in well.  No rippling.  Upper pole indentations bilaterally.      ASSESSMENT AND PLAN:  Based on the above diagnosis bilateral breast reconstruction for breast cancer was made.  I think she is a great candidate for upper pole fat grafting from abdomen and thighs to give her more natural appearing breasts.  Additionally, she can have nipples reconstructed at that time.  Plan is to do this in the next few weeks and then down the road to areolar tattooing.  I went over the planned procedure with her in detail, explained to her the risks, benefits and alternatives including pain, infection, bleeding, scarring, asymmetry, seromas, hematomas, wound breakdown, dehiscence, injury to the implant, injury to bowel, contour irregularities, sensation changes in the area, asymmetry, DVT, PE, MI, CVA, pneumonia and death.  She also understood that fat grafting may require a number of stages.  All questions were answered.  She was happy with the visit.  Consent obtained.  Photographs obtained.  Look forward to helping her out in the near future.

## 2018-10-23 NOTE — MR AVS SNAPSHOT
After Visit Summary   10/23/2018    Mary Chadwick    MRN: [id number]           Patient Information     Date Of Birth          1975        Visit Information        Provider Department      10/23/2018 9:30 AM BASILIO Kidd MD [facility]        Today's Diagnoses     S/P breast reconstruction, bilateral    -  1       Follow-ups after your visit        Follow-up notes from your care team     Return if symptoms worsen or fail to improve.      Your next 10 appointments already scheduled     Nov 06, 2018  1:30 PM CST   Infusion 60 with  ONCOLOGY INFUSION,  28 ATC   [facility] ([facility])    [address]  [address]  [facility] [address] [phone]            Nov 30, 2018  8:30 AM CST   MR BREAST BILATERAL W/O & W CONTRAST with MR1   [facility] ([facility])    [address]  1st Floor  [facility] [id number] [phone]           How do I prepare for my exam? (Food and drink instructions) **If you will be receiving sedation or general anesthesia, please see special notes below.**  How do I prepare for my exam? (Other instructions) Take your medicines as usual, unless your doctor tells you not to. The timing of your exam may depend on the start of your last period. If you re in menopause, you may have the exam anytime.  You will have IV contrast for this exam.  You do not need to do anything special to prepare. **If you will be receiving sedation or general anesthesia, please see special notes below.**  What should I wear:  The MRI machine uses a strong magnet. Please wear clothes without metal (snaps, zippers). A sweatsuit works well, or we may give you a hospital gown. Please remove any body piercings and hair extensions before you arrive. You will also remove watches, jewelry, hairpins, wallets, dentures, partial dental plates and hearing aids. You may  wear contact lenses, and you may be able to wear your rings. We have a safe place to keep your personal items, but it is safer to leave them at home.  How long does the exam take:  Most tests take 30 to 60 minutes.  HOWEVER, IF YOUR DOCTOR PRESCRIBES ANESTHESIA please plan on spending four to five hours in the recovery room.  What should I bring: Bring a list of your current medicines to your exam (including vitamins, minerals and over-the-counter drugs). Please bring any previous mammograms or breast MRIs from other facilities to the MRI dept. Do not mail these items to us.  Do I need a : **If you will be receiving sedation or general anesthesia, please see special notes below.**  What should I do after the exam: No Restrictions, You may resume normal activities.  What is this test: MRI (magnetic resonance imaging) uses a strong magnet and radio waves to look inside the body. An MRA (magnetic resonance angiogram) does the same thing, but it lets us look at your blood vessels. A computer turns the radio waves into pictures showing cross sections of the body, much like slices of bread. This helps us see any problems more clearly. You may receive fluid (called  contrast ) before or during your scan. The fluid helps us see the pictures better. We give the fluid through an IV (small needle in your arm).  Who should I call with questions: If you have any questions, please contact your Imaging Department exam site. Directions, parking instructions, and other information is available on our website, [email or web address].  How do I prepare if I m having sedation or anesthesia? **IMPORTANT** THE INSTRUCTIONS BELOW ARE ONLY FOR THOSE PATIENTS WHO HAVE BEEN TOLD THEY WILL RECEIVE SEDATION OR GENERAL ANESTHESIA DURING THEIR MRI PROCEDURE:  IF YOU WILL RECEIVE SEDATION (take medicine to help you relax during your exam) You must get the medicine from your doctor before you arrive. Bring the medicine to the exam. Do not take  it at home. Arrive one hour early. Bring someone who can take you home after the test. Your medicine will make you sleepy. After the exam, you may not drive, take a bus or take a taxi by yourself. No eating 8 hours before your exam. You may have clear liquids up until 4 hours before your exam. (Clear liquids include water, clear tea, black coffee and fruit juice without pulp.)  IF YOU WILL RECEIVE ANESTHESIA (be asleep for your exam) Arrive 1 1/2 hours early. Bring someone who can take you home after the test. You may not drive, take a bus or take a taxi by yourself. No eating 8 hours before your exam. You may have clear liquids up until 4 hours before your exam. (Clear liquids include water, clear tea, black coffee and fruit juice without pulp.)            Nov 30, 2018  9:15 AM CST   MR CHEST W/O & W CONTRAST with [address] MRI ([facility])    [address] [place] [address] [phone]           How do I prepare for my exam? (Food and drink instructions) **If you will be receiving sedation or general anesthesia, please see special notes below.**  How do I prepare for my exam? (Other instructions) Take your medicines as usual, unless your doctor tells you not to. You may or may not receive intravenous (IV) contrast for this exam pending the discretion of the Radiologist.  You do not need to do anything special to prepare.  **If you will be receiving sedation or general anesthesia, please see special notes below.**  What should I wear: The MRI machine uses a strong magnet. Please wear clothes without metal (snaps, zippers). A sweatsuit works well, or we may give you a hospital gown. Please remove any body piercings and hair extensions before you arrive. You will also remove watches, jewelry, hairpins, wallets, dentures, partial dental plates and hearing aids. You may wear contact lenses, and you may be able to wear your rings. We have a  safe place to keep your personal items, but it is safer to leave them at home.  How long does the exam take: Most tests take 30 to 60 minutes.  HOWEVER, IF YOUR DOCTOR PRESCRIBES ANESTHESIA please plan on spending four to five hours in the recovery room.  What should I bring:  Bring a list of your current medicines to your exam (including vitamins, minerals and over-the-counter drugs).  Do I need a :  **If you will be receiving sedation or general anesthesia, please see special notes below.**  What should I do after the exam: No Restrictions, You may resume normal activities.  What is this test: MRI (magnetic resonance imaging) uses a strong magnet and radio waves to look inside the body. An MRA (magnetic resonance angiogram) does the same thing, but it lets us look at your blood vessels. A computer turns the radio waves into pictures showing cross sections of the body, much like slices of bread. This helps us see any problems more clearly. You may receive fluid (called  contrast ) before or during your scan. The fluid helps us see the pictures better. We give the fluid through an IV (small needle in your arm).  Who should I call with questions:  Please call the Imaging Department at your exam site with any questions. Directions, parking instructions, and other information is available on our website, [email or web address].  How do I prepare if I m having sedation or anesthesia? **IMPORTANT** THE INSTRUCTIONS BELOW ARE ONLY FOR THOSE PATIENTS WHO HAVE BEEN TOLD THEY WILL RECEIVE SEDATION OR GENERAL ANESTHESIA DURING THEIR MRI PROCEDURE:  IF YOU WILL RECEIVE SEDATION (take medicine to help you relax during your exam): You must get the medicine from your doctor before you arrive. Bring the medicine to the exam. Do not take it at home. Arrive one hour early. Bring someone who can take you home after the test. Your medicine will make you sleepy. After the exam, you may not drive, take a bus or take a taxi by  yourself. No eating 8 hours before your exam. You may have clear liquids up until 4 hours before your exam. (Clear liquids include water, clear tea, black coffee and fruit juice without pulp.)  IF YOU WILL RECEIVE ANESTHESIA (be asleep for your exam): Arrive 1 1/2 hours early. Bring someone who can take you home after the test. You may not drive, take a bus or take a taxi by yourself. No eating 8 hours before your exam. You may have clear liquids up until 4 hours before your exam. (Clear liquids include water, clear tea, black coffee and fruit juice without pulp.)            Nov 30, 2018 10:00 AM CST   MR ABDOMEN & PELVIS W/O & W CONTRAST with [address] MRI ([facility])    [address] [place] [address] [phone]           How do I prepare for my exam? (Food and drink instructions) Do not eat or drink for 6 hours prior to exam. **If you will be receiving sedation or general anesthesia, please see special notes below.**  How do I prepare for my exam? (Other instructions) Take your medicines as usual, unless your doctor tells you not to. You may or may not receive IV contrast for this exam pending the discretion of the Radiologist.  **If you will be receiving sedation or general anesthesia, please see special notes below.**  What should I wear: The MRI machine uses a strong magnet. Please wear clothes without metal (snaps, zippers). A sweatsuit works well, or we may give you a hospital gown. Please remove any body piercings and hair extensions before you arrive. You will also remove watches, jewelry, hairpins, wallets, dentures, partial dental plates and hearing aids. You may wear contact lenses, and you may be able to wear your rings. We have a safe place to keep your personal items, but it is safer to leave them at home.  How long does the exam take: Most tests take 30 to 60 minutes.  HOWEVER, IF YOUR DOCTOR PRESCRIBES ANESTHESIA please  plan on spending four to five hours in the recovery room.  What should I bring: Bring a list of your current medicines to your exam (including vitamins, minerals and over-the-counter drugs). Also bring the results of similar scans you may have had.  Do I need a : **If you will be receiving sedation or general anesthesia, please see special notes below.**  What should I do after the exam: No Restrictions, You may resume normal activities.  What is this test: MRI (magnetic resonance imaging) uses a strong magnet and radio waves to look inside the body. An MRA (magnetic resonance angiogram) does the same thing, but it lets us look at your blood vessels. A computer turns the radio waves into pictures showing cross sections of the body, much like slices of bread. This helps us see any problems more clearly. You may receive fluid (called  contrast ) before or during your scan. The fluid helps us see the pictures better. We give the fluid through an IV (small needle in your arm).  Who should I call with questions: If you have any questions, please contact your Imaging Department exam site. Directions, parking instructions, and other information is available on our website, [email or web address].            Nov 30, 2018 11:00 AM CST   DX HIP/PELVIS/SPINE with UCDX1   [facility] ([facility])    [address] [address] [phone]           How do I prepare for my exam? (Food and drink instructions) No Food and Drink Restrictions.  How do I prepare for my exam? (Other instructions) Please do not take any of the following 24 hours prior to the day of your exam: vitamins, calcium tablets, antacids.  What should I wear: If possible, please wear clothes without metal (snaps, zippers). A sweat suit works well.  How long does the exam take: The exam takes about 20 minutes.  What should I bring: Bring a list of your current medicines to your exam  (including vitamins, minerals and over-the-counter drugs).  Do I need a :  No  is needed.  What should I do after the exam: No restrictions, You may resume normal activities.  How do I prepare for my exam? (Food and drink instructions) A DEXA scan is a bone-density scan. It uses a low level of radiation to check the strength of your bones. As you lie on a padded table, a machine will take X-rays. We most often scan the hips and lower spine.  Who should I call with questions: If you have any questions, please call the Imaging Department where you will have your exam. Directions, parking instructions, and other information is available on our website, [email or web address].            Dec 04, 2018 12:30 PM CST   (Arrive by 12:15 PM)   Return Visit with Valeria Gann MD   [facility] ([facility])    [address] [place] [address]  [address]  [facility] [address] [phone]            Dec 04, 2018  1:00 PM CST   Infusion 60 with  ONCOLOGY INFUSION, UC 11 ATC   [facility] ([facility])    [address] [place] [address]  [address]  [facility] [address] [phone]              Who to contact     If you have questions or need follow up information about today's clinic visit or your schedule please contact [facility] directly at [phone].  Normal or non-critical lab and imaging results will be communicated to you by MyChart, letter or phone within 4 business days after the clinic has received the results. If you do not hear from us within 7 days, please contact the clinic through MyChart or phone. If you have a critical or abnormal lab result, we will notify you by phone as soon as possible.  Submit refill requests through [employer] or call your pharmacy and they will forward the refill request to us. Please allow 3 business days for your refill to be completed.          Additional Information About Your  "Visit        MyChart Information     [employer] gives you secure access to your electronic health record. If you see a primary care provider, you can also send messages to your care team and make appointments. If you have questions, please call your primary care clinic.  If you do not have a primary care provider, please call [phone] and they will assist you.        Care EveryWhere ID     This is your Care EveryWhere ID. This could be used by other organizations to access your [facility] medical records  [id number]        Your Vitals Were     Pulse Temperature Respirations Height Pulse Oximetry BMI (Body Mass Index)    90 97.4  F (36.3  C) (Oral) 14 5' 9.02\" 99% 26.14 kg/m2       Blood Pressure from Last 3 Encounters:   10/23/18 127/69   10/15/18 105/67   10/12/18 103/66    Weight from Last 3 Encounters:   10/23/18 177 lb 1.6 oz   10/12/18 176 lb 12.8 oz   09/25/18 183 lb 1.6 oz              Today, you had the following     No orders found for display       Primary Care Provider Office Phone #    Flora Crews, [phone]       [facility] [address]  [facility] [address]        Equal Access to Services     SURESH CHAPARRO AH: Hadii olivier dotsono Sobharati, waaxda luqadaha, qaybta kaalmada adeegyada, naseem mendoza. So [facility] [phone].    ATENCIÓN: Si habla español, tiene a sparrow disposición servicios gratuitos de asistencia lingüística. Llame al [phone].    We comply with applicable federal civil rights laws and [place] laws. We do not discriminate on the basis of race, color, national origin, age, disability, sex, sexual orientation, or gender identity.            Thank you!     Thank you for choosing [facility]  for your care. Our goal is always to provide you with excellent care. Hearing back from our patients is one way we can continue to improve our services. Please take a few minutes to complete the written survey that you may receive in " the mail after your visit with us. Thank you!             Your Updated Medication List - Protect others around you: Learn how to safely use, store and throw away your medicines at www.disposemymeds.org.          This list is accurate as of 10/23/18 11:59 PM.  Always use your most recent med list.                   Brand Name Dispense Instructions for use Diagnosis    acetaminophen 325 MG tablet    TYLENOL    50 tablet    Take 2 tablets (650 mg) by mouth every 6 hours as needed for mild pain    Acute post-operative pain       calcium citrate-vitamin D 315-250 MG-UNIT Tabs per tablet    CITRACAL     Take 2 tablets by mouth        goserelin 3.6 MG injection    ZOLADEX     Inject 3.6 mg Subcutaneous every 30 days        * letrozole 2.5 MG tablet    FEMARA    30 tablet    Take 1 tablet (2.5 mg) by mouth daily    Malignant neoplasm of upper-outer quadrant of left breast in female, estrogen receptor positive (H)       * letrozole 2.5 MG tablet    FEMARA    90 tablet    Take 1 tablet (2.5 mg) by mouth daily    Malignant neoplasm of upper-outer quadrant of left breast in female, estrogen receptor positive (H)       medium chain triglycerides (MCT OIL) oil      Take 30 mLs by mouth daily        NONFORMULARY      Take 1 capsule by mouth daily Rosehip oil        OMEGA 3-6-9 FATTY ACIDS PO      Take 2 capsules by mouth daily        ondansetron 4 MG tablet    ZOFRAN    30 tablet    Take 1 tablet (4 mg) by mouth every 6 hours as needed for nausea    Nausea       Spirulina 500 MG Tabs      6 Tabs daily.    Malignant neoplasm of left breast in female, estrogen receptor positive, unspecified site of breast (H)       * Notice:  This list has 2 medication(s) that are the same as other medications prescribed for you. Read the directions carefully, and ask your doctor or other care provider to review them with you.

## 2018-10-24 ENCOUNTER — TELEPHONE (OUTPATIENT)
Dept: PLASTIC SURGERY | Facility: CLINIC | Age: 43
End: 2018-10-24

## 2018-10-29 ENCOUNTER — TELEPHONE (OUTPATIENT)
Dept: PLASTIC SURGERY | Facility: CLINIC | Age: 43
End: 2018-10-29

## 2018-11-02 ENCOUNTER — TELEPHONE (OUTPATIENT)
Dept: PLASTIC SURGERY | Facility: CLINIC | Age: 43
End: 2018-11-02

## 2018-11-02 NOTE — TELEPHONE ENCOUNTER
Patient is scheduled for surgery with Dr. Kidd      Spoke or left message with:Patient left me message    Date of Surgery: 11/08/18 @ 11:25 a.m.     Location: [facility]    Informed patient they will need an adult      Pre-op with surgeon (if applicable):     H&P: Scheduled with     Additional imaging/appointments: Post op is on 11/20/18 @ 11:15 a.m.     Surgery packet:     Additional comments:

## 2018-11-07 ENCOUNTER — ANESTHESIA EVENT (OUTPATIENT)
Dept: SURGERY | Facility: AMBULATORY SURGERY CENTER | Age: 43
End: 2018-11-07

## 2018-11-08 ENCOUNTER — HOSPITAL ENCOUNTER (OUTPATIENT)
Facility: AMBULATORY SURGERY CENTER | Age: 43
End: 2018-11-08
Attending: PLASTIC SURGERY
Payer: COMMERCIAL

## 2018-11-08 ENCOUNTER — ANESTHESIA (OUTPATIENT)
Dept: SURGERY | Facility: AMBULATORY SURGERY CENTER | Age: 43
End: 2018-11-08

## 2018-11-08 ENCOUNTER — SURGERY (OUTPATIENT)
Age: 43
End: 2018-11-08

## 2018-11-08 VITALS
HEIGHT: 69 IN | HEART RATE: 89 BPM | BODY MASS INDEX: 26.07 KG/M2 | OXYGEN SATURATION: 100 % | DIASTOLIC BLOOD PRESSURE: 76 MMHG | RESPIRATION RATE: 16 BRPM | WEIGHT: 176 LBS | SYSTOLIC BLOOD PRESSURE: 110 MMHG | TEMPERATURE: 98 F

## 2018-11-08 DIAGNOSIS — Z98.890 S/P BREAST RECONSTRUCTION, BILATERAL: Primary | ICD-10-CM

## 2018-11-08 RX ORDER — ONDANSETRON 4 MG/1
4 TABLET, ORALLY DISINTEGRATING ORAL EVERY 30 MIN PRN
Status: DISCONTINUED | OUTPATIENT
Start: 2018-11-08 | End: 2018-11-09 | Stop reason: HOSPADM

## 2018-11-08 RX ORDER — ONDANSETRON 2 MG/ML
4 INJECTION INTRAMUSCULAR; INTRAVENOUS EVERY 30 MIN PRN
Status: DISCONTINUED | OUTPATIENT
Start: 2018-11-08 | End: 2018-11-09 | Stop reason: HOSPADM

## 2018-11-08 RX ORDER — PROPOFOL 10 MG/ML
INJECTION, EMULSION INTRAVENOUS CONTINUOUS PRN
Status: DISCONTINUED | OUTPATIENT
Start: 2018-11-08 | End: 2018-11-08

## 2018-11-08 RX ORDER — FENTANYL CITRATE 50 UG/ML
25-50 INJECTION, SOLUTION INTRAMUSCULAR; INTRAVENOUS
Status: DISCONTINUED | OUTPATIENT
Start: 2018-11-08 | End: 2018-11-08 | Stop reason: HOSPADM

## 2018-11-08 RX ORDER — DEXAMETHASONE SODIUM PHOSPHATE 4 MG/ML
INJECTION, SOLUTION INTRA-ARTICULAR; INTRALESIONAL; INTRAMUSCULAR; INTRAVENOUS; SOFT TISSUE PRN
Status: DISCONTINUED | OUTPATIENT
Start: 2018-11-08 | End: 2018-11-08

## 2018-11-08 RX ORDER — CEFAZOLIN SODIUM 1 G/3ML
INJECTION, POWDER, FOR SOLUTION INTRAMUSCULAR; INTRAVENOUS PRN
Status: DISCONTINUED | OUTPATIENT
Start: 2018-11-08 | End: 2018-11-08

## 2018-11-08 RX ORDER — OXYCODONE HYDROCHLORIDE 5 MG/1
5 TABLET ORAL EVERY 4 HOURS PRN
Status: DISCONTINUED | OUTPATIENT
Start: 2018-11-08 | End: 2018-11-09 | Stop reason: HOSPADM

## 2018-11-08 RX ORDER — MEPERIDINE HYDROCHLORIDE 25 MG/ML
12.5 INJECTION INTRAMUSCULAR; INTRAVENOUS; SUBCUTANEOUS
Status: DISCONTINUED | OUTPATIENT
Start: 2018-11-08 | End: 2018-11-09 | Stop reason: HOSPADM

## 2018-11-08 RX ORDER — LIDOCAINE HYDROCHLORIDE 20 MG/ML
INJECTION, SOLUTION INFILTRATION; PERINEURAL PRN
Status: DISCONTINUED | OUTPATIENT
Start: 2018-11-08 | End: 2018-11-08

## 2018-11-08 RX ORDER — PROPOFOL 10 MG/ML
INJECTION, EMULSION INTRAVENOUS PRN
Status: DISCONTINUED | OUTPATIENT
Start: 2018-11-08 | End: 2018-11-08

## 2018-11-08 RX ORDER — SCOLOPAMINE TRANSDERMAL SYSTEM 1 MG/1
1 PATCH, EXTENDED RELEASE TRANSDERMAL
Status: DISCONTINUED | OUTPATIENT
Start: 2018-11-08 | End: 2018-11-09 | Stop reason: HOSPADM

## 2018-11-08 RX ORDER — ONDANSETRON 2 MG/ML
INJECTION INTRAMUSCULAR; INTRAVENOUS PRN
Status: DISCONTINUED | OUTPATIENT
Start: 2018-11-08 | End: 2018-11-08

## 2018-11-08 RX ORDER — NALOXONE HYDROCHLORIDE 0.4 MG/ML
.1-.4 INJECTION, SOLUTION INTRAMUSCULAR; INTRAVENOUS; SUBCUTANEOUS
Status: DISCONTINUED | OUTPATIENT
Start: 2018-11-08 | End: 2018-11-09 | Stop reason: HOSPADM

## 2018-11-08 RX ORDER — LIDOCAINE 40 MG/G
CREAM TOPICAL
Status: DISCONTINUED | OUTPATIENT
Start: 2018-11-08 | End: 2018-11-08 | Stop reason: HOSPADM

## 2018-11-08 RX ORDER — ACETAMINOPHEN 325 MG/1
975 TABLET ORAL ONCE
Status: COMPLETED | OUTPATIENT
Start: 2018-11-08 | End: 2018-11-08

## 2018-11-08 RX ORDER — SODIUM CHLORIDE, SODIUM LACTATE, POTASSIUM CHLORIDE, CALCIUM CHLORIDE 600; 310; 30; 20 MG/100ML; MG/100ML; MG/100ML; MG/100ML
INJECTION, SOLUTION INTRAVENOUS CONTINUOUS
Status: DISCONTINUED | OUTPATIENT
Start: 2018-11-08 | End: 2018-11-08 | Stop reason: HOSPADM

## 2018-11-08 RX ORDER — OXYCODONE HYDROCHLORIDE 5 MG/1
5-10 TABLET ORAL EVERY 6 HOURS PRN
Qty: 15 TABLET | Refills: 0 | Status: SHIPPED | OUTPATIENT
Start: 2018-11-08 | End: 2019-05-13

## 2018-11-08 RX ORDER — CEFAZOLIN SODIUM 1 G/50ML
1 SOLUTION INTRAVENOUS SEE ADMIN INSTRUCTIONS
Status: DISCONTINUED | OUTPATIENT
Start: 2018-11-08 | End: 2018-11-08 | Stop reason: HOSPADM

## 2018-11-08 RX ORDER — SODIUM CHLORIDE, SODIUM LACTATE, POTASSIUM CHLORIDE, CALCIUM CHLORIDE 600; 310; 30; 20 MG/100ML; MG/100ML; MG/100ML; MG/100ML
INJECTION, SOLUTION INTRAVENOUS CONTINUOUS PRN
Status: DISCONTINUED | OUTPATIENT
Start: 2018-11-08 | End: 2018-11-08

## 2018-11-08 RX ORDER — ONDANSETRON 4 MG/1
4-8 TABLET, ORALLY DISINTEGRATING ORAL EVERY 8 HOURS PRN
Qty: 4 TABLET | Refills: 0 | Status: SHIPPED | OUTPATIENT
Start: 2018-11-08 | End: 2019-01-17

## 2018-11-08 RX ORDER — AMOXICILLIN 250 MG
1-2 CAPSULE ORAL 2 TIMES DAILY
Qty: 30 TABLET | Refills: 0 | Status: SHIPPED | OUTPATIENT
Start: 2018-11-08 | End: 2019-05-13

## 2018-11-08 RX ORDER — KETOROLAC TROMETHAMINE 30 MG/ML
30 INJECTION, SOLUTION INTRAMUSCULAR; INTRAVENOUS EVERY 6 HOURS PRN
Status: DISCONTINUED | OUTPATIENT
Start: 2018-11-08 | End: 2018-11-09 | Stop reason: HOSPADM

## 2018-11-08 RX ORDER — FENTANYL CITRATE 50 UG/ML
INJECTION, SOLUTION INTRAMUSCULAR; INTRAVENOUS PRN
Status: DISCONTINUED | OUTPATIENT
Start: 2018-11-08 | End: 2018-11-08

## 2018-11-08 RX ORDER — SODIUM CHLORIDE, SODIUM LACTATE, POTASSIUM CHLORIDE, CALCIUM CHLORIDE 600; 310; 30; 20 MG/100ML; MG/100ML; MG/100ML; MG/100ML
INJECTION, SOLUTION INTRAVENOUS CONTINUOUS
Status: DISCONTINUED | OUTPATIENT
Start: 2018-11-08 | End: 2018-11-09 | Stop reason: HOSPADM

## 2018-11-08 RX ORDER — CEFAZOLIN SODIUM 2 G/50ML
2 SOLUTION INTRAVENOUS
Status: DISCONTINUED | OUTPATIENT
Start: 2018-11-08 | End: 2018-11-08 | Stop reason: HOSPADM

## 2018-11-08 RX ADMIN — DEXAMETHASONE SODIUM PHOSPHATE 4 MG: 4 INJECTION, SOLUTION INTRA-ARTICULAR; INTRALESIONAL; INTRAMUSCULAR; INTRAVENOUS; SOFT TISSUE at 12:47

## 2018-11-08 RX ADMIN — FENTANYL CITRATE 50 MCG: 50 INJECTION, SOLUTION INTRAMUSCULAR; INTRAVENOUS at 12:21

## 2018-11-08 RX ADMIN — SODIUM CHLORIDE, SODIUM LACTATE, POTASSIUM CHLORIDE, CALCIUM CHLORIDE: 600; 310; 30; 20 INJECTION, SOLUTION INTRAVENOUS at 13:52

## 2018-11-08 RX ADMIN — ONDANSETRON 4 MG: 2 INJECTION INTRAMUSCULAR; INTRAVENOUS at 13:53

## 2018-11-08 RX ADMIN — ACETAMINOPHEN 975 MG: 325 TABLET ORAL at 10:49

## 2018-11-08 RX ADMIN — PROPOFOL 200 MCG/KG/MIN: 10 INJECTION, EMULSION INTRAVENOUS at 12:56

## 2018-11-08 RX ADMIN — PROPOFOL 200 MCG/KG/MIN: 10 INJECTION, EMULSION INTRAVENOUS at 12:22

## 2018-11-08 RX ADMIN — Medication 0.5 MG: at 13:08

## 2018-11-08 RX ADMIN — SODIUM CHLORIDE, SODIUM LACTATE, POTASSIUM CHLORIDE, CALCIUM CHLORIDE: 600; 310; 30; 20 INJECTION, SOLUTION INTRAVENOUS at 10:49

## 2018-11-08 RX ADMIN — PROPOFOL: 10 INJECTION, EMULSION INTRAVENOUS at 14:02

## 2018-11-08 RX ADMIN — PROPOFOL: 10 INJECTION, EMULSION INTRAVENOUS at 13:25

## 2018-11-08 RX ADMIN — PROPOFOL 200 MG: 10 INJECTION, EMULSION INTRAVENOUS at 12:21

## 2018-11-08 RX ADMIN — FENTANYL CITRATE 50 MCG: 50 INJECTION, SOLUTION INTRAMUSCULAR; INTRAVENOUS at 12:44

## 2018-11-08 RX ADMIN — CEFAZOLIN SODIUM 1 G: 1 SOLUTION INTRAVENOUS at 14:11

## 2018-11-08 RX ADMIN — CEFAZOLIN SODIUM 2 G: 1 INJECTION, POWDER, FOR SOLUTION INTRAMUSCULAR; INTRAVENOUS at 12:30

## 2018-11-08 RX ADMIN — OXYCODONE HYDROCHLORIDE 5 MG: 5 TABLET ORAL at 14:34

## 2018-11-08 RX ADMIN — PROPOFOL 50 MG: 10 INJECTION, EMULSION INTRAVENOUS at 12:54

## 2018-11-08 RX ADMIN — LIDOCAINE HYDROCHLORIDE 80 MG: 20 INJECTION, SOLUTION INFILTRATION; PERINEURAL at 12:21

## 2018-11-08 NOTE — DISCHARGE INSTRUCTIONS
FAT GRAFTING POST-OPERATIVE INSTRUCTIONS    Instructions       Have someone drive you home after surgery and help you at home for 1-2      days.      Get plenty of rest.      Follow balanced diet.      Decreased activity may promote constipation, so you may want to add      more raw fruit to your diet, and be sure to increase fluid intake.      Take pain medication as prescribed. Do not take aspirin or any products      containing aspirin unless approved by your surgeon.      Do not drink alcohol when taking pain medications.      Even when not taking pain medications, no alcohol for 3 weeks as it      causes fluid retention.      If you are taking vitamins with iron, resume these as tolerated.      Do not smoke, as smoking delays healing and increases the risk of      complications.    Activities      Do not drive until you are no longer taking any pain medications      (narcotics).      Start walking as soon as possible, this helps to reduce swelling and       lowers the chance of blood clots.      Unless stated on this form, discuss your time off work with your surgeon.    Treated Area Care       You may shower after 24 hours. The ACE wrap (if used) may be rewrapped as needed (if too tight or loose). Use it for support and may subtitute with a sports bra if preferred.  Wear the compression garment (spandex type clothing or the provided sponge and binder) in the area where the liposuction was performed to harvest the fat for the fat injection for 2 weeks post opor per the surgeon's recommendation.      Avoid exposing scars to sun for at least 12 months.      Always use a strong sunblock, if sun exposure is unavoidable (SPF 50 or      greater).      Inspect daily for signs of infection.      No tub soaking, bathing, or swimming while sutures or drains are in place.      You may wear makeup with sunblock protection shortly.      Stay out of the sun until redness and bruising subsides (usually 48      Hours).    What  to Expect      Temporary stinging, throbbing, burning sensation, redness, swelling,       bruising, and excess fullness.      Some swelling, bruising or redness in the donor and recipient sites.      Swelling and puffiness may last several weeks.      Redness and bruising usually lasts about 48 hours.     Appearance      Improved skin texture.      Firmer and smoother skin.    Follow-Up Care      With regular follow-up treatments, you can easily maintain your new       look.      Repeated treatments may be necessary.    When to Call      If you have increased swelling or bruising.      If swelling and redness persist after a few days.      If you have increased redness along the incision.      If you have severe or increased pain not relieved by medication.      If you have any side effects to medications; such as, rash, nausea,      headache, vomiting.      If you have an oral temperature over 100.4 degrees.      If you have any yellowish or greenish drainage from the incisions or      notice a foul odor.      If you have bleeding from the incisions that is difficult to control with      light pressure.      If you have loss of feeling or motion.      If you have any sign of abscesses, open sores, skin peeling or lumpiness.    For Medical Questions, Please Call:      [phone], Monday - Friday, 8 a.m. - 4:30 p.m.      After hours and on weekends, call Hospital Paging at [phone] and      ask for the Plastic Surgeon on call.

## 2018-11-08 NOTE — IP AVS SNAPSHOT
[facility]    [address] [place] [id number]    Phone:  [phone]    Fax:  [phone]                                       After Visit Summary   11/8/2018    Mary Chadwick    MRN: [id number]           After Visit Summary Signature Page     I have received my discharge instructions, and my questions have been answered. I have discussed any challenges I see with this plan with the nurse or doctor.    ..........................................................................................................................................  Patient/Patient Representative Signature      ..........................................................................................................................................  Patient Representative Print Name and Relationship to Patient    ..................................................               ................................................  Date                                   Time    ..........................................................................................................................................  Reviewed by Signature/Title    ...................................................              ..............................................  Date                                               Time          22EPIC Rev 08/18

## 2018-11-08 NOTE — OP NOTE
PREOPERATIVE DIAGNOSIS: Status post bilateral breast reconstruction for right breast cancer. Now with indentations in the breast mound construct and absence of nipples.     POSTOPERATIVE DIAGNOSIS:Status post bilateral breast reconstruction for right breast cancer. Now with indentations in the breast mound construct and absence of nipples.     PROCEDURES: 1. Fat grafting of bilatera breast constructs with fat harvested from the abdomen and medial thighs. A total of 250 cc of fat injection in multiple planes. 2. Bilateral nipple reconstruction with modified skate flaps.     SURGEON: Basim Kidd MD     FELLOW: Paty Quintana MD     ANESTHESIA: General anesthesia with endotracheal intubation.     COMPLICATIONS: Nil.     DRAINS: Nil.     SPECIMENS: Nil.     BLOOD LOSS: 10 mL    DESCRIPTION OF PROCEDURE: After informed consent was taken from the patient,  the proper site and procedure was ascertained with her and she was appropriately marked and taken to the operating room. She was placed in a supine position with her knees comfortably flexed with pillows underneath them, and pneumoboots placed and running prior to induction of anesthesia. Preoperative antibiotics were given in the OR. All pressure points were padded. General anesthesia was administered without any complications. I had preoperatively marked out the harvest site and injection sites. I went ahead and injected tumescent solution in the subcutaneous plane of the abdomen and thighs (The tumescent solution was made up of 1L LR, mixed with 30cc 1% lidocaine and 1 cc of 1:1000 epinephrine).    While I waited for the medication to take effect, I marked out the skate flaps on both sides of the breasts based on the markings pre-op. The markings were in line with the mastectomy scars. They were cut and based superiorly, interdigitated with 4-0 Monocryl sutures, and the donor sites were closed with 2-0 Monocryl sutures and then 4-0 Monocryl sutures.     I then  harvested the fat using 4 mm cannulas and used the [employer] system to capture the fat until we had about 350 cc of fatty effluent in the system. We then washed it 3 different times with warm Ringer's lactate and then collected all the fat in 10 cc syringes. A total of 250 cc was then injected through multiple incision sites in multiple planes of the injection sites. Extreme care was taken not to injure the underlying implant. A fanning technique was used with Spears catheters to carry out the fat grafting. Once it was completed, we then closed all the stab incisions with 5-0 fast absorbing gut suture. A pressure dressing was placed over the abdomen and thighs. The nipples were covered with Bacitracin, Xeroform and a dry dressing covered with Tegaderm. The patient tolerated the procedure well. All counts were correct at the end of the case. The patient was extubated and sent to recovery room in a stable condition.

## 2018-11-08 NOTE — ANESTHESIA POSTPROCEDURE EVALUATION
Anesthesia POST Procedure Evaluation    Patient: Mary Chadwick   MRN:     [id number] Gender:   female   Age:    42 year old :      1975        Preoperative Diagnosis: Breast Cancer   Procedure(s):  Bilateral Breast Fat Grafting and nipple reconstruction  Nipple Reconstruction   Postop Comments: No value filed.       Anesthesia Type:  General    Reportable Event: NO     PAIN: Uncomplicated   Sign Out status: Comfortable, Well controlled pain     PONV: No PONV   Sign Out status:  No Nausea or Vomiting     Neuro/Psych: Uneventful perioperative course   Sign Out Status: Preoperative baseline; Age appropriate mentation     Airway/Resp.: Uneventful perioperative course   Sign Out Status: Non labored breathing, age appropriate RR; Resp. Status within EXPECTED Parameters     CV: Uneventful perioperative course   Sign Out status: Appropriate BP and perfusion indices; Appropriate HR/Rhythm     Disposition:   Sign Out in:  PACU  Disposition:  Phase II; Home  Recovery Course: Uneventful  Follow-Up: Not required           Last Anesthesia Record Vitals:  CRNA VITALS  2018 1354 - 2018 1454      2018             Resp Rate (set): 10          Last PACU/Preop Vitals:  Vitals:    18 1430 18 1442 18 1500   BP: 103/70 112/71 110/76   Pulse: 99 91 89   Resp: 16 16 16   Temp:  36.8  C (98.2  F) 36.7  C (98  F)   SpO2: 100% 100% 100%         Electronically Signed By: Solo Baumann MD, 2018

## 2018-11-08 NOTE — ANESTHESIA PREPROCEDURE EVALUATION
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; identified using CancerNext panel through [facility]     Monoallelic mutation of TP53 gene 2018    c.638G>A (p.R213Q), identified using CancerNext genetic testing through [facility]     PONV (postoperative nausea and vomiting)       Past Surgical History:   Procedure Laterality Date     ESOPHAGOSCOPY, GASTROSCOPY, DUODENOSCOPY (EGD), COMBINED N/A 10/15/2018    Procedure: EGD;  Surgeon: Edwardo Beatty MD;  Location: [facility] OR     HERNIA REPAIR, INGUINAL RT/LT  2002    Hernia Repair, Femoral RT/LT     MASTECTOMY SIMPLE BILATERAL, SENTINEL NODE BILATERAL, COMBINED Bilateral 2018    Procedure: COMBINED MASTECTOMY SIMPLE BILATERAL, SENTINEL NODE BILATERAL;  Bilateral Mastectomy With Immediate Reconstruction, Left [place] Lymph Node Biopsy, Anesthesia Block;  Surgeon: Antonio Andrews MD;  Location: [facility] OR     RECONSTRUCT BREAST BILATERAL Bilateral 2018    Procedure: RECONSTRUCT BREAST BILATERAL;;  Surgeon: BASILIO Kidd MD;  Location:  OR     REMOVE AND REPLACE BREAST IMPLANT PROSTHESIS Bilateral 2018    Procedure: REMOVE AND REPLACE BREAST IMPLANT PROSTHESIS;  Bilateral Breast Implant Exchange and Revision;  Surgeon: BASILIO Kidd MD;  Location:  OR                   PHYSICAL EXAM:   Mental Status/Neuro: A/A/O   Airway:  "Facies: Feasible  Mallampati: I  Mouth/Opening: Full  TM distance: > 6 cm  Neck ROM: Full   Respiratory: Auscultation: CTAB     Resp. Rate: Normal     Resp. Effort: Normal      CV: Rhythm: Regular  Rate: Age appropriate  Heart: Normal Sounds   Comments:      Dental: Normal                  Lab Results   Component Value Date    WBC 7.6 06/15/2018    HGB 12.3 08/06/2018    HCT 42.3 06/15/2018     06/15/2018     06/15/2018    POTASSIUM 3.5 08/06/2018    CHLORIDE 101 06/15/2018    CO2 28 06/15/2018    BUN 16 06/15/2018    CR 0.83 08/06/2018    GLC 85 06/15/2018    MARICHUY 10.0 06/15/2018    MAG 2.3 03/06/2018    ALBUMIN 4.2 06/15/2018    PROTTOTAL 7.6 06/15/2018    ALT 27 06/15/2018    AST 22 06/15/2018    ALKPHOS 67 06/15/2018    BILITOTAL 0.4 06/15/2018    PTT 32 03/06/2018    INR 1.02 08/06/2018    TSH 1.19 03/06/2018    HCG Negative 09/20/2018    HCGS Negative 03/06/2018       Preop Vitals  BP Readings from Last 3 Encounters:   10/23/18 127/69   10/15/18 105/67   10/12/18 103/66    Pulse Readings from Last 3 Encounters:   10/23/18 90   10/12/18 79   10/09/18 96      Resp Readings from Last 3 Encounters:   10/23/18 14   10/15/18 16   09/25/18 16    SpO2 Readings from Last 3 Encounters:   10/23/18 99%   10/15/18 98%   09/25/18 99%      Temp Readings from Last 1 Encounters:   10/23/18 36.3  C (97.4  F) (Oral)    Ht Readings from Last 1 Encounters:   10/23/18 1.753 m (5' 9.02\")      Wt Readings from Last 1 Encounters:   10/23/18 80.3 kg (177 lb 1.6 oz)    Estimated body mass index is 26.14 kg/(m^2) as calculated from the following:    Height as of 10/23/18: 1.753 m (5' 9.02\").    Weight as of 10/23/18: 80.3 kg (177 lb 1.6 oz).     LDA:  Peripheral IV 08/06/18 Right (Active)   Number of days:93       Peripheral IV 08/06/18 Left Hand (Active)   Number of days:93       Closed/Suction Drain 1 Left Chest Bulb 15 Macedonian (Active)   Number of days:93       Closed/Suction Drain 1 Right Chest Bulb 15 Macedonian (Active) "   Number of days:93            Assessment:   ASA SCORE: 2    NPO Status: > 2 hours since completed Clear Liquids; > 6 hours since completed Solid Foods   Documentation: H&P complete; Preop Testing complete; Consents complete   Proceeding: Proceed without further delay  Tobacco Use:  NO Active use of Tobacco/UNKNOWN Tobacco use status     Plan:   Anes. Type:  General   Pre-Induction: Midazolam IV; Acetaminophen PO   Induction:  IV (Standard)   Airway: LMA   Access/Monitoring: PIV   Maintenance: Balanced   Emergence: Procedure Site   Logistics: Same Day Surgery     Postop Pain/Sedation Strategy:  Standard-Options: Opioids PRN     PONV Management:  Adult Risk Factors: Female, H/o PONV or Motion Sickness, Non-Smoker, Postop Opioids  Prevention: Ondansetron; Dexamethasone     CONSENT: Direct conversation   Plan and risks discussed with: Patient        Comments for Plan/Consent:  LMA   No block                         Solo Baumann MD  Anesthesia Evaluation     . Pt has had prior anesthetic.     History of anesthetic complications   - PONV        ROS/MED HX    ENT/Pulmonary:  - neg pulmonary ROS     Neurologic:  - neg neurologic ROS     Cardiovascular:  - neg cardiovascular ROS       METS/Exercise Tolerance:     Hematologic:  - neg hematologic  ROS       Musculoskeletal:  - neg musculoskeletal ROS       GI/Hepatic:  - neg GI/hepatic ROS       Renal/Genitourinary:  - ROS Renal section negative       Endo:  - neg endo ROS       Psychiatric:  - neg psychiatric ROS       Infectious Disease:  - neg infectious disease ROS       Malignancy:   (+) Malignancy History of Breast  Breast CA Remission status post Surgery. Li Fraumeni syndrome: predisposed to rare cancers     - no malignancy   Other:    - neg other ROS                 Physical Exam  Normal systems: cardiovascular, pulmonary and dental    Airway   Mallampati: II  TM distance: >3 FB  Neck ROM: full    Dental     Cardiovascular       Pulmonary                      Anesthesia Plan      History & Physical Review  History and physical reviewed and following examination; no interval change.    ASA Status:  2 .    NPO Status:  > 8 hours    Plan for General, Periph. Nerve Block for postop pain and LMA with Intravenous induction. Maintenance will be Balanced.    PONV prophylaxis:  Ondansetron (or other 5HT-3) and Dexamethasone or Solumedrol       Postoperative Care  Postoperative pain management:  IV analgesics, Oral pain medications and Multi-modal analgesia.      Consents  Anesthetic plan, risks, benefits and alternatives discussed with:  Patient..                          .

## 2018-11-08 NOTE — IP AVS SNAPSHOT
MRN:[id number]                      After Visit Summary   11/8/2018    Mary Chadwick    MRN: [id number]           Thank you!     Thank you for choosing [facility] for your care. Our goal is always to provide you with excellent care. Hearing back from our patients is one way we can continue to improve our services. Please take a few minutes to complete the written survey that you may receive in the mail after you visit with us. Thank you!        Patient Information     Date Of Birth          1975        About your hospital stay     You were admitted on:  November 8, 2018 You last received care in the:  [facility]    You were discharged on:  November 8, 2018       Who to Call     For medical emergencies, please call 911.  For non-urgent questions about your medical care, please call your primary care provider or clinic, [phone]  For questions related to your surgery, please call your surgery clinic        Attending Provider     Provider Specialty    BASILIO Kidd MD Plastic Surgery       Primary Care Provider Office Phone #    Flora Crews, [phone]      Your next 10 appointments already scheduled     Nov 20, 2018 11:30 AM CST   (Arrive by 11:15 AM)   Post-Op with BASILIO Kidd MD   [facility] ([facility])    [address]  [address]  [place] [facility] [address] [phone]            Nov 30, 2018  8:30 AM CST   MR BREAST BILATERAL W/O & W CONTRAST with UC1   [facility] [place] [facility] ([facility] [place])    [address]  1st Floor  [facility] [id number] [phone]           How do I prepare for my exam? (Food and drink instructions) **If you will be receiving sedation or general anesthesia, please see special notes below.**  How do I prepare for my exam? (Other instructions) Take your medicines as usual, unless your doctor tells you not to. The  timing of your exam may depend on the start of your last period. If you re in menopause, you may have the exam anytime.  You will have IV contrast for this exam.  You do not need to do anything special to prepare. **If you will be receiving sedation or general anesthesia, please see special notes below.**  What should I wear:  The MRI machine uses a strong magnet. Please wear clothes without metal (snaps, zippers). A sweatsuit works well, or we may give you a hospital gown. Please remove any body piercings and hair extensions before you arrive. You will also remove watches, jewelry, hairpins, wallets, dentures, partial dental plates and hearing aids. You may wear contact lenses, and you may be able to wear your rings. We have a safe place to keep your personal items, but it is safer to leave them at home.  How long does the exam take:  Most tests take 30 to 60 minutes.  HOWEVER, IF YOUR DOCTOR PRESCRIBES ANESTHESIA please plan on spending four to five hours in the recovery room.  What should I bring: Bring a list of your current medicines to your exam (including vitamins, minerals and over-the-counter drugs). Please bring any previous mammograms or breast MRIs from other facilities to the MRI dept. Do not mail these items to us.  Do I need a : **If you will be receiving sedation or general anesthesia, please see special notes below.**  What should I do after the exam: No Restrictions, You may resume normal activities.  What is this test: MRI (magnetic resonance imaging) uses a strong magnet and radio waves to look inside the body. An MRA (magnetic resonance angiogram) does the same thing, but it lets us look at your blood vessels. A computer turns the radio waves into pictures showing cross sections of the body, much like slices of bread. This helps us see any problems more clearly. You may receive fluid (called  contrast ) before or during your scan. The fluid helps us see the pictures better. We give the  fluid through an IV (small needle in your arm).  Who should I call with questions: If you have any questions, please contact your Imaging Department exam site. Directions, parking instructions, and other information is available on our website, [email or web address].  How do I prepare if I m having sedation or anesthesia? **IMPORTANT** THE INSTRUCTIONS BELOW ARE ONLY FOR THOSE PATIENTS WHO HAVE BEEN TOLD THEY WILL RECEIVE SEDATION OR GENERAL ANESTHESIA DURING THEIR MRI PROCEDURE:  IF YOU WILL RECEIVE SEDATION (take medicine to help you relax during your exam) You must get the medicine from your doctor before you arrive. Bring the medicine to the exam. Do not take it at home. Arrive one hour early. Bring someone who can take you home after the test. Your medicine will make you sleepy. After the exam, you may not drive, take a bus or take a taxi by yourself. No eating 8 hours before your exam. You may have clear liquids up until 4 hours before your exam. (Clear liquids include water, clear tea, black coffee and fruit juice without pulp.)  IF YOU WILL RECEIVE ANESTHESIA (be asleep for your exam) Arrive 1 1/2 hours early. Bring someone who can take you home after the test. You may not drive, take a bus or take a taxi by yourself. No eating 8 hours before your exam. You may have clear liquids up until 4 hours before your exam. (Clear liquids include water, clear tea, black coffee and fruit juice without pulp.)            Nov 30, 2018  9:15 AM CST   MR CHEST W/O & W CONTRAST with UC[address] [facility] ([facility])    [address] [address] [phone]           How do I prepare for my exam? (Food and drink instructions) **If you will be receiving sedation or general anesthesia, please see special notes below.**  How do I prepare for my exam? (Other instructions) Take your medicines as usual, unless your doctor tells you not to. You may or may not  receive intravenous (IV) contrast for this exam pending the discretion of the Radiologist.  You do not need to do anything special to prepare.  **If you will be receiving sedation or general anesthesia, please see special notes below.**  What should I wear: The MRI machine uses a strong magnet. Please wear clothes without metal (snaps, zippers). A sweatsuit works well, or we may give you a hospital gown. Please remove any body piercings and hair extensions before you arrive. You will also remove watches, jewelry, hairpins, wallets, dentures, partial dental plates and hearing aids. You may wear contact lenses, and you may be able to wear your rings. We have a safe place to keep your personal items, but it is safer to leave them at home.  How long does the exam take: Most tests take 30 to 60 minutes.  HOWEVER, IF YOUR DOCTOR PRESCRIBES ANESTHESIA please plan on spending four to five hours in the recovery room.  What should I bring:  Bring a list of your current medicines to your exam (including vitamins, minerals and over-the-counter drugs).  Do I need a :  **If you will be receiving sedation or general anesthesia, please see special notes below.**  What should I do after the exam: No Restrictions, You may resume normal activities.  What is this test: MRI (magnetic resonance imaging) uses a strong magnet and radio waves to look inside the body. An MRA (magnetic resonance angiogram) does the same thing, but it lets us look at your blood vessels. A computer turns the radio waves into pictures showing cross sections of the body, much like slices of bread. This helps us see any problems more clearly. You may receive fluid (called  contrast ) before or during your scan. The fluid helps us see the pictures better. We give the fluid through an IV (small needle in your arm).  Who should I call with questions:  Please call the Imaging Department at your exam site with any questions. Directions, parking instructions, and  other information is available on our website, [email or web address].  How do I prepare if I m having sedation or anesthesia? **IMPORTANT** THE INSTRUCTIONS BELOW ARE ONLY FOR THOSE PATIENTS WHO HAVE BEEN TOLD THEY WILL RECEIVE SEDATION OR GENERAL ANESTHESIA DURING THEIR MRI PROCEDURE:  IF YOU WILL RECEIVE SEDATION (take medicine to help you relax during your exam): You must get the medicine from your doctor before you arrive. Bring the medicine to the exam. Do not take it at home. Arrive one hour early. Bring someone who can take you home after the test. Your medicine will make you sleepy. After the exam, you may not drive, take a bus or take a taxi by yourself. No eating 8 hours before your exam. You may have clear liquids up until 4 hours before your exam. (Clear liquids include water, clear tea, black coffee and fruit juice without pulp.)  IF YOU WILL RECEIVE ANESTHESIA (be asleep for your exam): Arrive 1 1/2 hours early. Bring someone who can take you home after the test. You may not drive, take a bus or take a taxi by yourself. No eating 8 hours before your exam. You may have clear liquids up until 4 hours before your exam. (Clear liquids include water, clear tea, black coffee and fruit juice without pulp.)            Nov 30, 2018 10:00 AM CST   MR ABDOMEN & PELVIS W/O & W CONTRAST with [address] [facility] [place] [facility] ([facility])    [address] [address] [phone]           How do I prepare for my exam? (Food and drink instructions) Do not eat or drink for 6 hours prior to exam. **If you will be receiving sedation or general anesthesia, please see special notes below.**  How do I prepare for my exam? (Other instructions) Take your medicines as usual, unless your doctor tells you not to. You may or may not receive IV contrast for this exam pending the discretion of the Radiologist.  **If you will be receiving sedation or general anesthesia,  please see special notes below.**  What should I wear: The MRI machine uses a strong magnet. Please wear clothes without metal (snaps, zippers). A sweatsuit works well, or we may give you a hospital gown. Please remove any body piercings and hair extensions before you arrive. You will also remove watches, jewelry, hairpins, wallets, dentures, partial dental plates and hearing aids. You may wear contact lenses, and you may be able to wear your rings. We have a safe place to keep your personal items, but it is safer to leave them at home.  How long does the exam take: Most tests take 30 to 60 minutes.  HOWEVER, IF YOUR DOCTOR PRESCRIBES ANESTHESIA please plan on spending four to five hours in the recovery room.  What should I bring: Bring a list of your current medicines to your exam (including vitamins, minerals and over-the-counter drugs). Also bring the results of similar scans you may have had.  Do I need a : **If you will be receiving sedation or general anesthesia, please see special notes below.**  What should I do after the exam: No Restrictions, You may resume normal activities.  What is this test: MRI (magnetic resonance imaging) uses a strong magnet and radio waves to look inside the body. An MRA (magnetic resonance angiogram) does the same thing, but it lets us look at your blood vessels. A computer turns the radio waves into pictures showing cross sections of the body, much like slices of bread. This helps us see any problems more clearly. You may receive fluid (called  contrast ) before or during your scan. The fluid helps us see the pictures better. We give the fluid through an IV (small needle in your arm).  Who should I call with questions: If you have any questions, please contact your Imaging Department exam site. Directions, parking instructions, and other information is available on our website, [email or web address].            Nov 30, 2018 11:00 AM CST   DX HIP/PELVIS/SPINE with UCDX1   [facility]  [facility] ([facility])    [address]  1st Floor  [facility] [address] [phone]           How do I prepare for my exam? (Food and drink instructions) No Food and Drink Restrictions.  How do I prepare for my exam? (Other instructions) Please do not take any of the following 24 hours prior to the day of your exam: vitamins, calcium tablets, antacids.  What should I wear: If possible, please wear clothes without metal (snaps, zippers). A sweat suit works well.  How long does the exam take: The exam takes about 20 minutes.  What should I bring: Bring a list of your current medicines to your exam (including vitamins, minerals and over-the-counter drugs).  Do I need a :  No  is needed.  What should I do after the exam: No restrictions, You may resume normal activities.  How do I prepare for my exam? (Food and drink instructions) A DEXA scan is a bone-density scan. It uses a low level of radiation to check the strength of your bones. As you lie on a padded table, a machine will take X-rays. We most often scan the hips and lower spine.  Who should I call with questions: If you have any questions, please call the Imaging Department where you will have your exam. Directions, parking instructions, and other information is available on our website, [email or web address].            Dec 04, 2018 12:30 PM CST   (Arrive by 12:15 PM)   Return Visit with Valeria Gann MD   [facility] ([facility])    [address]  [address]  [facility] [address]   [phone]            Dec 04, 2018  1:00 PM CST   Infusion 60 with UC ONCOLOGY INFUSION, UC 11 ATC   [facility] ([facility])    [address]  [address]  [facility] [address] [phone]              Further instructions from your care team       FAT GRAFTING POST-OPERATIVE  INSTRUCTIONS    Instructions       Have someone drive you home after surgery and help you at home for 1-2      days.      Get plenty of rest.      Follow balanced diet.      Decreased activity may promote constipation, so you may want to add      more raw fruit to your diet, and be sure to increase fluid intake.      Take pain medication as prescribed. Do not take aspirin or any products      containing aspirin unless approved by your surgeon.      Do not drink alcohol when taking pain medications.      Even when not taking pain medications, no alcohol for 3 weeks as it      causes fluid retention.      If you are taking vitamins with iron, resume these as tolerated.      Do not smoke, as smoking delays healing and increases the risk of      complications.    Activities      Do not drive until you are no longer taking any pain medications      (narcotics).      Start walking as soon as possible, this helps to reduce swelling and       lowers the chance of blood clots.      Unless stated on this form, discuss your time off work with your surgeon.    Treated Area Care       You may shower after 24 hours. The ACE wrap (if used) may be rewrapped as needed (if too tight or loose). Use it for support and may subtitute with a sports bra if preferred.  Wear the compression garment (spandex type clothing or the provided sponge and binder) in the area where the liposuction was performed to harvest the fat for the fat injection for 2 weeks post opor per the surgeon's recommendation.      Avoid exposing scars to sun for at least 12 months.      Always use a strong sunblock, if sun exposure is unavoidable (SPF 50 or      greater).      Inspect daily for signs of infection.      No tub soaking, bathing, or swimming while sutures or drains are in place.      You may wear makeup with sunblock protection shortly.      Stay out of the sun until redness and bruising subsides (usually 48      Hours).    What to Expect      Temporary  "stinging, throbbing, burning sensation, redness, swelling,       bruising, and excess fullness.      Some swelling, bruising or redness in the donor and recipient sites.      Swelling and puffiness may last several weeks.      Redness and bruising usually lasts about 48 hours.     Appearance      Improved skin texture.      Firmer and smoother skin.    Follow-Up Care      With regular follow-up treatments, you can easily maintain your new       look.      Repeated treatments may be necessary.    When to Call      If you have increased swelling or bruising.      If swelling and redness persist after a few days.      If you have increased redness along the incision.      If you have severe or increased pain not relieved by medication.      If you have any side effects to medications; such as, rash, nausea,      headache, vomiting.      If you have an oral temperature over 100.4 degrees.      If you have any yellowish or greenish drainage from the incisions or      notice a foul odor.      If you have bleeding from the incisions that is difficult to control with      light pressure.      If you have loss of feeling or motion.      If you have any sign of abscesses, open sores, skin peeling or lumpiness.    For Medical Questions, Please Call:      [phone], Monday - Friday, 8 a.m. - 4:30 p.m.      After hours and on weekends, call Hospital Paging at [phone] and      ask for the Plastic Surgeon on call.    Pending Results     No orders found from 11/6/2018 to 11/9/2018.            Admission Information     Date & Time Provider Department Dept. Phone    11/8/2018 BASILIO Kidd MD [facility] [phone]      Your Vitals Were     Blood Pressure Pulse Temperature Respirations Height Weight    111/72 70 98.1  F (36.7  C) (Oral) 15 1.753 m (5' 9\") 79.8 kg (176 lb)    Pulse Oximetry BMI (Body Mass Index)                98% 25.99 kg/m2          MyChart Information     MyChart gives " you secure access to your electronic health record. If you see a primary care provider, you can also send messages to your care team and make appointments. If you have questions, please call your primary care clinic.  If you do not have a primary care provider, please call [phone] and they will assist you.      [employer] is an electronic gateway that provides easy, online access to your medical records. With [employer], you can request a clinic appointment, read your test results, renew a prescription or communicate with your care team.     To access your existing account, please contact your [facility] or call [phone] for assistance.        Care EveryWhere ID     This is your Care EveryWhere ID. This could be used by other organizations to access your [place] medical records  [id number]        Equal Access to Services     SURESH CHAPARRO : Michelle Herndon, watamera black, wanda kaaljoo nina, naseem mendoza. So [facility] [phone].    ATENCIÓN: Si habla español, tiene a sparrow disposición servicios gratuitos de asistencia lingüística. Llame al [phone].    We comply with applicable federal civil rights laws and [place] laws. We do not discriminate on the basis of race, color, national origin, age, disability, sex, sexual orientation, or gender identity.               Review of your medicines      START taking        Dose / Directions    ondansetron 4 MG ODT tab   Commonly known as:  ZOFRAN-ODT   Used for:  S/P breast reconstruction, bilateral        Dose:  4-8 mg   Take 1-2 tablets (4-8 mg) by mouth every 8 hours as needed for nausea   Quantity:  4 tablet   Refills:  0       oxyCODONE IR 5 MG tablet   Commonly known as:  ROXICODONE   Used for:  S/P breast reconstruction, bilateral        Dose:  5-10 mg   Take 1-2 tablets (5-10 mg) by mouth every 6 hours as needed for moderate to severe pain   Quantity:  15 tablet   Refills:  0        senna-docusate 8.6-50 MG per tablet   Commonly known as:  SENOKOT-S;PERICOLACE   Used for:  S/P breast reconstruction, bilateral        Dose:  1-2 tablet   Take 1-2 tablets by mouth 2 times daily   Quantity:  30 tablet   Refills:  0         CONTINUE these medicines which have NOT CHANGED        Dose / Directions    acetaminophen 325 MG tablet   Commonly known as:  TYLENOL   Used for:  Acute post-operative pain        Dose:  650 mg   Take 2 tablets (650 mg) by mouth every 6 hours as needed for mild pain   Quantity:  50 tablet   Refills:  0       calcium citrate-vitamin D 315-250 MG-UNIT Tabs per tablet   Commonly known as:  CITRACAL        Dose:  2 tablet   Take 2 tablets by mouth   Refills:  0       goserelin 3.6 MG injection   Commonly known as:  ZOLADEX        Dose:  3.6 mg   Inject 3.6 mg Subcutaneous every 30 days   Refills:  0       * letrozole 2.5 MG tablet   Commonly known as:  FEMARA   Used for:  Malignant neoplasm of upper-outer quadrant of left breast in female, estrogen receptor positive (H)        Dose:  2.5 mg   Take 1 tablet (2.5 mg) by mouth daily   Quantity:  30 tablet   Refills:  11       * letrozole 2.5 MG tablet   Commonly known as:  FEMARA   Used for:  Malignant neoplasm of upper-outer quadrant of left breast in female, estrogen receptor positive (H)        Dose:  2.5 mg   Take 1 tablet (2.5 mg) by mouth daily   Quantity:  90 tablet   Refills:  11       medium chain triglycerides (MCT OIL) oil        Dose:  30 mL   Take 30 mLs by mouth daily   Refills:  0       NONFORMULARY   Indication:  Rosehip oil        Dose:  1 capsule   Take 1 capsule by mouth daily Rosehip oil   Refills:  0       OMEGA 3-6-9 FATTY ACIDS PO        Dose:  2 capsule   Take 2 capsules by mouth daily   Refills:  0       ondansetron 4 MG tablet   Commonly known as:  ZOFRAN   Used for:  Nausea        Dose:  4 mg   Take 1 tablet (4 mg) by mouth every 6 hours as needed for nausea   Quantity:  30 tablet   Refills:  1       Spirulina  500 MG Tabs   Used for:  Malignant neoplasm of left breast in female, estrogen receptor positive, unspecified site of breast (H)        6 Tabs daily.   Refills:  0       * Notice:  This list has 2 medication(s) that are the same as other medications prescribed for you. Read the directions carefully, and ask your doctor or other care provider to review them with you.         Where to get your medicines      These medications were sent to [place], [place] - [address] [facility] [address] [facility] [address]  [address] [address]    Hours:  TRANSPLANT PHONE NUMBER [phone] Phone:  [phone]     ondansetron 4 MG ODT tab    senna-docusate 8.6-50 MG per tablet         Some of these will need a paper prescription and others can be bought over the counter. Ask your nurse if you have questions.     Bring a paper prescription for each of these medications     oxyCODONE IR 5 MG tablet                Protect others around you: Learn how to safely use, store and throw away your medicines at www.disposemymeds.org.        Information about OPIOIDS     PRESCRIPTION OPIOIDS: WHAT YOU NEED TO KNOW   We gave you an opioid (narcotic) pain medicine. It is important to manage your pain, but opioids are not always the best choice. You should first try all the other options your care team gave you. Take this medicine for as short a time (and as few doses) as possible.    Some activities can increase your pain, such as bandage changes or therapy sessions. It may help to take your pain medicine 30 to 60 minutes before these activities. Reduce your stress by getting enough sleep, working on hobbies you enjoy and practicing relaxation or meditation. Talk to your care team about ways to manage your pain beyond prescription opioids.    These medicines have risks:    DO NOT drive when on new or higher doses of pain medicine. These medicines can affect your alertness and reaction times, and you could  be arrested for driving under the influence (DUI). If you need to use opioids long-term, talk to your care team about driving.    DO NOT operate heavy machinery    DO NOT do any other dangerous activities while taking these medicines.    DO NOT drink any alcohol while taking these medicines.     If the opioid prescribed includes acetaminophen, DO NOT take with any other medicines that contain acetaminophen. Read all labels carefully. Look for the word  acetaminophen  or  Tylenol.  Ask your pharmacist if you have questions or are unsure.    You can get addicted to pain medicines, especially if you have a history of addiction (chemical, alcohol or substance dependence). Talk to your care team about ways to reduce this risk.    All opioids tend to cause constipation. Drink plenty of water and eat foods that have a lot of fiber, such as fruits, vegetables, prune juice, apple juice and high-fiber cereal. Take a laxative (Miralax, milk of magnesia, Colace, Senna) if you don t move your bowels at least every other day. Other side effects include upset stomach, sleepiness, dizziness, throwing up, tolerance (needing more of the medicine to have the same effect), physical dependence and slowed breathing.    Store your pills in a secure place, locked if possible. We will not replace any lost or stolen medicine. If you don t finish your medicine, please throw away (dispose) as directed by your pharmacist. The [place] [employer] has more information about safe disposal: https://www.pca.[facility].[place]/living-green/managing-unwanted-medications             Medication List: This is a list of all your medications and when to take them. Check marks below indicate your daily home schedule. Keep this list as a reference.      Medications           Morning Afternoon Evening Bedtime As Needed    acetaminophen 325 MG tablet   Commonly known as:  TYLENOL   Take 2 tablets (650 mg) by mouth every 6 hours as needed for mild pain    Last time this was given:  975 mg on 11/8/2018 10:49 AM                                calcium citrate-vitamin D 315-250 MG-UNIT Tabs per tablet   Commonly known as:  CITRACAL   Take 2 tablets by mouth                                goserelin 3.6 MG injection   Commonly known as:  ZOLADEX   Inject 3.6 mg Subcutaneous every 30 days                                * letrozole 2.5 MG tablet   Commonly known as:  FEMARA   Take 1 tablet (2.5 mg) by mouth daily                                * letrozole 2.5 MG tablet   Commonly known as:  FEMARA   Take 1 tablet (2.5 mg) by mouth daily                                medium chain triglycerides (MCT OIL) oil   Take 30 mLs by mouth daily                                NONFORMULARY   Take 1 capsule by mouth daily Rosehip oil                                OMEGA 3-6-9 FATTY ACIDS PO   Take 2 capsules by mouth daily                                ondansetron 4 MG ODT tab   Commonly known as:  ZOFRAN-ODT   Take 1-2 tablets (4-8 mg) by mouth every 8 hours as needed for nausea                                ondansetron 4 MG tablet   Commonly known as:  ZOFRAN   Take 1 tablet (4 mg) by mouth every 6 hours as needed for nausea                                oxyCODONE IR 5 MG tablet   Commonly known as:  ROXICODONE   Take 1-2 tablets (5-10 mg) by mouth every 6 hours as needed for moderate to severe pain   Last time this was given:  5 mg on 11/8/2018  2:34 PM                                senna-docusate 8.6-50 MG per tablet   Commonly known as:  SENOKOT-S;PERICOLACE   Take 1-2 tablets by mouth 2 times daily                                Spirulina 500 MG Tabs   6 Tabs daily.                                * Notice:  This list has 2 medication(s) that are the same as other medications prescribed for you. Read the directions carefully, and ask your doctor or other care provider to review them with you.

## 2018-11-08 NOTE — ANESTHESIA CARE TRANSFER NOTE
Patient: Mary Chadwick    Procedure(s):  Bilateral Breast Fat Grafting and nipple reconstruction  Nipple Reconstruction    Diagnosis: Breast Cancer  Diagnosis Additional Information: No value filed.    Anesthesia Type:   No value filed.     Note:  Airway :Face Mask  Patient transferred to:PACU  Comments: 123/97  100%  97.7-94-20  Handoff Report: Identifed the Patient, Identified the Reponsible Provider, Reviewed the pertinent medical history, Discussed the surgical course, Reviewed Intra-OP anesthesia mangement and issues during anesthesia, Set expectations for post-procedure period and Allowed opportunity for questions and acknowledgement of understanding      Vitals: (Last set prior to Anesthesia Care Transfer)    CRNA VITALS  11/8/2018 1354 - 11/8/2018 1431      11/8/2018             Resp Rate (set): 10                Electronically Signed By: REY Smith CRNA  November 8, 2018  2:31 PM

## 2018-11-20 ENCOUNTER — OFFICE VISIT (OUTPATIENT)
Dept: PLASTIC SURGERY | Facility: CLINIC | Age: 43
End: 2018-11-20
Attending: PLASTIC SURGERY
Payer: COMMERCIAL

## 2018-11-20 VITALS
RESPIRATION RATE: 16 BRPM | HEART RATE: 63 BPM | DIASTOLIC BLOOD PRESSURE: 78 MMHG | SYSTOLIC BLOOD PRESSURE: 116 MMHG | TEMPERATURE: 97.5 F | OXYGEN SATURATION: 100 % | WEIGHT: 172 LBS | HEIGHT: 69 IN | BODY MASS INDEX: 25.48 KG/M2

## 2018-11-20 DIAGNOSIS — Z98.890 S/P BREAST RECONSTRUCTION, BILATERAL: Primary | ICD-10-CM

## 2018-11-20 PROCEDURE — G0463 HOSPITAL OUTPT CLINIC VISIT: HCPCS | Mod: ZF

## 2018-11-20 ASSESSMENT — PAIN SCALES - GENERAL: PAINLEVEL: NO PAIN (0)

## 2018-11-20 NOTE — LETTER
2018       RE: Mary Hernandez  [address]  [facility] [address]     Dear Colleague,    Thank you for referring your patient, Mary Hernandez, to the [facility] at [facility]. Please see a copy of my visit note below.    Service Date: 2018      PRESENTING COMPLAINT:  Postoperative visit, status post bilateral breast reconstruction for breast cancer with fat grafting to bilateral breasts and modified skate flaps done for nipple reconstruction done 2018.      HISTORY OF PRESENTING COMPLAINT:  Ms. Hernandez is 42 years old.  She is a couple of weeks out from her surgery, overall doing well, no major issues.      PHYSICAL EXAMINATION:  Vital signs stable.  She is afebrile, in no obvious distress.  The upper pole has filled in nicely, minimal bruising. Right nipple healing in well, left nipple has partial skin necrosis with some dry scabbing, no evidence for infection or juju breakdown.  Donor sites are healing in well.      ASSESSMENT AND PLAN:  Based on above findings, a diagnosis of bilateral breast reconstruction with fat grafting and nipple reconstruction was made.  I have asked the patient to start thoroughly moisturizing her breasts as well as her dried necrotic nipple area, keeping an eye on it very closely.  If there is any change, opening or infection signs, to let us know.  Otherwise, we will continue to follow this closely.  I will see her back in a week's time.  All questions were answered.  She was happy with the visit.         BASILIO STEEN MD             D: 2018   T: 2018   MT: HAIM      Name:     MARY HERNANDEZ   MRN:      [id number]        Account:      [id number]   :      1975           Service Date: 2018      Document: [id number]

## 2018-11-20 NOTE — MR AVS SNAPSHOT
After Visit Summary   11/20/2018    Mary Chadwick    MRN: [id number]           Patient Information     Date Of Birth          1975        Visit Information        Provider Department      11/20/2018 11:30 AM BASILIO Kidd MD [facility]         Follow-ups after your visit        Your next 10 appointments already scheduled     Nov 27, 2018  9:45 AM CST   (Arrive by 9:30 AM)   Post-Op with BASILIO Kidd MD   [facility] ([facility])    [address]  [address]  [place] [facility] [address] [phone]            Nov 30, 2018  8:30 AM CST   MR BREAST BILATERAL W/O & W CONTRAST with UCMR1   [facility] ([facility])    [address]  1st Floor  [place] [facility] [id number] [phone]           How do I prepare for my exam? (Food and drink instructions) **If you will be receiving sedation or general anesthesia, please see special notes below.**  How do I prepare for my exam? (Other instructions) Take your medicines as usual, unless your doctor tells you not to. The timing of your exam may depend on the start of your last period. If you re in menopause, you may have the exam anytime.  You will have IV contrast for this exam.  You do not need to do anything special to prepare. **If you will be receiving sedation or general anesthesia, please see special notes below.**  What should I wear:  The MRI machine uses a strong magnet. Please wear clothes without metal (snaps, zippers). A sweatsuit works well, or we may give you a hospital gown. Please remove any body piercings and hair extensions before you arrive. You will also remove watches, jewelry, hairpins, wallets, dentures, partial dental plates and hearing aids. You may wear contact lenses, and you may be able to wear your rings. We have a safe place to keep your personal items, but it is safer to leave them at home.  How long  does the exam take:  Most tests take 30 to 60 minutes.  HOWEVER, IF YOUR DOCTOR PRESCRIBES ANESTHESIA please plan on spending four to five hours in the recovery room.  What should I bring: Bring a list of your current medicines to your exam (including vitamins, minerals and over-the-counter drugs). Please bring any previous mammograms or breast MRIs from other facilities to the MRI dept. Do not mail these items to us.  Do I need a : **If you will be receiving sedation or general anesthesia, please see special notes below.**  What should I do after the exam: No Restrictions, You may resume normal activities.  What is this test: MRI (magnetic resonance imaging) uses a strong magnet and radio waves to look inside the body. An MRA (magnetic resonance angiogram) does the same thing, but it lets us look at your blood vessels. A computer turns the radio waves into pictures showing cross sections of the body, much like slices of bread. This helps us see any problems more clearly. You may receive fluid (called  contrast ) before or during your scan. The fluid helps us see the pictures better. We give the fluid through an IV (small needle in your arm).  Who should I call with questions: If you have any questions, please contact your Imaging Department exam site. Directions, parking instructions, and other information is available on our website, [email or web address].  How do I prepare if I m having sedation or anesthesia? **IMPORTANT** THE INSTRUCTIONS BELOW ARE ONLY FOR THOSE PATIENTS WHO HAVE BEEN TOLD THEY WILL RECEIVE SEDATION OR GENERAL ANESTHESIA DURING THEIR MRI PROCEDURE:  IF YOU WILL RECEIVE SEDATION (take medicine to help you relax during your exam) You must get the medicine from your doctor before you arrive. Bring the medicine to the exam. Do not take it at home. Arrive one hour early. Bring someone who can take you home after the test. Your medicine will make you sleepy. After the exam, you may not drive,  take a bus or take a taxi by yourself. No eating 8 hours before your exam. You may have clear liquids up until 4 hours before your exam. (Clear liquids include water, clear tea, black coffee and fruit juice without pulp.)  IF YOU WILL RECEIVE ANESTHESIA (be asleep for your exam) Arrive 1 1/2 hours early. Bring someone who can take you home after the test. You may not drive, take a bus or take a taxi by yourself. No eating 8 hours before your exam. You may have clear liquids up until 4 hours before your exam. (Clear liquids include water, clear tea, black coffee and fruit juice without pulp.)            Nov 30, 2018  9:15 AM CST   MR CHEST W/O & W CONTRAST with [address] MRI ([facility] [place])    [address] [place] [address] [phone]           How do I prepare for my exam? (Food and drink instructions) **If you will be receiving sedation or general anesthesia, please see special notes below.**  How do I prepare for my exam? (Other instructions) Take your medicines as usual, unless your doctor tells you not to. You may or may not receive intravenous (IV) contrast for this exam pending the discretion of the Radiologist.  You do not need to do anything special to prepare.  **If you will be receiving sedation or general anesthesia, please see special notes below.**  What should I wear: The MRI machine uses a strong magnet. Please wear clothes without metal (snaps, zippers). A sweatsuit works well, or we may give you a hospital gown. Please remove any body piercings and hair extensions before you arrive. You will also remove watches, jewelry, hairpins, wallets, dentures, partial dental plates and hearing aids. You may wear contact lenses, and you may be able to wear your rings. We have a safe place to keep your personal items, but it is safer to leave them at home.  How long does the exam take: Most tests take 30 to 60 minutes.  HOWEVER, IF YOUR  DOCTOR PRESCRIBES ANESTHESIA please plan on spending four to five hours in the recovery room.  What should I bring:  Bring a list of your current medicines to your exam (including vitamins, minerals and over-the-counter drugs).  Do I need a :  **If you will be receiving sedation or general anesthesia, please see special notes below.**  What should I do after the exam: No Restrictions, You may resume normal activities.  What is this test: MRI (magnetic resonance imaging) uses a strong magnet and radio waves to look inside the body. An MRA (magnetic resonance angiogram) does the same thing, but it lets us look at your blood vessels. A computer turns the radio waves into pictures showing cross sections of the body, much like slices of bread. This helps us see any problems more clearly. You may receive fluid (called  contrast ) before or during your scan. The fluid helps us see the pictures better. We give the fluid through an IV (small needle in your arm).  Who should I call with questions:  Please call the Imaging Department at your exam site with any questions. Directions, parking instructions, and other information is available on our website, [email or web address].  How do I prepare if I m having sedation or anesthesia? **IMPORTANT** THE INSTRUCTIONS BELOW ARE ONLY FOR THOSE PATIENTS WHO HAVE BEEN TOLD THEY WILL RECEIVE SEDATION OR GENERAL ANESTHESIA DURING THEIR MRI PROCEDURE:  IF YOU WILL RECEIVE SEDATION (take medicine to help you relax during your exam): You must get the medicine from your doctor before you arrive. Bring the medicine to the exam. Do not take it at home. Arrive one hour early. Bring someone who can take you home after the test. Your medicine will make you sleepy. After the exam, you may not drive, take a bus or take a taxi by yourself. No eating 8 hours before your exam. You may have clear liquids up until 4 hours before your exam. (Clear liquids include water, clear tea, black coffee and  fruit juice without pulp.)  IF YOU WILL RECEIVE ANESTHESIA (be asleep for your exam): Arrive 1 1/2 hours early. Bring someone who can take you home after the test. You may not drive, take a bus or take a taxi by yourself. No eating 8 hours before your exam. You may have clear liquids up until 4 hours before your exam. (Clear liquids include water, clear tea, black coffee and fruit juice without pulp.)            Nov 30, 2018 10:00 AM CST   MR ABDOMEN & PELVIS W/O & W CONTRAST with [address] MRI ([facility])    [address] [address] [phone]           How do I prepare for my exam? (Food and drink instructions) Do not eat or drink for 6 hours prior to exam. **If you will be receiving sedation or general anesthesia, please see special notes below.**  How do I prepare for my exam? (Other instructions) Take your medicines as usual, unless your doctor tells you not to. You may or may not receive IV contrast for this exam pending the discretion of the Radiologist.  **If you will be receiving sedation or general anesthesia, please see special notes below.**  What should I wear: The MRI machine uses a strong magnet. Please wear clothes without metal (snaps, zippers). A sweatsuit works well, or we may give you a hospital gown. Please remove any body piercings and hair extensions before you arrive. You will also remove watches, jewelry, hairpins, wallets, dentures, partial dental plates and hearing aids. You may wear contact lenses, and you may be able to wear your rings. We have a safe place to keep your personal items, but it is safer to leave them at home.  How long does the exam take: Most tests take 30 to 60 minutes.  HOWEVER, IF YOUR DOCTOR PRESCRIBES ANESTHESIA please plan on spending four to five hours in the recovery room.  What should I bring: Bring a list of your current medicines to your exam (including vitamins, minerals and  over-the-counter drugs). Also bring the results of similar scans you may have had.  Do I need a : **If you will be receiving sedation or general anesthesia, please see special notes below.**  What should I do after the exam: No Restrictions, You may resume normal activities.  What is this test: MRI (magnetic resonance imaging) uses a strong magnet and radio waves to look inside the body. An MRA (magnetic resonance angiogram) does the same thing, but it lets us look at your blood vessels. A computer turns the radio waves into pictures showing cross sections of the body, much like slices of bread. This helps us see any problems more clearly. You may receive fluid (called  contrast ) before or during your scan. The fluid helps us see the pictures better. We give the fluid through an IV (small needle in your arm).  Who should I call with questions: If you have any questions, please contact your Imaging Department exam site. Directions, parking instructions, and other information is available on our website, [email or web address].            Nov 30, 2018 11:00 AM CST   DX HIP/PELVIS/SPINE with UCDX1   [facility] ([facility])    [address] [place] [address] [phone]           How do I prepare for my exam? (Food and drink instructions) No Food and Drink Restrictions.  How do I prepare for my exam? (Other instructions) Please do not take any of the following 24 hours prior to the day of your exam: vitamins, calcium tablets, antacids.  What should I wear: If possible, please wear clothes without metal (snaps, zippers). A sweat suit works well.  How long does the exam take: The exam takes about 20 minutes.  What should I bring: Bring a list of your current medicines to your exam (including vitamins, minerals and over-the-counter drugs).  Do I need a :  No  is needed.  What should I do after the exam: No restrictions, You may resume  normal activities.  How do I prepare for my exam? (Food and drink instructions) A DEXA scan is a bone-density scan. It uses a low level of radiation to check the strength of your bones. As you lie on a padded table, a machine will take X-rays. We most often scan the hips and lower spine.  Who should I call with questions: If you have any questions, please call the Imaging Department where you will have your exam. Directions, parking instructions, and other information is available on our website, [email or web address].            Dec 04, 2018 12:30 PM CST   (Arrive by 12:15 PM)   Return Visit with Valeria Gann MD   [facility] ([facility])    [address] [place] [address]  [address]  [facility] [address] [phone]            Dec 04, 2018  1:00 PM CST   Infusion 60 with UC ONCOLOGY INFUSION, UC 11 ATC   [facility] ([facility])    [address] [place] [address]  [address]  [facility] [address] [phone]              Who to contact     If you have questions or need follow up information about today's clinic visit or your schedule please contact [facility] directly at [phone].  Normal or non-critical lab and imaging results will be communicated to you by [employer] Crossing[employer], letter or phone within 4 business days after the clinic has received the results. If you do not hear from us within 7 days, please contact the clinic through [employer] or phone. If you have a critical or abnormal lab result, we will notify you by phone as soon as possible.  Submit refill requests through Likeeds or call your pharmacy and they will forward the refill request to us. Please allow 3 business days for your refill to be completed.          Additional Information About Your Visit        Likeeds Information     Likeeds gives you secure access to your electronic health record. If you see a primary care provider, you can also send messages to  "your care team and make appointments. If you have questions, please call your primary care clinic.  If you do not have a primary care provider, please call [phone] and they will assist you.        Care EveryWhere ID     This is your Care EveryWhere ID. This could be used by other organizations to access your [facility] medical records  [id number]        Your Vitals Were     Pulse Temperature Respirations Height Pulse Oximetry BMI (Body Mass Index)    63 97.5  F (36.4  C) (Oral) 16 1.753 m (5' 9.02\") 100% 25.39 kg/m2       Blood Pressure from Last 3 Encounters:   11/20/18 116/78   11/08/18 110/76   10/23/18 127/69    Weight from Last 3 Encounters:   11/20/18 78 kg (172 lb)   11/08/18 79.8 kg (176 lb)   10/23/18 80.3 kg (177 lb 1.6 oz)              Today, you had the following     No orders found for display       Primary Care Provider Office Phone #    Flora Crews, [phone]       [facility] [address] [facility] [address]  [facility] [address]        Equal Access to Services     SURESH CHAPARRO AH: Hadii aad ku hadasho Soomaali, waaxda luqadaha, qaybta kaalmada adeegyada, naseem craig hayangelinen lorena lugo . So [facility] [phone].    ATENCIÓN: Si habla español, tiene a sparrow disposición servicios gratuitos de asistencia lingüística. Llame al [phone].    We comply with applicable federal civil rights laws and [place] laws. We do not discriminate on the basis of race, color, national origin, age, disability, sex, sexual orientation, or gender identity.            Thank you!     Thank you for choosing [facility]  for your care. Our goal is always to provide you with excellent care. Hearing back from our patients is one way we can continue to improve our services. Please take a few minutes to complete the written survey that you may receive in the mail after your visit with us. Thank you!             Your Updated Medication List - Protect others around you: Learn how to safely use, store " and throw away your medicines at www.disposemymeds.org.          This list is accurate as of 11/20/18 11:56 AM.  Always use your most recent med list.                   Brand Name Dispense Instructions for use Diagnosis    acetaminophen 325 MG tablet    TYLENOL    50 tablet    Take 2 tablets (650 mg) by mouth every 6 hours as needed for mild pain    Acute post-operative pain       calcium citrate-vitamin D 315-250 MG-UNIT Tabs per tablet    CITRACAL     Take 2 tablets by mouth        goserelin 3.6 MG injection    ZOLADEX     Inject 3.6 mg Subcutaneous every 30 days        * letrozole 2.5 MG tablet    FEMARA    30 tablet    Take 1 tablet (2.5 mg) by mouth daily    Malignant neoplasm of upper-outer quadrant of left breast in female, estrogen receptor positive (H)       * letrozole 2.5 MG tablet    FEMARA    90 tablet    Take 1 tablet (2.5 mg) by mouth daily    Malignant neoplasm of upper-outer quadrant of left breast in female, estrogen receptor positive (H)       medium chain triglycerides (MCT OIL) oil      Take 30 mLs by mouth daily        NONFORMULARY      Take 1 capsule by mouth daily Rosehip oil        OMEGA 3-6-9 FATTY ACIDS PO      Take 2 capsules by mouth daily        ondansetron 4 MG ODT tab    ZOFRAN-ODT    4 tablet    Take 1-2 tablets (4-8 mg) by mouth every 8 hours as needed for nausea    S/P breast reconstruction, bilateral       ondansetron 4 MG tablet    ZOFRAN    30 tablet    Take 1 tablet (4 mg) by mouth every 6 hours as needed for nausea    Nausea       oxyCODONE IR 5 MG tablet    ROXICODONE    15 tablet    Take 1-2 tablets (5-10 mg) by mouth every 6 hours as needed for moderate to severe pain    S/P breast reconstruction, bilateral       senna-docusate 8.6-50 MG per tablet    SENOKOT-S;PERICOLACE    30 tablet    Take 1-2 tablets by mouth 2 times daily    S/P breast reconstruction, bilateral       Spirulina 500 MG Tabs      6 Tabs daily.    Malignant neoplasm of left breast in female, estrogen  receptor positive, unspecified site of breast (H)       * Notice:  This list has 2 medication(s) that are the same as other medications prescribed for you. Read the directions carefully, and ask your doctor or other care provider to review them with you.

## 2018-11-20 NOTE — NURSING NOTE
"Oncology Rooming Note    November 20, 2018 11:30 AM   Mary Chadwick is a 42 year old female who presents for:    Chief Complaint   Patient presents with     Oncology Clinic Visit     return - post op      Initial Vitals: /78 (BP Location: Right arm, Patient Position: Chair, Cuff Size: Adult Regular)  Pulse 63  Temp 97.5  F (36.4  C) (Oral)  Resp 16  Ht 1.753 m (5' 9.02\")  Wt 78 kg (172 lb)  SpO2 100%  BMI 25.39 kg/m2 Estimated body mass index is 25.39 kg/(m^2) as calculated from the following:    Height as of this encounter: 1.753 m (5' 9.02\").    Weight as of this encounter: 78 kg (172 lb). Body surface area is 1.95 meters squared.  No Pain (0) Comment: Data Unavailable   No LMP recorded. Patient is not currently having periods (Reason: Perimenopausal).  Allergies reviewed: Yes  Medications reviewed: Yes    Medications: Medication refills not needed today.  Pharmacy name entered into EPIC: [facility] - [address]    Clinical concerns: none concerns      6 minutes for nursing intake (face to face time)     Marie Willoughby CMA            "

## 2018-11-21 ENCOUNTER — CARE COORDINATION (OUTPATIENT)
Dept: ONCOLOGY | Facility: CLINIC | Age: 43
End: 2018-11-21

## 2018-11-21 NOTE — PROGRESS NOTES
Patient called to re-schedule missed Zoladex this month. Urgent message sent to scheduling to arrange for Zoladex today or Friday. Will keep appointment with Dr Gann as currently scheduled.  Answered all patient's questions and verbalized understanding. Lauren Frank RN, BSN.

## 2018-11-21 NOTE — PROGRESS NOTES
Service Date: 2018      PRESENTING COMPLAINT:  Postoperative visit, status post bilateral breast reconstruction for breast cancer with fat grafting to bilateral breasts and modified skate flaps done for nipple reconstruction done 2018.      HISTORY OF PRESENTING COMPLAINT:  Ms. Hernandez is 42 years old.  She is a couple of weeks out from her surgery, overall doing well, no major issues.      PHYSICAL EXAMINATION:  Vital signs stable.  She is afebrile, in no obvious distress.  The upper pole has filled in nicely, minimal bruising. Right nipple healing in well, left nipple has partial skin necrosis with some dry scabbing, no evidence for infection or juju breakdown.  Donor sites are healing in well.      ASSESSMENT AND PLAN:  Based on above findings, a diagnosis of bilateral breast reconstruction with fat grafting and nipple reconstruction was made.  I have asked the patient to start thoroughly moisturizing her breasts as well as her dried necrotic nipple area, keeping an eye on it very closely.  If there is any change, opening or infection signs, to let us know.  Otherwise, we will continue to follow this closely.  I will see her back in a week's time.  All questions were answered.  She was happy with the visit.         BASILIO STEEN MD             D: 2018   T: 2018   MT: HAIM      Name:     JASMYNE HERNANDEZ   MRN:      [id number]        Account:      [id number]   :      1975           Service Date: 2018      Document: [id number]

## 2018-11-22 ENCOUNTER — TELEPHONE (OUTPATIENT)
Dept: ONCOLOGY | Facility: CLINIC | Age: 43
End: 2018-11-22

## 2018-11-22 ENCOUNTER — HOSPITAL ENCOUNTER (EMERGENCY)
Facility: CLINIC | Age: 43
Discharge: HOME | End: 2018-11-23
Attending: EMERGENCY MEDICINE | Admitting: EMERGENCY MEDICINE
Payer: COMMERCIAL

## 2018-11-22 DIAGNOSIS — L03.319 CELLULITIS OF TRUNK, UNSPECIFIED SITE OF TRUNK: ICD-10-CM

## 2018-11-22 DIAGNOSIS — N39.0 URINARY TRACT INFECTION WITHOUT HEMATURIA, SITE UNSPECIFIED: ICD-10-CM

## 2018-11-22 DIAGNOSIS — N61.0 CELLULITIS OF BREAST: ICD-10-CM

## 2018-11-22 LAB
HCG UR QL: NEGATIVE
INTERNAL QC OK POCT: YES

## 2018-11-22 PROCEDURE — 81025 URINE PREGNANCY TEST: CPT | Performed by: EMERGENCY MEDICINE

## 2018-11-22 PROCEDURE — 99284 EMERGENCY DEPT VISIT MOD MDM: CPT | Mod: Z6 | Performed by: EMERGENCY MEDICINE

## 2018-11-22 PROCEDURE — 99283 EMERGENCY DEPT VISIT LOW MDM: CPT | Performed by: EMERGENCY MEDICINE

## 2018-11-22 PROCEDURE — 81001 URINALYSIS AUTO W/SCOPE: CPT | Performed by: EMERGENCY MEDICINE

## 2018-11-22 ASSESSMENT — ENCOUNTER SYMPTOMS
DYSURIA: 1
FEVER: 0

## 2018-11-22 NOTE — ED AVS SNAPSHOT
[facility] [place], Emergency Department    [address] [place] [address]    Phone:  [phone]                                       Mary Chadwick   MRN: [id number]    Department:  [facility], Emergency Department   Date of Visit:  11/22/2018           After Visit Summary Signature Page     I have received my discharge instructions, and my questions have been answered. I have discussed any challenges I see with this plan with the nurse or doctor.    ..........................................................................................................................................  Patient/Patient Representative Signature      ..........................................................................................................................................  Patient Representative Print Name and Relationship to Patient    ..................................................               ................................................  Date                                   Time    ..........................................................................................................................................  Reviewed by Signature/Title    ...................................................              ..............................................  Date                                               Time          22EPIC Rev 08/18

## 2018-11-22 NOTE — ED AVS SNAPSHOT
[facility], Emergency Department    [address] [facility] [address]    Phone:  [phone]                                       Mary Chadwick   MRN: [id number]    Department:  [facility], Emergency Department   Date of Visit:  11/22/2018           Patient Information     Date Of Birth          1975        Your diagnoses for this visit were:     Urinary tract infection without hematuria, site unspecified     Cellulitis of trunk, unspecified site of trunk        You were seen by Noe Gorman MD.      Discharge References/Attachments     URINARY TRACT INFECTIONS IN WOMEN (ENGLISH)      Your next 10 appointments already scheduled     Nov 23, 2018 10:00 AM CST   Infusion 60 with UC ONCOLOGY INFUSION, UC 32 ATC   [facility] ([facility])    [address] [place] [address]  [address]  [facility] [id number] [phone]            Nov 27, 2018  9:45 AM CST   (Arrive by 9:30 AM)   Post-Op with  Basim Kidd MD   [facility] ([facility])    [address] [place] [address]  [address] [place] [id number] [phone]            Nov 30, 2018  8:30 AM CST   MR BREAST BILATERAL W/O & W CONTRAST with UCMR1   [facility] ([facility])    [address] [place] [address]  1st Floor  [facility] [id number] [phone]           How do I prepare for my exam? (Food and drink instructions) **If you will be receiving sedation or general anesthesia, please see special notes below.**  How do I prepare for my exam? (Other instructions) Take your medicines as usual, unless your doctor tells you not to. The timing of your exam may depend on the start of your last period. If you re in menopause, you may have the exam anytime.  You will have IV contrast for this exam.  You do not need to do anything special to prepare. **If you will be receiving sedation or general anesthesia, please see special  notes below.**  What should I wear:  The MRI machine uses a strong magnet. Please wear clothes without metal (snaps, zippers). A sweatsuit works well, or we may give you a hospital gown. Please remove any body piercings and hair extensions before you arrive. You will also remove watches, jewelry, hairpins, wallets, dentures, partial dental plates and hearing aids. You may wear contact lenses, and you may be able to wear your rings. We have a safe place to keep your personal items, but it is safer to leave them at home.  How long does the exam take:  Most tests take 30 to 60 minutes.  HOWEVER, IF YOUR DOCTOR PRESCRIBES ANESTHESIA please plan on spending four to five hours in the recovery room.  What should I bring: Bring a list of your current medicines to your exam (including vitamins, minerals and over-the-counter drugs). Please bring any previous mammograms or breast MRIs from other facilities to the MRI dept. Do not mail these items to us.  Do I need a : **If you will be receiving sedation or general anesthesia, please see special notes below.**  What should I do after the exam: No Restrictions, You may resume normal activities.  What is this test: MRI (magnetic resonance imaging) uses a strong magnet and radio waves to look inside the body. An MRA (magnetic resonance angiogram) does the same thing, but it lets us look at your blood vessels. A computer turns the radio waves into pictures showing cross sections of the body, much like slices of bread. This helps us see any problems more clearly. You may receive fluid (called  contrast ) before or during your scan. The fluid helps us see the pictures better. We give the fluid through an IV (small needle in your arm).  Who should I call with questions: If you have any questions, please contact your Imaging Department exam site. Directions, parking instructions, and other information is available on our website, [email or web address].  How do I prepare if I m  having sedation or anesthesia? **IMPORTANT** THE INSTRUCTIONS BELOW ARE ONLY FOR THOSE PATIENTS WHO HAVE BEEN TOLD THEY WILL RECEIVE SEDATION OR GENERAL ANESTHESIA DURING THEIR MRI PROCEDURE:  IF YOU WILL RECEIVE SEDATION (take medicine to help you relax during your exam) You must get the medicine from your doctor before you arrive. Bring the medicine to the exam. Do not take it at home. Arrive one hour early. Bring someone who can take you home after the test. Your medicine will make you sleepy. After the exam, you may not drive, take a bus or take a taxi by yourself. No eating 8 hours before your exam. You may have clear liquids up until 4 hours before your exam. (Clear liquids include water, clear tea, black coffee and fruit juice without pulp.)  IF YOU WILL RECEIVE ANESTHESIA (be asleep for your exam) Arrive 1 1/2 hours early. Bring someone who can take you home after the test. You may not drive, take a bus or take a taxi by yourself. No eating 8 hours before your exam. You may have clear liquids up until 4 hours before your exam. (Clear liquids include water, clear tea, black coffee and fruit juice without pulp.)            Nov 30, 2018  9:15 AM CST   MR CHEST W/O & W CONTRAST with [address] [facility] [place] [facility] ([facility])    [address] [address] [phone]           How do I prepare for my exam? (Food and drink instructions) **If you will be receiving sedation or general anesthesia, please see special notes below.**  How do I prepare for my exam? (Other instructions) Take your medicines as usual, unless your doctor tells you not to. You may or may not receive intravenous (IV) contrast for this exam pending the discretion of the Radiologist.  You do not need to do anything special to prepare.  **If you will be receiving sedation or general anesthesia, please see special notes below.**  What should I wear: The MRI machine uses a strong  magnet. Please wear clothes without metal (snaps, zippers). A sweatsuit works well, or we may give you a hospital gown. Please remove any body piercings and hair extensions before you arrive. You will also remove watches, jewelry, hairpins, wallets, dentures, partial dental plates and hearing aids. You may wear contact lenses, and you may be able to wear your rings. We have a safe place to keep your personal items, but it is safer to leave them at home.  How long does the exam take: Most tests take 30 to 60 minutes.  HOWEVER, IF YOUR DOCTOR PRESCRIBES ANESTHESIA please plan on spending four to five hours in the recovery room.  What should I bring:  Bring a list of your current medicines to your exam (including vitamins, minerals and over-the-counter drugs).  Do I need a :  **If you will be receiving sedation or general anesthesia, please see special notes below.**  What should I do after the exam: No Restrictions, You may resume normal activities.  What is this test: MRI (magnetic resonance imaging) uses a strong magnet and radio waves to look inside the body. An MRA (magnetic resonance angiogram) does the same thing, but it lets us look at your blood vessels. A computer turns the radio waves into pictures showing cross sections of the body, much like slices of bread. This helps us see any problems more clearly. You may receive fluid (called  contrast ) before or during your scan. The fluid helps us see the pictures better. We give the fluid through an IV (small needle in your arm).  Who should I call with questions:  Please call the Imaging Department at your exam site with any questions. Directions, parking instructions, and other information is available on our website, [email or web address].  How do I prepare if I m having sedation or anesthesia? **IMPORTANT** THE INSTRUCTIONS BELOW ARE ONLY FOR THOSE PATIENTS WHO HAVE BEEN TOLD THEY WILL RECEIVE SEDATION OR GENERAL ANESTHESIA DURING THEIR MRI PROCEDURE:   IF YOU WILL RECEIVE SEDATION (take medicine to help you relax during your exam): You must get the medicine from your doctor before you arrive. Bring the medicine to the exam. Do not take it at home. Arrive one hour early. Bring someone who can take you home after the test. Your medicine will make you sleepy. After the exam, you may not drive, take a bus or take a taxi by yourself. No eating 8 hours before your exam. You may have clear liquids up until 4 hours before your exam. (Clear liquids include water, clear tea, black coffee and fruit juice without pulp.)  IF YOU WILL RECEIVE ANESTHESIA (be asleep for your exam): Arrive 1 1/2 hours early. Bring someone who can take you home after the test. You may not drive, take a bus or take a taxi by yourself. No eating 8 hours before your exam. You may have clear liquids up until 4 hours before your exam. (Clear liquids include water, clear tea, black coffee and fruit juice without pulp.)            Nov 30, 2018 10:00 AM CST   MR ABDOMEN & PELVIS W/O & W CONTRAST with [address] MRI ([facility])    [address] [address] [phone]           How do I prepare for my exam? (Food and drink instructions) Do not eat or drink for 6 hours prior to exam. **If you will be receiving sedation or general anesthesia, please see special notes below.**  How do I prepare for my exam? (Other instructions) Take your medicines as usual, unless your doctor tells you not to. You may or may not receive IV contrast for this exam pending the discretion of the Radiologist.  **If you will be receiving sedation or general anesthesia, please see special notes below.**  What should I wear: The MRI machine uses a strong magnet. Please wear clothes without metal (snaps, zippers). A sweatsuit works well, or we may give you a hospital gown. Please remove any body piercings and hair extensions before you arrive. You will  also remove watches, jewelry, hairpins, wallets, dentures, partial dental plates and hearing aids. You may wear contact lenses, and you may be able to wear your rings. We have a safe place to keep your personal items, but it is safer to leave them at home.  How long does the exam take: Most tests take 30 to 60 minutes.  HOWEVER, IF YOUR DOCTOR PRESCRIBES ANESTHESIA please plan on spending four to five hours in the recovery room.  What should I bring: Bring a list of your current medicines to your exam (including vitamins, minerals and over-the-counter drugs). Also bring the results of similar scans you may have had.  Do I need a : **If you will be receiving sedation or general anesthesia, please see special notes below.**  What should I do after the exam: No Restrictions, You may resume normal activities.  What is this test: MRI (magnetic resonance imaging) uses a strong magnet and radio waves to look inside the body. An MRA (magnetic resonance angiogram) does the same thing, but it lets us look at your blood vessels. A computer turns the radio waves into pictures showing cross sections of the body, much like slices of bread. This helps us see any problems more clearly. You may receive fluid (called  contrast ) before or during your scan. The fluid helps us see the pictures better. We give the fluid through an IV (small needle in your arm).  Who should I call with questions: If you have any questions, please contact your Imaging Department exam site. Directions, parking instructions, and other information is available on our website, [email or web address].            Nov 30, 2018 11:00 AM CST   DX HIP/PELVIS/SPINE with UCDX1   [facility] ([facility])    [address] [address] [phone]           How do I prepare for my exam? (Food and drink instructions) No Food and Drink Restrictions.  How do I prepare for my exam? (Other  instructions) Please do not take any of the following 24 hours prior to the day of your exam: vitamins, calcium tablets, antacids.  What should I wear: If possible, please wear clothes without metal (snaps, zippers). A sweat suit works well.  How long does the exam take: The exam takes about 20 minutes.  What should I bring: Bring a list of your current medicines to your exam (including vitamins, minerals and over-the-counter drugs).  Do I need a :  No  is needed.  What should I do after the exam: No restrictions, You may resume normal activities.  How do I prepare for my exam? (Food and drink instructions) A DEXA scan is a bone-density scan. It uses a low level of radiation to check the strength of your bones. As you lie on a padded table, a machine will take X-rays. We most often scan the hips and lower spine.  Who should I call with questions: If you have any questions, please call the Imaging Department where you will have your exam. Directions, parking instructions, and other information is available on our website, [email or web address].            Dec 04, 2018 12:30 PM CST   (Arrive by 12:15 PM)   Return Visit with Valeria Gann MD   [facility] ([facility])    [address] [place] [address]  [address] [place] [address] [phone]            Dec 04, 2018  1:00 PM CST   Infusion 60 with UC ONCOLOGY INFUSION, UC 11 ATC   [facility] ([facility])    [address] [place] [address]  [address] [place] [id number] [phone]              24 Hour Appointment Hotline       To make an appointment at any [facility] ([facility]), call [phone] ([phone]). If you don't have a family doctor or clinic, we will help you find one. [facility] clinics are conveniently located to serve the needs of you and your family.             Review of your medicines      START taking        Dose / Directions Last dose taken     sulfamethoxazole-trimethoprim 800-160 MG per tablet   Commonly known as:  BACTRIM DS   Dose:  1 tablet   Quantity:  14 tablet        Take 1 tablet by mouth 2 times daily for 7 days   Refills:  0          Our records show that you are taking the medicines listed below. If these are incorrect, please call your family doctor or clinic.        Dose / Directions Last dose taken    acetaminophen 325 MG tablet   Commonly known as:  TYLENOL   Dose:  650 mg   Quantity:  50 tablet        Take 2 tablets (650 mg) by mouth every 6 hours as needed for mild pain   Refills:  0        calcium citrate-vitamin D 315-250 MG-UNIT Tabs per tablet   Commonly known as:  CITRACAL   Dose:  2 tablet        Take 2 tablets by mouth   Refills:  0        goserelin 3.6 MG injection   Commonly known as:  ZOLADEX   Dose:  3.6 mg        Inject 3.6 mg Subcutaneous every 30 days   Refills:  0        * letrozole 2.5 MG tablet   Commonly known as:  FEMARA   Dose:  2.5 mg   Quantity:  30 tablet        Take 1 tablet (2.5 mg) by mouth daily   Refills:  11        * letrozole 2.5 MG tablet   Commonly known as:  FEMARA   Dose:  2.5 mg   Quantity:  90 tablet        Take 1 tablet (2.5 mg) by mouth daily   Refills:  11        medium chain triglycerides (MCT OIL) oil   Dose:  30 mL        Take 30 mLs by mouth daily   Refills:  0        NONFORMULARY   Dose:  1 capsule   Indication:  Rosehip oil        Take 1 capsule by mouth daily Rosehip oil   Refills:  0        OMEGA 3-6-9 FATTY ACIDS PO   Dose:  2 capsule        Take 2 capsules by mouth daily   Refills:  0        ondansetron 4 MG ODT tab   Commonly known as:  ZOFRAN-ODT   Dose:  4-8 mg   Quantity:  4 tablet        Take 1-2 tablets (4-8 mg) by mouth every 8 hours as needed for nausea   Refills:  0        ondansetron 4 MG tablet   Commonly known as:  ZOFRAN   Dose:  4 mg   Quantity:  30 tablet        Take 1 tablet (4 mg) by mouth every 6 hours as needed for nausea   Refills:  1        oxyCODONE 5 MG tablet    Commonly known as:  ROXICODONE   Dose:  5-10 mg   Quantity:  15 tablet        Take 1-2 tablets (5-10 mg) by mouth every 6 hours as needed for moderate to severe pain   Refills:  0        senna-docusate 8.6-50 MG per tablet   Commonly known as:  SENOKOT-S;PERICOLACE   Dose:  1-2 tablet   Quantity:  30 tablet        Take 1-2 tablets by mouth 2 times daily   Refills:  0        Spirulina 500 MG Tabs        6 Tabs daily.   Refills:  0        * Notice:  This list has 2 medication(s) that are the same as other medications prescribed for you. Read the directions carefully, and ask your doctor or other care provider to review them with you.            Prescriptions were sent or printed at these locations (1 Prescription)                   Other Prescriptions                Printed at Department/Unit printer (1 of 1)         sulfamethoxazole-trimethoprim (BACTRIM DS) 800-160 MG per tablet                Procedures and tests performed during your visit     UA with Microscopic reflex to Culture    hCG qual urine POCT      Orders Needing Specimen Collection     None      Pending Results     No orders found for last 3 day(s).            Pending Culture Results     No orders found for last 3 day(s).            Pending Results Instructions     If you had any lab results that were not finalized at the time of your Discharge, you can call the ED Lab Result RN at [phone]. You will be contacted by this team for any positive Lab results or changes in treatment. The nurses are available 7 days a week from 10A to 6:30P.  You can leave a message 24 hours per day and they will return your call.        Thank you for choosing [facility]       Thank you for choosing [facility] for your care. Our goal is always to provide you with excellent care. Hearing back from our patients is one way we can continue to improve our services. Please take a few minutes to complete the written survey that you may receive in the mail after you visit with us.  Thank you!        [employer] Information     [employer] gives you secure access to your electronic health record. If you see a primary care provider, you can also send messages to your care team and make appointments. If you have questions, please call your primary care clinic.  If you do not have a primary care provider, please call [phone] and they will assist you.        Care EveryWhere ID     This is your Care EveryWhere ID. This could be used by other organizations to access your [facility] medical records  [id number]        Equal Access to Services     SURESH CHAPARRO : Michelle Herndon, watamera marroquinadaha, qadejon kaaljoo nina, naseem mendoza. So [facility] [phone].    ATENCIÓN: Si habla español, tiene a sparrow disposición servicios gratuitos de asistencia lingüística. Llame al [phone].    We comply with applicable federal civil rights laws and [place] laws. We do not discriminate on the basis of race, color, national origin, age, disability, sex, sexual orientation, or gender identity.            After Visit Summary       This is your record. Keep this with you and show to your community pharmacist(s) and doctor(s) at your next visit.

## 2018-11-23 ENCOUNTER — INFUSION THERAPY VISIT (OUTPATIENT)
Dept: ONCOLOGY | Facility: CLINIC | Age: 43
End: 2018-11-23
Attending: INTERNAL MEDICINE
Payer: COMMERCIAL

## 2018-11-23 VITALS
HEART RATE: 80 BPM | SYSTOLIC BLOOD PRESSURE: 134 MMHG | OXYGEN SATURATION: 97 % | BODY MASS INDEX: 26.66 KG/M2 | WEIGHT: 180.6 LBS | TEMPERATURE: 98.1 F | DIASTOLIC BLOOD PRESSURE: 85 MMHG

## 2018-11-23 VITALS
RESPIRATION RATE: 16 BRPM | HEART RATE: 78 BPM | SYSTOLIC BLOOD PRESSURE: 121 MMHG | OXYGEN SATURATION: 97 % | DIASTOLIC BLOOD PRESSURE: 77 MMHG | TEMPERATURE: 98 F

## 2018-11-23 DIAGNOSIS — Z17.0 MALIGNANT NEOPLASM OF UPPER-OUTER QUADRANT OF LEFT BREAST IN FEMALE, ESTROGEN RECEPTOR POSITIVE (H): Primary | ICD-10-CM

## 2018-11-23 DIAGNOSIS — C50.412 MALIGNANT NEOPLASM OF UPPER-OUTER QUADRANT OF LEFT BREAST IN FEMALE, ESTROGEN RECEPTOR POSITIVE (H): Primary | ICD-10-CM

## 2018-11-23 LAB
ALBUMIN UR-MCNC: 30 MG/DL
APPEARANCE UR: ABNORMAL
BILIRUB UR QL STRIP: NEGATIVE
COLOR UR AUTO: YELLOW
GLUCOSE UR STRIP-MCNC: NEGATIVE MG/DL
HGB UR QL STRIP: ABNORMAL
KETONES UR STRIP-MCNC: NEGATIVE MG/DL
LEUKOCYTE ESTERASE UR QL STRIP: ABNORMAL
MUCOUS THREADS #/AREA URNS LPF: PRESENT /LPF
NITRATE UR QL: NEGATIVE
PH UR STRIP: 7.5 PH (ref 5–7)
RBC #/AREA URNS AUTO: >182 /HPF (ref 0–2)
SOURCE: ABNORMAL
SP GR UR STRIP: 1.01 (ref 1–1.03)
SQUAMOUS #/AREA URNS AUTO: 2 /HPF (ref 0–1)
UROBILINOGEN UR STRIP-MCNC: NORMAL MG/DL (ref 0–2)
WBC #/AREA URNS AUTO: 143 /HPF (ref 0–5)

## 2018-11-23 PROCEDURE — 25000131 ZZH RX MED GY IP 250 OP 636 PS 637: Performed by: EMERGENCY MEDICINE

## 2018-11-23 PROCEDURE — 96402 CHEMO HORMON ANTINEOPL SQ/IM: CPT

## 2018-11-23 PROCEDURE — 25000128 H RX IP 250 OP 636: Mod: ZF | Performed by: INTERNAL MEDICINE

## 2018-11-23 PROCEDURE — 25000125 ZZHC RX 250: Mod: ZF | Performed by: INTERNAL MEDICINE

## 2018-11-23 PROCEDURE — 25000132 ZZH RX MED GY IP 250 OP 250 PS 637: Performed by: EMERGENCY MEDICINE

## 2018-11-23 RX ORDER — SULFAMETHOXAZOLE/TRIMETHOPRIM 800-160 MG
1 TABLET ORAL ONCE
Status: COMPLETED | OUTPATIENT
Start: 2018-11-23 | End: 2018-11-23

## 2018-11-23 RX ORDER — ONDANSETRON 4 MG/1
4 TABLET, ORALLY DISINTEGRATING ORAL ONCE
Status: COMPLETED | OUTPATIENT
Start: 2018-11-23 | End: 2018-11-23

## 2018-11-23 RX ORDER — SULFAMETHOXAZOLE/TRIMETHOPRIM 800-160 MG
1 TABLET ORAL 2 TIMES DAILY
Qty: 14 TABLET | Refills: 0 | Status: SHIPPED | OUTPATIENT
Start: 2018-11-23 | End: 2019-05-13

## 2018-11-23 RX ADMIN — LIDOCAINE HYDROCHLORIDE 2 ML: 10 INJECTION, SOLUTION EPIDURAL; INFILTRATION; INTRACAUDAL; PERINEURAL at 10:13

## 2018-11-23 RX ADMIN — GOSERELIN ACETATE 3.6 MG: 3.6 IMPLANT SUBCUTANEOUS at 10:16

## 2018-11-23 RX ADMIN — ONDANSETRON 4 MG: 4 TABLET, ORALLY DISINTEGRATING ORAL at 00:51

## 2018-11-23 RX ADMIN — SULFAMETHOXAZOLE AND TRIMETHOPRIM 1 TABLET: 800; 160 TABLET ORAL at 00:51

## 2018-11-23 ASSESSMENT — PAIN SCALES - GENERAL: PAINLEVEL: NO PAIN (0)

## 2018-11-23 NOTE — PROGRESS NOTES
Infusion Nursing Note:  Mary Chadwick presents today for Zoladex.    Patient seen by provider today: No    Treatment Conditions:  Not Applicable.    Intravenous Access:  No Intravenous access/labs at this visit.      Note:   Proceed with treatment.    Ice applied to left of abdomen prior to lidocaine injection.  Tolerated injection into Left side of abdomen without incident. No Prescriptions filled today.   D/C in care of self.  Pt did miss last injection so 12/4 Zoladex canceled (too soon) and rescheduled for 12/20. Pt aware of Dr Gann appt on 12/4 with MRI prior.  Sophia Sanchez RN

## 2018-11-23 NOTE — PATIENT INSTRUCTIONS
Contact Numbers  [facility]: [phone]    After Hours:  [phone]  Triage: [phone]    Please call the [facility] Triage line if you experience a temperature greater than or equal to 100.5, shaking chills, have uncontrolled nausea, vomiting and/or diarrhea, dizziness, shortness of breath, chest pain, bleeding, unexplained bruising, or if you have any other new/concerning symptoms, questions or concerns.     If it is after hours, weekends, or holidays, please call the main hospital  at  [phone] and ask to speak to the Oncology doctor on call.     If you are having any concerning symptoms or wish to speak to a provider before your next infusion visit, please call your care coordinator or triage to notify them so we can adequately serve you.     If you need a refill on a narcotic prescription or other medication, please call triage before your infusion appointment.

## 2018-11-23 NOTE — MR AVS SNAPSHOT
After Visit Summary   11/23/2018    Mary Chadwick    MRN: [id number]           Patient Information     Date Of Birth          1975        Visit Information        Provider Department      11/23/2018 10:00 AM  32 ATC;  ONCOLOGY INFUSION [facility]        Today's Diagnoses     Malignant neoplasm of upper-outer quadrant of left breast in female, estrogen receptor positive (H)    -  1      Care Instructions    Contact Numbers  [facility]: [phone]    After Hours:  [phone]  Triage: [phone]    Please call the [facility] Triage line if you experience a temperature greater than or equal to 100.5, shaking chills, have uncontrolled nausea, vomiting and/or diarrhea, dizziness, shortness of breath, chest pain, bleeding, unexplained bruising, or if you have any other new/concerning symptoms, questions or concerns.     If it is after hours, weekends, or holidays, please call the main hospital  at  [phone] and ask to speak to the Oncology doctor on call.     If you are having any concerning symptoms or wish to speak to a provider before your next infusion visit, please call your care coordinator or triage to notify them so we can adequately serve you.     If you need a refill on a narcotic prescription or other medication, please call triage before your infusion appointment.             Follow-ups after your visit        Your next 10 appointments already scheduled     Nov 27, 2018  9:45 AM CST   (Arrive by 9:30 AM)   Post-Op with BASILIO Kidd MD   [facility] ([facility])    [address]  [address]  [place] [address] [phone]            Nov 30, 2018  8:30 AM CST   MR BREAST BILATERAL W/O & W CONTRAST with UCMR1   [facility] ([facility])    [address]  1st Floor  [place] [address] [phone]           How do I prepare  for my exam? (Food and drink instructions) **If you will be receiving sedation or general anesthesia, please see special notes below.**  How do I prepare for my exam? (Other instructions) Take your medicines as usual, unless your doctor tells you not to. The timing of your exam may depend on the start of your last period. If you re in menopause, you may have the exam anytime.  You will have IV contrast for this exam.  You do not need to do anything special to prepare. **If you will be receiving sedation or general anesthesia, please see special notes below.**  What should I wear:  The MRI machine uses a strong magnet. Please wear clothes without metal (snaps, zippers). A sweatsuit works well, or we may give you a hospital gown. Please remove any body piercings and hair extensions before you arrive. You will also remove watches, jewelry, hairpins, wallets, dentures, partial dental plates and hearing aids. You may wear contact lenses, and you may be able to wear your rings. We have a safe place to keep your personal items, but it is safer to leave them at home.  How long does the exam take:  Most tests take 30 to 60 minutes.  HOWEVER, IF YOUR DOCTOR PRESCRIBES ANESTHESIA please plan on spending four to five hours in the recovery room.  What should I bring: Bring a list of your current medicines to your exam (including vitamins, minerals and over-the-counter drugs). Please bring any previous mammograms or breast MRIs from other facilities to the MRI dept. Do not mail these items to us.  Do I need a : **If you will be receiving sedation or general anesthesia, please see special notes below.**  What should I do after the exam: No Restrictions, You may resume normal activities.  What is this test: MRI (magnetic resonance imaging) uses a strong magnet and radio waves to look inside the body. An MRA (magnetic resonance angiogram) does the same thing, but it lets us look at your blood vessels. A computer turns the  radio waves into pictures showing cross sections of the body, much like slices of bread. This helps us see any problems more clearly. You may receive fluid (called  contrast ) before or during your scan. The fluid helps us see the pictures better. We give the fluid through an IV (small needle in your arm).  Who should I call with questions: If you have any questions, please contact your Imaging Department exam site. Directions, parking instructions, and other information is available on our website, [email or web address].  How do I prepare if I m having sedation or anesthesia? **IMPORTANT** THE INSTRUCTIONS BELOW ARE ONLY FOR THOSE PATIENTS WHO HAVE BEEN TOLD THEY WILL RECEIVE SEDATION OR GENERAL ANESTHESIA DURING THEIR MRI PROCEDURE:  IF YOU WILL RECEIVE SEDATION (take medicine to help you relax during your exam) You must get the medicine from your doctor before you arrive. Bring the medicine to the exam. Do not take it at home. Arrive one hour early. Bring someone who can take you home after the test. Your medicine will make you sleepy. After the exam, you may not drive, take a bus or take a taxi by yourself. No eating 8 hours before your exam. You may have clear liquids up until 4 hours before your exam. (Clear liquids include water, clear tea, black coffee and fruit juice without pulp.)  IF YOU WILL RECEIVE ANESTHESIA (be asleep for your exam) Arrive 1 1/2 hours early. Bring someone who can take you home after the test. You may not drive, take a bus or take a taxi by yourself. No eating 8 hours before your exam. You may have clear liquids up until 4 hours before your exam. (Clear liquids include water, clear tea, black coffee and fruit juice without pulp.)            Nov 30, 2018  9:15 AM CST   MR CHEST W/O & W CONTRAST with [address] [facility] ([facility])    [address]  1st Floor  [place] [address] [phone]           How do I prepare for my exam?  (Food and drink instructions) **If you will be receiving sedation or general anesthesia, please see special notes below.**  How do I prepare for my exam? (Other instructions) Take your medicines as usual, unless your doctor tells you not to. You may or may not receive intravenous (IV) contrast for this exam pending the discretion of the Radiologist.  You do not need to do anything special to prepare.  **If you will be receiving sedation or general anesthesia, please see special notes below.**  What should I wear: The MRI machine uses a strong magnet. Please wear clothes without metal (snaps, zippers). A sweatsuit works well, or we may give you a hospital gown. Please remove any body piercings and hair extensions before you arrive. You will also remove watches, jewelry, hairpins, wallets, dentures, partial dental plates and hearing aids. You may wear contact lenses, and you may be able to wear your rings. We have a safe place to keep your personal items, but it is safer to leave them at home.  How long does the exam take: Most tests take 30 to 60 minutes.  HOWEVER, IF YOUR DOCTOR PRESCRIBES ANESTHESIA please plan on spending four to five hours in the recovery room.  What should I bring:  Bring a list of your current medicines to your exam (including vitamins, minerals and over-the-counter drugs).  Do I need a :  **If you will be receiving sedation or general anesthesia, please see special notes below.**  What should I do after the exam: No Restrictions, You may resume normal activities.  What is this test: MRI (magnetic resonance imaging) uses a strong magnet and radio waves to look inside the body. An MRA (magnetic resonance angiogram) does the same thing, but it lets us look at your blood vessels. A computer turns the radio waves into pictures showing cross sections of the body, much like slices of bread. This helps us see any problems more clearly. You may receive fluid (called  contrast ) before or during  your scan. The fluid helps us see the pictures better. We give the fluid through an IV (small needle in your arm).  Who should I call with questions:  Please call the Imaging Department at your exam site with any questions. Directions, parking instructions, and other information is available on our website, [email or web address].  How do I prepare if I m having sedation or anesthesia? **IMPORTANT** THE INSTRUCTIONS BELOW ARE ONLY FOR THOSE PATIENTS WHO HAVE BEEN TOLD THEY WILL RECEIVE SEDATION OR GENERAL ANESTHESIA DURING THEIR MRI PROCEDURE:  IF YOU WILL RECEIVE SEDATION (take medicine to help you relax during your exam): You must get the medicine from your doctor before you arrive. Bring the medicine to the exam. Do not take it at home. Arrive one hour early. Bring someone who can take you home after the test. Your medicine will make you sleepy. After the exam, you may not drive, take a bus or take a taxi by yourself. No eating 8 hours before your exam. You may have clear liquids up until 4 hours before your exam. (Clear liquids include water, clear tea, black coffee and fruit juice without pulp.)  IF YOU WILL RECEIVE ANESTHESIA (be asleep for your exam): Arrive 1 1/2 hours early. Bring someone who can take you home after the test. You may not drive, take a bus or take a taxi by yourself. No eating 8 hours before your exam. You may have clear liquids up until 4 hours before your exam. (Clear liquids include water, clear tea, black coffee and fruit juice without pulp.)            Nov 30, 2018 10:00 AM CST   MR ABDOMEN & PELVIS W/O & W CONTRAST with [address] [facility] [place] [facility] ([facility])    [address] [address] [phone]           How do I prepare for my exam? (Food and drink instructions) Do not eat or drink for 6 hours prior to exam. **If you will be receiving sedation or general anesthesia, please see special notes below.**  How do I  prepare for my exam? (Other instructions) Take your medicines as usual, unless your doctor tells you not to. You may or may not receive IV contrast for this exam pending the discretion of the Radiologist.  **If you will be receiving sedation or general anesthesia, please see special notes below.**  What should I wear: The MRI machine uses a strong magnet. Please wear clothes without metal (snaps, zippers). A sweatsuit works well, or we may give you a hospital gown. Please remove any body piercings and hair extensions before you arrive. You will also remove watches, jewelry, hairpins, wallets, dentures, partial dental plates and hearing aids. You may wear contact lenses, and you may be able to wear your rings. We have a safe place to keep your personal items, but it is safer to leave them at home.  How long does the exam take: Most tests take 30 to 60 minutes.  HOWEVER, IF YOUR DOCTOR PRESCRIBES ANESTHESIA please plan on spending four to five hours in the recovery room.  What should I bring: Bring a list of your current medicines to your exam (including vitamins, minerals and over-the-counter drugs). Also bring the results of similar scans you may have had.  Do I need a : **If you will be receiving sedation or general anesthesia, please see special notes below.**  What should I do after the exam: No Restrictions, You may resume normal activities.  What is this test: MRI (magnetic resonance imaging) uses a strong magnet and radio waves to look inside the body. An MRA (magnetic resonance angiogram) does the same thing, but it lets us look at your blood vessels. A computer turns the radio waves into pictures showing cross sections of the body, much like slices of bread. This helps us see any problems more clearly. You may receive fluid (called  contrast ) before or during your scan. The fluid helps us see the pictures better. We give the fluid through an IV (small needle in your arm).  Who should I call with  questions: If you have any questions, please contact your Imaging Department exam site. Directions, parking instructions, and other information is available on our website, [email or web address].            Nov 30, 2018 11:00 AM CST   DX HIP/PELVIS/SPINE with UCDX1   [facility] ([facility])    [address]  1st Floor  [place] [facility] [id number] [phone]           How do I prepare for my exam? (Food and drink instructions) No Food and Drink Restrictions.  How do I prepare for my exam? (Other instructions) Please do not take any of the following 24 hours prior to the day of your exam: vitamins, calcium tablets, antacids.  What should I wear: If possible, please wear clothes without metal (snaps, zippers). A sweat suit works well.  How long does the exam take: The exam takes about 20 minutes.  What should I bring: Bring a list of your current medicines to your exam (including vitamins, minerals and over-the-counter drugs).  Do I need a :  No  is needed.  What should I do after the exam: No restrictions, You may resume normal activities.  How do I prepare for my exam? (Food and drink instructions) A DEXA scan is a bone-density scan. It uses a low level of radiation to check the strength of your bones. As you lie on a padded table, a machine will take X-rays. We most often scan the hips and lower spine.  Who should I call with questions: If you have any questions, please call the Imaging Department where you will have your exam. Directions, parking instructions, and other information is available on our website, [email or web address].            Dec 04, 2018 12:30 PM CST   (Arrive by 12:15 PM)   Return Visit with Valeria Gann MD   [facility] ([facility])    [address]  [address]  [place] [facility] [id number] [phone]            Dec 20, 2018  9:30 AM CST   Infusion 60 with  ONCOLOGY INFUSION,  UC 22 ATC   [facility] ([facility] [place])    [address]  [address]  [place] [address] [phone]              Who to contact     If you have questions or need follow up information about today's clinic visit or your schedule please contact [facility] directly at [phone].  Normal or non-critical lab and imaging results will be communicated to you by MyChart, letter or phone within 4 business days after the clinic has received the results. If you do not hear from us within 7 days, please contact the clinic through MyChart or phone. If you have a critical or abnormal lab result, we will notify you by phone as soon as possible.  Submit refill requests through [employer] or call your pharmacy and they will forward the refill request to us. Please allow 3 business days for your refill to be completed.          Additional Information About Your Visit        Retewihart Information     [employer] gives you secure access to your electronic health record. If you see a primary care provider, you can also send messages to your care team and make appointments. If you have questions, please call your primary care clinic.  If you do not have a primary care provider, please call [phone] and they will assist you.        Care EveryWhere ID     This is your Care EveryWhere ID. This could be used by other organizations to access your [facility] medical records  [id number]        Your Vitals Were     Pulse Temperature Respirations Pulse Oximetry          78 98  F (36.7  C) (Oral) 16 97%         Blood Pressure from Last 3 Encounters:   11/23/18 121/77   11/23/18 (P) 128/73   11/20/18 116/78    Weight from Last 3 Encounters:   11/22/18 81.9 kg (180 lb 9.6 oz)   11/20/18 78 kg (172 lb)   11/08/18 79.8 kg (176 lb)              Today, you had the following     No orders found for display       Primary Care Provider Office Phone #    Flora Crews, PT  [phone]       [facility] [address]  [facility] [facility] [place] [address]        Equal Access to Services     SURESH CHAPARRO : Hadii aad ku hadlindaaletha Rosabharati, wajaceyda luhowardadaha, qaraineta kaalmada lorenayolishani, waxtala nicolein hayaablanca calderonsonny anujashahnazjoby mendoza. So [facility] [phone].    ATENCIÓN: Si habla español, tiene a sparrow disposición servicios gratuitos de asistencia lingüística. Karsten al [phone].    We comply with applicable federal civil rights laws and [place] laws. We do not discriminate on the basis of race, color, national origin, age, disability, sex, sexual orientation, or gender identity.            Thank you!     Thank you for choosing [facility]  for your care. Our goal is always to provide you with excellent care. Hearing back from our patients is one way we can continue to improve our services. Please take a few minutes to complete the written survey that you may receive in the mail after your visit with us. Thank you!             Your Updated Medication List - Protect others around you: Learn how to safely use, store and throw away your medicines at www.disposemymeds.org.          This list is accurate as of 11/23/18 10:35 AM.  Always use your most recent med list.                   Brand Name Dispense Instructions for use Diagnosis    acetaminophen 325 MG tablet    TYLENOL    50 tablet    Take 2 tablets (650 mg) by mouth every 6 hours as needed for mild pain    Acute post-operative pain       calcium citrate-vitamin D 315-250 MG-UNIT Tabs per tablet    CITRACAL     Take 2 tablets by mouth        goserelin 3.6 MG injection    ZOLADEX     Inject 3.6 mg Subcutaneous every 30 days        * letrozole 2.5 MG tablet    FEMARA    30 tablet    Take 1 tablet (2.5 mg) by mouth daily    Malignant neoplasm of upper-outer quadrant of left breast in female, estrogen receptor positive (H)       * letrozole 2.5 MG tablet    FEMARA    90 tablet    Take 1 tablet (2.5 mg) by mouth daily    Malignant  neoplasm of upper-outer quadrant of left breast in female, estrogen receptor positive (H)       medium chain triglycerides (MCT OIL) oil      Take 30 mLs by mouth daily        NONFORMULARY      Take 1 capsule by mouth daily Rosehip oil        OMEGA 3-6-9 FATTY ACIDS PO      Take 2 capsules by mouth daily        ondansetron 4 MG ODT tab    ZOFRAN-ODT    4 tablet    Take 1-2 tablets (4-8 mg) by mouth every 8 hours as needed for nausea    S/P breast reconstruction, bilateral       ondansetron 4 MG tablet    ZOFRAN    30 tablet    Take 1 tablet (4 mg) by mouth every 6 hours as needed for nausea    Nausea       oxyCODONE 5 MG tablet    ROXICODONE    15 tablet    Take 1-2 tablets (5-10 mg) by mouth every 6 hours as needed for moderate to severe pain    S/P breast reconstruction, bilateral       senna-docusate 8.6-50 MG per tablet    SENOKOT-S;PERICOLACE    30 tablet    Take 1-2 tablets by mouth 2 times daily    S/P breast reconstruction, bilateral       Spirulina 500 MG Tabs      6 Tabs daily.    Malignant neoplasm of left breast in female, estrogen receptor positive, unspecified site of breast (H)       sulfamethoxazole-trimethoprim 800-160 MG per tablet    BACTRIM DS    14 tablet    Take 1 tablet by mouth 2 times daily for 7 days        * Notice:  This list has 2 medication(s) that are the same as other medications prescribed for you. Read the directions carefully, and ask your doctor or other care provider to review them with you.

## 2018-11-23 NOTE — TELEPHONE ENCOUNTER
I spoke with the patient after receiving the page from triage nurse. She has developed dysuric symptoms today and mentions that they are getting worse. No fever. She also endorses vaginal spotting ( she was on Zoladex, but have missed her dose). She would like to be started on some Abx.  Advised to visit ED for UA and antibiotic prescription if needed. She has Zoladex infusion appointment tomorrow.     Dav Sierra MD  Hem/Onc fellow

## 2018-11-23 NOTE — ED TRIAGE NOTES
Triage Assessment and Note    Vitals:    11/22/18 2320   BP: 134/85   Pulse: 80   Temp: 98.1  F (36.7  C)   TempSrc: Oral   SpO2: 97%   Weight: 81.9 kg (180 lb 9.6 oz)       Reason for visit: Pt developed sudden onset dysuria today at approximately 2200.       Background Information: Pt is receiving a monthly injection for breast cancer (zoladex) and missed her last dose due to a breast reconstruction surgery two weeks ago. She is due to have her injection tomorrow 11/23/18, but started spotting today. She has mild concerns about nipple discharge in the Left side.     Home remedies/Treatments: none. She called her cx team and they told her to come in.         Olimpia Barajas RN   November 22, 2018

## 2018-11-23 NOTE — ED PROVIDER NOTES
History     Chief Complaint   Patient presents with     Dysuria     HPI  Mary Chadwick is a 42 year old female who is status post bilateral breat reconstruction for breast cancer with fat grafting to bilateral breasts and modified skate flaps done for nipple reconstruction performed on 11/08/18 on Zoladex for breast cancer who presents to the Emergency Department today for evaluation of dysuria. The patient states that at about 10 PM today, about 1.5 hours ago, she developed sudden onset of dysuria.     I have reviewed the Medications, Allergies, Past Medical and Surgical History, and Social History in the Epic system.    Review of Systems   Constitutional: Negative for fever.   Genitourinary: Positive for dysuria.   All other systems reviewed and are negative.    Physical Exam   BP: 134/85  Pulse: 80  Temp: 98.1  F (36.7  C)  Weight: 81.9 kg (180 lb 9.6 oz)  SpO2: 97 %    Physical Exam   Constitutional: She is oriented to person, place, and time. She appears well-developed and well-nourished. No distress.   HENT:   Head: Normocephalic and atraumatic.   Eyes: No scleral icterus.   Neck: Normal range of motion. Neck supple.   Cardiovascular: Normal rate.    Pulmonary/Chest: Effort normal.       Neurological: She is alert and oriented to person, place, and time.   Skin: Skin is warm and dry. No rash noted. She is not diaphoretic. No erythema. No pallor.       ED Course     ED Course     Procedures             Critical Care time:  none             Labs Ordered and Resulted from Time of ED Arrival Up to the Time of Departure from the ED   ROUTINE UA WITH MICROSCOPIC REFLEX TO CULTURE - Abnormal; Notable for the following:        Result Value    Blood Urine Moderate (*)     pH Urine 7.5 (*)     Protein Albumin Urine 30 (*)     Leukocyte Esterase Urine Large (*)     WBC Urine 143 (*)     RBC Urine >182 (*)     Squamous Epithelial /HPF Urine 2 (*)     Mucous Urine Present (*)     All other components within normal  limits   HCG QUAL URINE POCT - Normal            Assessments & Plan (with Medical Decision Making)   This is a 42-year-old female patient coming into the emergency room with complaints of dysuria.  She states that her dysuria started this evening for the first time.  She denies any fevers.  She has no previous urinary tract infections in the past.  Her UA is blatantly positive.  She had no other urinary complaints other than this.  She also is concerned about her nipple that was just reconstructed.  It had a small area of erythema and drainage.  I believe that we can start her on Bactrim for both her UTI and for her mild area of cellulitis.  She will follow-up with her plastic surgeon for further care management.    I have reviewed the nursing notes.    I have reviewed the findings, diagnosis, plan and need for follow up with the patient.    New Prescriptions    SULFAMETHOXAZOLE-TRIMETHOPRIM (BACTRIM DS) 800-160 MG PER TABLET    Take 1 tablet by mouth 2 times daily for 7 days       Final diagnoses:   Urinary tract infection without hematuria, site unspecified   Cellulitis of trunk, unspecified site of trunk   IJames, am serving as a trained medical scribe to document services personally performed by Noe Gorman MD, based on the provider's statements to me.   Noe SMILEY MD, was physically present and have reviewed and verified the accuracy of this note documented by James Aguilera.     11/22/2018   [facility], [place], EMERGENCY DEPARTMENT     Noe Gorman MD  11/23/18 0112

## 2018-11-27 ENCOUNTER — MYC MEDICAL ADVICE (OUTPATIENT)
Dept: PLASTIC SURGERY | Facility: CLINIC | Age: 43
End: 2018-11-27

## 2018-11-30 ENCOUNTER — OFFICE VISIT (OUTPATIENT)
Dept: SURGERY | Facility: CLINIC | Age: 43
End: 2018-11-30
Payer: COMMERCIAL

## 2018-11-30 DIAGNOSIS — Z98.890 S/P BREAST RECONSTRUCTION, BILATERAL: Primary | ICD-10-CM

## 2018-11-30 PROCEDURE — 99024 POSTOP FOLLOW-UP VISIT: CPT | Performed by: PLASTIC SURGERY

## 2018-11-30 NOTE — MR AVS SNAPSHOT
After Visit Summary   11/30/2018    Mary Chadwick    MRN: [id number]           Patient Information     Date Of Birth          1975        Visit Information        Provider Department      11/30/2018 2:00 PM BASILIO Kidd MD [facility]        Today's Diagnoses     S/P breast reconstruction, bilateral    -  1       Follow-ups after your visit        Follow-up notes from your care team     Return in about 2 weeks (around 12/14/2018).      Your next 10 appointments already scheduled     Dec 04, 2018 12:30 PM CST   (Arrive by 12:15 PM)   Return Visit with Valeria Gann MD   [facility] ([facility])    [address]  [address]  [facility] [id number] [phone]            Dec 04, 2018  1:00 PM CST   DX HIP/PELVIS/SPINE with [id number]   [facility] ([facility])    [address]  1st Floor  [facility] [id number] [phone]           How do I prepare for my exam? (Food and drink instructions) No Food and Drink Restrictions.  How do I prepare for my exam? (Other instructions) Please do not take any of the following 24 hours prior to the day of your exam: vitamins, calcium tablets, antacids.  What should I wear: If possible, please wear clothes without metal (snaps, zippers). A sweat suit works well.  How long does the exam take: The exam takes about 20 minutes.  What should I bring: Bring a list of your current medicines to your exam (including vitamins, minerals and over-the-counter drugs).  Do I need a :  No  is needed.  What should I do after the exam: No restrictions, You may resume normal activities.  How do I prepare for my exam? (Food and drink instructions) A DEXA scan is a bone-density scan. It uses a low level of radiation to check the strength of your bones. As you lie on a padded table, a machine will take X-rays. We most often scan the  hips and lower spine.  Who should I call with questions: If you have any questions, please call the Imaging Department where you will have your exam. Directions, parking instructions, and other information is available on our website, [email or web address].            Dec 11, 2018  9:30 AM CST   (Arrive by 9:15 AM)   Return Visit with BASILIO Kidd MD   [facility] ([facility])    [address]  [address]  [place] [facility] [address]   [phone]            Dec 20, 2018  9:30 AM CST   Infusion 60 with UC ONCOLOGY INFUSION, UC 22 ATC   [facility] ([facility])    [address]  [address]  [facility] [address] [phone]            Dec 20, 2018  1:45 PM CST   MR CHEST W/O & W CONTRAST with [id number]   [facility] ([facility])    [address]  1st Floor  [facility] [address] [phone]           How do I prepare for my exam? (Food and drink instructions) **If you will be receiving sedation or general anesthesia, please see special notes below.**  How do I prepare for my exam? (Other instructions) Take your medicines as usual, unless your doctor tells you not to. You may or may not receive intravenous (IV) contrast for this exam pending the discretion of the Radiologist.  You do not need to do anything special to prepare.  **If you will be receiving sedation or general anesthesia, please see special notes below.**  What should I wear: The MRI machine uses a strong magnet. Please wear clothes without metal (snaps, zippers). A sweatsuit works well, or we may give you a hospital gown. Please remove any body piercings and hair extensions before you arrive. You will also remove watches, jewelry, hairpins, wallets, dentures, partial dental plates and hearing aids. You may wear contact lenses, and you may be able to wear your rings. We have a safe place to keep  your personal items, but it is safer to leave them at home.  How long does the exam take: Most tests take 30 to 60 minutes.  HOWEVER, IF YOUR DOCTOR PRESCRIBES ANESTHESIA please plan on spending four to five hours in the recovery room.  What should I bring:  Bring a list of your current medicines to your exam (including vitamins, minerals and over-the-counter drugs).  Do I need a :  **If you will be receiving sedation or general anesthesia, please see special notes below.**  What should I do after the exam: No Restrictions, You may resume normal activities.  What is this test: MRI (magnetic resonance imaging) uses a strong magnet and radio waves to look inside the body. An MRA (magnetic resonance angiogram) does the same thing, but it lets us look at your blood vessels. A computer turns the radio waves into pictures showing cross sections of the body, much like slices of bread. This helps us see any problems more clearly. You may receive fluid (called  contrast ) before or during your scan. The fluid helps us see the pictures better. We give the fluid through an IV (small needle in your arm).  Who should I call with questions:  Please call the Imaging Department at your exam site with any questions. Directions, parking instructions, and other information is available on our website, [email or web address].  How do I prepare if I m having sedation or anesthesia? **IMPORTANT** THE INSTRUCTIONS BELOW ARE ONLY FOR THOSE PATIENTS WHO HAVE BEEN TOLD THEY WILL RECEIVE SEDATION OR GENERAL ANESTHESIA DURING THEIR MRI PROCEDURE:  IF YOU WILL RECEIVE SEDATION (take medicine to help you relax during your exam): You must get the medicine from your doctor before you arrive. Bring the medicine to the exam. Do not take it at home. Arrive one hour early. Bring someone who can take you home after the test. Your medicine will make you sleepy. After the exam, you may not drive, take a bus or take a taxi by yourself. No eating 8  hours before your exam. You may have clear liquids up until 4 hours before your exam. (Clear liquids include water, clear tea, black coffee and fruit juice without pulp.)  IF YOU WILL RECEIVE ANESTHESIA (be asleep for your exam): Arrive 1 1/2 hours early. Bring someone who can take you home after the test. You may not drive, take a bus or take a taxi by yourself. No eating 8 hours before your exam. You may have clear liquids up until 4 hours before your exam. (Clear liquids include water, clear tea, black coffee and fruit juice without pulp.)            Dec 20, 2018  2:30 PM CST   MR ABDOMEN & PELVIS W/O & W CONTRAST with [id number]   [facility] ([facility])    [address] [address] [phone]           How do I prepare for my exam? (Food and drink instructions) Do not eat or drink for 6 hours prior to exam. **If you will be receiving sedation or general anesthesia, please see special notes below.**  How do I prepare for my exam? (Other instructions) Take your medicines as usual, unless your doctor tells you not to. You may or may not receive IV contrast for this exam pending the discretion of the Radiologist.  **If you will be receiving sedation or general anesthesia, please see special notes below.**  What should I wear: The MRI machine uses a strong magnet. Please wear clothes without metal (snaps, zippers). A sweatsuit works well, or we may give you a hospital gown. Please remove any body piercings and hair extensions before you arrive. You will also remove watches, jewelry, hairpins, wallets, dentures, partial dental plates and hearing aids. You may wear contact lenses, and you may be able to wear your rings. We have a safe place to keep your personal items, but it is safer to leave them at home.  How long does the exam take: Most tests take 30 to 60 minutes.  HOWEVER, IF YOUR DOCTOR PRESCRIBES ANESTHESIA please plan on spending  four to five hours in the recovery room.  What should I bring: Bring a list of your current medicines to your exam (including vitamins, minerals and over-the-counter drugs). Also bring the results of similar scans you may have had.  Do I need a : **If you will be receiving sedation or general anesthesia, please see special notes below.**  What should I do after the exam: No Restrictions, You may resume normal activities.  What is this test: MRI (magnetic resonance imaging) uses a strong magnet and radio waves to look inside the body. An MRA (magnetic resonance angiogram) does the same thing, but it lets us look at your blood vessels. A computer turns the radio waves into pictures showing cross sections of the body, much like slices of bread. This helps us see any problems more clearly. You may receive fluid (called  contrast ) before or during your scan. The fluid helps us see the pictures better. We give the fluid through an IV (small needle in your arm).  Who should I call with questions: If you have any questions, please contact your Imaging Department exam site. Directions, parking instructions, and other information is available on our website, [email or web address].            Dec 20, 2018  3:15 PM CST   MR BRAIN W/O & W CONTRAST with [id number]   [facility] ([facility])    [address] [address] [phone]           How do I prepare for my exam? (Food and drink instructions) **If you will be receiving sedation or general anesthesia, please see special notes below.**  How do I prepare for my exam? (Other instructions) Take your medicines as usual, unless your doctor tells you not to. You may or may not receive intravenous (IV) contrast for this exam pending the discretion of the Radiologist.  You do not need to do anything special to prepare.  **If you will be receiving sedation or general anesthesia, please see special notes  below.**  What should I wear: The MRI machine uses a strong magnet. Please wear clothes without metal (snaps, zippers). A sweatsuit works well, or we may give you a hospital gown. Please remove any body piercings and hair extensions before you arrive. You will also remove watches, jewelry, hairpins, wallets, dentures, partial dental plates and hearing aids. You may wear contact lenses, and you may be able to wear your rings. We have a safe place to keep your personal items, but it is safer to leave them at home.  How long does the exam take: Most tests take 30 to 60 minutes.  HOWEVER, IF YOUR DOCTOR PRESCRIBES ANESTHESIA please plan on spending four to five hours in the recovery room.  What should I bring:  Bring a list of your current medicines to your exam (including vitamins, minerals and over-the-counter drugs).  Do I need a :  **If you will be receiving sedation or general anesthesia, please see special notes below.**  What should I do after the exam: No Restrictions, You may resume normal activities.  What is this test: MRI (magnetic resonance imaging) uses a strong magnet and radio waves to look inside the body. An MRA (magnetic resonance angiogram) does the same thing, but it lets us look at your blood vessels. A computer turns the radio waves into pictures showing cross sections of the body, much like slices of bread. This helps us see any problems more clearly. You may receive fluid (called  contrast ) before or during your scan. The fluid helps us see the pictures better. We give the fluid through an IV (small needle in your arm).  Who should I call with questions:  Please call the Imaging Department at your exam site with any questions. Directions, parking instructions, and other information is available on our website, [email or web address].  How do I prepare if I m having sedation or anesthesia? **IMPORTANT** THE INSTRUCTIONS BELOW ARE ONLY FOR THOSE PATIENTS WHO HAVE BEEN TOLD THEY WILL RECEIVE  SEDATION OR GENERAL ANESTHESIA DURING THEIR MRI PROCEDURE:  IF YOU WILL RECEIVE SEDATION (take medicine to help you relax during your exam): You must get the medicine from your doctor before you arrive. Bring the medicine to the exam. Do not take it at home. Arrive one hour early. Bring someone who can take you home after the test. Your medicine will make you sleepy. After the exam, you may not drive, take a bus or take a taxi by yourself. No eating 8 hours before your exam. You may have clear liquids up until 4 hours before your exam. (Clear liquids include water, clear tea, black coffee and fruit juice without pulp.)  IF YOU WILL RECEIVE ANESTHESIA (be asleep for your exam): Arrive 1 1/2 hours early. Bring someone who can take you home after the test. You may not drive, take a bus or take a taxi by yourself. No eating 8 hours before your exam. You may have clear liquids up until 4 hours before your exam. (Clear liquids include water, clear tea, black coffee and fruit juice without pulp.)            Dec 20, 2018  4:00 PM CST   MR TIBIA FIBULA LOWER LEG LEFT WO CONTRAST with [id number]   [facility] ([facility])    [address] [address] [phone]           How do I prepare for my exam? (Food and drink instructions) **If you will be receiving sedation or general anesthesia, please see special notes below.**  How do I prepare for my exam? (Other instructions) Take your medicines as usual, unless your doctor tells you not to. Please remove any body piercings and hair extensions before you arrive. Follow your doctor s orders. If you do not, we may have to postpone your exam. You may or may not receive IV contrast for this exam pending the discretion of the Radiologist.  You do not need to do anything special to prepare. **If you will be receiving sedation or general anesthesia, please see special notes below.**  What should I wear:  The MRI  machine uses a strong magnet. Please wear clothes without metal (snaps, zippers). A sweatsuit works well, or we may give you a hospital gown.  How long does the exam take: Most tests take 30 to 60 minutes.  HOWEVER, IF YOUR DOCTOR PRESCRIBES ANESTHESIA please plan on spending four to five hours in the recovery room.  What should I bring: Bring a list of your current medicines to your exam (including vitamins, minerals and over-the-counter drugs). Also bring the results of similar scans you may have had.  Do I need a : **If you will be receiving sedation or general anesthesia, please see special notes below.**  What should I do after the exam? No Restrictions, You may resume normal activities.  What is this test: MRI (magnetic resonance imaging) uses a strong magnet and radio waves to look inside the body. An MRA (magnetic resonance angiogram) does the same thing, but it lets us look at your blood vessels. A computer turns the radio waves into pictures showing cross sections of the body, much like slices of bread. This helps us see any problems more clearly.  Who should I call with questions: Please call the Imaging Department at your exam site with any questions. Directions, parking instructions, and other information is available on our website, [email or web address].  How do I prepare if I m having sedation or anesthesia? **IMPORTANT** THE INSTRUCTIONS BELOW ARE ONLY FOR THOSE PATIENTS WHO HAVE BEEN TOLD THEY WILL RECEIVE SEDATION OR GENERAL ANESTHESIA DURING THEIR MRI PROCEDURE:  IF YOU WILL RECEIVE SEDATION (take medicine to help you relax during your exam): You must get the medicine from your doctor before you arrive. Bring the medicine to the exam. Do not take it at home. Arrive one hour early. Bring someone who can take you home after the test. Your medicine will make you sleepy. After the exam, you may not drive, take a bus or take a taxi by yourself. No eating 8 hours before your exam. You may have  clear liquids up until 4 hours before your exam. (Clear liquids include water, clear tea, black coffee and fruit juice without pulp.)  IF YOU WILL RECEIVE ANESTHESIA (be asleep for your exam): Arrive 1 1/2 hours early. Bring someone who can take you home after the test. You may not drive, take a bus or take a taxi by yourself. No eating 8 hours before your exam. You may have clear liquids up until 4 hours before your exam. (Clear liquids include water, clear tea, black coffee and fruit juice without pulp.) You will spend four to five hours in the recovery room.            Dec 20, 2018  4:45 PM CST   MR FEMUR THIGH RIGHT WO CONTRAST with [id number]   [facility] ([facility])    [address] [address] [phone]           How do I prepare for my exam? (Food and drink instructions) **If you will be receiving sedation or general anesthesia, please see special notes below.**  How do I prepare for my exam? (Other instructions) Take your medicines as usual, unless your doctor tells you not to. Please remove any body piercings and hair extensions before you arrive. Follow your doctor s orders. If you do not, we may have to postpone your exam. You may or may not receive IV contrast for this exam pending the discretion of the Radiologist.  You do not need to do anything special to prepare. **If you will be receiving sedation or general anesthesia, please see special notes below.**  What should I wear:  The MRI machine uses a strong magnet. Please wear clothes without metal (snaps, zippers). A sweatsuit works well, or we may give you a hospital gown.  How long does the exam take: Most tests take 30 to 60 minutes.  HOWEVER, IF YOUR DOCTOR PRESCRIBES ANESTHESIA please plan on spending four to five hours in the recovery room.  What should I bring: Bring a list of your current medicines to your exam (including vitamins, minerals and over-the-counter  drugs). Also bring the results of similar scans you may have had.  Do I need a : **If you will be receiving sedation or general anesthesia, please see special notes below.**  What should I do after the exam? No Restrictions, You may resume normal activities.  What is this test: MRI (magnetic resonance imaging) uses a strong magnet and radio waves to look inside the body. An MRA (magnetic resonance angiogram) does the same thing, but it lets us look at your blood vessels. A computer turns the radio waves into pictures showing cross sections of the body, much like slices of bread. This helps us see any problems more clearly.  Who should I call with questions: Please call the Imaging Department at your exam site with any questions. Directions, parking instructions, and other information is available on our website, [email or web address][email or web address].  How do I prepare if I m having sedation or anesthesia? **IMPORTANT** THE INSTRUCTIONS BELOW ARE ONLY FOR THOSE PATIENTS WHO HAVE BEEN TOLD THEY WILL RECEIVE SEDATION OR GENERAL ANESTHESIA DURING THEIR MRI PROCEDURE:  IF YOU WILL RECEIVE SEDATION (take medicine to help you relax during your exam): You must get the medicine from your doctor before you arrive. Bring the medicine to the exam. Do not take it at home. Arrive one hour early. Bring someone who can take you home after the test. Your medicine will make you sleepy. After the exam, you may not drive, take a bus or take a taxi by yourself. No eating 8 hours before your exam. You may have clear liquids up until 4 hours before your exam. (Clear liquids include water, clear tea, black coffee and fruit juice without pulp.)  IF YOU WILL RECEIVE ANESTHESIA (be asleep for your exam): Arrive 1 1/2 hours early. Bring someone who can take you home after the test. You may not drive, take a bus or take a taxi by yourself. No eating 8 hours before your exam. You may have clear liquids up until 4 hours before your exam. (Clear liquids  include water, clear tea, black coffee and fruit juice without pulp.) You will spend four to five hours in the recovery room.              Future tests that were ordered for you today     Open Future Orders        Priority Expected Expires Ordered    MR Humerus Upper Arm Left wo Contrast Routine  11/29/2019 11/29/2018    MR Femur Thigh Right wo Contrast Routine  11/29/2019 11/29/2018    MR Tibia Fibula Lower Leg Left wo Contrast Routine  11/29/2019 11/29/2018            Who to contact     If you have questions or need follow up information about today's clinic visit or your schedule please contact [facility] directly at [phone].  Normal or non-critical lab and imaging results will be communicated to you by [employer], letter or phone within 4 business days after the clinic has received the results. If you do not hear from us within 7 days, please contact the clinic through [employer] or phone. If you have a critical or abnormal lab result, we will notify you by phone as soon as possible.  Submit refill requests through [employer] or call your pharmacy and they will forward the refill request to us. Please allow 3 business days for your refill to be completed.          Additional Information About Your Visit        [employer] Information     [employer] gives you secure access to your electronic health record. If you see a primary care provider, you can also send messages to your care team and make appointments. If you have questions, please call your primary care clinic.  If you do not have a primary care provider, please call [phone] and they will assist you.      [employer] is an electronic gateway that provides easy, online access to your medical records. With [employer], you can request a clinic appointment, read your test results, renew a prescription or communicate with your care team.     To access your existing account, please contact your [facility] or call [phone]  for assistance.        Care EveryWhere ID     This is your Care EveryWhere ID. This could be used by other organizations to access your [place] medical records  [id number]         Blood Pressure from Last 3 Encounters:   11/23/18 121/77   11/23/18 (P) 128/73   11/20/18 116/78    Weight from Last 3 Encounters:   11/22/18 180 lb 9.6 oz   11/20/18 172 lb   11/08/18 176 lb              Today, you had the following     No orders found for display       Primary Care Provider Office Phone #    Flora Crews, [phone]       [facility] [address]  [facility] [address]        Equal Access to Services     SURESH CHAPARRO : Hadii olivier lazaro hadlindao Sobharati, waaxda luqadaha, qaybta kaalmada adesonnyyada, naseem mendoza. So [facility] [phone].    ATENCIÓN: Si habla español, tiene a sparrow disposición servicios gratuitos de asistencia lingüística. Llame al [phone].    We comply with applicable federal civil rights laws and [place] laws. We do not discriminate on the basis of race, color, national origin, age, disability, sex, sexual orientation, or gender identity.            Thank you!     Thank you for choosing [facility]  for your care. Our goal is always to provide you with excellent care. Hearing back from our patients is one way we can continue to improve our services. Please take a few minutes to complete the written survey that you may receive in the mail after your visit with us. Thank you!             Your Updated Medication List - Protect others around you: Learn how to safely use, store and throw away your medicines at www.disposemymeds.org.          This list is accurate as of 11/30/18  3:45 PM.  Always use your most recent med list.                   Brand Name Dispense Instructions for use Diagnosis    acetaminophen 325 MG tablet    TYLENOL    50 tablet    Take 2 tablets (650 mg) by mouth every 6 hours as needed for mild pain    Acute  post-operative pain       calcium citrate-vitamin D 315-250 MG-UNIT Tabs per tablet    CITRACAL     Take 2 tablets by mouth        goserelin 3.6 MG injection    ZOLADEX     Inject 3.6 mg Subcutaneous every 30 days        * letrozole 2.5 MG tablet    FEMARA    30 tablet    Take 1 tablet (2.5 mg) by mouth daily    Malignant neoplasm of upper-outer quadrant of left breast in female, estrogen receptor positive (H)       * letrozole 2.5 MG tablet    FEMARA    90 tablet    Take 1 tablet (2.5 mg) by mouth daily    Malignant neoplasm of upper-outer quadrant of left breast in female, estrogen receptor positive (H)       medium chain triglycerides (MCT OIL) oil    MCT OIL     Take 30 mLs by mouth daily        NONFORMULARY      Take 1 capsule by mouth daily Rosehip oil        OMEGA 3-6-9 FATTY ACIDS PO      Take 2 capsules by mouth daily        ondansetron 4 MG ODT tab    ZOFRAN-ODT    4 tablet    Take 1-2 tablets (4-8 mg) by mouth every 8 hours as needed for nausea    S/P breast reconstruction, bilateral       ondansetron 4 MG tablet    ZOFRAN    30 tablet    Take 1 tablet (4 mg) by mouth every 6 hours as needed for nausea    Nausea       oxyCODONE 5 MG tablet    ROXICODONE    15 tablet    Take 1-2 tablets (5-10 mg) by mouth every 6 hours as needed for moderate to severe pain    S/P breast reconstruction, bilateral       senna-docusate 8.6-50 MG tablet    SENOKOT-S/PERICOLACE    30 tablet    Take 1-2 tablets by mouth 2 times daily    S/P breast reconstruction, bilateral       Spirulina 500 MG Tabs      6 Tabs daily.    Malignant neoplasm of left breast in female, estrogen receptor positive, unspecified site of breast (H)       sulfamethoxazole-trimethoprim 800-160 MG tablet    BACTRIM DS    14 tablet    Take 1 tablet by mouth 2 times daily for 7 days        * Notice:  This list has 2 medication(s) that are the same as other medications prescribed for you. Read the directions carefully, and ask your doctor or other care  provider to review them with you.

## 2018-11-30 NOTE — LETTER
11/30/2018         RE: Mary Chadwick  [address]  [facility] [address]        Dear Colleague,    Thank you for referring your patient, Mary Chadwick, to the [facility]. Please see a copy of my visit note below.    PRESENTING COMPLAINT:  Postoperative visit, status post bilateral breast reconstruction for breast cancer with fat grafting to bilateral breasts and modified skate flaps done for nipple reconstruction done 11/08/2018.       HISTORY OF PRESENTING COMPLAINT:  Ms. Chadwick is 42 years old.  She is three weeks out from her surgery, overall doing well, no major issues. Was started on Bactrim for a UTI.      PHYSICAL EXAMINATION:  Vital signs stable.  She is afebrile, in no obvious distress.  The upper pole has filled in nicely, minimal bruising. Right nipple healing in well, left nipple has partial skin necrosis with some dry scabbing, no evidence for infection or juju breakdown.  Donor sites are healing in well.       ASSESSMENT AND PLAN:  Based on above findings, a diagnosis of bilateral breast reconstruction with fat grafting and nipple reconstruction was made.  I have asked the patient to start thoroughly moisturizing her breasts as well as her dried necrotic nipple area, keeping an eye on it very closely.  Otherwise, we will continue to follow this closely.  I will see her back in a week's time.  All questions were answered.  She was happy with the visit.     Again, thank you for allowing me to participate in the care of your patient.        Sincerely,        BASILIO Kidd MD

## 2018-11-30 NOTE — NURSING NOTE
Mary Chadwick's goals for this visit include: breast infection  She requests these members of her care team be copied on today's visit information: no    PCP: Flora Crews    Referring Provider:  No referring provider defined for this encounter.    There were no vitals taken for this visit.    Do you need any medication refills at today's visit? No    Anabel Molina LPN

## 2018-12-04 ENCOUNTER — ONCOLOGY VISIT (OUTPATIENT)
Dept: ONCOLOGY | Facility: CLINIC | Age: 43
End: 2018-12-04
Attending: INTERNAL MEDICINE
Payer: MEDICAID

## 2018-12-04 ENCOUNTER — RADIANT APPOINTMENT (OUTPATIENT)
Dept: BONE DENSITY | Facility: CLINIC | Age: 43
End: 2018-12-04
Attending: INTERNAL MEDICINE
Payer: MEDICAID

## 2018-12-04 VITALS
TEMPERATURE: 97.2 F | DIASTOLIC BLOOD PRESSURE: 72 MMHG | SYSTOLIC BLOOD PRESSURE: 117 MMHG | WEIGHT: 172.2 LBS | HEART RATE: 80 BPM | RESPIRATION RATE: 14 BRPM | HEIGHT: 69 IN | BODY MASS INDEX: 25.51 KG/M2 | OXYGEN SATURATION: 100 %

## 2018-12-04 DIAGNOSIS — C50.112 MALIGNANT NEOPLASM OF CENTRAL PORTION OF LEFT BREAST IN FEMALE, ESTROGEN RECEPTOR POSITIVE (H): Primary | ICD-10-CM

## 2018-12-04 DIAGNOSIS — C50.912 MALIGNANT NEOPLASM OF LEFT BREAST IN FEMALE, ESTROGEN RECEPTOR POSITIVE, UNSPECIFIED SITE OF BREAST (H): ICD-10-CM

## 2018-12-04 DIAGNOSIS — Z15.01 LI-FRAUMENI SYNDROME: ICD-10-CM

## 2018-12-04 DIAGNOSIS — Z15.09 MONOALLELIC MUTATION OF TP53 GENE: ICD-10-CM

## 2018-12-04 DIAGNOSIS — Z15.89 MONOALLELIC MUTATION OF TP53 GENE: ICD-10-CM

## 2018-12-04 DIAGNOSIS — Z17.0 MALIGNANT NEOPLASM OF LEFT BREAST IN FEMALE, ESTROGEN RECEPTOR POSITIVE, UNSPECIFIED SITE OF BREAST (H): ICD-10-CM

## 2018-12-04 DIAGNOSIS — Z15.89 MONOALLELIC MUTATION OF MUTYH GENE: ICD-10-CM

## 2018-12-04 DIAGNOSIS — Z17.0 MALIGNANT NEOPLASM OF CENTRAL PORTION OF LEFT BREAST IN FEMALE, ESTROGEN RECEPTOR POSITIVE (H): Primary | ICD-10-CM

## 2018-12-04 DIAGNOSIS — Z15.01 MONOALLELIC MUTATION OF TP53 GENE: ICD-10-CM

## 2018-12-04 PROCEDURE — G0463 HOSPITAL OUTPT CLINIC VISIT: HCPCS | Mod: ZF

## 2018-12-04 PROCEDURE — 99213 OFFICE O/P EST LOW 20 MIN: CPT | Mod: ZP | Performed by: INTERNAL MEDICINE

## 2018-12-04 ASSESSMENT — PAIN SCALES - GENERAL: PAINLEVEL: NO PAIN (0)

## 2018-12-04 NOTE — NURSING NOTE
"Oncology Rooming Note    December 4, 2018 12:40 PM   Mary Chadwick is a 43 year old female who presents for:    Chief Complaint   Patient presents with     Oncology Clinic Visit     [facility] RETURN- BREAST CA     Initial Vitals: /72 (BP Location: Right arm, Patient Position: Chair, Cuff Size: Adult Regular)  Pulse 80  Temp 97.2  F (36.2  C) (Oral)  Resp 14  Ht 1.753 m (5' 9.02\")  Wt 78.1 kg (172 lb 3.2 oz)  SpO2 100%  BMI 25.42 kg/m2 Estimated body mass index is 25.42 kg/(m^2) as calculated from the following:    Height as of this encounter: 1.753 m (5' 9.02\").    Weight as of this encounter: 78.1 kg (172 lb 3.2 oz). Body surface area is 1.95 meters squared.  No Pain (0) Comment: Data Unavailable   No LMP recorded. Patient is not currently having periods (Reason: Perimenopausal).  Allergies reviewed: Yes  Medications reviewed: Yes    Medications: Medication refills not needed today.  Pharmacy name entered into EPIC: [facility] - [address]    Clinical concerns: No new concerns. Azeem was notified.    10 minutes for nursing intake (face to face time)     Christian Stone LPN            "

## 2018-12-04 NOTE — MR AVS SNAPSHOT
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, estrogen receptor positive (H)    -  1       Follow-ups after your visit        Your next 10 appointments already scheduled     Dec 11, 2018  9:30 AM CST   (Arrive by 9:15 AM)   Post-Op with Roxanna Humphrey RN   [facility] ([facility])    [address]  4th Floor  [facility] [address] [phone]            Dec 20, 2018  9:30 AM CST   Infusion 60 with UC ONCOLOGY INFUSION, UC 22 ATC   [facility] ([facility])    [address]  [address]  [facility] [address] [phone]            Dec 20, 2018  1:45 PM CST   MR CHEST W/O & W CONTRAST with [id number]   [facility] ([facility])    [address]  1st Floor  [facility] [address] [phone]           How do I prepare for my exam? (Food and drink instructions) **If you will be receiving sedation or general anesthesia, please see special notes below.**  How do I prepare for my exam? (Other instructions) Take your medicines as usual, unless your doctor tells you not to. You may or may not receive intravenous (IV) contrast for this exam pending the discretion of the Radiologist.  You do not need to do anything special to prepare.  **If you will be receiving sedation or general anesthesia, please see special notes below.**  What should I wear: The MRI machine uses a strong magnet. Please wear clothes without metal (snaps, zippers). A sweatsuit works well, or we may give you a hospital gown. Please remove any body  piercings and hair extensions before you arrive. You will also remove watches, jewelry, hairpins, wallets, dentures, partial dental plates and hearing aids. You may wear contact lenses, and you may be able to wear your rings. We have a safe place to keep your personal items, but it is safer to leave them at home.  How long does the exam take: Most tests take 30 to 60 minutes.  HOWEVER, IF YOUR DOCTOR PRESCRIBES ANESTHESIA please plan on spending four to five hours in the recovery room.  What should I bring:  Bring a list of your current medicines to your exam (including vitamins, minerals and over-the-counter drugs).  Do I need a :  **If you will be receiving sedation or general anesthesia, please see special notes below.**  What should I do after the exam: No Restrictions, You may resume normal activities.  What is this test: MRI (magnetic resonance imaging) uses a strong magnet and radio waves to look inside the body. An MRA (magnetic resonance angiogram) does the same thing, but it lets us look at your blood vessels. A computer turns the radio waves into pictures showing cross sections of the body, much like slices of bread. This helps us see any problems more clearly. You may receive fluid (called  contrast ) before or during your scan. The fluid helps us see the pictures better. We give the fluid through an IV (small needle in your arm).  Who should I call with questions:  Please call the Imaging Department at your exam site with any questions. Directions, parking instructions, and other information is available on our website, [email or web address].  How do I prepare if I m having sedation or anesthesia? **IMPORTANT** THE INSTRUCTIONS BELOW ARE ONLY FOR THOSE PATIENTS WHO HAVE BEEN TOLD THEY WILL RECEIVE SEDATION OR GENERAL ANESTHESIA DURING THEIR MRI PROCEDURE:  IF YOU WILL RECEIVE SEDATION (take medicine to help you relax during your exam): You must get the medicine from your doctor before you arrive.  Bring the medicine to the exam. Do not take it at home. Arrive one hour early. Bring someone who can take you home after the test. Your medicine will make you sleepy. After the exam, you may not drive, take a bus or take a taxi by yourself. No eating 8 hours before your exam. You may have clear liquids up until 4 hours before your exam. (Clear liquids include water, clear tea, black coffee and fruit juice without pulp.)  IF YOU WILL RECEIVE ANESTHESIA (be asleep for your exam): Arrive 1 1/2 hours early. Bring someone who can take you home after the test. You may not drive, take a bus or take a taxi by yourself. No eating 8 hours before your exam. You may have clear liquids up until 4 hours before your exam. (Clear liquids include water, clear tea, black coffee and fruit juice without pulp.)            Dec 20, 2018  2:30 PM CST   MR ABDOMEN & PELVIS W/O & W CONTRAST with [id number]   [facility] ([facility] [place])    [address] [place] [address] [phone]           How do I prepare for my exam? (Food and drink instructions) Do not eat or drink for 6 hours prior to exam. **If you will be receiving sedation or general anesthesia, please see special notes below.**  How do I prepare for my exam? (Other instructions) Take your medicines as usual, unless your doctor tells you not to. You may or may not receive IV contrast for this exam pending the discretion of the Radiologist.  **If you will be receiving sedation or general anesthesia, please see special notes below.**  What should I wear: The MRI machine uses a strong magnet. Please wear clothes without metal (snaps, zippers). A sweatsuit works well, or we may give you a hospital gown. Please remove any body piercings and hair extensions before you arrive. You will also remove watches, jewelry, hairpins, wallets, dentures, partial dental plates and hearing aids. You may wear contact lenses, and you may be  able to wear your rings. We have a safe place to keep your personal items, but it is safer to leave them at home.  How long does the exam take: Most tests take 30 to 60 minutes.  HOWEVER, IF YOUR DOCTOR PRESCRIBES ANESTHESIA please plan on spending four to five hours in the recovery room.  What should I bring: Bring a list of your current medicines to your exam (including vitamins, minerals and over-the-counter drugs). Also bring the results of similar scans you may have had.  Do I need a : **If you will be receiving sedation or general anesthesia, please see special notes below.**  What should I do after the exam: No Restrictions, You may resume normal activities.  What is this test: MRI (magnetic resonance imaging) uses a strong magnet and radio waves to look inside the body. An MRA (magnetic resonance angiogram) does the same thing, but it lets us look at your blood vessels. A computer turns the radio waves into pictures showing cross sections of the body, much like slices of bread. This helps us see any problems more clearly. You may receive fluid (called  contrast ) before or during your scan. The fluid helps us see the pictures better. We give the fluid through an IV (small needle in your arm).  Who should I call with questions: If you have any questions, please contact your Imaging Department exam site. Directions, parking instructions, and other information is available on our website, [email or web address].            Dec 20, 2018  3:15 PM CST   MR BRAIN W/O & W CONTRAST with [id number]   [facility] ([facility])    [address] [address] [phone]           How do I prepare for my exam? (Food and drink instructions) **If you will be receiving sedation or general anesthesia, please see special notes below.**  How do I prepare for my exam? (Other instructions) Take your medicines as usual, unless your doctor tells you not to.  You may or may not receive intravenous (IV) contrast for this exam pending the discretion of the Radiologist.  You do not need to do anything special to prepare.  **If you will be receiving sedation or general anesthesia, please see special notes below.**  What should I wear: The MRI machine uses a strong magnet. Please wear clothes without metal (snaps, zippers). A sweatsuit works well, or we may give you a hospital gown. Please remove any body piercings and hair extensions before you arrive. You will also remove watches, jewelry, hairpins, wallets, dentures, partial dental plates and hearing aids. You may wear contact lenses, and you may be able to wear your rings. We have a safe place to keep your personal items, but it is safer to leave them at home.  How long does the exam take: Most tests take 30 to 60 minutes.  HOWEVER, IF YOUR DOCTOR PRESCRIBES ANESTHESIA please plan on spending four to five hours in the recovery room.  What should I bring:  Bring a list of your current medicines to your exam (including vitamins, minerals and over-the-counter drugs).  Do I need a :  **If you will be receiving sedation or general anesthesia, please see special notes below.**  What should I do after the exam: No Restrictions, You may resume normal activities.  What is this test: MRI (magnetic resonance imaging) uses a strong magnet and radio waves to look inside the body. An MRA (magnetic resonance angiogram) does the same thing, but it lets us look at your blood vessels. A computer turns the radio waves into pictures showing cross sections of the body, much like slices of bread. This helps us see any problems more clearly. You may receive fluid (called  contrast ) before or during your scan. The fluid helps us see the pictures better. We give the fluid through an IV (small needle in your arm).  Who should I call with questions:  Please call the Imaging Department at your exam site with any questions. Directions, parking  instructions, and other information is available on our website, [email or web address].  How do I prepare if I m having sedation or anesthesia? **IMPORTANT** THE INSTRUCTIONS BELOW ARE ONLY FOR THOSE PATIENTS WHO HAVE BEEN TOLD THEY WILL RECEIVE SEDATION OR GENERAL ANESTHESIA DURING THEIR MRI PROCEDURE:  IF YOU WILL RECEIVE SEDATION (take medicine to help you relax during your exam): You must get the medicine from your doctor before you arrive. Bring the medicine to the exam. Do not take it at home. Arrive one hour early. Bring someone who can take you home after the test. Your medicine will make you sleepy. After the exam, you may not drive, take a bus or take a taxi by yourself. No eating 8 hours before your exam. You may have clear liquids up until 4 hours before your exam. (Clear liquids include water, clear tea, black coffee and fruit juice without pulp.)  IF YOU WILL RECEIVE ANESTHESIA (be asleep for your exam): Arrive 1 1/2 hours early. Bring someone who can take you home after the test. You may not drive, take a bus or take a taxi by yourself. No eating 8 hours before your exam. You may have clear liquids up until 4 hours before your exam. (Clear liquids include water, clear tea, black coffee and fruit juice without pulp.)            Dec 20, 2018  4:00 PM CST   MR TIBIA FIBULA LOWER LEG LEFT WO CONTRAST with [id number]   [facility] [place] [facility] ([facility])    [address] [id number] [phone]           How do I prepare for my exam? (Food and drink instructions) **If you will be receiving sedation or general anesthesia, please see special notes below.**  How do I prepare for my exam? (Other instructions) Take your medicines as usual, unless your doctor tells you not to. Please remove any body piercings and hair extensions before you arrive. Follow your doctor s orders. If you do not, we may have to postpone your exam. You may or may not receive  IV contrast for this exam pending the discretion of the Radiologist.  You do not need to do anything special to prepare. **If you will be receiving sedation or general anesthesia, please see special notes below.**  What should I wear:  The MRI machine uses a strong magnet. Please wear clothes without metal (snaps, zippers). A sweatsuit works well, or we may give you a hospital gown.  How long does the exam take: Most tests take 30 to 60 minutes.  HOWEVER, IF YOUR DOCTOR PRESCRIBES ANESTHESIA please plan on spending four to five hours in the recovery room.  What should I bring: Bring a list of your current medicines to your exam (including vitamins, minerals and over-the-counter drugs). Also bring the results of similar scans you may have had.  Do I need a : **If you will be receiving sedation or general anesthesia, please see special notes below.**  What should I do after the exam? No Restrictions, You may resume normal activities.  What is this test: MRI (magnetic resonance imaging) uses a strong magnet and radio waves to look inside the body. An MRA (magnetic resonance angiogram) does the same thing, but it lets us look at your blood vessels. A computer turns the radio waves into pictures showing cross sections of the body, much like slices of bread. This helps us see any problems more clearly.  Who should I call with questions: Please call the Imaging Department at your exam site with any questions. Directions, parking instructions, and other information is available on our website, [email or web address].  How do I prepare if I m having sedation or anesthesia? **IMPORTANT** THE INSTRUCTIONS BELOW ARE ONLY FOR THOSE PATIENTS WHO HAVE BEEN TOLD THEY WILL RECEIVE SEDATION OR GENERAL ANESTHESIA DURING THEIR MRI PROCEDURE:  IF YOU WILL RECEIVE SEDATION (take medicine to help you relax during your exam): You must get the medicine from your doctor before you arrive. Bring the medicine to the exam. Do not take it at  home. Arrive one hour early. Bring someone who can take you home after the test. Your medicine will make you sleepy. After the exam, you may not drive, take a bus or take a taxi by yourself. No eating 8 hours before your exam. You may have clear liquids up until 4 hours before your exam. (Clear liquids include water, clear tea, black coffee and fruit juice without pulp.)  IF YOU WILL RECEIVE ANESTHESIA (be asleep for your exam): Arrive 1 1/2 hours early. Bring someone who can take you home after the test. You may not drive, take a bus or take a taxi by yourself. No eating 8 hours before your exam. You may have clear liquids up until 4 hours before your exam. (Clear liquids include water, clear tea, black coffee and fruit juice without pulp.) You will spend four to five hours in the recovery room.            Dec 20, 2018  4:45 PM CST   MR FEMUR THIGH RIGHT WO CONTRAST with [id number]   [facility] MRI ([facility] [place])    [address] [address] [phone]           How do I prepare for my exam? (Food and drink instructions) **If you will be receiving sedation or general anesthesia, please see special notes below.**  How do I prepare for my exam? (Other instructions) Take your medicines as usual, unless your doctor tells you not to. Please remove any body piercings and hair extensions before you arrive. Follow your doctor s orders. If you do not, we may have to postpone your exam. You may or may not receive IV contrast for this exam pending the discretion of the Radiologist.  You do not need to do anything special to prepare. **If you will be receiving sedation or general anesthesia, please see special notes below.**  What should I wear:  The MRI machine uses a strong magnet. Please wear clothes without metal (snaps, zippers). A sweatsuit works well, or we may give you a hospital gown.  How long does the exam take: Most tests take 30 to 60 minutes.   HOWEVER, IF YOUR DOCTOR PRESCRIBES ANESTHESIA please plan on spending four to five hours in the recovery room.  What should I bring: Bring a list of your current medicines to your exam (including vitamins, minerals and over-the-counter drugs). Also bring the results of similar scans you may have had.  Do I need a : **If you will be receiving sedation or general anesthesia, please see special notes below.**  What should I do after the exam? No Restrictions, You may resume normal activities.  What is this test: MRI (magnetic resonance imaging) uses a strong magnet and radio waves to look inside the body. An MRA (magnetic resonance angiogram) does the same thing, but it lets us look at your blood vessels. A computer turns the radio waves into pictures showing cross sections of the body, much like slices of bread. This helps us see any problems more clearly.  Who should I call with questions: Please call the Imaging Department at your exam site with any questions. Directions, parking instructions, and other information is available on our website, [email or web address].  How do I prepare if I m having sedation or anesthesia? **IMPORTANT** THE INSTRUCTIONS BELOW ARE ONLY FOR THOSE PATIENTS WHO HAVE BEEN TOLD THEY WILL RECEIVE SEDATION OR GENERAL ANESTHESIA DURING THEIR MRI PROCEDURE:  IF YOU WILL RECEIVE SEDATION (take medicine to help you relax during your exam): You must get the medicine from your doctor before you arrive. Bring the medicine to the exam. Do not take it at home. Arrive one hour early. Bring someone who can take you home after the test. Your medicine will make you sleepy. After the exam, you may not drive, take a bus or take a taxi by yourself. No eating 8 hours before your exam. You may have clear liquids up until 4 hours before your exam. (Clear liquids include water, clear tea, black coffee and fruit juice without pulp.)  IF YOU WILL RECEIVE ANESTHESIA (be asleep for your exam): Arrive 1 1/2  hours early. Bring someone who can take you home after the test. You may not drive, take a bus or take a taxi by yourself. No eating 8 hours before your exam. You may have clear liquids up until 4 hours before your exam. (Clear liquids include water, clear tea, black coffee and fruit juice without pulp.) You will spend four to five hours in the recovery room.            Dec 20, 2018  5:30 PM CST   MR HUMERUS UPPER ARM LEFT WO CONTRAST with [id number]   [facility] ([facility] [place])    [address] [address] [phone]           How do I prepare for my exam? (Food and drink instructions) **If you will be receiving sedation or general anesthesia, please see special notes below.**  How do I prepare for my exam? (Other instructions) Take your medicines as usual, unless your doctor tells you not to. Please remove any body piercings and hair extensions before you arrive. Follow your doctor s orders. If you do not, we may have to postpone your exam. You may or may not receive IV contrast for this exam pending the discretion of the Radiologist.  You do not need to do anything special to prepare. **If you will be receiving sedation or general anesthesia, please see special notes below.**  What should I wear:  The MRI machine uses a strong magnet. Please wear clothes without metal (snaps, zippers). A sweatsuit works well, or we may give you a hospital gown.  How long does the exam take: Most tests take 30 to 60 minutes.  HOWEVER, IF YOUR DOCTOR PRESCRIBES ANESTHESIA please plan on spending four to five hours in the recovery room.  What should I bring: Bring a list of your current medicines to your exam (including vitamins, minerals and over-the-counter drugs). Also bring the results of similar scans you may have had.  Do I need a : **If you will be receiving sedation or general anesthesia, please see special notes below.**  What should I do after  the exam? No Restrictions, You may resume normal activities.  What is this test: MRI (magnetic resonance imaging) uses a strong magnet and radio waves to look inside the body. An MRA (magnetic resonance angiogram) does the same thing, but it lets us look at your blood vessels. A computer turns the radio waves into pictures showing cross sections of the body, much like slices of bread. This helps us see any problems more clearly.  Who should I call with questions: Please call the Imaging Department at your exam site with any questions. Directions, parking instructions, and other information is available on our website, [email or web address].  How do I prepare if I m having sedation or anesthesia? **IMPORTANT** THE INSTRUCTIONS BELOW ARE ONLY FOR THOSE PATIENTS WHO HAVE BEEN TOLD THEY WILL RECEIVE SEDATION OR GENERAL ANESTHESIA DURING THEIR MRI PROCEDURE:  IF YOU WILL RECEIVE SEDATION (take medicine to help you relax during your exam): You must get the medicine from your doctor before you arrive. Bring the medicine to the exam. Do not take it at home. Arrive one hour early. Bring someone who can take you home after the test. Your medicine will make you sleepy. After the exam, you may not drive, take a bus or take a taxi by yourself. No eating 8 hours before your exam. You may have clear liquids up until 4 hours before your exam. (Clear liquids include water, clear tea, black coffee and fruit juice without pulp.)  IF YOU WILL RECEIVE ANESTHESIA (be asleep for your exam): Arrive 1 1/2 hours early. Bring someone who can take you home after the test. You may not drive, take a bus or take a taxi by yourself. No eating 8 hours before your exam. You may have clear liquids up until 4 hours before your exam. (Clear liquids include water, clear tea, black coffee and fruit juice without pulp.) You will spend four to five hours in the recovery room.              Who to contact     If you have questions or need follow up  "information about today's clinic visit or your schedule please contact [facility] directly at [phone].  Normal or non-critical lab and imaging results will be communicated to you by MyChart, letter or phone within 4 business days after the clinic has received the results. If you do not hear from us within 7 days, please contact the clinic through [employer] or phone. If you have a critical or abnormal lab result, we will notify you by phone as soon as possible.  Submit refill requests through [employer] or call your pharmacy and they will forward the refill request to us. Please allow 3 business days for your refill to be completed.          Additional Information About Your Visit        [employer] Information     [employer] gives you secure access to your electronic health record. If you see a primary care provider, you can also send messages to your care team and make appointments. If you have questions, please call your primary care clinic.  If you do not have a primary care provider, please call [phone] and they will assist you.        Care EveryWhere ID     This is your Care EveryWhere ID. This could be used by other organizations to access your [place] medical records  [id number]        Your Vitals Were     Pulse Temperature Respirations Height Pulse Oximetry BMI (Body Mass Index)    80 97.2  F (36.2  C) (Oral) 14 1.753 m (5' 9.02\") 100% 25.42 kg/m2       Blood Pressure from Last 3 Encounters:   12/04/18 117/72   11/23/18 121/77   11/23/18 (P) 128/73    Weight from Last 3 Encounters:   12/04/18 78.1 kg (172 lb 3.2 oz)   11/22/18 81.9 kg (180 lb 9.6 oz)   11/20/18 78 kg (172 lb)              Today, you had the following     No orders found for display       Primary Care Provider Office Phone #    Tonyajojo JORY Crews, [phone]       [facility] [address] [place] [address]  [facility] [address]        Equal Access to Services     SURESH CHAPARRO AH: gary Bhandari " wayne inocenciadejon seniornaseem nuno ah. So [facility] [phone].    ATENCIÓN: Si cristo brian, tiene a sparrow disposición servicios gratuitos de asistencia lingüística. Karsten al [phone].    We comply with applicable federal civil rights laws and [place] laws. We do not discriminate on the basis of race, color, national origin, age, disability, sex, sexual orientation, or gender identity.            Thank you!     Thank you for choosing [facility]  for your care. Our goal is always to provide you with excellent care. Hearing back from our patients is one way we can continue to improve our services. Please take a few minutes to complete the written survey that you may receive in the mail after your visit with us. Thank you!             Your Updated Medication List - Protect others around you: Learn how to safely use, store and throw away your medicines at www.disposemymeds.org.          This list is accurate as of 12/4/18 11:59 PM.  Always use your most recent med list.                   Brand Name Dispense Instructions for use Diagnosis    acetaminophen 325 MG tablet    TYLENOL    50 tablet    Take 2 tablets (650 mg) by mouth every 6 hours as needed for mild pain    Acute post-operative pain       calcium citrate-vitamin D 315-250 MG-UNIT Tabs per tablet    CITRACAL     Take 2 tablets by mouth        goserelin 3.6 MG injection    ZOLADEX     Inject 3.6 mg Subcutaneous every 30 days        * letrozole 2.5 MG tablet    FEMARA    30 tablet    Take 1 tablet (2.5 mg) by mouth daily    Malignant neoplasm of upper-outer quadrant of left breast in female, estrogen receptor positive (H)       * letrozole 2.5 MG tablet    FEMARA    90 tablet    Take 1 tablet (2.5 mg) by mouth daily    Malignant neoplasm of upper-outer quadrant of left breast in female, estrogen receptor positive (H)       medium chain triglycerides (MCT OIL) oil    MCT OIL     Take 30 mLs by mouth  daily        NONFORMULARY      Take 1 capsule by mouth daily Rosehip oil        OMEGA 3-6-9 FATTY ACIDS PO      Take 2 capsules by mouth daily        ondansetron 4 MG ODT tab    ZOFRAN-ODT    4 tablet    Take 1-2 tablets (4-8 mg) by mouth every 8 hours as needed for nausea    S/P breast reconstruction, bilateral       ondansetron 4 MG tablet    ZOFRAN    30 tablet    Take 1 tablet (4 mg) by mouth every 6 hours as needed for nausea    Nausea       oxyCODONE 5 MG tablet    ROXICODONE    15 tablet    Take 1-2 tablets (5-10 mg) by mouth every 6 hours as needed for moderate to severe pain    S/P breast reconstruction, bilateral       senna-docusate 8.6-50 MG tablet    SENOKOT-S/PERICOLACE    30 tablet    Take 1-2 tablets by mouth 2 times daily    S/P breast reconstruction, bilateral       Spirulina 500 MG Tabs      6 Tabs daily.    Malignant neoplasm of left breast in female, estrogen receptor positive, unspecified site of breast (H)       * Notice:  This list has 2 medication(s) that are the same as other medications prescribed for you. Read the directions carefully, and ask your doctor or other care provider to review them with you.

## 2018-12-04 NOTE — PROGRESS NOTES
"December 4, 2018    HISTORY OF PRESENT ILLNESS:  I am seeing Ms. Omaira Chadwick in follow up for low risk mammaprint at least stage 2 breast cancer.     Mary, or \"Omaira,\" comes in for follow up of breast cancer.  She is originally from [place].  She moved here displaced from [place].  She underwent a screening mammography this week.  This screening mammogram was performed on 02/09/2018 followed by diagnostic breast ultrasound which revealed a 2.6 x 1.1 x 1.7 cm irregular shaped mass at the 2 o'clock position involving the left breast.  There were indeterminate microcalcifications involving the left breast and a biopsy was recommended.  A stereotactic biopsy occurred on 02/20/2018 revealing an invasive ductal carcinoma, grade 2, ER positive, NE positive, HER2 negative by immunohistochemistry histochemistry at 1+.      Omaira had a breast MRI which revealed a 3.5-cm mass in her left breast with an area of non-mass-like enhancement measuring approximately 8 cm.  On this imaging, she had an equivocal lymph node in the left axilla.  This was not biopsied.       She was screened for the I-SPY-2 clinical trial.  She came back as a low-risk MammaPrint.  She was not enrolled on the therapeutic portion of the trial, as a result.  She was also diagnosed with Li-Fraumeni syndrome.       She started on neoadjuvant endocrine therapy with monthly Zoladex on 3/22/18, with letrozole added in April 2018.     She underwent bilateral mastectomies with sentinel node evaluation and breast reconstruction on 8/6/18 with Carissa Andrews and Marti.      Pathology reviewed revealing T2N1 with treatment related changed.    Mary returns today on zoladex and AI.  She is feeling well.  No f/c.  No n/v/d/c.  No concerns.  No vaginal symptoms.  No nodules or masses.    She is due for MRI screening later this month    [place]; she is trying to get her son here.    Her 10-point review of systems is otherwise " "negative.     PAST MEDICAL HISTORY:  Breast cancer, p53 mutation, MUTHY mutation     Current Outpatient Prescriptions   Medication     goserelin (ZOLADEX) 3.6 MG injection     letrozole (FEMARA) 2.5 MG tablet     acetaminophen (TYLENOL) 325 MG tablet     calcium citrate-vitamin D (CITRACAL) 315-250 MG-UNIT TABS per tablet     letrozole (FEMARA) 2.5 MG tablet     medium chain triglycerides (MCT OIL) oil     NONFORMULARY     OMEGA 3-6-9 FATTY ACIDS PO     ondansetron (ZOFRAN) 4 MG tablet     ondansetron (ZOFRAN-ODT) 4 MG ODT tab     oxyCODONE IR (ROXICODONE) 5 MG tablet     senna-docusate (SENOKOT-S;PERICOLACE) 8.6-50 MG per tablet     Spirulina 500 MG TABS     No current facility-administered medications for this visit.           PHYSICAL EXAMINATION:  /72 (BP Location: Right arm, Patient Position: Chair, Cuff Size: Adult Regular)  Pulse 80  Temp 97.2  F (36.2  C) (Oral)  Resp 14  Ht 1.753 m (5' 9.02\")  Wt 78.1 kg (172 lb 3.2 oz)  SpO2 100%  BMI 25.42 kg/m2  Gen: well appearing, nad  Heent: no icterus o/p clear  Cv: rrr, nl S1S2  Lungs: clear  Abd: soft, nt, nd + bs  Ext: no edema  Skin: no rashes  Breast exam: s/p bilateral mastectomy.  Well healed.  No erythema or warmth.     LABORATORY:  Pathology was reviewed.      ADDENDUM DIAGNOSIS:   A: Breast, left, skin sparing mastectomy:   -INVASIVE MAMMARY CARCINOMA OF NO SPECIAL TYPE (INVASIVE DUCTAL   CARCINOMA), JENN GRADE 1, two foci        -Larger focus in the lower outer quadrant (29 x 28 mm)        -Smaller focus in the lower inner quadrant (4 x 1.5 mm)   -Extensive ductal carcinoma in situ (DCIS), nuclear grades 2 and 3, solid   and cribriform types, with lobular   involvement   -DCIS is admixed with and extends beyond the larger focus of invasive   carcinoma   -DCIS is adjacent to the smaller focus of invasive carcinoma   -See comments and tumor synoptic     Lymph nodes with 1/2 + invasive disease measuring 4 mm with associated treated related " changes.  RCB class 2.    YpT2(m)N1a(sn)     ASSESSMENT AND PLAN:  This is a 42-year-old female with stage T2 N1a, invasive ductal carcinoma, ER positive, NJ positive, HER2 negative, involving the left breast in the setting of Li-Fraumeni syndrome.      1. Breast cancer - No s/s of recurrence.    Continue zoladex and AI.        We reviewed the pros/cons of having a prophylactic oophorectomy for permanent ovarian suppression; she is interested in this. She is thinking about this for the new year.    She will need a bone density scan.  This was done today and the results are pending.  We discussed prophylactic  zometa.    She has occasional hot flashes that are well-tolerated.      She is coping reasonably well.      Li-Fraumeni Cancer Screening (NCCN v.1.2018, Angely et al., 2016, Jesse et al, reviewed.  - arrange for whole body MRI and brain MRI  - she had a colonoscopy  - she needs EGD  - Had a derm exam.

## 2018-12-04 NOTE — LETTER
"12/4/2018       RE: Mary Chadwick  [address]  [facility] [address]     Dear Colleague,    Thank you for referring your patient, Mary Chadwick, to the [facility]. Please see a copy of my visit note below.    December 4, 2018    HISTORY OF PRESENT ILLNESS:  I am seeing Ms. Omaira Chadwick in follow up for low risk mammaprint at least stage 2 breast cancer.     Mary, or \"Omaira,Scarlett" comes in for follow up of breast cancer.  She is originally from [place].  She moved here displaced from [place].  She underwent a screening mammography this week.  This screening mammogram was performed on 02/09/2018 followed by diagnostic breast ultrasound which revealed a 2.6 x 1.1 x 1.7 cm irregular shaped mass at the 2 o'clock position involving the left breast.  There were indeterminate microcalcifications involving the left breast and a biopsy was recommended.  A stereotactic biopsy occurred on 02/20/2018 revealing an invasive ductal carcinoma, grade 2, ER positive, AK positive, HER2 negative by immunohistochemistry histochemistry at 1+.      Omaira had a breast MRI which revealed a 3.5-cm mass in her left breast with an area of non-mass-like enhancement measuring approximately 8 cm.  On this imaging, she had an equivocal lymph node in the left axilla.  This was not biopsied.       She was screened for the I-SPY-2 clinical trial.  She came back as a low-risk MammaPrint.  She was not enrolled on the therapeutic portion of the trial, as a result.  She was also diagnosed with Li-Fraumeni syndrome.       She started on neoadjuvant endocrine therapy with monthly Zoladex on 3/22/18, with letrozole added in April 2018.     She underwent bilateral mastectomies with sentinel node evaluation and breast reconstruction on 8/6/18 with Carissa Andrews and Marti.      Pathology reviewed revealing T2N1 with treatment related changed.    Mary returns today on zoladex and AI.  She is feeling " "well.  No f/c.  No n/v/d/c.  No concerns.  No vaginal symptoms.  No nodules or masses.    She is due for MRI screening later this month    [place]; she [place] to get her [place] here.    Her 10-point review of systems is otherwise negative.     PAST MEDICAL HISTORY:  Breast cancer, p53 mutation, MUTHY mutation     Current Outpatient Prescriptions   Medication     goserelin (ZOLADEX) 3.6 MG injection     letrozole (FEMARA) 2.5 MG tablet     acetaminophen (TYLENOL) 325 MG tablet     calcium citrate-vitamin D (CITRACAL) 315-250 MG-UNIT TABS per tablet     letrozole (FEMARA) 2.5 MG tablet     medium chain triglycerides (MCT OIL) oil     NONFORMULARY     OMEGA 3-6-9 FATTY ACIDS PO     ondansetron (ZOFRAN) 4 MG tablet     ondansetron (ZOFRAN-ODT) 4 MG ODT tab     oxyCODONE IR (ROXICODONE) 5 MG tablet     senna-docusate (SENOKOT-S;PERICOLACE) 8.6-50 MG per tablet     Spirulina 500 MG TABS     No current facility-administered medications for this visit.           PHYSICAL EXAMINATION:  /72 (BP Location: Right arm, Patient Position: Chair, Cuff Size: Adult Regular)  Pulse 80  Temp 97.2  F (36.2  C) (Oral)  Resp 14  Ht 1.753 m (5' 9.02\")  Wt 78.1 kg (172 lb 3.2 oz)  SpO2 100%  BMI 25.42 kg/m2  Gen: well appearing, nad  Heent: no icterus o/p clear  Cv: rrr, nl S1S2  Lungs: clear  Abd: soft, nt, nd + bs  Ext: no edema  Skin: no rashes  Breast exam: s/p bilateral mastectomy.  Well healed.  No erythema or warmth.     LABORATORY:  Pathology was reviewed.      ADDENDUM DIAGNOSIS:   A: Breast, left, skin sparing mastectomy:   -INVASIVE MAMMARY CARCINOMA OF NO SPECIAL TYPE (INVASIVE DUCTAL   CARCINOMA), JENN GRADE 1, two foci        -Larger focus in the lower outer quadrant (29 x 28 mm)        -Smaller focus in the lower inner quadrant (4 x 1.5 mm)   -Extensive ductal carcinoma in situ (DCIS), nuclear grades 2 and 3, solid   and cribriform types, with lobular   involvement   -DCIS is admixed " with and extends beyond the larger focus of invasive   carcinoma   -DCIS is adjacent to the smaller focus of invasive carcinoma   -See comments and tumor synoptic     Lymph nodes with 1/2 + invasive disease measuring 4 mm with associated treated related changes.  RCB class 2.    YpT2(m)N1a(sn)     ASSESSMENT AND PLAN:  This is a 42-year-old female with stage T2 N1a, invasive ductal carcinoma, ER positive, CA positive, HER2 negative, involving the left breast in the setting of Li-Fraumeni syndrome.      1. Breast cancer - No s/s of recurrence.    Continue zoladex and AI.        We reviewed the pros/cons of having a prophylactic oophorectomy for permanent ovarian suppression; she is interested in this. She is thinking about this for the new year.    She will need a bone density scan.  This was done today and the results are pending.  We discussed prophylactic  zometa.    She has occasional hot flashes that are well-tolerated.      She is coping reasonably well.      Li-Fraumeni Cancer Screening (NCCN v.1.2018, Angely et al., 2016, Jesse et al, reviewed.  - arrange for whole body MRI and brain MRI  - she had a colonoscopy  - she needs EGD  - Had a derm exam.    Again, thank you for allowing me to participate in the care of your patient.      Sincerely,    Valeria Gann MD

## 2018-12-11 ENCOUNTER — APPOINTMENT (OUTPATIENT)
Dept: PLASTIC SURGERY | Facility: CLINIC | Age: 43
End: 2018-12-11
Attending: PLASTIC SURGERY
Payer: MEDICAID

## 2018-12-20 ENCOUNTER — INFUSION THERAPY VISIT (OUTPATIENT)
Dept: ONCOLOGY | Facility: CLINIC | Age: 43
End: 2018-12-20
Attending: INTERNAL MEDICINE
Payer: MEDICAID

## 2018-12-20 VITALS
SYSTOLIC BLOOD PRESSURE: 109 MMHG | BODY MASS INDEX: 25.08 KG/M2 | OXYGEN SATURATION: 99 % | HEART RATE: 80 BPM | WEIGHT: 169.9 LBS | DIASTOLIC BLOOD PRESSURE: 65 MMHG | RESPIRATION RATE: 18 BRPM | TEMPERATURE: 98.3 F

## 2018-12-20 DIAGNOSIS — Z17.0 MALIGNANT NEOPLASM OF UPPER-OUTER QUADRANT OF LEFT BREAST IN FEMALE, ESTROGEN RECEPTOR POSITIVE (H): Primary | ICD-10-CM

## 2018-12-20 DIAGNOSIS — C50.412 MALIGNANT NEOPLASM OF UPPER-OUTER QUADRANT OF LEFT BREAST IN FEMALE, ESTROGEN RECEPTOR POSITIVE (H): Primary | ICD-10-CM

## 2018-12-20 PROCEDURE — 25000128 H RX IP 250 OP 636: Mod: ZF | Performed by: INTERNAL MEDICINE

## 2018-12-20 PROCEDURE — 96402 CHEMO HORMON ANTINEOPL SQ/IM: CPT

## 2018-12-20 PROCEDURE — 25000125 ZZHC RX 250: Mod: JW,ZF | Performed by: INTERNAL MEDICINE

## 2018-12-20 RX ADMIN — LIDOCAINE HYDROCHLORIDE 1 ML: 10 INJECTION, SOLUTION EPIDURAL; INFILTRATION; INTRACAUDAL; PERINEURAL at 09:59

## 2018-12-20 RX ADMIN — GOSERELIN ACETATE 3.6 MG: 3.6 IMPLANT SUBCUTANEOUS at 09:59

## 2018-12-20 NOTE — PROGRESS NOTES
Infusion Nursing Note:  Marygypsy Chadwick presents today for C10D1 Zoladex injection.    Patient seen by provider today: No   present during visit today: Not Applicable.    Note: Patient feels well. Denies fever/chills nor any signs of infection. Denies nausea/vomiting nor chest/abdominal discomfort. No new concerns made. Otherwise well.     Intravenous Access:  No Intravenous access/labs at this visit.    Treatment Conditions:  Not Applicable.      Post Infusion Assessment:  Ice applied prior to lidocaine.  Patient tolerated Zoladex injection on Right lower abdomen without incident.  Site patent and intact, free from redness, edema or discomfort.  No evidence of extravasations.    Discharge Plan:   Patient declined prescription refills.  Discharge instructions reviewed with: Patient.  Patient and/or family verbalized understanding of discharge instructions and all questions answered.  Copy of AVS reviewed with patient and/or family.  Patient will return 1/17/19 for next appointment.  Patient discharged in stable condition accompanied by: self.  Departure Mode: Ambulatory.  Face to Face time: 3.    ANNEMARIE SCHAFFER RN

## 2018-12-20 NOTE — PATIENT INSTRUCTIONS
Contact Numbers  [facility]: [phone]    After Hours:  [phone]  Triage: [phone]    Please call the [facility] Triage line if you experience a temperature greater than or equal to 100.5, shaking chills, have uncontrolled nausea, vomiting and/or diarrhea, dizziness, shortness of breath, chest pain, bleeding, unexplained bruising, or if you have any other new/concerning symptoms, questions or concerns.     If it is after hours, weekends, or holidays, please call the main hospital  at  [phone] and ask to speak to the Oncology doctor on call.     If you are having any concerning symptoms or wish to speak to a provider before your next infusion visit, please call your care coordinator or triage to notify them so we can adequately serve you.     If you need a refill on a narcotic prescription or other medication, please call triage before your infusion appointment.         December 2018 Sunday Monday Tuesday Wednesday Thursday Friday Saturday                                 1       2     3     4    [facility] RETURN  12:15 PM   (30 min.)   Valeria Gann MD   formerly [facility]    DX HIP/PELVIS/SPINE   1:00 PM   (30 min.)   DX1   [facility] Dexa 5     6     7     8       9     10     11    [facility] POST-OP   9:15 AM   (30 min.)   Rn,  [facility] Plastic Surgery   [facility] 12     13     14     15       16     17     18     19     20    [facility] ONC INFUSION 60   9:30 AM   (60 min.)    ONCOLOGY INFUSION   formerly [facility]    MR CHEST WWO   1:45 PM   (45 min.)   [address] MRI    MR ABDOMEN & PELVIS WWO   2:30 PM   (45 min.)   [address] MRI    MR BRAIN WWO   3:15 PM   (45 min.)   [address] MRI    MR TIBIA FIBULA LEFT WO   4:00 PM   (45 min.)   [address] MRI    MR FEMUR THIGH RIGHT WO   4:45 PM   (45 min.)   [address] MRI    MR  HUMERUS UPPER ARM LEFT WO   5:30 PM   (45 min.)   [id number]   [facility] MRI 21     22       23     24     25     26     27     28     29       30     31                                          January 2019 Sunday Monday Tuesday Wednesday Thursday Friday Saturday             1     2     3     4     5       6     7     8     9     10     11     12       13     14     15     16     17    [facility] ONC INFUSION 60   3:00 PM   (60 min.)    ONCOLOGY INFUSION   [facility] 18     19       20     21     22     23     24     25     26       27     28     29     30     31                               Lab Results:  No results found for this or any previous visit (from the past 12 hour(s)).

## 2019-01-16 ENCOUNTER — ANCILLARY PROCEDURE (OUTPATIENT)
Dept: MRI IMAGING | Facility: CLINIC | Age: 44
End: 2019-01-16
Attending: INTERNAL MEDICINE
Payer: COMMERCIAL

## 2019-01-16 DIAGNOSIS — Z15.09 MONOALLELIC MUTATION OF TP53 GENE: ICD-10-CM

## 2019-01-16 DIAGNOSIS — Z15.01 LI-FRAUMENI SYNDROME: ICD-10-CM

## 2019-01-16 DIAGNOSIS — Z15.89 MONOALLELIC MUTATION OF MUTYH GENE: ICD-10-CM

## 2019-01-16 DIAGNOSIS — Z15.01 MONOALLELIC MUTATION OF TP53 GENE: ICD-10-CM

## 2019-01-16 DIAGNOSIS — C50.912 MALIGNANT NEOPLASM OF LEFT BREAST IN FEMALE, ESTROGEN RECEPTOR POSITIVE, UNSPECIFIED SITE OF BREAST (H): ICD-10-CM

## 2019-01-16 DIAGNOSIS — Z15.89 MONOALLELIC MUTATION OF TP53 GENE: ICD-10-CM

## 2019-01-16 DIAGNOSIS — Z17.0 MALIGNANT NEOPLASM OF LEFT BREAST IN FEMALE, ESTROGEN RECEPTOR POSITIVE, UNSPECIFIED SITE OF BREAST (H): ICD-10-CM

## 2019-01-16 RX ORDER — GADOBUTROL 604.72 MG/ML
7.5 INJECTION INTRAVENOUS ONCE
Status: COMPLETED | OUTPATIENT
Start: 2019-01-16 | End: 2019-01-16

## 2019-01-16 RX ADMIN — GADOBUTROL 7.5 ML: 604.72 INJECTION INTRAVENOUS at 15:36

## 2019-01-17 ENCOUNTER — INFUSION THERAPY VISIT (OUTPATIENT)
Dept: ONCOLOGY | Facility: CLINIC | Age: 44
End: 2019-01-17
Attending: INTERNAL MEDICINE
Payer: COMMERCIAL

## 2019-01-17 VITALS
RESPIRATION RATE: 16 BRPM | HEART RATE: 67 BPM | OXYGEN SATURATION: 99 % | DIASTOLIC BLOOD PRESSURE: 72 MMHG | TEMPERATURE: 98.6 F | SYSTOLIC BLOOD PRESSURE: 121 MMHG

## 2019-01-17 DIAGNOSIS — Z17.0 MALIGNANT NEOPLASM OF UPPER-OUTER QUADRANT OF LEFT BREAST IN FEMALE, ESTROGEN RECEPTOR POSITIVE (H): Primary | ICD-10-CM

## 2019-01-17 DIAGNOSIS — C50.412 MALIGNANT NEOPLASM OF UPPER-OUTER QUADRANT OF LEFT BREAST IN FEMALE, ESTROGEN RECEPTOR POSITIVE (H): Primary | ICD-10-CM

## 2019-01-17 PROCEDURE — 25000128 H RX IP 250 OP 636: Mod: ZF | Performed by: INTERNAL MEDICINE

## 2019-01-17 PROCEDURE — 96402 CHEMO HORMON ANTINEOPL SQ/IM: CPT

## 2019-01-17 PROCEDURE — 25000125 ZZHC RX 250: Mod: ZF | Performed by: INTERNAL MEDICINE

## 2019-01-17 RX ADMIN — GOSERELIN ACETATE 3.6 MG: 3.6 IMPLANT SUBCUTANEOUS at 15:06

## 2019-01-17 RX ADMIN — LIDOCAINE HYDROCHLORIDE 2 ML: 10 INJECTION, SOLUTION EPIDURAL; INFILTRATION; INTRACAUDAL; PERINEURAL at 15:02

## 2019-01-17 ASSESSMENT — PAIN SCALES - GENERAL: PAINLEVEL: NO PAIN (0)

## 2019-01-17 NOTE — PROGRESS NOTES
Infusion Nursing Note:  Mary Dohertyy Farrukh presents today for Cycle 11 Day 1 Zoladex.    Patient seen by provider today: No   present during visit today: Not Applicable.    Post Infusion Assessment:  Ice applied to abdomen prior to injection. Patient also received lidocaine.  Patient tolerated ONE injection in RUQ of abdomen without incident.    Discharge Plan:   Patient declined prescription refills.  AVS to patient via [employer]. Patient will return 2/14/19 for next appointment.   Patient discharged in stable condition accompanied by: self.  Departure Mode: Ambulatory.    Deepika Venegas RN

## 2019-02-14 ENCOUNTER — INFUSION THERAPY VISIT (OUTPATIENT)
Dept: ONCOLOGY | Facility: CLINIC | Age: 44
End: 2019-02-14
Attending: INTERNAL MEDICINE
Payer: COMMERCIAL

## 2019-02-14 VITALS
DIASTOLIC BLOOD PRESSURE: 79 MMHG | TEMPERATURE: 98.1 F | WEIGHT: 171 LBS | SYSTOLIC BLOOD PRESSURE: 124 MMHG | HEIGHT: 69 IN | OXYGEN SATURATION: 95 % | BODY MASS INDEX: 25.33 KG/M2 | RESPIRATION RATE: 18 BRPM | HEART RATE: 81 BPM

## 2019-02-14 DIAGNOSIS — Z17.0 MALIGNANT NEOPLASM OF UPPER-OUTER QUADRANT OF LEFT BREAST IN FEMALE, ESTROGEN RECEPTOR POSITIVE (H): Primary | ICD-10-CM

## 2019-02-14 DIAGNOSIS — C50.412 MALIGNANT NEOPLASM OF UPPER-OUTER QUADRANT OF LEFT BREAST IN FEMALE, ESTROGEN RECEPTOR POSITIVE (H): Primary | ICD-10-CM

## 2019-02-14 PROCEDURE — 25000128 H RX IP 250 OP 636: Mod: ZF | Performed by: INTERNAL MEDICINE

## 2019-02-14 PROCEDURE — 25000125 ZZHC RX 250: Mod: ZF | Performed by: INTERNAL MEDICINE

## 2019-02-14 PROCEDURE — 96401 CHEMO ANTI-NEOPL SQ/IM: CPT

## 2019-02-14 RX ORDER — LETROZOLE 2.5 MG/1
2.5 TABLET, FILM COATED ORAL DAILY
Qty: 90 TABLET | Refills: 11 | Status: SHIPPED | OUTPATIENT
Start: 2019-02-14 | End: 2019-05-14

## 2019-02-14 RX ADMIN — GOSERELIN ACETATE 3.6 MG: 3.6 IMPLANT SUBCUTANEOUS at 15:12

## 2019-02-14 RX ADMIN — LIDOCAINE HYDROCHLORIDE 2 ML: 10 INJECTION, SOLUTION EPIDURAL; INFILTRATION; INTRACAUDAL; PERINEURAL at 15:08

## 2019-02-14 ASSESSMENT — PAIN SCALES - GENERAL: PAINLEVEL: SEVERE PAIN (7)

## 2019-02-14 ASSESSMENT — MIFFLIN-ST. JEOR: SCORE: 1505.28

## 2019-02-14 NOTE — PROGRESS NOTES
Infusion Nursing Note:  Mary Chadwick presents today for Cycle 12 Day 1 Zoladex.    Patient seen by provider today: No   present during visit today: Not Applicable.    NOTE: Patient states she has a headache because she hasn't eaten since 5am. After a sandwich patient stated she felt much better.      Post Infusion Assessment:  Ice applied to abdomen prior to injection. Patient also received lidocaine.  Patient tolerated ONE injection in LUQ of abdomen without incident.     Discharge Plan:   Patient declined prescription refills.  AVS to patient via Seen. Patient will return 3/14/19 for next appointment.   Patient discharged in stable condition accompanied by: self.  Departure Mode: Ambulatory.

## 2019-03-12 NOTE — PROGRESS NOTES
"HEMATOLOGY/ONCOLOGY PROGRESS NOTE  Mar 14, 2019    REASON FOR VISIT: follow-up of breast cancer    DIAGNOSIS:   Mary \"Omaira\"Farrukh is a 42-year-old female with stage IIA invasive ductal carcinoma, ER/CT-positive, HER2--negative, involving the left breast. She is originally from [place], displaced due to [place]. She underwent screening mammogram 2/9/2018 followed by diagnostic breast ultrasound, which revealed a 2.6 x 1.1 x 1.7 cm irregular shaped mass at the 2 o'clock position involving the left breast. Left breast biopsy showed invasive ductal carcinoma, grade 2, ER/CT-positive, HER2-negative. Breast MRI showed a 3.5 cm mass in her left breast with an area of non-mass enhancement measuring approximately 8 cm. On this imaging, she had an equivocal node in the left axilla; this was not biopsied.    She was screened for the I-SPY-2 clinical trial. She came back as low-risk MammaPrint and the study was not recommended. 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     She started on neoadjuvant endocrine therapy with monthly Zoladex on 3/22/18, with letrozole added in April 2018.    She underwent bilateral mastectomies with sentinel node evaluation and breast reconstruction on 8/6/18 with Carissa Andrews and Marti. Pathology revealed a T2N1 with treatment-related change.    INTERVAL HISTORY:   Omaira is here for follow-up.    She has no new concerns. Contemplating having the [facility]-BSO, already met with gyn. She thinks she is going to try to do this this summer while her son is with his father. She recently moved and is happy about this. She hasn't been as diligent about exercise in the last 1-2 months with the move, weather, but is hopeful to get back to her normal exercise program soon. She hasn't had any new concerns with treatment, remainswith some generalized aches without any localizing foci of pain, this is improved. She felt more emotional at start of endocrine therapy, however " has since started acupuncture and this has helped tremendously. She feels that treatment is tolerable at this time. She has no other concerns.    No fevers, chills, cough, SOB, chest pain, abdominal pain, nausea, vomiting, bleeding, or swelling.     Current Outpatient Medications   Medication Sig Dispense Refill     acetaminophen (TYLENOL) 325 MG tablet Take 2 tablets (650 mg) by mouth every 6 hours as needed for mild pain (Patient not taking: Reported on 12/4/2018) 50 tablet 0     calcium citrate-vitamin D (CITRACAL) 315-250 MG-UNIT TABS per tablet Take 2 tablets by mouth       goserelin (ZOLADEX) 3.6 MG injection Inject 3.6 mg Subcutaneous every 30 days       letrozole (FEMARA) 2.5 MG tablet Take 1 tablet (2.5 mg) by mouth daily 90 tablet 11     letrozole (FEMARA) 2.5 MG tablet Take 1 tablet (2.5 mg) by mouth daily (Patient not taking: Reported on 12/4/2018) 90 tablet 11     letrozole (FEMARA) 2.5 MG tablet Take 1 tablet (2.5 mg) by mouth daily 30 tablet 11     medium chain triglycerides (MCT OIL) oil Take 30 mLs by mouth daily       NONFORMULARY Take 1 capsule by mouth daily Rosehip oil       OMEGA 3-6-9 FATTY ACIDS PO Take 2 capsules by mouth daily       oxyCODONE IR (ROXICODONE) 5 MG tablet Take 1-2 tablets (5-10 mg) by mouth every 6 hours as needed for moderate to severe pain (Patient not taking: Reported on 12/4/2018) 15 tablet 0     senna-docusate (SENOKOT-S;PERICOLACE) 8.6-50 MG per tablet Take 1-2 tablets by mouth 2 times daily (Patient not taking: Reported on 12/4/2018) 30 tablet 0     Spirulina 500 MG TABS 6 Tabs daily.            No Known Allergies    PHYSICAL EXAMINATION  /59 (BP Location: Left arm, Patient Position: Sitting, Cuff Size: Adult Regular)   Pulse 76   Temp 98.5  F (36.9  C) (Oral)   Resp 16   Wt 78.6 kg (173 lb 3.2 oz)   SpO2 99%   BMI 25.57 kg/m    Constitutional: Alert, oriented female in no visible distress.  HEENT: PERRL, MMM  CV: RRR  Lungs: Clear  Abd: +BS, soft,  non-tender,non-distended  Ext: No edema  Skin: no rashes  Breast: S/p bilateral mastectomies with implants in place. No masses, nodules appreciated.   Lymph: no axillary, cervical, or supraclavicular adenopathy appreciated    LABS:  Results for orders placed or performed in visit on 03/14/19 (from the past 24 hour(s))   Comprehensive metabolic panel   Result Value Ref Range    Sodium 139 133 - 144 mmol/L    Potassium 3.9 3.4 - 5.3 mmol/L    Chloride 107 94 - 109 mmol/L    Carbon Dioxide 27 20 - 32 mmol/L    Anion Gap 5 3 - 14 mmol/L    Glucose 92 70 - 99 mg/dL    Urea Nitrogen 14 7 - 30 mg/dL    Creatinine 0.69 0.52 - 1.04 mg/dL    GFR Estimate >90 >60 mL/min/[1.73_m2]    GFR Estimate If Black >90 >60 mL/min/[1.73_m2]    Calcium 9.1 8.5 - 10.1 mg/dL    Bilirubin Total 0.3 0.2 - 1.3 mg/dL    Albumin 3.8 3.4 - 5.0 g/dL    Protein Total 7.4 6.8 - 8.8 g/dL    Alkaline Phosphatase 88 40 - 150 U/L    ALT 18 0 - 50 U/L    AST 13 0 - 45 U/L         IMPRESSION/PLAN:  Mary Chadwick is a 42 year old female with a T2 NX, invasive ductal carcinoma, ER/AR-positive, HER2 negative, involving the left breast, in the setting of Li-Fraumeni syndrome, s/p bilateral masectomies.    Stage IIA breast cancer: Continues on Zoladex and AI, tolerates overall well with mild myalgias, and some emotional fluctuations, which have been significantly improved with acupuncture. She feels this is tolerable at this time. SHe is contemplating [facility]-BS[facility], tentatively planning for early summer. Plan to continue Zoladex/AI for now. Follow-up 3 months as scheduled.    Liver lesions: follow-up imaging confirmed benign hemangiomas    Bone health: continue supplementation with calcium, vitamin D. Dexa 12/2018 normal bone density. She is awaiting a dental implant and is planning for this soon, will hold off starting Zometa.    Li-Fraumeni:  - She has completed screening colonoscopy, derm exam, body MRI, brain MRI, and EUS 1/2019    Paty Pandey,  DOMINGO  Hematology, Oncology, and Transplant  [facility] [address] [address] [phone]

## 2019-03-14 ENCOUNTER — ONCOLOGY VISIT (OUTPATIENT)
Dept: ONCOLOGY | Facility: CLINIC | Age: 44
End: 2019-03-14
Attending: PHYSICIAN ASSISTANT
Payer: COMMERCIAL

## 2019-03-14 ENCOUNTER — APPOINTMENT (OUTPATIENT)
Dept: LAB | Facility: CLINIC | Age: 44
End: 2019-03-14
Attending: PHYSICIAN ASSISTANT
Payer: COMMERCIAL

## 2019-03-14 ENCOUNTER — INFUSION THERAPY VISIT (OUTPATIENT)
Dept: ONCOLOGY | Facility: CLINIC | Age: 44
End: 2019-03-14
Attending: INTERNAL MEDICINE
Payer: COMMERCIAL

## 2019-03-14 VITALS
SYSTOLIC BLOOD PRESSURE: 112 MMHG | OXYGEN SATURATION: 99 % | RESPIRATION RATE: 16 BRPM | TEMPERATURE: 98.5 F | DIASTOLIC BLOOD PRESSURE: 59 MMHG | WEIGHT: 173.2 LBS | BODY MASS INDEX: 25.57 KG/M2 | HEART RATE: 76 BPM

## 2019-03-14 DIAGNOSIS — Z17.0 MALIGNANT NEOPLASM OF CENTRAL PORTION OF LEFT BREAST IN FEMALE, ESTROGEN RECEPTOR POSITIVE (H): ICD-10-CM

## 2019-03-14 DIAGNOSIS — C50.112 MALIGNANT NEOPLASM OF CENTRAL PORTION OF LEFT BREAST IN FEMALE, ESTROGEN RECEPTOR POSITIVE (H): Primary | ICD-10-CM

## 2019-03-14 DIAGNOSIS — Z17.0 MALIGNANT NEOPLASM OF UPPER-OUTER QUADRANT OF LEFT BREAST IN FEMALE, ESTROGEN RECEPTOR POSITIVE (H): Primary | ICD-10-CM

## 2019-03-14 DIAGNOSIS — Z17.0 MALIGNANT NEOPLASM OF CENTRAL PORTION OF LEFT BREAST IN FEMALE, ESTROGEN RECEPTOR POSITIVE (H): Primary | ICD-10-CM

## 2019-03-14 DIAGNOSIS — C50.412 MALIGNANT NEOPLASM OF UPPER-OUTER QUADRANT OF LEFT BREAST IN FEMALE, ESTROGEN RECEPTOR POSITIVE (H): Primary | ICD-10-CM

## 2019-03-14 DIAGNOSIS — C50.112 MALIGNANT NEOPLASM OF CENTRAL PORTION OF LEFT BREAST IN FEMALE, ESTROGEN RECEPTOR POSITIVE (H): ICD-10-CM

## 2019-03-14 LAB
ALBUMIN SERPL-MCNC: 3.8 G/DL (ref 3.4–5)
ALP SERPL-CCNC: 88 U/L (ref 40–150)
ALT SERPL W P-5'-P-CCNC: 18 U/L (ref 0–50)
ANION GAP SERPL CALCULATED.3IONS-SCNC: 5 MMOL/L (ref 3–14)
AST SERPL W P-5'-P-CCNC: 13 U/L (ref 0–45)
BASOPHILS # BLD AUTO: 0.1 10E9/L (ref 0–0.2)
BASOPHILS NFR BLD AUTO: 0.7 %
BILIRUB SERPL-MCNC: 0.3 MG/DL (ref 0.2–1.3)
BUN SERPL-MCNC: 14 MG/DL (ref 7–30)
CALCIUM SERPL-MCNC: 9.1 MG/DL (ref 8.5–10.1)
CHLORIDE SERPL-SCNC: 107 MMOL/L (ref 94–109)
CO2 SERPL-SCNC: 27 MMOL/L (ref 20–32)
CREAT SERPL-MCNC: 0.69 MG/DL (ref 0.52–1.04)
DIFFERENTIAL METHOD BLD: ABNORMAL
EOSINOPHIL # BLD AUTO: 0.2 10E9/L (ref 0–0.7)
EOSINOPHIL NFR BLD AUTO: 3.3 %
ERYTHROCYTE [DISTWIDTH] IN BLOOD BY AUTOMATED COUNT: 13.1 % (ref 10–15)
GFR SERPL CREATININE-BSD FRML MDRD: >90 ML/MIN/{1.73_M2}
GLUCOSE SERPL-MCNC: 92 MG/DL (ref 70–99)
HCT VFR BLD AUTO: 42 % (ref 35–47)
HGB BLD-MCNC: 13.2 G/DL (ref 11.7–15.7)
IMM GRANULOCYTES # BLD: 0 10E9/L (ref 0–0.4)
IMM GRANULOCYTES NFR BLD: 0.3 %
LYMPHOCYTES # BLD AUTO: 2.9 10E9/L (ref 0.8–5.3)
LYMPHOCYTES NFR BLD AUTO: 38.9 %
MCH RBC QN AUTO: 27.5 PG (ref 26.5–33)
MCHC RBC AUTO-ENTMCNC: 31.4 G/DL (ref 31.5–36.5)
MCV RBC AUTO: 88 FL (ref 78–100)
MONOCYTES # BLD AUTO: 0.7 10E9/L (ref 0–1.3)
MONOCYTES NFR BLD AUTO: 9 %
NEUTROPHILS # BLD AUTO: 3.5 10E9/L (ref 1.6–8.3)
NEUTROPHILS NFR BLD AUTO: 47.8 %
NRBC # BLD AUTO: 0 10*3/UL
NRBC BLD AUTO-RTO: 0 /100
PLATELET # BLD AUTO: 301 10E9/L (ref 150–450)
POTASSIUM SERPL-SCNC: 3.9 MMOL/L (ref 3.4–5.3)
PROT SERPL-MCNC: 7.4 G/DL (ref 6.8–8.8)
RBC # BLD AUTO: 4.8 10E12/L (ref 3.8–5.2)
SODIUM SERPL-SCNC: 139 MMOL/L (ref 133–144)
WBC # BLD AUTO: 7.3 10E9/L (ref 4–11)

## 2019-03-14 PROCEDURE — G0463 HOSPITAL OUTPT CLINIC VISIT: HCPCS | Mod: ZF

## 2019-03-14 PROCEDURE — 36415 COLL VENOUS BLD VENIPUNCTURE: CPT

## 2019-03-14 PROCEDURE — 25000128 H RX IP 250 OP 636: Mod: ZF | Performed by: PHYSICIAN ASSISTANT

## 2019-03-14 PROCEDURE — 99214 OFFICE O/P EST MOD 30 MIN: CPT | Mod: ZP | Performed by: PHYSICIAN ASSISTANT

## 2019-03-14 PROCEDURE — 96402 CHEMO HORMON ANTINEOPL SQ/IM: CPT

## 2019-03-14 PROCEDURE — 25000125 ZZHC RX 250: Mod: ZF | Performed by: PHYSICIAN ASSISTANT

## 2019-03-14 PROCEDURE — 85025 COMPLETE CBC W/AUTO DIFF WBC: CPT | Performed by: PHYSICIAN ASSISTANT

## 2019-03-14 PROCEDURE — 80053 COMPREHEN METABOLIC PANEL: CPT | Performed by: PHYSICIAN ASSISTANT

## 2019-03-14 RX ORDER — DEXTROAMPHETAMINE SACCHARATE, AMPHETAMINE ASPARTATE MONOHYDRATE, DEXTROAMPHETAMINE SULFATE AND AMPHETAMINE SULFATE 2.5; 2.5; 2.5; 2.5 MG/1; MG/1; MG/1; MG/1
10 CAPSULE, EXTENDED RELEASE ORAL
COMMUNITY
Start: 2019-03-04 | End: 2022-09-09

## 2019-03-14 RX ORDER — LETROZOLE 2.5 MG/1
2.5 TABLET, FILM COATED ORAL DAILY
Qty: 90 TABLET | Refills: 11 | Status: CANCELLED | OUTPATIENT
Start: 2019-03-14

## 2019-03-14 RX ADMIN — GOSERELIN ACETATE 3.6 MG: 3.6 IMPLANT SUBCUTANEOUS at 12:37

## 2019-03-14 RX ADMIN — LIDOCAINE HYDROCHLORIDE 1 ML: 10 INJECTION, SOLUTION EPIDURAL; INFILTRATION; INTRACAUDAL; PERINEURAL at 12:35

## 2019-03-14 ASSESSMENT — PAIN SCALES - GENERAL: PAINLEVEL: NO PAIN (0)

## 2019-03-14 NOTE — NURSING NOTE
"Oncology Rooming Note    March 14, 2019 11:43 AM   Mary Chadwick is a 43 year old female who presents for:    Chief Complaint   Patient presents with     Blood Draw     labs drawn via vpt by rn. vs taken      Oncology Clinic Visit     [facility] RETURN- BREAST CA     Initial Vitals: /59 (BP Location: Left arm, Patient Position: Sitting, Cuff Size: Adult Regular)   Pulse 76   Temp 98.5  F (36.9  C) (Oral)   Resp 16   Wt 78.6 kg (173 lb 3.2 oz)   SpO2 99%   BMI 25.57 kg/m   Estimated body mass index is 25.57 kg/m  as calculated from the following:    Height as of 2/14/19: 1.753 m (5' 9.02\").    Weight as of this encounter: 78.6 kg (173 lb 3.2 oz). Body surface area is 1.96 meters squared.  No Pain (0) Comment: Data Unavailable   No LMP recorded. Patient is perimenopausal.  Allergies reviewed: Yes  Medications reviewed: Yes    Medications: Medication refills not needed today.  Pharmacy name entered into EPIC: [facility] - [place], [place] - [address] [place] [address]    Clinical concerns: No new concerns. Katherin was notified.      Christian Stone LPN            "

## 2019-03-14 NOTE — NURSING NOTE
Chief Complaint   Patient presents with     Blood Draw     labs drawn via vpt by rn. vs taken      Blood drawn via vpt by RN in lab. VS taken. Pt checked into next appointment.   Nerissa Mitchell RN

## 2019-03-14 NOTE — PATIENT INSTRUCTIONS
[facility] Main Line: [phone]    Call triage nurse with chills and/or temperature greater than or equal to 100.4, uncontrolled nausea/vomiting, diarrhea, constipation, dizziness, shortness of breath, chest pain, bleeding, unexplained bruising, or any new/concerning symptoms, questions/concerns.     If you are having any concerning symptoms or wish to speak to a provider before your next infusion visit, please call your care coordinator or triage to notify them so we can adequately serve you.     For triage nurse, after hours, weekends, and holidays: [phone]
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
## 2019-03-14 NOTE — PROGRESS NOTES
Infusion Nursing Note:  Mary Chadwick presents today for Zoladex injection.    Patient seen by provider today: Yes: Paty Pandey PA-C   present during visit today: Not Applicable.    Note: Omaira arrives to infusion following her clinic appointment. She offers no new complaints.     Intravenous Access:  No IV access at this visit.    Treatment Conditions:  Not Applicable.    Post Infusion Assessment:  Patient tolerated lidocaine and Zoladex injection to RLQ without incident.     Discharge Plan:   Patient declined prescription refills.  Patient and/or family verbalized understanding of discharge instructions and all questions answered.  AVS to patient via Sokrati.  Patient will return 4/11/2019 for next appointment.   Patient discharged in stable condition.  Departure Mode: Ambulatory.    lAec Ramos RN

## 2019-04-15 ENCOUNTER — INFUSION THERAPY VISIT (OUTPATIENT)
Dept: ONCOLOGY | Facility: CLINIC | Age: 44
End: 2019-04-15
Attending: INTERNAL MEDICINE
Payer: COMMERCIAL

## 2019-04-15 VITALS
HEART RATE: 78 BPM | WEIGHT: 180.3 LBS | RESPIRATION RATE: 18 BRPM | TEMPERATURE: 98.5 F | BODY MASS INDEX: 26.61 KG/M2 | SYSTOLIC BLOOD PRESSURE: 124 MMHG | DIASTOLIC BLOOD PRESSURE: 72 MMHG | OXYGEN SATURATION: 100 %

## 2019-04-15 DIAGNOSIS — Z17.0 MALIGNANT NEOPLASM OF UPPER-OUTER QUADRANT OF LEFT BREAST IN FEMALE, ESTROGEN RECEPTOR POSITIVE (H): Primary | ICD-10-CM

## 2019-04-15 DIAGNOSIS — C50.412 MALIGNANT NEOPLASM OF UPPER-OUTER QUADRANT OF LEFT BREAST IN FEMALE, ESTROGEN RECEPTOR POSITIVE (H): Primary | ICD-10-CM

## 2019-04-15 PROCEDURE — 25000128 H RX IP 250 OP 636: Mod: ZF | Performed by: PHYSICIAN ASSISTANT

## 2019-04-15 PROCEDURE — 96402 CHEMO HORMON ANTINEOPL SQ/IM: CPT

## 2019-04-15 PROCEDURE — G0463 HOSPITAL OUTPT CLINIC VISIT: HCPCS | Mod: 25

## 2019-04-15 PROCEDURE — 25000125 ZZHC RX 250: Mod: JW,ZF | Performed by: PHYSICIAN ASSISTANT

## 2019-04-15 RX ORDER — LETROZOLE 2.5 MG/1
2.5 TABLET, FILM COATED ORAL DAILY
Qty: 90 TABLET | Refills: 11 | Status: SHIPPED | OUTPATIENT
Start: 2019-04-15 | End: 2019-08-26

## 2019-04-15 RX ADMIN — GOSERELIN ACETATE 3.6 MG: 3.6 IMPLANT SUBCUTANEOUS at 16:41

## 2019-04-15 RX ADMIN — LIDOCAINE HYDROCHLORIDE 1 ML: 10 INJECTION, SOLUTION EPIDURAL; INFILTRATION; INTRACAUDAL; PERINEURAL at 16:41

## 2019-04-15 NOTE — PATIENT INSTRUCTIONS
Contact Numbers  [facility]: [phone]    After Hours:  [phone]  Triage: [phone]    Please call the [facility] Triage line if you experience a temperature greater than or equal to 100.5, shaking chills, have uncontrolled nausea, vomiting and/or diarrhea, dizziness, shortness of breath, chest pain, bleeding, unexplained bruising, or if you have any other new/concerning symptoms, questions or concerns.     If it is after hours, weekends, or holidays, please call the main hospital  at  [phone] and ask to speak to the Oncology doctor on call.     If you are having any concerning symptoms or wish to speak to a provider before your next infusion visit, please call your care coordinator or triage to notify them so we can adequately serve you.     If you need a refill on a narcotic prescription or other medication, please call triage before your infusion appointment.         April 2019 Sunday Monday Tuesday Wednesday Thursday Friday Saturday        1     2     3     4     5     6       7     8     9     10     11    [facility] ONC INFUSION 60   3:00 PM   (60 min.)    ONCOLOGY INFUSION   [facility] 12     13       14     15    [facility] ONC INFUSION 60   4:30 PM   (60 min.)    ONCOLOGY INFUSION   [facility] 16     17     18     19     20       21     22     23     24     25     26     27       28     29     30                                     May 2019      [place] Monday Tuesday Wednesday Thursday Friday Saturday                  1     2     3     4       5     6     7     8     9    [facility] ONC INFUSION 60   3:00 PM   (60 min.)    ONCOLOGY INFUSION   [facility] 10     11       12     13     14     15     16     17     18       19     20     21     22     23     24     25       26     27     28     29     30     31                          Lab Results:  No results found for this or any previous visit (from the past 12 hour(s)).

## 2019-04-15 NOTE — PROGRESS NOTES
Infusion Nursing Note:  Mary Chadwick presents today for Zoladex.    Patient seen by provider today: NO.   present during visit today: Not Applicable.    Note: Patient arrived at infusion complaining on non radiating on and off chest tightness when taking deep breath. States feels due to surgery. Symptom happened when PO Letrozole started.  Denies nausea/vomiting. Denies heart palpitations nor dizziness. Refused to see provider today and wants to monitor. Sent InBasket to Dr. Gann and Zoya Us.     Appointment for next Zoladex 6, days early. Sent Inbasket to  to move it. Patient agreed with the plan Verbalized understanding.    Intravenous Access:  No Intravenous access/labs at this visit.    Treatment Conditions:  Not Applicable.      Post Infusion Assessment:  Patient tolerated Zoladex injection on Left lower abdomen without incident.  Site patent and intact, free from redness, edema or discomfort.       Discharge Plan:   Prescription refills given for Letrozole.  Discharge instructions reviewed with: Patient.  Patient and/or family verbalized understanding of discharge instructions and all questions answered.  AVS to patient via Tibersoft.  Patient will return 5/13/19 for next appointment.   Patient discharged in stable condition accompanied by: self and son.  Departure Mode: Ambulatory.  Face to Face time: 5.    ANNEMARIE SCHAFFER RN

## 2019-05-13 ENCOUNTER — INFUSION THERAPY VISIT (OUTPATIENT)
Dept: ONCOLOGY | Facility: CLINIC | Age: 44
End: 2019-05-13
Attending: INTERNAL MEDICINE
Payer: COMMERCIAL

## 2019-05-13 VITALS
RESPIRATION RATE: 16 BRPM | HEART RATE: 80 BPM | SYSTOLIC BLOOD PRESSURE: 111 MMHG | DIASTOLIC BLOOD PRESSURE: 73 MMHG | OXYGEN SATURATION: 96 % | TEMPERATURE: 98.4 F

## 2019-05-13 DIAGNOSIS — C50.412 MALIGNANT NEOPLASM OF UPPER-OUTER QUADRANT OF LEFT BREAST IN FEMALE, ESTROGEN RECEPTOR POSITIVE (H): Primary | ICD-10-CM

## 2019-05-13 DIAGNOSIS — Z17.0 MALIGNANT NEOPLASM OF UPPER-OUTER QUADRANT OF LEFT BREAST IN FEMALE, ESTROGEN RECEPTOR POSITIVE (H): Primary | ICD-10-CM

## 2019-05-13 PROCEDURE — 96402 CHEMO HORMON ANTINEOPL SQ/IM: CPT

## 2019-05-13 PROCEDURE — 25000125 ZZHC RX 250: Mod: ZF | Performed by: PHYSICIAN ASSISTANT

## 2019-05-13 PROCEDURE — 25000128 H RX IP 250 OP 636: Mod: ZF | Performed by: PHYSICIAN ASSISTANT

## 2019-05-13 RX ORDER — LETROZOLE 2.5 MG/1
2.5 TABLET, FILM COATED ORAL DAILY
Qty: 90 TABLET | Refills: 11 | Status: CANCELLED | OUTPATIENT
Start: 2019-05-13

## 2019-05-13 RX ADMIN — LIDOCAINE HYDROCHLORIDE 1 ML: 10 INJECTION, SOLUTION EPIDURAL; INFILTRATION; INTRACAUDAL; PERINEURAL at 15:45

## 2019-05-13 RX ADMIN — GOSERELIN ACETATE 3.6 MG: 3.6 IMPLANT SUBCUTANEOUS at 15:45

## 2019-05-13 ASSESSMENT — PAIN SCALES - GENERAL: PAINLEVEL: NO PAIN (0)

## 2019-05-13 NOTE — PATIENT INSTRUCTIONS
Contact Numbers    [facility] Main Line: [phone]  [facility] Triage and after hours / weekends / holidays:  [phone]      Please call the triage or after hours line if you experience a temperature greater than or equal to 100.5, shaking chills, have uncontrolled nausea, vomiting and/or diarrhea, dizziness, shortness of breath, chest pain, bleeding, unexplained bruising, or if you have any other new/concerning symptoms, questions or concerns.      If you are having any concerning symptoms or wish to speak to a provider before your next infusion visit, please call your care coordinator or triage to notify them so we can adequately serve you.     If you need a refill on a narcotic prescription or other medication, please call before your infusion appointment.

## 2019-05-13 NOTE — PROGRESS NOTES
Infusion Nursing Note:  Mary Nat Farrukh presents today for Zoladex.        Note: Zoladex injected to RUQ abdomen without incident.  Lidocaine given prior to zoladex    Discharge Plan:   Pt needs a refill on letrozole but this RN was unable to send the refill through oral pharmacy team.  A detailed message was sent to the oral pharmacy team and pt's care coordinator to ensure that pt does get her letrozole.    AVS to patient via Alphion.  Patient will return 6/10/19 to see Dr Gann in clinic and have next zoladex injection  Face to Face time: 0.    Carline Calvo, RN

## 2019-06-04 ENCOUNTER — OFFICE VISIT (OUTPATIENT)
Dept: PLASTIC SURGERY | Facility: CLINIC | Age: 44
End: 2019-06-04
Attending: PLASTIC SURGERY
Payer: COMMERCIAL

## 2019-06-04 ENCOUNTER — TELEPHONE (OUTPATIENT)
Dept: SURGERY | Facility: CLINIC | Age: 44
End: 2019-06-04

## 2019-06-04 VITALS
OXYGEN SATURATION: 100 % | DIASTOLIC BLOOD PRESSURE: 76 MMHG | TEMPERATURE: 97.8 F | WEIGHT: 177.7 LBS | RESPIRATION RATE: 16 BRPM | BODY MASS INDEX: 26.32 KG/M2 | HEIGHT: 69 IN | HEART RATE: 76 BPM | SYSTOLIC BLOOD PRESSURE: 112 MMHG

## 2019-06-04 DIAGNOSIS — Z98.890 S/P BREAST RECONSTRUCTION, BILATERAL: Primary | ICD-10-CM

## 2019-06-04 PROCEDURE — G0463 HOSPITAL OUTPT CLINIC VISIT: HCPCS | Mod: ZF

## 2019-06-04 RX ORDER — CEFAZOLIN SODIUM 2 G/50ML
2 SOLUTION INTRAVENOUS
Status: CANCELLED | OUTPATIENT
Start: 2019-06-04

## 2019-06-04 RX ORDER — CEFAZOLIN SODIUM 1 G/50ML
1 INJECTION, SOLUTION INTRAVENOUS SEE ADMIN INSTRUCTIONS
Status: CANCELLED | OUTPATIENT
Start: 2019-06-04

## 2019-06-04 ASSESSMENT — PAIN SCALES - GENERAL: PAINLEVEL: NO PAIN (0)

## 2019-06-04 ASSESSMENT — MIFFLIN-ST. JEOR: SCORE: 1535.73

## 2019-06-04 NOTE — LETTER
6/4/2019       RE: Mary Chadwick  [address]  [facility] [address]     Dear Colleague,    Thank you for referring your patient, Mary Chadwick, to the [facility] at [facility]. Please see a copy of my visit note below.    PRESENTING COMPLAINT:  Postoperative visit, status post bilateral breast reconstruction for breast cancer with implants and subsequent fat grafting and nipple reconstructions done 11/08/2018.      HISTORY OF PRESENTING COMPLAINT:  Ms. Chadwick is 43 years old.  She is about 6 months out from her last surgery, completely healed, very happy with the results.  No issues.  She has a bit of concerns regarding some rippling in the upper pole and some left upper pole indentation.      PHYSICAL EXAMINATION:  Vital signs are stable.  She is afebrile, in no obvious distress.  Both breasts are symmetric and aesthetic.  Left breast has some upper pole indentations.  Both nipples are healed.  There is minimal rippling in both upper poles as well bilaterally.  Grade 2 capsular contracture.      ASSESSMENT AND PLAN:  Based upon the above findings, a diagnosis of bilateral breast reconstruction with current rippling and upper pole indentation was made.  I gave her the options of doing nothing versus fat grafting versus fat grafting and implant exchange.  She wants to proceed with fat grafting only, which I think is reasonable.  I explained to her how that would be done and where the scars would be.  I explained to her the concept.  All risks, benefits and alternatives, including pain, infection, bleeding, scarring, asymmetry, seromas, hematomas, wound breakdown, wound dehiscence, contour irregularities, palpable lumps, indentations, injury to the bowel, DVT, PE, MI, CVA, pneumonia, renal failure and death, were explained.  We will get prior authorization and proceed as indicated.  She was happy with this visit.      Total time spent with the  patient was 15 minutes, of which more than half was counseling.     Again, thank you for allowing me to participate in the care of your patient.      Sincerely,    BASILIO Kidd MD

## 2019-06-04 NOTE — PROGRESS NOTES
PRESENTING COMPLAINT:  Postoperative visit, status post bilateral breast reconstruction for breast cancer with implants and subsequent fat grafting and nipple reconstructions done 11/08/2018.      HISTORY OF PRESENTING COMPLAINT:  Ms. Chadwick is 43 years old.  She is about 6 months out from her last surgery, completely healed, very happy with the results.  No issues.  She has a bit of concerns regarding some rippling in the upper pole and some left upper pole indentation.      PHYSICAL EXAMINATION:  Vital signs are stable.  She is afebrile, in no obvious distress.  Both breasts are symmetric and aesthetic.  Left breast has some upper pole indentations.  Both nipples are healed.  There is minimal rippling in both upper poles as well bilaterally.  Grade 2 capsular contracture.      ASSESSMENT AND PLAN:  Based upon the above findings, a diagnosis of bilateral breast reconstruction with current rippling and upper pole indentation was made.  I gave her the options of doing nothing versus fat grafting versus fat grafting and implant exchange.  She wants to proceed with fat grafting only, which I think is reasonable.  I explained to her how that would be done and where the scars would be.  I explained to her the concept.  All risks, benefits and alternatives, including pain, infection, bleeding, scarring, asymmetry, seromas, hematomas, wound breakdown, wound dehiscence, contour irregularities, palpable lumps, indentations, injury to the bowel, DVT, PE, MI, CVA, pneumonia, renal failure and death, were explained.  We will get prior authorization and proceed as indicated.  She was happy with this visit.      Total time spent with the patient was 15 minutes, of which more than half was counseling.

## 2019-06-06 ENCOUNTER — TELEPHONE (OUTPATIENT)
Dept: SURGERY | Facility: CLINIC | Age: 44
End: 2019-06-06

## 2019-06-06 NOTE — TELEPHONE ENCOUNTER
Sent [employer] message to schedule procedure with Dr Basim Kidd    Details left with my direct contact number for scheduling.     Radha Lehman  Jordyn-Op Surgical Coordinator  [phone]

## 2019-06-07 ENCOUNTER — HOSPITAL ENCOUNTER (OUTPATIENT)
Facility: AMBULATORY SURGERY CENTER | Age: 44
End: 2019-06-07
Attending: PLASTIC SURGERY
Payer: COMMERCIAL

## 2019-06-10 ENCOUNTER — INFUSION THERAPY VISIT (OUTPATIENT)
Dept: ONCOLOGY | Facility: CLINIC | Age: 44
End: 2019-06-10
Attending: INTERNAL MEDICINE
Payer: COMMERCIAL

## 2019-06-10 ENCOUNTER — PATIENT OUTREACH (OUTPATIENT)
Dept: ONCOLOGY | Facility: CLINIC | Age: 44
End: 2019-06-10

## 2019-06-10 ENCOUNTER — PATIENT OUTREACH (OUTPATIENT)
Dept: CARE COORDINATION | Facility: CLINIC | Age: 44
End: 2019-06-10

## 2019-06-10 ENCOUNTER — APPOINTMENT (OUTPATIENT)
Dept: LAB | Facility: CLINIC | Age: 44
End: 2019-06-10
Attending: INTERNAL MEDICINE
Payer: COMMERCIAL

## 2019-06-10 VITALS
OXYGEN SATURATION: 100 % | HEIGHT: 69 IN | SYSTOLIC BLOOD PRESSURE: 132 MMHG | HEART RATE: 71 BPM | RESPIRATION RATE: 16 BRPM | BODY MASS INDEX: 25.96 KG/M2 | WEIGHT: 175.3 LBS | TEMPERATURE: 98.4 F | DIASTOLIC BLOOD PRESSURE: 74 MMHG

## 2019-06-10 DIAGNOSIS — C50.412 MALIGNANT NEOPLASM OF UPPER-OUTER QUADRANT OF LEFT BREAST IN FEMALE, ESTROGEN RECEPTOR POSITIVE (H): Primary | ICD-10-CM

## 2019-06-10 DIAGNOSIS — C50.112 MALIGNANT NEOPLASM OF CENTRAL PORTION OF LEFT BREAST IN FEMALE, ESTROGEN RECEPTOR POSITIVE (H): Primary | ICD-10-CM

## 2019-06-10 DIAGNOSIS — Z17.0 MALIGNANT NEOPLASM OF CENTRAL PORTION OF LEFT BREAST IN FEMALE, ESTROGEN RECEPTOR POSITIVE (H): Primary | ICD-10-CM

## 2019-06-10 DIAGNOSIS — Z17.0 MALIGNANT NEOPLASM OF UPPER-OUTER QUADRANT OF LEFT BREAST IN FEMALE, ESTROGEN RECEPTOR POSITIVE (H): Primary | ICD-10-CM

## 2019-06-10 LAB
ALBUMIN SERPL-MCNC: 4.1 G/DL (ref 3.4–5)
ALP SERPL-CCNC: 96 U/L (ref 40–150)
ALT SERPL W P-5'-P-CCNC: 19 U/L (ref 0–50)
ANION GAP SERPL CALCULATED.3IONS-SCNC: 5 MMOL/L (ref 3–14)
AST SERPL W P-5'-P-CCNC: 12 U/L (ref 0–45)
BASOPHILS # BLD AUTO: 0.1 10E9/L (ref 0–0.2)
BASOPHILS NFR BLD AUTO: 0.6 %
BILIRUB SERPL-MCNC: 0.3 MG/DL (ref 0.2–1.3)
BUN SERPL-MCNC: 16 MG/DL (ref 7–30)
CALCIUM SERPL-MCNC: 9.2 MG/DL (ref 8.5–10.1)
CHLORIDE SERPL-SCNC: 108 MMOL/L (ref 94–109)
CO2 SERPL-SCNC: 29 MMOL/L (ref 20–32)
CREAT SERPL-MCNC: 0.72 MG/DL (ref 0.52–1.04)
DIFFERENTIAL METHOD BLD: NORMAL
EOSINOPHIL # BLD AUTO: 0.1 10E9/L (ref 0–0.7)
EOSINOPHIL NFR BLD AUTO: 1.8 %
ERYTHROCYTE [DISTWIDTH] IN BLOOD BY AUTOMATED COUNT: 12.4 % (ref 10–15)
GFR SERPL CREATININE-BSD FRML MDRD: >90 ML/MIN/{1.73_M2}
GLUCOSE SERPL-MCNC: 90 MG/DL (ref 70–99)
HCT VFR BLD AUTO: 41.4 % (ref 35–47)
HGB BLD-MCNC: 13.4 G/DL (ref 11.7–15.7)
IMM GRANULOCYTES # BLD: 0 10E9/L (ref 0–0.4)
IMM GRANULOCYTES NFR BLD: 0.3 %
LYMPHOCYTES # BLD AUTO: 2.7 10E9/L (ref 0.8–5.3)
LYMPHOCYTES NFR BLD AUTO: 34.4 %
MCH RBC QN AUTO: 28.4 PG (ref 26.5–33)
MCHC RBC AUTO-ENTMCNC: 32.4 G/DL (ref 31.5–36.5)
MCV RBC AUTO: 88 FL (ref 78–100)
MONOCYTES # BLD AUTO: 0.6 10E9/L (ref 0–1.3)
MONOCYTES NFR BLD AUTO: 8 %
NEUTROPHILS # BLD AUTO: 4.3 10E9/L (ref 1.6–8.3)
NEUTROPHILS NFR BLD AUTO: 54.9 %
NRBC # BLD AUTO: 0 10*3/UL
NRBC BLD AUTO-RTO: 0 /100
PLATELET # BLD AUTO: 297 10E9/L (ref 150–450)
POTASSIUM SERPL-SCNC: 3.6 MMOL/L (ref 3.4–5.3)
PROT SERPL-MCNC: 7.8 G/DL (ref 6.8–8.8)
RBC # BLD AUTO: 4.72 10E12/L (ref 3.8–5.2)
SODIUM SERPL-SCNC: 141 MMOL/L (ref 133–144)
WBC # BLD AUTO: 7.9 10E9/L (ref 4–11)

## 2019-06-10 PROCEDURE — 85025 COMPLETE CBC W/AUTO DIFF WBC: CPT | Performed by: INTERNAL MEDICINE

## 2019-06-10 PROCEDURE — 25000125 ZZHC RX 250: Mod: ZF | Performed by: INTERNAL MEDICINE

## 2019-06-10 PROCEDURE — 80053 COMPREHEN METABOLIC PANEL: CPT | Performed by: INTERNAL MEDICINE

## 2019-06-10 PROCEDURE — 25000128 H RX IP 250 OP 636: Mod: ZF | Performed by: INTERNAL MEDICINE

## 2019-06-10 PROCEDURE — G0463 HOSPITAL OUTPT CLINIC VISIT: HCPCS | Mod: ZF

## 2019-06-10 PROCEDURE — 96402 CHEMO HORMON ANTINEOPL SQ/IM: CPT

## 2019-06-10 PROCEDURE — 99214 OFFICE O/P EST MOD 30 MIN: CPT | Mod: ZP | Performed by: INTERNAL MEDICINE

## 2019-06-10 RX ORDER — VENLAFAXINE HYDROCHLORIDE 37.5 MG/1
CAPSULE, EXTENDED RELEASE ORAL
Qty: 60 CAPSULE | Refills: 1 | Status: SHIPPED | OUTPATIENT
Start: 2019-06-10 | End: 2019-10-11

## 2019-06-10 RX ADMIN — LIDOCAINE HYDROCHLORIDE 2 ML: 10 INJECTION, SOLUTION EPIDURAL; INFILTRATION; INTRACAUDAL; PERINEURAL at 15:57

## 2019-06-10 RX ADMIN — GOSERELIN ACETATE 3.6 MG: 3.6 IMPLANT SUBCUTANEOUS at 16:00

## 2019-06-10 ASSESSMENT — MIFFLIN-ST. JEOR: SCORE: 1524.85

## 2019-06-10 ASSESSMENT — PAIN SCALES - GENERAL: PAINLEVEL: NO PAIN (0)
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
## 2019-06-10 NOTE — PROGRESS NOTES
Met with Omaira who scored high on the distress scale and was emotional in the exam room.  After long discussion, Omaira is feeling overwhelmed with her cancer diagnosis, legal issues between her/ex  in regards to custody issues, finances, the unexpected loss of her boyfriend in March to a heart attack and upcoming surgeries. She does not have a strong support system.  Discussed resources available to her. Social work will meet with her today to review options for financial assistance. She has agreed to meet with Christian Franco.  Encouraged her to call any time.

## 2019-06-10 NOTE — NURSING NOTE
"Oncology Rooming Note    Ammy 10, 2019 2:07 PM   Mary Chadwick is a 43 year old female who presents for:    Chief Complaint   Patient presents with     Lab Only     No lab orders. Pt would like to be drawn in clinic once orders are placed. VS taken. Pt checked in for next appt     Oncology Clinic Visit     RETURN VISIT; BREAST CA 6 MONTH FOLLOW UP      Initial Vitals: /74 (BP Location: Right arm, Patient Position: Sitting, Cuff Size: Adult Regular)   Pulse 71   Temp 98.4  F (36.9  C) (Oral)   Resp 16   Ht 1.753 m (5' 9.02\")   Wt 79.5 kg (175 lb 4.8 oz)   SpO2 100%   BMI 25.87 kg/m   Estimated body mass index is 25.87 kg/m  as calculated from the following:    Height as of this encounter: 1.753 m (5' 9.02\").    Weight as of this encounter: 79.5 kg (175 lb 4.8 oz). Body surface area is 1.97 meters squared.  No Pain (0) Comment: Data Unavailable   No LMP recorded. Patient is perimenopausal.  Allergies reviewed: Yes  Medications reviewed: Yes    Medications: Medication refills not needed today.  Pharmacy name entered into EPIC:    [facility] - [place], [place] - [address] [place] [address] [id number] - [facility] [address] [place] [address] AT [address]    Clinical concerns: Patient missed Femara for a week and just restarted it Friday. Patient states that she is really emotional and overwhelmed.  Dr. Gann was notified.      Bridgette Damico              "

## 2019-06-10 NOTE — PROGRESS NOTES
Infusion Nursing Note:  Mary Joy Farrukh presents today for Zoladex.    Patient seen by provider today: No     Treatment Conditions:  Not Applicable.     Intravenous Access:  No Intravenous access/labs at this visit.        Note:   Proceed with treatment.    Ice applied to left of abdomen prior to lidocaine injection.  Tolerated injection into Left side of abdomen without incident. No Prescriptions filled today.  Pt will follow up with FD regarding future appts.  Sophia Sanchez RN

## 2019-06-10 NOTE — NURSING NOTE
Chief Complaint   Patient presents with     Lab Only     No lab orders. Pt would like to be drawn in clinic once orders are placed. VS taken. Pt checked in for next appt
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
## 2019-06-18 ENCOUNTER — MYC MEDICAL ADVICE (OUTPATIENT)
Dept: PLASTIC SURGERY | Facility: CLINIC | Age: 44
End: 2019-06-18

## 2019-06-19 ENCOUNTER — ANESTHESIA EVENT (OUTPATIENT)
Dept: SURGERY | Facility: CLINIC | Age: 44
End: 2019-06-19

## 2019-06-19 RX ORDER — NALOXONE HYDROCHLORIDE 0.4 MG/ML
.1-.4 INJECTION, SOLUTION INTRAMUSCULAR; INTRAVENOUS; SUBCUTANEOUS
Status: CANCELLED | OUTPATIENT
Start: 2019-06-19 | End: 2019-06-20

## 2019-06-19 RX ORDER — ONDANSETRON 2 MG/ML
4 INJECTION INTRAMUSCULAR; INTRAVENOUS EVERY 30 MIN PRN
Status: CANCELLED | OUTPATIENT
Start: 2019-06-19

## 2019-06-19 RX ORDER — SODIUM CHLORIDE, SODIUM LACTATE, POTASSIUM CHLORIDE, CALCIUM CHLORIDE 600; 310; 30; 20 MG/100ML; MG/100ML; MG/100ML; MG/100ML
INJECTION, SOLUTION INTRAVENOUS CONTINUOUS
Status: CANCELLED | OUTPATIENT
Start: 2019-06-19

## 2019-06-19 RX ORDER — ONDANSETRON 4 MG/1
4 TABLET, ORALLY DISINTEGRATING ORAL EVERY 30 MIN PRN
Status: CANCELLED | OUTPATIENT
Start: 2019-06-19

## 2019-06-19 RX ORDER — FENTANYL CITRATE 50 UG/ML
25-50 INJECTION, SOLUTION INTRAMUSCULAR; INTRAVENOUS EVERY 5 MIN PRN
Status: CANCELLED | OUTPATIENT
Start: 2019-06-19

## 2019-06-19 RX ORDER — GABAPENTIN 300 MG/1
300 CAPSULE ORAL ONCE
Status: CANCELLED | OUTPATIENT
Start: 2019-06-19 | End: 2019-06-19

## 2019-06-19 RX ORDER — OXYCODONE HYDROCHLORIDE 5 MG/1
5 TABLET ORAL EVERY 4 HOURS PRN
Status: CANCELLED | OUTPATIENT
Start: 2019-06-19

## 2019-06-19 RX ORDER — ACETAMINOPHEN 325 MG/1
975 TABLET ORAL ONCE
Status: CANCELLED | OUTPATIENT
Start: 2019-06-19 | End: 2019-06-19

## 2019-06-20 ENCOUNTER — ANESTHESIA (OUTPATIENT)
Dept: SURGERY | Facility: CLINIC | Age: 44
End: 2019-06-20

## 2019-07-08 ENCOUNTER — INFUSION THERAPY VISIT (OUTPATIENT)
Dept: ONCOLOGY | Facility: CLINIC | Age: 44
End: 2019-07-08
Attending: INTERNAL MEDICINE
Payer: COMMERCIAL

## 2019-07-08 VITALS
OXYGEN SATURATION: 99 % | TEMPERATURE: 98.1 F | RESPIRATION RATE: 16 BRPM | DIASTOLIC BLOOD PRESSURE: 70 MMHG | SYSTOLIC BLOOD PRESSURE: 109 MMHG | HEART RATE: 62 BPM

## 2019-07-08 DIAGNOSIS — Z17.0 MALIGNANT NEOPLASM OF UPPER-OUTER QUADRANT OF LEFT BREAST IN FEMALE, ESTROGEN RECEPTOR POSITIVE (H): Primary | ICD-10-CM

## 2019-07-08 DIAGNOSIS — C50.412 MALIGNANT NEOPLASM OF UPPER-OUTER QUADRANT OF LEFT BREAST IN FEMALE, ESTROGEN RECEPTOR POSITIVE (H): Primary | ICD-10-CM

## 2019-07-08 PROCEDURE — 96402 CHEMO HORMON ANTINEOPL SQ/IM: CPT

## 2019-07-08 PROCEDURE — 25000128 H RX IP 250 OP 636: Mod: ZF | Performed by: PHYSICIAN ASSISTANT

## 2019-07-08 PROCEDURE — 25000125 ZZHC RX 250: Mod: ZF | Performed by: PHYSICIAN ASSISTANT

## 2019-07-08 RX ADMIN — GOSERELIN ACETATE 3.6 MG: 3.6 IMPLANT SUBCUTANEOUS at 09:10

## 2019-07-08 RX ADMIN — LIDOCAINE HYDROCHLORIDE 1 ML: 10 INJECTION, SOLUTION EPIDURAL; INFILTRATION; INTRACAUDAL; PERINEURAL at 09:06

## 2019-07-08 ASSESSMENT — PAIN SCALES - GENERAL: PAINLEVEL: NO PAIN (0)

## 2019-07-08 NOTE — PROGRESS NOTES
Infusion Nursing Note:  Mary Nat Farrukh presents today for Zoladex.    Patient seen by provider today: No   present during visit today: Not Applicable.    Note: Patient feels well today and has no concerns.      Post Infusion Assessment:  Patient tolerated injections of lidocaine and Zoladex without incident into right abdominal tissue.  Patient iced abdomen prior injections.       Discharge Plan:   [facility] pharmacy states that she has adequate refills of Femara on file.  AVS to patient via [email or web address].  Patient will return 8/5/19 for next appointment.   Patient discharged in stable condition accompanied by: self.  Departure Mode: Ambulatory.    Yenny Ozuna RN

## 2019-07-08 NOTE — PATIENT INSTRUCTIONS
Contact Numbers  [facility]: [phone] (for scheduling changes)  Triage: [phone] (for symptoms)    Please call the [facility] Triage line if you experience a temperature greater than or equal to 100.4, shaking chills, have uncontrolled nausea, vomiting and/or diarrhea, dizziness, shortness of breath, chest pain, bleeding, unexplained bruising, or if you have any other new/concerning symptoms, questions or concerns.     If you are having any concerning symptoms or wish to speak to a provider before your next infusion visit, please call your care coordinator or triage to notify them so we can adequately serve you.     If you need a refill on a narcotic prescription or other medication, please call triage before your infusion appointment.

## 2019-08-05 ENCOUNTER — INFUSION THERAPY VISIT (OUTPATIENT)
Dept: ONCOLOGY | Facility: CLINIC | Age: 44
End: 2019-08-05
Attending: INTERNAL MEDICINE
Payer: COMMERCIAL

## 2019-08-05 VITALS
DIASTOLIC BLOOD PRESSURE: 74 MMHG | OXYGEN SATURATION: 95 % | HEART RATE: 66 BPM | SYSTOLIC BLOOD PRESSURE: 122 MMHG | TEMPERATURE: 98.1 F | RESPIRATION RATE: 18 BRPM

## 2019-08-05 DIAGNOSIS — Z17.0 MALIGNANT NEOPLASM OF UPPER-OUTER QUADRANT OF LEFT BREAST IN FEMALE, ESTROGEN RECEPTOR POSITIVE (H): Primary | ICD-10-CM

## 2019-08-05 DIAGNOSIS — C50.412 MALIGNANT NEOPLASM OF UPPER-OUTER QUADRANT OF LEFT BREAST IN FEMALE, ESTROGEN RECEPTOR POSITIVE (H): Primary | ICD-10-CM

## 2019-08-05 PROCEDURE — 25000128 H RX IP 250 OP 636: Mod: ZF | Performed by: INTERNAL MEDICINE

## 2019-08-05 PROCEDURE — 25000125 ZZHC RX 250: Mod: ZF | Performed by: INTERNAL MEDICINE

## 2019-08-05 PROCEDURE — 96402 CHEMO HORMON ANTINEOPL SQ/IM: CPT

## 2019-08-05 RX ORDER — LETROZOLE 2.5 MG/1
2.5 TABLET, FILM COATED ORAL DAILY
Qty: 90 TABLET | Refills: 11 | Status: SHIPPED | OUTPATIENT
Start: 2019-08-05 | End: 2020-03-10

## 2019-08-05 RX ADMIN — GOSERELIN ACETATE 3.6 MG: 3.6 IMPLANT SUBCUTANEOUS at 10:20

## 2019-08-05 RX ADMIN — LIDOCAINE HYDROCHLORIDE 1 ML: 10 INJECTION, SOLUTION EPIDURAL; INFILTRATION; INTRACAUDAL; PERINEURAL at 10:20

## 2019-08-05 ASSESSMENT — PAIN SCALES - GENERAL: PAINLEVEL: NO PAIN (0)

## 2019-08-05 NOTE — PATIENT INSTRUCTIONS
Contact Numbers    [facility] Main Line: [phone]  [facility] Triage and after hours / weekends / holidays:  [phone]      Please call the triage or after hours line if you experience a temperature greater than or equal to 100.5, shaking chills, have uncontrolled nausea, vomiting and/or diarrhea, dizziness, shortness of breath, chest pain, bleeding, unexplained bruising, or if you have any other new/concerning symptoms, questions or concerns.      If you are having any concerning symptoms or wish to speak to a provider before your next infusion visit, please call your care coordinator or triage to notify them so we can adequately serve you.     If you need a refill on a narcotic prescription or other medication, please call before your infusion appointment.         August 2019 Sunday Monday Tuesday Wednesday Thursday Friday Saturday                       1     2     3       4     5    [facility] ONC INFUSION 60   9:30 AM   (60 min.)    ONCOLOGY INFUSION   [facility] 6     7     8     9     10       11     12     13     14     15     16     17       18     19     20     21     22     23     24       25     26    [facility] DRAW   4:15 PM   (15 min.)   [facility] D[facility]   [facility] Lab Draw    [facility] RETURN   4:45 PM   (30 min.)   Valeria Gann MD   [facility] 27     28     29     30     31 September 2019 Sunday Monday Tuesday Wednesday Thursday Friday Saturday   1     2     3    [facility] ONC INFUSION 60   9:00 AM   (60 min.)    ONCOLOGY INFUSION   [facility] 4     5     6     7       8     9     10     11     12     13     14       15     16     17     18     19     20     21       22     23     24     25     26     27     28       29     30                                              Lab Results:  No results found for this or any previous visit (from the past 12 hour(s)).

## 2019-08-05 NOTE — PROGRESS NOTES
Infusion Nursing Note:  Mary Chadwick presents today for Zoladex injection.    Patient seen by provider today: No   present during visit today: Not Applicable.    Note: Patient presents to infusion today stating she feels well and offers no concerns or questions at this time. Patient denies any pain at this time.       Intravenous Access:  No Intravenous access/labs at this visit.    Treatment Conditions:  Not Applicable.      Post Infusion Assessment:  Patient tolerated injection without incident to Left abdomen.       Discharge Plan:   Prescription refills given for Femara (sent to preferred pharmacy).  Discharge instructions reviewed with: Patient.  Patient and/or family verbalized understanding of discharge instructions and all questions answered.  AVS to patient via [employer].  Patient will return 8/26/19 for next appointment.   Patient discharged in stable condition accompanied by: self.  Departure Mode: Ambulatory.    Samira Ram RN

## 2019-08-26 ENCOUNTER — ONCOLOGY VISIT (OUTPATIENT)
Dept: ONCOLOGY | Facility: CLINIC | Age: 44
End: 2019-08-26
Attending: INTERNAL MEDICINE
Payer: COMMERCIAL

## 2019-08-26 ENCOUNTER — APPOINTMENT (OUTPATIENT)
Dept: LAB | Facility: CLINIC | Age: 44
End: 2019-08-26
Attending: INTERNAL MEDICINE
Payer: COMMERCIAL

## 2019-08-26 VITALS
HEIGHT: 69 IN | RESPIRATION RATE: 16 BRPM | OXYGEN SATURATION: 100 % | WEIGHT: 180.8 LBS | HEART RATE: 83 BPM | DIASTOLIC BLOOD PRESSURE: 69 MMHG | SYSTOLIC BLOOD PRESSURE: 118 MMHG | BODY MASS INDEX: 26.78 KG/M2 | TEMPERATURE: 98.1 F

## 2019-08-26 DIAGNOSIS — C50.412 MALIGNANT NEOPLASM OF UPPER-OUTER QUADRANT OF LEFT BREAST IN FEMALE, ESTROGEN RECEPTOR POSITIVE (H): Primary | ICD-10-CM

## 2019-08-26 DIAGNOSIS — Z17.0 MALIGNANT NEOPLASM OF UPPER-OUTER QUADRANT OF LEFT BREAST IN FEMALE, ESTROGEN RECEPTOR POSITIVE (H): Primary | ICD-10-CM

## 2019-08-26 PROCEDURE — G0463 HOSPITAL OUTPT CLINIC VISIT: HCPCS | Mod: ZF

## 2019-08-26 PROCEDURE — 99214 OFFICE O/P EST MOD 30 MIN: CPT | Mod: ZP | Performed by: INTERNAL MEDICINE

## 2019-08-26 RX ORDER — LETROZOLE 2.5 MG/1
2.5 TABLET, FILM COATED ORAL DAILY
Qty: 90 TABLET | Refills: 11 | Status: SHIPPED | OUTPATIENT
Start: 2019-08-26 | End: 2019-10-11

## 2019-08-26 ASSESSMENT — PAIN SCALES - GENERAL: PAINLEVEL: NO PAIN (0)

## 2019-08-26 ASSESSMENT — MIFFLIN-ST. JEOR: SCORE: 1549.73

## 2019-08-26 NOTE — NURSING NOTE
Chief Complaint   Patient presents with     Blood Draw     Labs drawn via VPT by RN in lab. VS taken. Pt checked in for next appt     Labs collected from venipuncture by RN. Vitals taken. Checked in for appointment(s).    Niurka GUTIERREZ RN PHN BSN  BMT/Oncology Lab

## 2019-08-26 NOTE — LETTER
"8/26/2019       RE: Mary Chadwick  [address] [facility] [address]     Dear Colleague,    Thank you for referring your patient, Mary Chadwick, to the [facility]. Please see a copy of my visit note below.    September 15, 2019    HISTORY OF PRESENT ILLNESS:  I am seeing Ms. Omaira Chadwick in follow up for low risk mammaprint stage 2 breast cancer.     Mary, or \"Omaira,\" comes in for follow up of breast cancer.  She is originally from [place].  She moved here displaced from [place].  She underwent a screening mammography this week.  This screening mammogram was performed on 02/09/2018 followed by diagnostic breast ultrasound which revealed a 2.6 x 1.1 x 1.7 cm irregular shaped mass at the 2 o'clock position involving the left breast.  There were indeterminate microcalcifications involving the left breast and a biopsy was recommended.  A stereotactic biopsy occurred on 02/20/2018 revealing an invasive ductal carcinoma, grade 2, ER positive, NJ positive, HER2 negative by immunohistochemistry histochemistry at 1+.      Omaira had a breast MRI which revealed a 3.5-cm mass in her left breast with an area of non-mass-like enhancement measuring approximately 8 cm.  On this imaging, she had an equivocal lymph node in the left axilla.  This was not biopsied.       She was screened for the I-SPY-2 clinical trial.  She came back as a low-risk MammaPrint.  She was not enrolled on the therapeutic portion of the trial, as a result.  She was also diagnosed with Li-Fraumeni syndrome.       She started on neoadjuvant endocrine therapy with monthly Zoladex on 3/22/18, with letrozole added in April 2018.     She underwent bilateral mastectomies with sentinel node evaluation and breast reconstruction on 8/6/18 with Carissa Andrews and Marti.      Pathology reviewed revealing T2N1 with treatment related changed.    Mary returns today on zoladex and AI.  She is feeling well.  No " "f/c.  No n/v/d/c.  No concerns.  No vaginal symptoms.  No nodules or masses.    [place]; she has plans to move back this fall.  She is very anxious about this and the health care system, but feels that in order to keep her family together, this is the next best thing.  As a result, she is planning on an oophorectomy this summer ? HIMANSHU    She is anxious to see OB/GYN.  She feels well otherwise without complaints.      Her 10-point review of systems is otherwise negative.     PAST MEDICAL HISTORY:  Breast cancer, p53 mutation, MUTHY mutation     Current Outpatient Medications   Medication     goserelin (ZOLADEX) 3.6 MG injection     letrozole (FEMARA) 2.5 MG tablet     amphetamine-dextroamphetamine (ADDERALL XR) 10 MG 24 hr capsule     calcium citrate-vitamin D (CITRACAL) 315-250 MG-UNIT TABS per tablet     letrozole (FEMARA) 2.5 MG tablet     Spirulina 500 MG TABS     venlafaxine (EFFEXOR XR) 37.5 MG 24 hr capsule     No current facility-administered medications for this visit.           PHYSICAL EXAMINATION:  /69 (BP Location: Right arm, Patient Position: Sitting, Cuff Size: Adult Regular)   Pulse 83   Temp 98.1  F (36.7  C) (Oral)   Resp 16   Ht 1.753 m (5' 9.02\")   Wt 82 kg (180 lb 12.8 oz)   SpO2 100%   BMI 26.69 kg/m     Gen: well appearing, nad  Heent: no icterus o/p clear  Cv: rrr, nl S1S2  Lungs: clear  Abd: soft, nt, nd + bs  Ext: no edema  Skin: no rashes  Breast exam: s/p bilateral mastectomy.  Well healed.  No erythema or warmth.  No axillary adenopathy     LABORATORY:  Pathology was reviewed.      ADDENDUM DIAGNOSIS:   A: Breast, left, skin sparing mastectomy:   -INVASIVE MAMMARY CARCINOMA OF NO SPECIAL TYPE (INVASIVE DUCTAL   CARCINOMA), JENN GRADE 1, two foci        -Larger focus in the lower outer quadrant (29 x 28 mm)        -Smaller focus in the lower inner quadrant (4 x 1.5 mm)   -Extensive ductal carcinoma in situ (DCIS), nuclear grades 2 and 3, solid "   and cribriform types, with lobular   involvement   -DCIS is admixed with and extends beyond the larger focus of invasive   carcinoma   -DCIS is adjacent to the smaller focus of invasive carcinoma   -See comments and tumor synoptic     Lymph nodes with 1/2 + invasive disease measuring 4 mm with associated treated related changes.  RCB class 2.    YpT2(m)N1a(sn)     Reviewed recent imaging including whole body MRI which was benign      ASSESSMENT AND PLAN:  This is a 43-year-old female with stage T2 N1a, invasive ductal carcinoma, ER positive, NC positive, HER2 negative, involving the left breast in the setting of Li-Fraumeni syndrome.      1. Breast cancer - No s/s of recurrence.    Continue zoladex and AI.        We reviewed the pros/cons of having a prophylactic oophorectomy for permanent ovarian suppression; she is interested in this. Given she is moving to the [facility], we discussed having this done this summer.  It would be reasonable to think about TAHBSO given her genetic abnormalities, need for ongoing endocrine therapy, and the fact that in the [facility], tamoxifen is the most readily available medication, and this can cause uterine changes.     2. She has occasional hot flashes that are well-tolerated.       3. Li-Fraumeni Cancer Screening (NCCN v.1.2018, Angely et al., 2016, Jesse et al, reviewed.  - up to date for whole body MRI and brain MRI  - she had a colonoscopy  - she needs EGD  - Had a derm exam.    Again, thank you for allowing me to participate in the care of your patient.      Sincerely,    Valeria Gann MD

## 2019-08-26 NOTE — NURSING NOTE
"Oncology Rooming Note    August 26, 2019 4:59 PM   Mary Chadwick is a 43 year old female who presents for:    Chief Complaint   Patient presents with     Blood Draw     Labs drawn via VPT by RN in lab. VS taken. Pt checked in for next appt     RECHECK     Breast ca     Initial Vitals: /69 (BP Location: Right arm, Patient Position: Sitting, Cuff Size: Adult Regular)   Pulse 83   Temp 98.1  F (36.7  C) (Oral)   Resp 16   Ht 1.753 m (5' 9.02\")   Wt 82 kg (180 lb 12.8 oz)   SpO2 100%   BMI 26.69 kg/m   Estimated body mass index is 26.69 kg/m  as calculated from the following:    Height as of this encounter: 1.753 m (5' 9.02\").    Weight as of this encounter: 82 kg (180 lb 12.8 oz). Body surface area is 2 meters squared.  No Pain (0) Comment: Data Unavailable   No LMP recorded. Patient is perimenopausal.  Allergies reviewed: Yes  Medications reviewed: Yes    Medications: Medication refills not needed today.  Pharmacy name entered into EPIC:    [facility] - [place], [place] - [address] [id number] - [place], [place] - [address] AT [address]    Clinical concerns: none        Marla TREVOR Nascimento              "

## 2019-09-10 ENCOUNTER — TELEPHONE (OUTPATIENT)
Dept: OBGYN | Facility: CLINIC | Age: 44
End: 2019-09-10

## 2019-09-10 NOTE — TELEPHONE ENCOUNTER
Tried to call patient to schedule surgery, but kept getting busy signal. [employer] message sent to patient to contact clinic to schedule

## 2019-09-14 ENCOUNTER — INFUSION THERAPY VISIT (OUTPATIENT)
Dept: ONCOLOGY | Facility: CLINIC | Age: 44
End: 2019-09-14
Attending: INTERNAL MEDICINE
Payer: COMMERCIAL

## 2019-09-14 VITALS
OXYGEN SATURATION: 100 % | HEART RATE: 80 BPM | DIASTOLIC BLOOD PRESSURE: 66 MMHG | TEMPERATURE: 98 F | SYSTOLIC BLOOD PRESSURE: 118 MMHG | RESPIRATION RATE: 16 BRPM

## 2019-09-14 DIAGNOSIS — Z17.0 MALIGNANT NEOPLASM OF UPPER-OUTER QUADRANT OF LEFT BREAST IN FEMALE, ESTROGEN RECEPTOR POSITIVE (H): Primary | ICD-10-CM

## 2019-09-14 DIAGNOSIS — C50.412 MALIGNANT NEOPLASM OF UPPER-OUTER QUADRANT OF LEFT BREAST IN FEMALE, ESTROGEN RECEPTOR POSITIVE (H): Primary | ICD-10-CM

## 2019-09-14 PROCEDURE — 96402 CHEMO HORMON ANTINEOPL SQ/IM: CPT

## 2019-09-14 PROCEDURE — 25000125 ZZHC RX 250: Mod: ZF | Performed by: INTERNAL MEDICINE

## 2019-09-14 PROCEDURE — 25000128 H RX IP 250 OP 636: Mod: ZF | Performed by: INTERNAL MEDICINE

## 2019-09-14 RX ADMIN — LIDOCAINE HYDROCHLORIDE 1 ML: 10 INJECTION, SOLUTION EPIDURAL; INFILTRATION; INTRACAUDAL; PERINEURAL at 13:11

## 2019-09-14 RX ADMIN — GOSERELIN ACETATE 3.6 MG: 3.6 IMPLANT SUBCUTANEOUS at 13:11

## 2019-09-14 ASSESSMENT — PAIN SCALES - GENERAL: PAINLEVEL: NO PAIN (0)

## 2019-09-14 NOTE — PATIENT INSTRUCTIONS
[facility] Main Line: [phone]    Call triage nurse with chills and/or temperature greater than or equal to 100.4, uncontrolled nausea/vomiting, diarrhea, constipation, dizziness, shortness of breath, chest pain, bleeding, unexplained bruising, or any new/concerning symptoms, questions/concerns.   If you are having any concerning symptoms or wish to speak to a provider before your next infusion visit, please call your care coordinator or triage to notify them so we can adequately serve you.   Nurse Triage line:  [phone]    If after hours, weekends, or holidays, call main hospital  and ask for Oncology doctor on call @ [phone]      September 2019 Sunday Monday Tuesday Wednesday Thursday Friday Saturday   1     2    [facility] ONC INFUSION 60  12:30 PM   (60 min.)    ONCOLOGY INFUSION   [facility] 3     4     5     6     7       8     9     10     11     12     13     14    [facility] ONC INFUSION 60  12:30 PM   (60 min.)    ONCOLOGY INFUSION   [facility]   15     16     17     18     19     20     21       22     23     24     25     26     27     28       29     30                                          October 2019 Sunday Monday Tuesday Wednesday Thursday Friday Saturday             1     2     3     4     5       6     7     8     9     10     11    LONG  10:15 AM   (30 min.)   Yenny Hidalgo MD   [facility]    [facility] ONC INFUSION 60   1:30 PM   (60 min.)    ONCOLOGY INFUSION   [facility] 12       13     14     15     16     17     18     19       20     21     22     23     24     25     26       27     28     29     30     31                               Lab Results:  No results found for this or any previous visit (from the past 12 hour(s)).

## 2019-09-14 NOTE — PROGRESS NOTES
Infusion Nursing Note:  Mary Nat Chadwick presents today for Zoladex.    Patient seen by provider today: No   present during visit today: Not Applicable.    Note: Patient reported to clinic with no new cocerns. Ice and Lidocaine used prior to injection.    Intravenous Access:  No Intravenous access/labs at this visit.    Treatment Conditions:  Not Applicable.      Post Infusion Assessment:  Patient tolerated injection without incident.   Right side abdomen one injection of Zoladex    Discharge Plan:   Patient declined prescription refills.  Discharge instructions reviewed with: Patient.  Patient and/or family verbalized understanding of discharge instructions and all questions answered.  AVS to patient via [employer].  Patient will return 10/11/19 for next appointment.   Patient discharged in stable condition accompanied by: self.  Departure Mode: Ambulatory.  Face to Face time: 0 minutes.    Juan José Howe, MAN, RN

## 2019-09-15 NOTE — PROGRESS NOTES
"September 15, 2019    HISTORY OF PRESENT ILLNESS:  I am seeing Ms. Omaira Chadwick in follow up for low risk mammaprint stage 2 breast cancer.     Mary, or \"Omaira,\" comes in for follow up of breast cancer.  She is originally from [place].  She moved here displaced from [place].  She underwent a screening mammography this week.  This screening mammogram was performed on 02/09/2018 followed by diagnostic breast ultrasound which revealed a 2.6 x 1.1 x 1.7 cm irregular shaped mass at the 2 o'clock position involving the left breast.  There were indeterminate microcalcifications involving the left breast and a biopsy was recommended.  A stereotactic biopsy occurred on 02/20/2018 revealing an invasive ductal carcinoma, grade 2, ER positive, WI positive, HER2 negative by immunohistochemistry histochemistry at 1+.      Omaira had a breast MRI which revealed a 3.5-cm mass in her left breast with an area of non-mass-like enhancement measuring approximately 8 cm.  On this imaging, she had an equivocal lymph node in the left axilla.  This was not biopsied.       She was screened for the I-SPY-2 clinical trial.  She came back as a low-risk MammaPrint.  She was not enrolled on the therapeutic portion of the trial, as a result.  She was also diagnosed with Li-Fraumeni syndrome.       She started on neoadjuvant endocrine therapy with monthly Zoladex on 3/22/18, with letrozole added in April 2018.     She underwent bilateral mastectomies with sentinel node evaluation and breast reconstruction on 8/6/18 with Carissa Andrews and Marti.      Pathology reviewed revealing T2N1 with treatment related changed.    Mary returns today on zoladex and AI.  She is feeling well.  No f/c.  No n/v/d/c.  No concerns.  No vaginal symptoms.  No nodules or masses.    [place] returned [place]; she has plans to move back this fall.  She is very anxious about this and the health care system, but feels that in order to keep her " "family together, this is the next best thing.  As a result, she is planning on an oophorectomy this summer ? HIMANSHU    She is anxious to see OB/GYN.  She feels well otherwise without complaints.      Her 10-point review of systems is otherwise negative.     PAST MEDICAL HISTORY:  Breast cancer, p53 mutation, MUTHY mutation     Current Outpatient Medications   Medication     goserelin (ZOLADEX) 3.6 MG injection     letrozole (FEMARA) 2.5 MG tablet     amphetamine-dextroamphetamine (ADDERALL XR) 10 MG 24 hr capsule     calcium citrate-vitamin D (CITRACAL) 315-250 MG-UNIT TABS per tablet     letrozole (FEMARA) 2.5 MG tablet     Spirulina 500 MG TABS     venlafaxine (EFFEXOR XR) 37.5 MG 24 hr capsule     No current facility-administered medications for this visit.           PHYSICAL EXAMINATION:  /69 (BP Location: Right arm, Patient Position: Sitting, Cuff Size: Adult Regular)   Pulse 83   Temp 98.1  F (36.7  C) (Oral)   Resp 16   Ht 1.753 m (5' 9.02\")   Wt 82 kg (180 lb 12.8 oz)   SpO2 100%   BMI 26.69 kg/m    Gen: well appearing, nad  Heent: no icterus o/p clear  Cv: rrr, nl S1S2  Lungs: clear  Abd: soft, nt, nd + bs  Ext: no edema  Skin: no rashes  Breast exam: s/p bilateral mastectomy.  Well healed.  No erythema or warmth.  No axillary adenopathy     LABORATORY:  Pathology was reviewed.      ADDENDUM DIAGNOSIS:   A: Breast, left, skin sparing mastectomy:   -INVASIVE MAMMARY CARCINOMA OF NO SPECIAL TYPE (INVASIVE DUCTAL   CARCINOMA), JENN GRADE 1, two foci        -Larger focus in the lower outer quadrant (29 x 28 mm)        -Smaller focus in the lower inner quadrant (4 x 1.5 mm)   -Extensive ductal carcinoma in situ (DCIS), nuclear grades 2 and 3, solid   and cribriform types, with lobular   involvement   -DCIS is admixed with and extends beyond the larger focus of invasive   carcinoma   -DCIS is adjacent to the smaller focus of invasive carcinoma   -See comments and tumor synoptic     Lymph nodes " with 1/2 + invasive disease measuring 4 mm with associated treated related changes.  RCB class 2.    YpT2(m)N1a(sn)     Reviewed recent imaging including whole body MRI which was benign      ASSESSMENT AND PLAN:  This is a 43-year-old female with stage T2 N1a, invasive ductal carcinoma, ER positive, CT positive, HER2 negative, involving the left breast in the setting of Li-Fraumeni syndrome.      1. Breast cancer - No s/s of recurrence.    Continue zoladex and AI.        We reviewed the pros/cons of having a prophylactic oophorectomy for permanent ovarian suppression; she is interested in this. Given she is moving to the [facility], we discussed having this done this summer.  It would be reasonable to think about TAHBSO given her genetic abnormalities, need for ongoing endocrine therapy, and the fact that in the [facility], tamoxifen is the most readily available medication, and this can cause uterine changes.     2. She has occasional hot flashes that are well-tolerated.       3. Li-Fraumeni Cancer Screening (NCCN v.1.2018, Angely et al., 2016, Jesse et al, reviewed.  - up to date for whole body MRI and brain MRI  - she had a colonoscopy  - she needs EGD  - Had a derm exam.

## 2019-09-18 NOTE — TELEPHONE ENCOUNTER
Attempt 3 to schedule surgery with patient. Per [employer] message, contacted pt on work phone. lvm to call back

## 2019-09-18 NOTE — TELEPHONE ENCOUNTER
Type of surgery: gyn  Location of surgery: [facility]  Date and time of surgery: 11/20/19 730a  Surgeon: Gwen  Pre-Op Appt Date: tbd  Post-Op Appt Date: tbd   Packet sent out: Yes  Pre-cert/Authorization completed:  No  Date: 9/18/2019    Jenifer HARRIS, Surgery Coordinator

## 2019-10-01 PROBLEM — Z78.9 USES BIRTH CONTROL: Status: ACTIVE | Noted: 2018-05-14

## 2019-10-11 ENCOUNTER — OFFICE VISIT (OUTPATIENT)
Dept: OBGYN | Facility: CLINIC | Age: 44
End: 2019-10-11
Payer: COMMERCIAL

## 2019-10-11 ENCOUNTER — INFUSION THERAPY VISIT (OUTPATIENT)
Dept: ONCOLOGY | Facility: CLINIC | Age: 44
End: 2019-10-11
Attending: INTERNAL MEDICINE
Payer: COMMERCIAL

## 2019-10-11 VITALS
DIASTOLIC BLOOD PRESSURE: 71 MMHG | TEMPERATURE: 97.6 F | HEART RATE: 95 BPM | SYSTOLIC BLOOD PRESSURE: 121 MMHG | WEIGHT: 175.3 LBS | BODY MASS INDEX: 25.88 KG/M2

## 2019-10-11 DIAGNOSIS — Z17.0 MALIGNANT NEOPLASM OF UPPER-OUTER QUADRANT OF LEFT BREAST IN FEMALE, ESTROGEN RECEPTOR POSITIVE (H): Primary | ICD-10-CM

## 2019-10-11 DIAGNOSIS — Z15.01 LI-FRAUMENI SYNDROME: ICD-10-CM

## 2019-10-11 DIAGNOSIS — C50.412 MALIGNANT NEOPLASM OF UPPER-OUTER QUADRANT OF LEFT BREAST IN FEMALE, ESTROGEN RECEPTOR POSITIVE (H): Primary | ICD-10-CM

## 2019-10-11 DIAGNOSIS — N93.9 ABNORMAL UTERINE BLEEDING: Primary | ICD-10-CM

## 2019-10-11 LAB — HCG UR QL: NEGATIVE

## 2019-10-11 PROCEDURE — 88305 TISSUE EXAM BY PATHOLOGIST: CPT | Performed by: OBSTETRICS & GYNECOLOGY

## 2019-10-11 PROCEDURE — 25000128 H RX IP 250 OP 636: Mod: ZF | Performed by: INTERNAL MEDICINE

## 2019-10-11 PROCEDURE — 81025 URINE PREGNANCY TEST: CPT | Performed by: OBSTETRICS & GYNECOLOGY

## 2019-10-11 PROCEDURE — 58100 BIOPSY OF UTERUS LINING: CPT | Performed by: OBSTETRICS & GYNECOLOGY

## 2019-10-11 PROCEDURE — 96402 CHEMO HORMON ANTINEOPL SQ/IM: CPT

## 2019-10-11 PROCEDURE — 99214 OFFICE O/P EST MOD 30 MIN: CPT | Mod: 25 | Performed by: OBSTETRICS & GYNECOLOGY

## 2019-10-11 PROCEDURE — 25000125 ZZHC RX 250: Mod: ZF | Performed by: INTERNAL MEDICINE

## 2019-10-11 RX ADMIN — LIDOCAINE HYDROCHLORIDE 2 ML: 10 INJECTION, SOLUTION EPIDURAL; INFILTRATION; INTRACAUDAL; PERINEURAL at 13:28

## 2019-10-11 RX ADMIN — GOSERELIN ACETATE 3.6 MG: 3.6 IMPLANT SUBCUTANEOUS at 13:28

## 2019-10-11 NOTE — PROGRESS NOTES
Infusion Nursing Note:  Mary Chadwick presents today for Zoladex.    Patient seen by provider today: No   present during visit today: Not Applicable.    Note: Offers no complaints.    Omaira asked if she should get her Zoladex in November as she's scheduled for her hysterectomy on 11/20/19.  I/B sent to Dr. Gann and Galina [facility] to follow up on that question.      Post Infusion Assessment:  Patient tolerated subcutaneous Lidocaine injection followed by subcutaneous Zoladex to LEFT abdomen without incident.       Discharge Plan:   Patient discharged in stable condition accompanied by: self.  Departure Mode: Ambulatory.  Face to Face time: 0.    Radha Bullock RN

## 2019-10-11 NOTE — PATIENT INSTRUCTIONS
[facility] Main Line: [phone]    Call triage nurse with chills and/or temperature greater than or equal to 100.4, uncontrolled nausea/vomiting, diarrhea, constipation, dizziness, shortness of breath, chest pain, bleeding, unexplained bruising, or any new/concerning symptoms, questions/concerns.   If you are having any concerning symptoms or wish to speak to a provider before your next infusion visit, please call your care coordinator or triage to notify them so we can adequately serve you.   Triage Nurse Line: [phone]    If after hours, weekends, or holidays [phone]

## 2019-10-15 LAB — COPATH REPORT: NORMAL

## 2019-10-17 ENCOUNTER — TELEPHONE (OUTPATIENT)
Dept: ONCOLOGY | Facility: CLINIC | Age: 44
End: 2019-10-17

## 2019-10-17 NOTE — TELEPHONE ENCOUNTER
10/17/2019    Omaira called and I spoke to her by phone today. Her cousin is currently undergoing genetic testing, and Omaira would like to send her cousin a copy of her results to help with this process.    We discussed the option of secure emainl and mailing results. Omaira thinks she may have a copy at home, and will plan to look for a copy to send to her cousin. She would also like me to mail her another paper copy of her results, which I will do.     She also would like her children to be tested. They live on [address] in the [facility], and may be visiting over [facility] and [place]. She was provided with the scheduling number for [facility]. I will also look into options for having her children tested at home, and send her some information regarding that.    See letter dated 10/17/19.    Nay Smith MS, [facility]  Licensed Genetic Counselor  P. [phone]  F. [phone]

## 2019-10-17 NOTE — LETTER
Cancer Risk Management  Program Locations    [facility]  [facility]  [facility]  Mailing Address  Cancer Risk Management Program  [facility]  [address]  [place] [address]    New patient appointments  [phone]  October 17, 2019    Mary Chadwick  [address] [place] [address]      Dear Omaira,    It was nice talking with you the other day. Enclosed is a paper copy of your genetic test results.    As far as having your children tested, the number for our pediatric oncology clinic again is [phone]. I have not been able to locate genetics services in [place], and from what I am able to tell, many of the [employer] companies in the [place] don't take international clients.     One other option, if your kids are unable to be seen by genetics here, would be for your children's pediatrician in the [place] to order the TP53 genetic testing for them through [facility] directly. [facility] is the lab you were tested at, and it appears they take international samples. One thing to keep in mind is that your children's doctor would need a copy of your genetic test results to send with the blood samples, which would allow for the most accurate interpretation of your children's testing.     This is [facility]' website: [email or web address]. They have a phone number for international orders, which is [phone].     Please let me know if you have any further questions,    Nay Smith MS, [facility]  Licensed Genetic Counselor  P. [phone]  F. [phone]    Enclosure: Send outs misc test [[id number]] (Order [id number])

## 2019-10-17 NOTE — Clinical Note
Please print and send a copy of this letter to the patient.    Please enclose test report: Send outs misc test [[id number]] (Order [id number])    dated 3/02/2018 - print the scanned document and include with letter.

## 2019-10-30 NOTE — PROGRESS NOTES
S:  Omaira presents for endometrial biopsy and pre-surgical discussion.   Planning for TLH-BSO for Li-Fraumeni syndrome. Patient has higher risk of sarcomas as a result of this syndrome. She also has breast cancer and needs either BSO or medical ovarian suppression. She is preparing to go back to [place] soon and will not have access to the medications she needs for suppression.  She is concerned about prolapse and sexual function following surgery.   Periods are currently rare.   She has not had significant menopausal symptoms on zoladex.  No LMP recorded. Patient is perimenopausal.       Current Outpatient Medications:      amphetamine-dextroamphetamine (ADDERALL XR) 10 MG 24 hr capsule, Take 10 mg by mouth, Disp: , Rfl:      calcium citrate-vitamin D (CITRACAL) 315-250 MG-UNIT TABS per tablet, Take 2 tablets by mouth, Disp: , Rfl:      goserelin (ZOLADEX) 3.6 MG injection, Inject 3.6 mg Subcutaneous every 30 days, Disp: , Rfl:      letrozole (FEMARA) 2.5 MG tablet, Take 1 tablet (2.5 mg) by mouth daily, Disp: 90 tablet, Rfl: 11     Spirulina 500 MG TABS, 6 Tabs daily., Disp: , Rfl:      No Known Allergies    Exam:  Vitals:    10/11/19 1056   BP: 121/71   Pulse: 95   Temp: 97.6  F (36.4  C)   TempSrc: Oral   Weight: 79.5 kg (175 lb 4.8 oz)     Body mass index is 25.88 kg/m .     Gen: well appearing  Abd: soft, NT, no masses  : normal external genitalia. Cervix parous, no lesions. Uterus normal in size and contour. Adnexa normal. Vaginal length is long. Very minimal anterior and posterior wall prolapse.     Procedure: Endometrial biopsy.   After informed consent was obtained, the cervix was cleansed with betadyne, grasped with a tenaculum.  The pipel was inserted easily and four quadrant sampling was done.  Sample was sent for pathology.    Tenaculum removed, hemostasis achieved with simple pressure, minimal bleeding. Patient tolerated procedure well.      A/P:  43 year old  with Li-Fraumeni  syndrome.   Discussed hysterectomy procedure. Plan laparoscopic approach, possible single port.   Discussed BSO and potential for post-menopausal symptoms that will not be able to be treated with hormones. Omaira asks about herbal supplements. Discussed that these often have a mild hormonal effect and will likely not be recommended by her oncologists.   Discussed short and long term recovery and outcomes for hysterectomy. Discussed postoperative restrictions.   Discussed usually same day procedure.   Endometrial biopsy performed for pre-surgical planning.     30 min was spent with this patient that does not include time spent on the procedure and over 50 % of the 30 min was spent counseling on surgical planning related to li-fraumeni syndrome

## 2019-11-05 ENCOUNTER — HEALTH MAINTENANCE LETTER (OUTPATIENT)
Age: 44
End: 2019-11-05

## 2019-11-08 ENCOUNTER — INFUSION THERAPY VISIT (OUTPATIENT)
Dept: ONCOLOGY | Facility: CLINIC | Age: 44
End: 2019-11-08
Attending: INTERNAL MEDICINE
Payer: COMMERCIAL

## 2019-11-08 VITALS
WEIGHT: 180.3 LBS | OXYGEN SATURATION: 99 % | TEMPERATURE: 98.4 F | DIASTOLIC BLOOD PRESSURE: 76 MMHG | HEART RATE: 71 BPM | SYSTOLIC BLOOD PRESSURE: 123 MMHG | RESPIRATION RATE: 16 BRPM | BODY MASS INDEX: 26.61 KG/M2

## 2019-11-08 DIAGNOSIS — Z17.0 MALIGNANT NEOPLASM OF UPPER-OUTER QUADRANT OF LEFT BREAST IN FEMALE, ESTROGEN RECEPTOR POSITIVE (H): Primary | ICD-10-CM

## 2019-11-08 DIAGNOSIS — C50.412 MALIGNANT NEOPLASM OF UPPER-OUTER QUADRANT OF LEFT BREAST IN FEMALE, ESTROGEN RECEPTOR POSITIVE (H): Primary | ICD-10-CM

## 2019-11-08 PROCEDURE — 25000125 ZZHC RX 250: Mod: ZF | Performed by: INTERNAL MEDICINE

## 2019-11-08 PROCEDURE — 96402 CHEMO HORMON ANTINEOPL SQ/IM: CPT

## 2019-11-08 PROCEDURE — 25000128 H RX IP 250 OP 636: Mod: ZF | Performed by: INTERNAL MEDICINE

## 2019-11-08 RX ADMIN — LIDOCAINE HYDROCHLORIDE 1 ML: 10 INJECTION, SOLUTION EPIDURAL; INFILTRATION; INTRACAUDAL; PERINEURAL at 16:18

## 2019-11-08 RX ADMIN — GOSERELIN ACETATE 3.6 MG: 3.6 IMPLANT SUBCUTANEOUS at 16:21

## 2019-11-08 ASSESSMENT — PAIN SCALES - GENERAL: PAINLEVEL: NO PAIN (0)

## 2019-11-08 NOTE — PROGRESS NOTES
Infusion Nursing Note:  Mary Chadwick presents today for Zoladex.    Patient seen by provider today: No   present during visit today: Not Applicable.    Note: Patient feels well today and has no new concerns.    Patient is not sure what the plan is after her hysterectomy.  IB sent to Dr. Gann and Zoya Us, [facility] asking that they contact patient about her plan.    Post Infusion Assessment:  Patient tolerated injection without incident into her right lower abdominal tissue.     Discharge Plan:   Patient declined prescription refills.  Patient states she has adequate supply of letrozole.  Discharge instructions reviewed with: Patient.  Patient and/or family verbalized understanding of discharge instructions and all questions answered.  AVS to patient via [employer].  Patient discharged in stable condition accompanied by: self.    Yenny Ozuna RN

## 2019-11-08 NOTE — PATIENT INSTRUCTIONS
Contact Numbers  [facility]: [phone] (for symptom and scheduling needs)    Please call the [facility] Triage line if you experience a temperature greater than or equal to 100.4, shaking chills, have uncontrolled nausea, vomiting and/or diarrhea, dizziness, shortness of breath, chest pain, bleeding, unexplained bruising, or if you have any other new/concerning symptoms, questions or concerns.     If you are having any concerning symptoms or wish to speak to a provider before your next infusion visit, please call your care coordinator or triage to notify them so we can adequately serve you.     If you need a refill on a narcotic prescription or other medication, please call triage before your infusion appointment.

## 2019-11-11 DIAGNOSIS — Z15.01 LI-FRAUMENI SYNDROME: ICD-10-CM

## 2019-11-11 DIAGNOSIS — Z01.818 PRE-OP EXAM: Primary | ICD-10-CM

## 2019-11-18 DIAGNOSIS — Z15.01 LI-FRAUMENI SYNDROME: ICD-10-CM

## 2019-11-18 DIAGNOSIS — C50.412 MALIGNANT NEOPLASM OF UPPER-OUTER QUADRANT OF LEFT BREAST IN FEMALE, ESTROGEN RECEPTOR POSITIVE (H): ICD-10-CM

## 2019-11-18 DIAGNOSIS — Z01.818 PRE-OP EXAM: ICD-10-CM

## 2019-11-18 DIAGNOSIS — Z17.0 MALIGNANT NEOPLASM OF UPPER-OUTER QUADRANT OF LEFT BREAST IN FEMALE, ESTROGEN RECEPTOR POSITIVE (H): ICD-10-CM

## 2019-11-18 LAB
ABO + RH BLD: NORMAL
ABO + RH BLD: NORMAL
ALBUMIN SERPL-MCNC: 4 G/DL (ref 3.4–5)
ALP SERPL-CCNC: 101 U/L (ref 40–150)
ALT SERPL W P-5'-P-CCNC: 20 U/L (ref 0–50)
ANION GAP SERPL CALCULATED.3IONS-SCNC: 5 MMOL/L (ref 3–14)
AST SERPL W P-5'-P-CCNC: 14 U/L (ref 0–45)
BILIRUB SERPL-MCNC: 0.3 MG/DL (ref 0.2–1.3)
BLD GP AB SCN SERPL QL: NORMAL
BLOOD BANK CMNT PATIENT-IMP: NORMAL
BUN SERPL-MCNC: 16 MG/DL (ref 7–30)
CALCIUM SERPL-MCNC: 9.3 MG/DL (ref 8.5–10.1)
CHLORIDE SERPL-SCNC: 107 MMOL/L (ref 94–109)
CO2 SERPL-SCNC: 26 MMOL/L (ref 20–32)
CREAT SERPL-MCNC: 0.75 MG/DL (ref 0.52–1.04)
ERYTHROCYTE [DISTWIDTH] IN BLOOD BY AUTOMATED COUNT: 13.3 % (ref 10–15)
GFR SERPL CREATININE-BSD FRML MDRD: >90 ML/MIN/{1.73_M2}
GLUCOSE SERPL-MCNC: 87 MG/DL (ref 70–99)
HCT VFR BLD AUTO: 40.9 % (ref 35–47)
HGB BLD-MCNC: 13.2 G/DL (ref 11.7–15.7)
MCH RBC QN AUTO: 28.7 PG (ref 26.5–33)
MCHC RBC AUTO-ENTMCNC: 32.3 G/DL (ref 31.5–36.5)
MCV RBC AUTO: 89 FL (ref 78–100)
PLATELET # BLD AUTO: 318 10E9/L (ref 150–450)
POTASSIUM SERPL-SCNC: 4.5 MMOL/L (ref 3.4–5.3)
PROT SERPL-MCNC: 7.6 G/DL (ref 6.8–8.8)
RBC # BLD AUTO: 4.6 10E12/L (ref 3.8–5.2)
SODIUM SERPL-SCNC: 138 MMOL/L (ref 133–144)
SPECIMEN EXP DATE BLD: NORMAL
WBC # BLD AUTO: 6.8 10E9/L (ref 4–11)

## 2019-11-18 PROCEDURE — 86850 RBC ANTIBODY SCREEN: CPT | Performed by: OBSTETRICS & GYNECOLOGY

## 2019-11-18 PROCEDURE — 86900 BLOOD TYPING SEROLOGIC ABO: CPT | Performed by: OBSTETRICS & GYNECOLOGY

## 2019-11-18 PROCEDURE — 36415 COLL VENOUS BLD VENIPUNCTURE: CPT | Performed by: OBSTETRICS & GYNECOLOGY

## 2019-11-18 PROCEDURE — 85027 COMPLETE CBC AUTOMATED: CPT | Performed by: OBSTETRICS & GYNECOLOGY

## 2019-11-18 PROCEDURE — 86901 BLOOD TYPING SEROLOGIC RH(D): CPT | Performed by: OBSTETRICS & GYNECOLOGY

## 2019-11-18 PROCEDURE — 80053 COMPREHEN METABOLIC PANEL: CPT | Performed by: OBSTETRICS & GYNECOLOGY

## 2019-11-19 ENCOUNTER — ANESTHESIA EVENT (OUTPATIENT)
Dept: SURGERY | Facility: CLINIC | Age: 44
End: 2019-11-19
Payer: COMMERCIAL

## 2019-11-19 RX ORDER — EVE PRIMROSE/LINOLEIC/G-LENIC 1000 MG
2000 CAPSULE ORAL AT BEDTIME
COMMUNITY
End: 2021-03-12

## 2019-11-19 NOTE — OR NURSING
Pt is unreachable by phone, number goes to a busy signal when called. In demographics, it states to send a [employer] message for patient information. ClickBush[employer] message sent with surgery information sent to patient using [facility]paring For [facility] dotphrase. Document contains PAN callback number if patient has any further questions.

## 2019-11-20 ENCOUNTER — ANESTHESIA (OUTPATIENT)
Dept: SURGERY | Facility: CLINIC | Age: 44
End: 2019-11-20
Payer: COMMERCIAL

## 2019-11-20 ENCOUNTER — HOSPITAL ENCOUNTER (OUTPATIENT)
Facility: CLINIC | Age: 44
Discharge: HOME | End: 2019-11-20
Attending: OBSTETRICS & GYNECOLOGY | Admitting: OBSTETRICS & GYNECOLOGY
Payer: COMMERCIAL

## 2019-11-20 VITALS
SYSTOLIC BLOOD PRESSURE: 112 MMHG | HEIGHT: 68 IN | TEMPERATURE: 98.2 F | RESPIRATION RATE: 12 BRPM | OXYGEN SATURATION: 95 % | HEART RATE: 74 BPM | WEIGHT: 181.22 LBS | BODY MASS INDEX: 27.47 KG/M2 | DIASTOLIC BLOOD PRESSURE: 70 MMHG

## 2019-11-20 DIAGNOSIS — Z90.710 S/P HYSTERECTOMY: Primary | ICD-10-CM

## 2019-11-20 LAB
GLUCOSE BLDC GLUCOMTR-MCNC: 99 MG/DL (ref 70–99)
HCG UR QL: NEGATIVE
SPECIMEN SOURCE: NORMAL
WET PREP SPEC: NORMAL

## 2019-11-20 PROCEDURE — 25000128 H RX IP 250 OP 636: Performed by: OBSTETRICS & GYNECOLOGY

## 2019-11-20 PROCEDURE — 37000009 ZZH ANESTHESIA TECHNICAL FEE, EACH ADDTL 15 MIN: Performed by: OBSTETRICS & GYNECOLOGY

## 2019-11-20 PROCEDURE — 88309 TISSUE EXAM BY PATHOLOGIST: CPT | Performed by: OBSTETRICS & GYNECOLOGY

## 2019-11-20 PROCEDURE — 81025 URINE PREGNANCY TEST: CPT | Performed by: OBSTETRICS & GYNECOLOGY

## 2019-11-20 PROCEDURE — 25800030 ZZH RX IP 258 OP 636: Performed by: STUDENT IN AN ORGANIZED HEALTH CARE EDUCATION/TRAINING PROGRAM

## 2019-11-20 PROCEDURE — 25000132 ZZH RX MED GY IP 250 OP 250 PS 637: Performed by: STUDENT IN AN ORGANIZED HEALTH CARE EDUCATION/TRAINING PROGRAM

## 2019-11-20 PROCEDURE — 82962 GLUCOSE BLOOD TEST: CPT

## 2019-11-20 PROCEDURE — 25000125 ZZHC RX 250: Performed by: STUDENT IN AN ORGANIZED HEALTH CARE EDUCATION/TRAINING PROGRAM

## 2019-11-20 PROCEDURE — 27210794 ZZH OR GENERAL SUPPLY STERILE: Performed by: OBSTETRICS & GYNECOLOGY

## 2019-11-20 PROCEDURE — 87210 SMEAR WET MOUNT SALINE/INK: CPT | Performed by: OBSTETRICS & GYNECOLOGY

## 2019-11-20 PROCEDURE — 25000132 ZZH RX MED GY IP 250 OP 250 PS 637: Performed by: OBSTETRICS & GYNECOLOGY

## 2019-11-20 PROCEDURE — 71000014 ZZH RECOVERY PHASE 1 LEVEL 2 FIRST HR: Performed by: OBSTETRICS & GYNECOLOGY

## 2019-11-20 PROCEDURE — 71000015 ZZH RECOVERY PHASE 1 LEVEL 2 EA ADDTL HR: Performed by: OBSTETRICS & GYNECOLOGY

## 2019-11-20 PROCEDURE — 37000008 ZZH ANESTHESIA TECHNICAL FEE, 1ST 30 MIN: Performed by: OBSTETRICS & GYNECOLOGY

## 2019-11-20 PROCEDURE — 71000027 ZZH RECOVERY PHASE 2 EACH 15 MINS: Performed by: OBSTETRICS & GYNECOLOGY

## 2019-11-20 PROCEDURE — 40000170 ZZH STATISTIC PRE-PROCEDURE ASSESSMENT II: Performed by: OBSTETRICS & GYNECOLOGY

## 2019-11-20 PROCEDURE — 36000062 ZZH SURGERY LEVEL 4 1ST 30 MIN - UMMC: Performed by: OBSTETRICS & GYNECOLOGY

## 2019-11-20 PROCEDURE — 58552 LAPARO-VAG HYST INCL T/O: CPT | Mod: GC | Performed by: OBSTETRICS & GYNECOLOGY

## 2019-11-20 PROCEDURE — 58552 LAPARO-VAG HYST INCL T/O: CPT | Mod: 80 | Performed by: OBSTETRICS & GYNECOLOGY

## 2019-11-20 PROCEDURE — C1765 ADHESION BARRIER: HCPCS | Performed by: OBSTETRICS & GYNECOLOGY

## 2019-11-20 PROCEDURE — 25000128 H RX IP 250 OP 636: Performed by: STUDENT IN AN ORGANIZED HEALTH CARE EDUCATION/TRAINING PROGRAM

## 2019-11-20 PROCEDURE — 88309 TISSUE EXAM BY PATHOLOGIST: CPT | Mod: 26 | Performed by: OBSTETRICS & GYNECOLOGY

## 2019-11-20 PROCEDURE — 36000064 ZZH SURGERY LEVEL 4 EA 15 ADDTL MIN - UMMC: Performed by: OBSTETRICS & GYNECOLOGY

## 2019-11-20 RX ORDER — CEFAZOLIN SODIUM 2 G/100ML
2 INJECTION, SOLUTION INTRAVENOUS
Status: COMPLETED | OUTPATIENT
Start: 2019-11-20 | End: 2019-11-20

## 2019-11-20 RX ORDER — ONDANSETRON 2 MG/ML
4 INJECTION INTRAMUSCULAR; INTRAVENOUS EVERY 30 MIN PRN
Status: DISCONTINUED | OUTPATIENT
Start: 2019-11-20 | End: 2019-11-20 | Stop reason: HOSPADM

## 2019-11-20 RX ORDER — PHENAZOPYRIDINE HYDROCHLORIDE 200 MG/1
200 TABLET, FILM COATED ORAL ONCE
Status: COMPLETED | OUTPATIENT
Start: 2019-11-20 | End: 2019-11-20

## 2019-11-20 RX ORDER — IBUPROFEN 600 MG/1
600 TABLET, FILM COATED ORAL
Status: DISCONTINUED | OUTPATIENT
Start: 2019-11-20 | End: 2019-11-20 | Stop reason: HOSPADM

## 2019-11-20 RX ORDER — HYDROCODONE BITARTRATE AND ACETAMINOPHEN 5; 325 MG/1; MG/1
1 TABLET ORAL EVERY 6 HOURS PRN
Qty: 10 TABLET | Refills: 0 | Status: SHIPPED | OUTPATIENT
Start: 2019-11-20 | End: 2019-11-23

## 2019-11-20 RX ORDER — SCOLOPAMINE TRANSDERMAL SYSTEM 1 MG/1
1 PATCH, EXTENDED RELEASE TRANSDERMAL
Status: DISCONTINUED | OUTPATIENT
Start: 2019-11-20 | End: 2019-11-20 | Stop reason: HOSPADM

## 2019-11-20 RX ORDER — AMOXICILLIN 250 MG
1-2 CAPSULE ORAL 2 TIMES DAILY
Qty: 30 TABLET | Refills: 0 | Status: SHIPPED | OUTPATIENT
Start: 2019-11-20 | End: 2021-03-12

## 2019-11-20 RX ORDER — SODIUM CHLORIDE, SODIUM LACTATE, POTASSIUM CHLORIDE, CALCIUM CHLORIDE 600; 310; 30; 20 MG/100ML; MG/100ML; MG/100ML; MG/100ML
INJECTION, SOLUTION INTRAVENOUS CONTINUOUS PRN
Status: DISCONTINUED | OUTPATIENT
Start: 2019-11-20 | End: 2019-11-20

## 2019-11-20 RX ORDER — ACETAMINOPHEN 325 MG/1
975 TABLET ORAL ONCE
Status: COMPLETED | OUTPATIENT
Start: 2019-11-20 | End: 2019-11-20

## 2019-11-20 RX ORDER — PROPOFOL 10 MG/ML
INJECTION, EMULSION INTRAVENOUS CONTINUOUS PRN
Status: DISCONTINUED | OUTPATIENT
Start: 2019-11-20 | End: 2019-11-20

## 2019-11-20 RX ORDER — OXYCODONE HYDROCHLORIDE 5 MG/1
5 TABLET ORAL EVERY 4 HOURS PRN
Status: DISCONTINUED | OUTPATIENT
Start: 2019-11-20 | End: 2019-11-20 | Stop reason: HOSPADM

## 2019-11-20 RX ORDER — NALOXONE HYDROCHLORIDE 0.4 MG/ML
.1-.4 INJECTION, SOLUTION INTRAMUSCULAR; INTRAVENOUS; SUBCUTANEOUS
Status: DISCONTINUED | OUTPATIENT
Start: 2019-11-20 | End: 2019-11-20 | Stop reason: HOSPADM

## 2019-11-20 RX ORDER — FENTANYL CITRATE 50 UG/ML
INJECTION, SOLUTION INTRAMUSCULAR; INTRAVENOUS PRN
Status: DISCONTINUED | OUTPATIENT
Start: 2019-11-20 | End: 2019-11-20

## 2019-11-20 RX ORDER — FENTANYL CITRATE 50 UG/ML
25-50 INJECTION, SOLUTION INTRAMUSCULAR; INTRAVENOUS
Status: DISCONTINUED | OUTPATIENT
Start: 2019-11-20 | End: 2019-11-20 | Stop reason: HOSPADM

## 2019-11-20 RX ORDER — KETOROLAC TROMETHAMINE 30 MG/ML
INJECTION, SOLUTION INTRAMUSCULAR; INTRAVENOUS PRN
Status: DISCONTINUED | OUTPATIENT
Start: 2019-11-20 | End: 2019-11-20

## 2019-11-20 RX ORDER — ONDANSETRON 4 MG/1
4 TABLET, ORALLY DISINTEGRATING ORAL
Status: DISCONTINUED | OUTPATIENT
Start: 2019-11-20 | End: 2019-11-20 | Stop reason: HOSPADM

## 2019-11-20 RX ORDER — OXYCODONE HYDROCHLORIDE 5 MG/1
5 TABLET ORAL
Status: DISCONTINUED | OUTPATIENT
Start: 2019-11-20 | End: 2019-11-20 | Stop reason: HOSPADM

## 2019-11-20 RX ORDER — SODIUM CHLORIDE, SODIUM LACTATE, POTASSIUM CHLORIDE, CALCIUM CHLORIDE 600; 310; 30; 20 MG/100ML; MG/100ML; MG/100ML; MG/100ML
INJECTION, SOLUTION INTRAVENOUS CONTINUOUS
Status: DISCONTINUED | OUTPATIENT
Start: 2019-11-20 | End: 2019-11-20 | Stop reason: HOSPADM

## 2019-11-20 RX ORDER — IBUPROFEN 600 MG/1
600 TABLET, FILM COATED ORAL EVERY 6 HOURS PRN
Qty: 40 TABLET | Refills: 0 | Status: SHIPPED | OUTPATIENT
Start: 2019-11-20 | End: 2021-03-12

## 2019-11-20 RX ORDER — BUPIVACAINE HYDROCHLORIDE 2.5 MG/ML
INJECTION, SOLUTION INFILTRATION; PERINEURAL PRN
Status: DISCONTINUED | OUTPATIENT
Start: 2019-11-20 | End: 2019-11-20 | Stop reason: HOSPADM

## 2019-11-20 RX ORDER — ONDANSETRON 4 MG/1
4 TABLET, ORALLY DISINTEGRATING ORAL EVERY 30 MIN PRN
Status: DISCONTINUED | OUTPATIENT
Start: 2019-11-20 | End: 2019-11-20 | Stop reason: HOSPADM

## 2019-11-20 RX ORDER — HYDROMORPHONE HYDROCHLORIDE 1 MG/ML
.3-.5 INJECTION, SOLUTION INTRAMUSCULAR; INTRAVENOUS; SUBCUTANEOUS EVERY 5 MIN PRN
Status: DISCONTINUED | OUTPATIENT
Start: 2019-11-20 | End: 2019-11-20 | Stop reason: HOSPADM

## 2019-11-20 RX ORDER — LIDOCAINE HYDROCHLORIDE 20 MG/ML
INJECTION, SOLUTION INFILTRATION; PERINEURAL PRN
Status: DISCONTINUED | OUTPATIENT
Start: 2019-11-20 | End: 2019-11-20

## 2019-11-20 RX ORDER — CEFAZOLIN SODIUM 1 G/3ML
1 INJECTION, POWDER, FOR SOLUTION INTRAMUSCULAR; INTRAVENOUS SEE ADMIN INSTRUCTIONS
Status: DISCONTINUED | OUTPATIENT
Start: 2019-11-20 | End: 2019-11-20 | Stop reason: HOSPADM

## 2019-11-20 RX ORDER — DEXAMETHASONE SODIUM PHOSPHATE 4 MG/ML
INJECTION, SOLUTION INTRA-ARTICULAR; INTRALESIONAL; INTRAMUSCULAR; INTRAVENOUS; SOFT TISSUE PRN
Status: DISCONTINUED | OUTPATIENT
Start: 2019-11-20 | End: 2019-11-20

## 2019-11-20 RX ORDER — PROPOFOL 10 MG/ML
INJECTION, EMULSION INTRAVENOUS PRN
Status: DISCONTINUED | OUTPATIENT
Start: 2019-11-20 | End: 2019-11-20

## 2019-11-20 RX ADMIN — LIDOCAINE HYDROCHLORIDE 100 MG: 20 INJECTION, SOLUTION INFILTRATION; PERINEURAL at 07:57

## 2019-11-20 RX ADMIN — PROPOFOL 160 MG: 10 INJECTION, EMULSION INTRAVENOUS at 07:57

## 2019-11-20 RX ADMIN — ROCURONIUM BROMIDE 50 MG: 10 INJECTION INTRAVENOUS at 07:57

## 2019-11-20 RX ADMIN — FENTANYL CITRATE 50 MCG: 50 INJECTION, SOLUTION INTRAMUSCULAR; INTRAVENOUS at 07:57

## 2019-11-20 RX ADMIN — DEXAMETHASONE SODIUM PHOSPHATE 4 MG: 4 INJECTION, SOLUTION INTRAMUSCULAR; INTRAVENOUS at 10:35

## 2019-11-20 RX ADMIN — HYDROMORPHONE HYDROCHLORIDE 0.5 MG: 1 INJECTION, SOLUTION INTRAMUSCULAR; INTRAVENOUS; SUBCUTANEOUS at 10:03

## 2019-11-20 RX ADMIN — PROPOFOL 150 MCG/KG/MIN: 10 INJECTION, EMULSION INTRAVENOUS at 08:03

## 2019-11-20 RX ADMIN — PHENAZOPYRIDINE HYDROCHLORIDE 200 MG: 200 TABLET, FILM COATED ORAL at 07:00

## 2019-11-20 RX ADMIN — ROCURONIUM BROMIDE 20 MG: 10 INJECTION INTRAVENOUS at 09:22

## 2019-11-20 RX ADMIN — HYDROMORPHONE HYDROCHLORIDE 0.3 MG: 1 INJECTION, SOLUTION INTRAMUSCULAR; INTRAVENOUS; SUBCUTANEOUS at 12:45

## 2019-11-20 RX ADMIN — ROCURONIUM BROMIDE 10 MG: 10 INJECTION INTRAVENOUS at 09:55

## 2019-11-20 RX ADMIN — ACETAMINOPHEN 975 MG: 325 TABLET, FILM COATED ORAL at 06:59

## 2019-11-20 RX ADMIN — ROCURONIUM BROMIDE 20 MG: 10 INJECTION INTRAVENOUS at 08:36

## 2019-11-20 RX ADMIN — DEXAMETHASONE SODIUM PHOSPHATE 10 MG: 4 INJECTION, SOLUTION INTRAMUSCULAR; INTRAVENOUS at 08:33

## 2019-11-20 RX ADMIN — MIDAZOLAM 2 MG: 1 INJECTION INTRAMUSCULAR; INTRAVENOUS at 07:51

## 2019-11-20 RX ADMIN — FENTANYL CITRATE 50 MCG: 50 INJECTION, SOLUTION INTRAMUSCULAR; INTRAVENOUS at 08:31

## 2019-11-20 RX ADMIN — CEFAZOLIN SODIUM 2 G: 2 INJECTION, SOLUTION INTRAVENOUS at 08:15

## 2019-11-20 RX ADMIN — PROPOFOL 40 MG: 10 INJECTION, EMULSION INTRAVENOUS at 08:10

## 2019-11-20 RX ADMIN — SUGAMMADEX 170 MG: 100 INJECTION, SOLUTION INTRAVENOUS at 10:35

## 2019-11-20 RX ADMIN — KETOROLAC TROMETHAMINE 15 MG: 30 INJECTION, SOLUTION INTRAMUSCULAR at 10:38

## 2019-11-20 RX ADMIN — CEFAZOLIN SODIUM 1 G: 2 INJECTION, SOLUTION INTRAVENOUS at 10:15

## 2019-11-20 RX ADMIN — SCOPALAMINE 1 PATCH: 1 PATCH, EXTENDED RELEASE TRANSDERMAL at 07:26

## 2019-11-20 RX ADMIN — LIDOCAINE HYDROCHLORIDE 50 MG: 20 INJECTION, SOLUTION INFILTRATION; PERINEURAL at 08:29

## 2019-11-20 RX ADMIN — SODIUM CHLORIDE, POTASSIUM CHLORIDE, SODIUM LACTATE AND CALCIUM CHLORIDE: 600; 310; 30; 20 INJECTION, SOLUTION INTRAVENOUS at 07:49

## 2019-11-20 ASSESSMENT — LIFESTYLE VARIABLES: TOBACCO_USE: 1

## 2019-11-20 ASSESSMENT — MIFFLIN-ST. JEOR: SCORE: 1535.5

## 2019-11-20 NOTE — ANESTHESIA CARE TRANSFER NOTE
"Patient: Mary Chadwick    Procedure(s):  Total Laparoscopic Hysterectomy Via Single Port, Bilateral Salpingo-Oophorectomy    Diagnosis: Risk Reducing Li-farumeni  Diagnosis Additional Information: No value filed.    Anesthesia Type:   General     Note:  Airway :Face Mask  Patient transferred to:PACU  Comments: VSS. Breathing spontaneously at a regular rate with adequate tidal volumes and maintaining O2 sats on 6L via facemask. No immediate post-op nausea or pain. No apparent complications from anesthesia.     Rajan \"Zurdo\"Stew RYAN MD  CA-1 Handoff Report: Identifed the Patient, Identified the Reponsible Provider, Reviewed the pertinent medical history, Discussed the surgical course, Reviewed Intra-OP anesthesia mangement and issues during anesthesia, Set expectations for post-procedure period and Allowed opportunity for questions and acknowledgement of understanding      Vitals: (Last set prior to Anesthesia Care Transfer)    CRNA VITALS  11/20/2019 1026 - 11/20/2019 1100      11/20/2019             Pulse:  72    SpO2:  98 %                Electronically Signed By: Rajan Mathias III, MD  November 20, 2019  11:00 AM  "

## 2019-11-20 NOTE — OP NOTE
[facility] Gynecologic Operative Note   Mary Chadwick  [id number]  11/20/2019    Preoperative Diagnosis:   - Li Fraumeni Syndrome  - Breast cancer (ER positive, MS positive)    Postoperative Diagnosis:   - Same status post procedure     Procedure:   1. Total laparoscopic hysterectomy with bilateral salpingo-oophorectomy via single incision    Surgeon: Yenny Hidalgo MD  Assist: Nay Becerril MD, Regla Alvarenga MD PGY3    Anesthesia: GETA  Complications: None     EBL: 40 mL   IVF: 800 mL crystalloid   UOP: 200 mL clear urine     Findings: Exam under anesthesia revealed normal appearing external female genitalia, small, anteverted uterus.  A survey of the upper abdomen showed normal anatomy. Bilateral ovaries, fallopian tubes and ovaries appeared normal.    Specimen: Uterus, cervix, bilateral fallopian tubes and ovaries    Indications: Mary Chadwick is a 43 year old female who presented to clinic for discussion of prophylactic hysterectomy with bilateral salpingo-oophorectomy for a history of Li Fraumeni syndrome. A total laparoscopic hysterectomy with bilateral salpingo-oophorectomy was recommended at that time. All risks, benefits and alternatives were discussed and written informed consent was obtained.     Procedure: The patient was taken to the operating room where general anesthesia was induced without difficulty. She was then examined under anesthesia with the above noted findings. She was then prepared and draped in the normal sterile fashion in the dorsal lithotomy position using yellow fin stirrups, in what was felt to be a neurologically safe position. Attention was turned to the vagina. A sterile speculum was placed for visualization of the cervix and the anterior lip of the cervix was then grasped with a single-tooth tenaculum. The cervix was then serially dilated and the Medium Vcare uterine manipulator was placed without difficulty and the balloon insufflated with 20 ml of normal saline.  Attention was then turned to the abdomen where a 3cm horizontal umbilical skin incision was made. The fascia was incised with a scalpel and the incision extended laterally with Jordan scissors. The Asa O retractor was placed and the single port docking system attached with 3 5mm ports in place. The gas was turned on and pneumoperitoneum was achieved using CO2 gas. A survey of the patient's abdomen and pelvis was made with the above noted findings. Attention was then turned to the pelvis where the left ureter was identified transperitoneally. The left IP ligament was then transected using the Olympus PK cutting forceps. The broad ligament and round ligament were transected down to the level of the uterine arteries. The right ureter was identified. The right IP ligament was identified, transected and the broad and roung ligaments carried down to the uterine arteries in the exact same fashion. The bladder flap was developed using the PK cutting forceps. Once skeletonized, bilateral uterine arteries were ligated and transected. Next, the electrocautery spatula was used to perform the colpotomy along the ring. The uterus, tubes, and ovaries were then delivered through the vagina. The vaginal cuff was closed vaginally using interrupted figure-of-eight stitches of 0-Vicryl.  Attention was then turned back to the abdomen, where the surgical sites were irrigated, and excellent hemostasis was noted. One piece of interseed was placed over the vaginal cuff. The abdomen was then desufflated and the single port docking device removed. The lateral aspects of the fascia were grapsed with kochers and the fascia closed with a running 0 Vicryl stitch. The skin incision was closed using 4-0 vicryl in a subcuticular fashion.     The patient tolerated the procedure well. Sponge, lap and needle counts were correct. The patient was taken to the recovery area in stable condition. Dr. Hidalgo was present and scrubbed for the entire procedure.  Dr. Becerril was there as an assist due to surgical complexity for single port approach and risk reducing surgery for cancer risk.    Regla Alvarenga MD  OBGYN Resident PGY3  11/20/2019       Physician Attestation   I was present for the entire procedure between opening and closing.    Yenny Hidalgo  Date of Service (when I saw the patient): 11/20/19

## 2019-11-20 NOTE — ANESTHESIA PREPROCEDURE EVALUATION
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), 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; identified using CancerNext panel through [employer]     Monoallelic mutation of TP53 gene 2018    c.638G>A (p.R213Q), identified using CancerNext genetic testing through [employer]     PONV (postoperative nausea and vomiting)       Past Surgical History:   Procedure Laterality Date     ESOPHAGOSCOPY, GASTROSCOPY, DUODENOSCOPY (EGD), COMBINED N/A 10/15/2018    Procedure: EGD;  Surgeon: Edwardo Beatty MD;  Location: [facility] OR     GRAFT FAT TO BREAST Bilateral 2018    Procedure: Bilateral Breast Fat Grafting and nipple reconstruction;  Surgeon: BASILIO Kidd MD;  Location: [facility] OR     HERNIA REPAIR, INGUINAL RT/LT  2002    Hernia Repair, Femoral RT/LT     MASTECTOMY SIMPLE BILATERAL, SENTINEL NODE BILATERAL, COMBINED Bilateral 2018    Procedure: COMBINED MASTECTOMY SIMPLE BILATERAL, SENTINEL NODE BILATERAL;  Bilateral Mastectomy With Immediate Reconstruction, Left [place] Lymph Node Biopsy, Anesthesia Block;  Surgeon: Antonio Andrews MD;  Location: [facility] OR     RECONSTRUCT BREAST BILATERAL Bilateral 2018    Procedure: RECONSTRUCT BREAST BILATERAL;;  Surgeon: BASILIO Kidd MD;  Location: [facility] OR     RECONSTRUCT NIPPLE BILATERAL Bilateral 2018    Procedure: Nipple Reconstruction;  Surgeon:  BASILIO Kidd MD;  Location:  OR     REMOVE AND REPLACE BREAST IMPLANT PROSTHESIS Bilateral 9/20/2018    Procedure: REMOVE AND REPLACE BREAST IMPLANT PROSTHESIS;  Bilateral Breast Implant Exchange and Revision;  Surgeon: BASILIO Kidd MD;  Location:  OR          Anesthesia Evaluation     . Pt has had prior anesthetic.     History of anesthetic complications   - PONV  08/06/18; 0748; Mask Ventilation: Easy; Ease of Intubation: Easy; Airway Size: 7;  Cuffed;  Oral;  Blade Type: Darian;  Blade Size: 3;  Place by: ms;  Insertion Attempts: 1;  Secured at (cm)to lip: 22 cm;      ROS/MED HX    ENT/Pulmonary:  - neg pulmonary ROS   (+)tobacco use, Past use , . .    Neurologic:  - neg neurologic ROS     Cardiovascular:  - neg cardiovascular ROS   (+) ----. : . . . :. . Previous cardiac testing date:results:date: results:ECG reviewed date:3/2018 results:NSR date: results:          METS/Exercise Tolerance:  4 - Raking leaves, gardening   Hematologic:  - neg hematologic  ROS       Musculoskeletal:  - neg musculoskeletal ROS       GI/Hepatic: Comment: S/p inguinal hernia repair - neg GI/hepatic ROS       Renal/Genitourinary:  - ROS Renal section negative       Endo:  - neg endo ROS       Psychiatric:  - neg psychiatric ROS   (+) psychiatric history anxiety and depression (ADHD)      Infectious Disease:  - neg infectious disease ROS       Malignancy:   (+) Malignancy History of Breast  Breast CA Remission status post Surgery. Li Fraumeni syndrome: predisposed to rare cancers  S/p bilateral mastectomy w/ reconstruction in 2018     - no malignancy   Other:    - neg other ROS               Meds:  Adderall  Fish oil  Ibuprofen  Goserelin  Letrozole  spirulina             PHYSICAL EXAM:   Mental Status/Neuro: Age Appropriate; A/A/O   Airway: Facies: Feasible  Mallampati: III  Mouth/Opening: Full  TM distance: > 6 cm  Neck ROM: Full   Respiratory: Auscultation: CTAB     Resp. Rate: Normal     Resp. Effort:  "Normal      CV: Rhythm: Regular  Rate: Age appropriate  Heart: Normal Sounds  Edema: None   Comments:      Dental: Details                  LABS:  CBC:   Lab Results   Component Value Date    WBC 6.8 11/18/2019    WBC 7.9 06/10/2019    HGB 13.2 11/18/2019    HGB 13.4 06/10/2019    HCT 40.9 11/18/2019    HCT 41.4 06/10/2019     11/18/2019     06/10/2019     BMP:   Lab Results   Component Value Date     11/18/2019     06/10/2019    POTASSIUM 4.5 11/18/2019    POTASSIUM 3.6 06/10/2019    CHLORIDE 107 11/18/2019    CHLORIDE 108 06/10/2019    CO2 26 11/18/2019    CO2 29 06/10/2019    BUN 16 11/18/2019    BUN 16 06/10/2019    CR 0.75 11/18/2019    CR 0.72 06/10/2019    GLC 87 11/18/2019    GLC 90 06/10/2019     COAGS:   Lab Results   Component Value Date    PTT 32 03/06/2018    INR 1.02 08/06/2018     POC:   Lab Results   Component Value Date     (H) 08/06/2018    HCG Negative 10/11/2019    HCGS Negative 03/06/2018     OTHER:   Lab Results   Component Value Date    A1C 5.4 03/06/2018    MARICHUY 9.3 11/18/2019    MAG 2.3 03/06/2018    ALBUMIN 4.0 11/18/2019    PROTTOTAL 7.6 11/18/2019    ALT 20 11/18/2019    AST 14 11/18/2019    ALKPHOS 101 11/18/2019    BILITOTAL 0.3 11/18/2019    TSH 1.19 03/06/2018        Preop Vitals    BP Readings from Last 3 Encounters:   11/08/19 123/76   10/11/19 121/71   09/14/19 118/66    Pulse Readings from Last 3 Encounters:   11/08/19 71   10/11/19 95   09/14/19 80      Resp Readings from Last 3 Encounters:   11/08/19 16   09/14/19 16   08/26/19 16    SpO2 Readings from Last 3 Encounters:   11/08/19 99%   09/14/19 100%   08/26/19 100%      Temp Readings from Last 1 Encounters:   11/08/19 36.9  C (98.4  F) (Oral)    Ht Readings from Last 1 Encounters:   08/26/19 1.753 m (5' 9.02\")      Wt Readings from Last 1 Encounters:   11/08/19 81.8 kg (180 lb 4.8 oz)    Estimated body mass index is 26.61 kg/m  as calculated from the following:    Height as of 8/26/19: 1.753 m " "(5' 9.02\").    Weight as of 11/8/19: 81.8 kg (180 lb 4.8 oz).     LDA:  Closed/Suction Drain 1 Left Chest Bulb 15 Finnish (Active)   Number of days: 470       Closed/Suction Drain 1 Right Chest Bulb 15 Finnish (Active)   Number of days: 470        Assessment:   ASA SCORE: 2    H&P: History and physical reviewed and following examination; no interval change.   Smoking Status:  Non-Smoker/Unknown   NPO Status: NPO Appropriate     Plan:   Anes. Type:  General   Pre-Medication: None   Induction:  IV (Standard)   Airway: ETT; Oral   Access/Monitoring: PIV; 2nd PIV   Maintenance: TIVA     Postop Plan:   Postop Pain: Opioids  Postop Sedation/Airway: Not planned  Disposition: Outpatient     PONV Management:   Adult Risk Factors: Female, H/o PONV or Motion Sickness, Non-Smoker, Postop Opioids   Prevention: Ondansetron, Dexamethasone, Scopolamine, No Volatiles     CONSENT: Direct conversation   Plan and risks discussed with: Patient   Blood Products: Consented (ALL Blood Products)       Comments for Plan/Consent:  43 year old female with Li Fraumeni syndrome w/ breast cancer, s/p bilateral mastectomy, presenting for laparoscopic hysterectomy BSO.  History of PONV.  ASA 2.  - general anesthesia with ETT  - opioids and APAP for pain  - scopolamine, propofol TIVA for PONV prophylaxis                   Rajan Mathisa III, MD  "

## 2019-11-20 NOTE — ANESTHESIA POSTPROCEDURE EVALUATION
Anesthesia POST Procedure Evaluation    Patient: Mary Chadwick   MRN:     [id number] Gender:   female   Age:    43 year old :      1975        Preoperative Diagnosis: Risk Reducing Li-farumeni   Procedure(s):  Total Laparoscopic Hysterectomy Via Single Port, Bilateral Salpingo-Oophorectomy   Postop Comments: No value filed.       Anesthesia Type:  Not documented  General    Reportable Event: NO     PAIN: Uncomplicated   Sign Out status: Comfortable, Well controlled pain     PONV: No PONV   Sign Out status:  No Nausea or Vomiting     Neuro/Psych: Uneventful perioperative course   Sign Out Status: Preoperative baseline; Age appropriate mentation     Airway/Resp.: Uneventful perioperative course   Sign Out Status: Non labored breathing, age appropriate RR; Resp. Status within EXPECTED Parameters     CV: Uneventful perioperative course   Sign Out status: Appropriate BP and perfusion indices; Appropriate HR/Rhythm     Disposition:   Sign Out in:  PACU  Disposition:  Phase II; Home  Recovery Course: Uneventful  Follow-Up: Not required           Last Anesthesia Record Vitals:  CRNA VITALS  2019 1026 - 2019 1126      2019             Pulse:  72    SpO2:  98 %          Last PACU Vitals:  Vitals Value Taken Time   /67 2019 12:15 PM   Temp 36.3  C (97.4  F) 2019 12:15 PM   Pulse 74 2019 12:15 PM   Resp 15 2019 12:16 PM   SpO2 94 % 2019 12:54 PM   Temp src     NIBP     Pulse     SpO2     Resp     Temp     Ht Rate     Temp 2     Vitals shown include unvalidated device data.      Electronically Signed By: Sarah Wachter, MD, 2019, 1:12 PM

## 2019-11-20 NOTE — BRIEF OP NOTE
[facility], [place]    Brief Operative Note    Pre-operative diagnosis: Risk Reducing Li-farumeni  Post-operative diagnosis Same as pre-operative diagnosis    Procedure: Procedure(s):  Total Laparoscopic Hysterectomy Via Single Port, Bilateral Salpingo-Oophorectomy  Surgeon: Surgeon(s) and Role:     * Yenny Hidalgo MD - Primary     * Regla Alvarenga MD - Resident - Assisting     * Nay Becerril MD  Anesthesia: General   Estimated blood loss: 40mL  Drains: None  Specimens:   ID Type Source Tests Collected by Time Destination   1 :  Other (specify in comments) Vagina WET PREP Yenny Hidalgo MD 11/20/2019  8:15 AM    A : uterus, cervix, bilateral fallopian tubes, bilateral ovaries Organ Uterus with Bilateral Ovaries and Fallopian Tubes SURGICAL PATHOLOGY EXAM Yenny Hidalgo MD 11/20/2019  9:50 AM      Findings: NEFG, small, anteverted uterus, normal upper abdominal anatomy, normal appearing bilateral ovaries, fallopian tubes and uterus.  Complications: None.    Regla Alvarenga MD  OBGYN Resident PGY3  11/20/2019 10:52 AM

## 2019-11-20 NOTE — DISCHARGE INSTRUCTIONS
Discharge Instructions: Vaginal or Abdominal Hysterectomy   Healing takes time. How much time depends on your health and the type of surgery you had. During your recovery time, you can do a lot to make sure that you regain your health and energy.    Diet    Eat a well-balanced diet with lots of protein, fruits, vegetables, and whole grains.  Avoid spicy or greasy foods.     Drink plenty of fluids - at least 8 tall glasses of water a day. Water is best. Limit caffeine (coffee, tea, soda) to help prevent constipation (hard stools that are difficult to pass).    If you are constipated, you may take one of these medications from the drug store: Docusate (Colace), Docusate with Casanthranol (Jordyn-Colace), Psyllium (Metamucil), or Milk of Magnesia. Follow to directions on the label.  Activity    Get plenty of rest at first. Slowly return to your normal routine. Several short walks each day will help. It is okay to climb stairs, but use the handrail in case you feel dizzy.     After 2 weeks, you may start gentle exercises. Listen to your body. If you feel tired, sore, or have backaches, you may be doing too much too soon.     Do not drive until you can step on the brakes without pain.     Do not use tampons, douche, or have sex (intercourse) until you see your doctor.     For the next 6 weeks, do not lift anything greater than 15 pounds and avoid heavy exercise.  Pain    Your pain should decrease over the next 2-3 weeks.     You may feel sore after mild exercise.    Take your pain medication as prescribed by your doctor.   Caring for your Incisions (if applicable)    You may see some fluid draining from your incision(s). Wear the bandage(s) until it stops.     Keep the area clean and dry. Wash with soap and water.    Do not use lotions or powders near the incision(s).    If you have:  o Steri-strips (small pieces of tape) - they should fall off on their own in 7-10 days. If that time has passed and they are still in place,  you may remove them.  o Staples - These will be removed at your next visit.   o Dermabond (medical glue) - Leave it in place until it wears off.   Bathing  Take care to avoid slips and falls. Gently pat your incisions dry after bathing.    After Abdominal Hysterectomy (surgery through the belly): You may shower. Avoid tub baths and swimming for two weeks, of until your incision heals.     After Vaginal Hysterectomy (surgery through the vagina): You may bathe or shower. If you had surgery to repair the vaginal wall, it may helps to soak in a warm bath for 20 minutes, twice a day. This will speed healing and reduce tenderness.   What to expect after surgery    A small amount of blood or fluid coming from your vagina for several weeks. Wear pads as needed.     Stitches poking out of the vagina.     Stitches passing out of the vagina (they will look like tiny threads).    Most stitches dissolve within 3 months.     Feeling numb around your stitches. This should go away in less than a year.     Feeling dizzy or light-headed. Hot flashes, trouble sleeping, sudden mood swings, and irritability. If side effects become a problem, notify your doctor.     If you had a vaginal repair, you may feel tugging in the vagina. This is a normal part of healing.   Call your doctor if you have:    Severe chills and a fever of 100.4 degrees F or higher, taken under the tongue.     Bright red blood coming out of the vagina - enough to soak one pad an hour.     Large clots coming out of the vagina.     Urine or vaginal fluid that smells bad.     Trouble urinating (peeing), burning when you go, or the need to go more often.     Calf pain and/or swelling in both legs.    Nausea (feeling sick to your stomach) or vomiting (throwing up).    Pain that you cannot control with the pain medication prescribed by your doctor.   Follow up with your doctor and return to the clinic in   Make this appointment after you get home if it has not already been  "scheduled.                 REV. 5/12  Discharge Instructions:   Following a Laparoscopy    Comfort:    The amount of discomfort you can expect is very unpredictable.     If you have pain that cannot be controlled with non aspirin medication or with the prescription medication you may have received, you should notify your physician.     You May Experience:    Abdominal tenderness; abdominal cramps (like menstrual cramps).    Low back ache or discomfort radiating to your shoulders, chest, back or neck. This is a result of the gas used to inflate your abdomen during surgery. This gas is absorbed in 24 to 36 hours. The \"knee chest\" position will help relieve this discomfort.    Sore throat for a day or two resulting from the anesthesia tube used during surgery. You may use throat lozenges to help relieve this discomfort.    Black and blue marks on your abdomen.    Drainage:    You may expect a small amount of drainage from the incision on your abdomen and you may change the bandage when necessary.    You may also have a small amount of vaginal drainage for 3 to 4 days; this is normal and no cause for concern. If excessive bleeding occurs, notify your physician.    Do not douche, and use a pad rather than tampons. Do not resume intercourse for at least one week or until bleeding has ceased.    Home Activity:    The day of surgery spend a quiet day at home.    Increase activity as tolerated.    You may bathe or shower, do not soak in bath tub or scrub incisions.    You have no restrictions on your diet. Following surgery, drink plenty of fluids and eat a light meal.    The anesthesia may produce some nausea. If you feel nauseated, stay in bed, keep your head down and try drinking fluids such as Seven-Up, tea or soup.    Notify Physician at Once IF:    You have a fever over 100 degrees. A low grade fever (under 100 degrees) is usual after surgery.    You have severe pain.  You have a large amount of bleeding or " drainage.    Rev. 2014  Same-Day Surgery   Adult Discharge Orders & Instructions     For 24 hours after surgery:  1. Get plenty of rest.  A responsible adult must stay with you for at least 24 hours after you leave the hospital.   2. Pain medication can slow your reflexes. Do not drive or use heavy equipment.  If you have weakness or tingling, don't drive or use heavy equipment until this feeling goes away.  3. Mixing alcohol and pain medication can cause dizziness and slow your breathing. It can even be fatal. Do not drink alcohol while taking pain medication.  4. Avoid strenuous or risky activities.  Ask for help when climbing stairs.   5. You may feel lightheaded.  If so, sit for a few minutes before standing.  Have someone help you get up.   6. If you have nausea (feel sick to your stomach), drink only clear liquids such as apple juice, ginger ale, broth or 7-Up.  Rest may also help.  Be sure to drink enough fluids.  Move to a regular diet as you feel able. Take pain medications with a small amount of solid food, such as toast or crackers, to avoid nausea.   7. A slight fever is normal. Call the doctor if your fever is over 100 F (37.7 C) (taken under the tongue) or lasts longer than 24 hours.  8. You may have a dry mouth, muscle aches, trouble sleeping or a sore throat.  These symptoms should go away after 24 hours.  9. Do not make important or legal decisions.   Pain Management:      1. Take pain medication (if prescribed) for pain as directed by your physician.        2. WARNING: If the pain medication you have been prescribed contains Tylenol  (acetaminophen), DO NOT take additional doses of Tylenol (acetaminophen).     Call your doctor for any of the followin.  Signs of infection (fever, growing tenderness at the surgery site, severe pain, a large amount of drainage or bleeding, foul-smelling drainage, redness, swelling).    2.  It has been over 8 to 10 hours since surgery and you are still not able  to urinate (pee).    3.  Headache for over 24 hours.    4.  Numbness, tingling or weakness the day after surgery (if you had spinal anesthesia).  To contact a doctor, call clinic @ [phone]    or:      [phone] and ask for the Resident On Call for:          OB/GYN (answered 24 hours a day)      Emergency Department:  [facility]: [phone]  [facility]: [phone]               Rev. 10/2014   Scopolamine Patch  (Absorbed through the skin)    The Scopolamine Patch prevents nausea and vomiting caused by motion sickness or anesthesia and surgery in adults.    Brand Name(s): Transderm Scop, Transderm-Scope  There may be other brand names for this medicine.    When This Medicine Should Not Be Used:  You should not use this medicine if you have had an allergic reaction to scopolamine, or if you have narrow angle glaucoma.    How to Use This Medicine:    Your doctor will tell you how many patches to use, where to apply them, and how often to apply them.     Do not use more patches or apply them more often than your doctor tells you to.    This medicine comes with patient instructions. Read and follow these instructions carefully. Ask your doctor or pharmacist if you have any questions.    To prevent motion sickness, apply the patch at least 4 hours before you need it.    Wash and dry your hands thoroughly before applying the patch.    Leave the patch in its sealed wrapper until you are ready to put it on. Tear the wrapper open carefully. NEVER CUT the wrapper or the patch with scissors. Do not use any patch that has been cut by accident.    Take the liner off the sticky side before applying.    Apply the patch to dry, hairless skin behind the ear.    If the patch is loose or falls off, apply a new patch at a different place behind the ear.    After you take off the patch, wash the place where the patch was and your hands thoroughly.    Only one patch should be used at any  time.    If a dose is missed:  If you forget to wear or change a patch, put one on as soon as you can. If it is almost time to put on your next patch, wait until then to apply a new patch and skip the one you missed. Do not apply extra patches to make up for a missed dose.    How to Store and Dispose of This Medicine:    Store the patches at room temperature in a closed container, away from heat, moisture and direct light.    Fold the used patch in half with the sticky sides together. Throw any used patch away so that children or pets cannot get to it. You will also need to throw away old patches after the expiration date has passed.    Keep all medicine away from children and never share your medicine with anyone.    Ask your doctor or pharmacist before using any other medicine, including over-the-counter medicines, vitamins, and herbal products.  Tell your doctor if you are using any medicines that make you sleepy. These include sleeping pills, cold and allergy medicine, narcotic pain relievers and sedatives.     Tell your doctor if you are using any medicine that make you sleepy. These include sleeping pills, hayes and allergy medicine, narcotic pain relievers and sedatives.    Do not drink alcohol while you are using this medicine.     Warnings While Using This Medicine:    Make sure your doctor knows if you are pregnant or breastfeeding or if you have glaucoma, prostate problems, trouble urinating, blocked bowels, liver disease, kidney disease or a history of seizures or mental illness.    This medicine can cause blurring of vision and other vision problems if it comes in contact with the eyes. This medicine may also cause problems with urination. If any of these reactions occur, remove the patch and call your doctor right away.    This medicine may make you dizzy or drowsy. Avoid driving, using machines, or doing anything else that could be dangerous if you are not alert. If you plan to participate in  underwater sports, this medicine may cause disorienting effects. If this is a concern for you, talk with your doctor.    This medicine may make you sweat less and cause your body to get too hot. Be careful in hot weather, when you are exercising or if using sauna or whirlpool.    Make sure any doctor or dentist who treats you knows that you are using this medicine. This medicine may affect the results of certain medical tests.    Skin burns have been reported at the patch site in several patients wearing an aluminized transdermal system during a magnetic resonance imaging scan (MRI). Because Transderm Scop contains aluminum, it is recommended to remove the system before undergoing an MRI.    Call your doctor right away if you notice any of these side effects:    Allergic reaction: Itching or hives, swelling in your face or hands, swelling or tingling in your mouth or throat, chest tightness, trouble breathing    Blurred vision    Confusion or memory loss    Fast, slow or uneven heartbeat    Lightheadedness, dizziness, drowsiness or fainting    Seeing, hearing or feeling things that are not there    Severe eye pain    Trouble urinating    If you notice these less serious side effects, talk with your doctor:    Dry mouth    Dry, itchy or red eyes    Restlessness    Skin rash or redness    If you notice other side effects that you think are caused by this medicine, tell your doctor immediately.    Rev. 4/2014

## 2019-11-27 LAB — COPATH REPORT: NORMAL

## 2020-01-03 ENCOUNTER — OFFICE VISIT (OUTPATIENT)
Dept: OBGYN | Facility: CLINIC | Age: 45
End: 2020-01-03

## 2020-01-03 VITALS
HEART RATE: 88 BPM | BODY MASS INDEX: 28.19 KG/M2 | HEIGHT: 68 IN | OXYGEN SATURATION: 100 % | WEIGHT: 186 LBS | TEMPERATURE: 98.1 F | DIASTOLIC BLOOD PRESSURE: 84 MMHG | SYSTOLIC BLOOD PRESSURE: 131 MMHG

## 2020-01-03 DIAGNOSIS — Z90.710 S/P HYSTERECTOMY WITH OOPHORECTOMY: Primary | ICD-10-CM

## 2020-01-03 DIAGNOSIS — Z90.721 S/P HYSTERECTOMY WITH OOPHORECTOMY: Primary | ICD-10-CM

## 2020-01-03 PROCEDURE — 99024 POSTOP FOLLOW-UP VISIT: CPT | Performed by: OBSTETRICS & GYNECOLOGY

## 2020-01-03 ASSESSMENT — MIFFLIN-ST. JEOR: SCORE: 1552.19

## 2020-01-03 NOTE — NURSING NOTE
"Chief Complaint   Patient presents with     Follow Up       Initial /84   Pulse 88   Temp 98.1  F (36.7  C) (Oral)   Ht 1.727 m (5' 8\")   Wt 84.4 kg (186 lb)   SpO2 100%   Breastfeeding No   BMI 28.28 kg/m   Estimated body mass index is 28.28 kg/m  as calculated from the following:    Height as of this encounter: 1.727 m (5' 8\").    Weight as of this encounter: 84.4 kg (186 lb).  BP completed using cuff size: large    Questioned patient about current smoking habits.  Pt. quit smoking some time ago.          The following HM Due: Vaccinations: tdap      The following patient reported/Care Every where data was sent to:  P ABSTRACT QUALITY INITIATIVES [42185]  n/a      patient has appointment for today              "

## 2020-01-03 NOTE — PROGRESS NOTES
"S:        O:    Vitals:    01/03/20 1321   BP: 131/84   Pulse: 88   Temp: 98.1  F (36.7  C)   TempSrc: Oral   SpO2: 100%   Weight: 84.4 kg (186 lb)   Height: 1.727 m (5' 8\")     Body mass index is 28.28 kg/m .     Gen: well appearing  Abd: soft, NT, no masses  : normal external genitalia. Cervix {parous/nonparous:839618}, no lesions. Uterus normal in size and contour. Adnexa normal.    "

## 2020-01-04 NOTE — PROGRESS NOTES
"S:  Omaira presents for follow-up after TLH-BSO through single port on 19.   Doing well.   Tolerating surgical menopause. Had initial gum pain which has resolved. She credits that to multivitamin that she started.         O:    Vitals:    20 1321   BP: 131/84   Pulse: 88   Temp: 98.1  F (36.7  C)   TempSrc: Oral   SpO2: 100%   Weight: 84.4 kg (186 lb)   Height: 1.727 m (5' 8\")     Body mass index is 28.28 kg/m .     Gen: well appearing  Abd: soft, NT, no masses  : normal external genitalia.Vaginal cuff well healed by inspection and palpation.    Patient Name: JASMYNE HERNANDEZ   MR#: [id number]   Specimen #: [id number]   Collected: 2019   Received: 2019   Reported: 2019 21:42   Ordering Phy(s): ALF ALTMAN     For improved result formatting, select 'View Enhanced Report Format' under    Linked Documents section.     SPECIMEN(S):   Uterus, cervix, bilateral fallopian tubes and bilateral ovaries     FINAL DIAGNOSIS:   Uterus, cervix, bilateral ovaries and fallopian tubes, risk reducing total    laparoscopic hysterectomy and   bilateral salpingo-oophorectomy:   - Atrophic endometrium   - Unremarkable myometrium   - Unremarkable uterine serosa   - Unremarkable cervix   - Left ovarian 0.5 cm simple cyst   - Unremarkable right ovary   - Unremarkable bilateral fallopian tubes   - Negative for malignancy       A/P:  44 year old  s/p TLH-BSO through single port for ovarian suppression after breast cancer.  Doing well.   Tolerating menopausal symptoms.   Reviewed supplements. Contains block cohosh, recommend she review with her oncologist.   RTC prn. No need for future paps.   "

## 2020-03-10 ENCOUNTER — ONCOLOGY VISIT (OUTPATIENT)
Dept: ONCOLOGY | Facility: CLINIC | Age: 45
End: 2020-03-10
Attending: PHYSICIAN ASSISTANT
Payer: COMMERCIAL

## 2020-03-10 VITALS
HEIGHT: 68 IN | RESPIRATION RATE: 14 BRPM | TEMPERATURE: 97 F | DIASTOLIC BLOOD PRESSURE: 72 MMHG | HEART RATE: 70 BPM | WEIGHT: 188.5 LBS | BODY MASS INDEX: 28.57 KG/M2 | SYSTOLIC BLOOD PRESSURE: 126 MMHG | OXYGEN SATURATION: 99 %

## 2020-03-10 DIAGNOSIS — Z15.01 LI-FRAUMENI SYNDROME: Primary | ICD-10-CM

## 2020-03-10 DIAGNOSIS — Z17.0 MALIGNANT NEOPLASM OF UPPER-OUTER QUADRANT OF LEFT BREAST IN FEMALE, ESTROGEN RECEPTOR POSITIVE (H): ICD-10-CM

## 2020-03-10 DIAGNOSIS — C50.412 MALIGNANT NEOPLASM OF UPPER-OUTER QUADRANT OF LEFT BREAST IN FEMALE, ESTROGEN RECEPTOR POSITIVE (H): ICD-10-CM

## 2020-03-10 PROCEDURE — G0463 HOSPITAL OUTPT CLINIC VISIT: HCPCS | Mod: ZF

## 2020-03-10 PROCEDURE — 99214 OFFICE O/P EST MOD 30 MIN: CPT | Mod: ZP | Performed by: PHYSICIAN ASSISTANT

## 2020-03-10 RX ORDER — LETROZOLE 2.5 MG/1
2.5 TABLET, FILM COATED ORAL DAILY
Qty: 90 TABLET | Refills: 11 | Status: SHIPPED | OUTPATIENT
Start: 2020-03-10 | End: 2020-07-16

## 2020-03-10 ASSESSMENT — PAIN SCALES - GENERAL: PAINLEVEL: NO PAIN (0)

## 2020-03-10 ASSESSMENT — MIFFLIN-ST. JEOR: SCORE: 1563.4

## 2020-03-10 NOTE — NURSING NOTE
"Oncology Rooming Note    March 10, 2020 5:07 PM   Mary Chadwick is a 44 year old female who presents for:    Chief Complaint   Patient presents with     Oncology Clinic Visit     Return; Breast Ca     Initial Vitals: /72 (BP Location: Left arm, Patient Position: Chair, Cuff Size: Adult Regular)   Pulse 70   Temp 97  F (36.1  C) (Oral)   Resp 14   Ht 1.727 m (5' 7.99\")   Wt 85.5 kg (188 lb 8 oz)   SpO2 99%   BMI 28.67 kg/m   Estimated body mass index is 28.67 kg/m  as calculated from the following:    Height as of this encounter: 1.727 m (5' 7.99\").    Weight as of this encounter: 85.5 kg (188 lb 8 oz). Body surface area is 2.03 meters squared.  No Pain (0) Comment: Data Unavailable   No LMP recorded. Patient is perimenopausal.  Allergies reviewed: Yes  Medications reviewed: Yes    Medications: MEDICATION REFILLS NEEDED TODAY. Provider was notified.  Pharmacy name entered into EPIC:    [facility] - [place], [place] - [address] [id number] - [place], [place] - [address]    Clinical concerns: Patient would like refill on Femara.        Samira Alex CMA              "
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
## 2020-06-29 ENCOUNTER — VIRTUAL VISIT (OUTPATIENT)
Dept: ONCOLOGY | Facility: CLINIC | Age: 45
End: 2020-06-29
Attending: INTERNAL MEDICINE
Payer: COMMERCIAL

## 2020-06-29 DIAGNOSIS — Z80.0 FAMILY HISTORY OF COLON CANCER: ICD-10-CM

## 2020-06-29 DIAGNOSIS — Z15.89 MONOALLELIC MUTATION OF MUTYH GENE: ICD-10-CM

## 2020-06-29 DIAGNOSIS — Z15.01 MONOALLELIC MUTATION OF TP53 GENE: ICD-10-CM

## 2020-06-29 DIAGNOSIS — Z15.89 MONOALLELIC MUTATION OF TP53 GENE: ICD-10-CM

## 2020-06-29 DIAGNOSIS — Z15.09 MONOALLELIC MUTATION OF TP53 GENE: ICD-10-CM

## 2020-06-29 DIAGNOSIS — Z79.811 USE OF AROMATASE INHIBITORS: ICD-10-CM

## 2020-06-29 DIAGNOSIS — C50.812 MALIGNANT NEOPLASM OF OVERLAPPING SITES OF LEFT BREAST IN FEMALE, ESTROGEN RECEPTOR POSITIVE (H): ICD-10-CM

## 2020-06-29 DIAGNOSIS — Z15.01 LI-FRAUMENI SYNDROME: Primary | ICD-10-CM

## 2020-06-29 DIAGNOSIS — Z17.0 MALIGNANT NEOPLASM OF OVERLAPPING SITES OF LEFT BREAST IN FEMALE, ESTROGEN RECEPTOR POSITIVE (H): ICD-10-CM

## 2020-06-29 PROCEDURE — 99214 OFFICE O/P EST MOD 30 MIN: CPT | Mod: GT | Performed by: INTERNAL MEDICINE

## 2020-06-29 PROCEDURE — 40001009 ZZH VIDEO/TELEPHONE VISIT; NO CHARGE
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
## 2020-07-02 ENCOUNTER — MYC MEDICAL ADVICE (OUTPATIENT)
Dept: ONCOLOGY | Facility: CLINIC | Age: 45
End: 2020-07-02

## 2020-07-16 DIAGNOSIS — Z17.0 MALIGNANT NEOPLASM OF UPPER-OUTER QUADRANT OF LEFT BREAST IN FEMALE, ESTROGEN RECEPTOR POSITIVE (H): ICD-10-CM

## 2020-07-16 DIAGNOSIS — C50.412 MALIGNANT NEOPLASM OF UPPER-OUTER QUADRANT OF LEFT BREAST IN FEMALE, ESTROGEN RECEPTOR POSITIVE (H): ICD-10-CM

## 2020-07-16 RX ORDER — LETROZOLE 2.5 MG/1
2.5 TABLET, FILM COATED ORAL DAILY
Qty: 90 TABLET | Refills: 0 | Status: SHIPPED | OUTPATIENT
Start: 2020-07-16 | End: 2020-11-19

## 2020-08-18 PROBLEM — C50.912 MALIGNANT NEOPLASM OF LEFT BREAST IN FEMALE, ESTROGEN RECEPTOR POSITIVE (H): Status: ACTIVE | Noted: 2018-02-20

## 2020-08-18 PROBLEM — Z17.0 MALIGNANT NEOPLASM OF LEFT BREAST IN FEMALE, ESTROGEN RECEPTOR POSITIVE (H): Status: ACTIVE | Noted: 2018-02-20

## 2020-11-17 DIAGNOSIS — Z17.0 MALIGNANT NEOPLASM OF UPPER-OUTER QUADRANT OF LEFT BREAST IN FEMALE, ESTROGEN RECEPTOR POSITIVE (H): ICD-10-CM

## 2020-11-17 DIAGNOSIS — C50.412 MALIGNANT NEOPLASM OF UPPER-OUTER QUADRANT OF LEFT BREAST IN FEMALE, ESTROGEN RECEPTOR POSITIVE (H): ICD-10-CM

## 2020-11-19 RX ORDER — LETROZOLE 2.5 MG/1
2.5 TABLET, FILM COATED ORAL DAILY
Qty: 90 TABLET | Refills: 0 | Status: SHIPPED | OUTPATIENT
Start: 2020-11-19 | End: 2021-03-03

## 2020-11-22 ENCOUNTER — HEALTH MAINTENANCE LETTER (OUTPATIENT)
Age: 45
End: 2020-11-22

## 2020-12-07 ENCOUNTER — HOSPITAL ENCOUNTER (OUTPATIENT)
Dept: BONE DENSITY | Facility: CLINIC | Age: 45
Discharge: HOME | End: 2020-12-07
Attending: INTERNAL MEDICINE | Admitting: INTERNAL MEDICINE
Payer: COMMERCIAL

## 2020-12-07 DIAGNOSIS — Z15.89 MONOALLELIC MUTATION OF MUTYH GENE: ICD-10-CM

## 2020-12-07 DIAGNOSIS — Z15.01 LI-FRAUMENI SYNDROME: ICD-10-CM

## 2020-12-07 DIAGNOSIS — Z17.0 MALIGNANT NEOPLASM OF OVERLAPPING SITES OF LEFT BREAST IN FEMALE, ESTROGEN RECEPTOR POSITIVE (H): ICD-10-CM

## 2020-12-07 DIAGNOSIS — Z15.01 MONOALLELIC MUTATION OF TP53 GENE: ICD-10-CM

## 2020-12-07 DIAGNOSIS — C50.812 MALIGNANT NEOPLASM OF OVERLAPPING SITES OF LEFT BREAST IN FEMALE, ESTROGEN RECEPTOR POSITIVE (H): ICD-10-CM

## 2020-12-07 DIAGNOSIS — Z15.89 MONOALLELIC MUTATION OF TP53 GENE: ICD-10-CM

## 2020-12-07 DIAGNOSIS — Z15.09 MONOALLELIC MUTATION OF TP53 GENE: ICD-10-CM

## 2020-12-07 DIAGNOSIS — Z80.0 FAMILY HISTORY OF COLON CANCER: ICD-10-CM

## 2020-12-07 DIAGNOSIS — Z79.811 USE OF AROMATASE INHIBITORS: ICD-10-CM

## 2020-12-07 PROCEDURE — 77080 DXA BONE DENSITY AXIAL: CPT

## 2021-01-04 ENCOUNTER — VIRTUAL VISIT (OUTPATIENT)
Dept: ONCOLOGY | Facility: CLINIC | Age: 46
End: 2021-01-04
Attending: PHYSICIAN ASSISTANT
Payer: COMMERCIAL

## 2021-01-04 DIAGNOSIS — C50.812 MALIGNANT NEOPLASM OF OVERLAPPING SITES OF LEFT BREAST IN FEMALE, ESTROGEN RECEPTOR POSITIVE (H): Primary | ICD-10-CM

## 2021-01-04 DIAGNOSIS — Z17.0 MALIGNANT NEOPLASM OF OVERLAPPING SITES OF LEFT BREAST IN FEMALE, ESTROGEN RECEPTOR POSITIVE (H): Primary | ICD-10-CM

## 2021-01-04 PROCEDURE — 999N001193 HC VIDEO/TELEPHONE VISIT; NO CHARGE

## 2021-01-04 PROCEDURE — 99215 OFFICE O/P EST HI 40 MIN: CPT | Mod: GT | Performed by: PHYSICIAN ASSISTANT
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
## 2021-01-19 ENCOUNTER — MYC MEDICAL ADVICE (OUTPATIENT)
Dept: ONCOLOGY | Facility: CLINIC | Age: 46
End: 2021-01-19

## 2021-01-20 NOTE — TELEPHONE ENCOUNTER
"Pt called triage back to discuss symptomatic mychart; stated bleeding gums developed about 6-7 weeks ago, she did see the dentist for her annual cleaning and was told then it may be happening because it had been over a year since her last cleaning. Was told to monitor and use Act otc mouth rinse. She stated bleeding as not gotten better, even just talking her mouth will \"fill up with blood\", most prominent parts of gums that seem to be bleeding in above her 2 front teeth. She denied gums being swollen, tender or painful, denied any sores or white patches, fevers, chills or other bleeding and bruising issues. 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 [facility] pcp is at [facility] if she should need any labs she prefers to go there.    Advised pt Femara use would not decrease her platelet counts, recommend she call her dentist to update them that her gums continue to bleed to see if she should be seen again. She stated she would. Routing to care team for follow-up.  "

## 2021-01-21 NOTE — TELEPHONE ENCOUNTER
Called pt to follow-up on mychart/triage call from yesterday. Pt's phone is not accepting calls, message on call out screen to call friend Renzo at [phone], left generic message to have pt call clinic back. Also sent pt mychart follow-up message.

## 2021-01-26 NOTE — TELEPHONE ENCOUNTER
Pt did not read last mychart message by Fanny on 1/22, called friend Renzo and reached her, relayed message for pt to read her mychart and call back [phone] to update triage. She stated she would relay the message.

## 2021-01-29 DIAGNOSIS — Z15.01 LI-FRAUMENI SYNDROME: Primary | ICD-10-CM

## 2021-02-12 ENCOUNTER — TRANSFERRED RECORDS (OUTPATIENT)
Dept: HEALTH INFORMATION MANAGEMENT | Facility: CLINIC | Age: 46
End: 2021-02-12

## 2021-02-25 ENCOUNTER — ANCILLARY PROCEDURE (OUTPATIENT)
Dept: MRI IMAGING | Facility: CLINIC | Age: 46
End: 2021-02-25
Attending: PHYSICIAN ASSISTANT
Payer: COMMERCIAL

## 2021-02-25 DIAGNOSIS — Z15.01 LI-FRAUMENI SYNDROME: ICD-10-CM

## 2021-02-25 PROCEDURE — 71550 MRI CHEST W/O DYE: CPT | Mod: GC | Performed by: RADIOLOGY

## 2021-02-25 PROCEDURE — 73218 MRI UPPER EXTREMITY W/O DYE: CPT | Mod: RT | Performed by: RADIOLOGY

## 2021-02-25 PROCEDURE — 73718 MRI LOWER EXTREMITY W/O DYE: CPT | Mod: LT | Performed by: RADIOLOGY

## 2021-02-25 PROCEDURE — 73218 MRI UPPER EXTREMITY W/O DYE: CPT | Mod: LT | Performed by: RADIOLOGY

## 2021-02-25 PROCEDURE — 73718 MRI LOWER EXTREMITY W/O DYE: CPT | Mod: RT | Performed by: RADIOLOGY

## 2021-02-25 PROCEDURE — 72195 MRI PELVIS W/O DYE: CPT | Mod: GC | Performed by: RADIOLOGY

## 2021-02-25 PROCEDURE — 74181 MRI ABDOMEN W/O CONTRAST: CPT | Mod: GC | Performed by: RADIOLOGY

## 2021-02-25 PROCEDURE — A9585 GADOBUTROL INJECTION: HCPCS | Performed by: STUDENT IN AN ORGANIZED HEALTH CARE EDUCATION/TRAINING PROGRAM

## 2021-02-25 PROCEDURE — 70553 MRI BRAIN STEM W/O & W/DYE: CPT | Performed by: STUDENT IN AN ORGANIZED HEALTH CARE EDUCATION/TRAINING PROGRAM

## 2021-02-25 RX ORDER — GADOBUTROL 604.72 MG/ML
7.5 INJECTION INTRAVENOUS ONCE
Status: COMPLETED | OUTPATIENT
Start: 2021-02-25 | End: 2021-02-25

## 2021-02-25 RX ADMIN — GADOBUTROL 7.5 ML: 604.72 INJECTION INTRAVENOUS at 09:11

## 2021-02-25 NOTE — DISCHARGE INSTRUCTIONS
MRI Contrast Discharge Instructions    The IV contrast you received today will pass out of your body in your  urine. This will happen in the next 24 hours. You will not feel this process.  Your urine will not change color.    Drink at least 4 extra glasses of water or juice today (unless your doctor  has restricted your fluids). This reduces the stress on your kidneys.  You may take your regular medicines.    If you are on dialysis: It is best to have dialysis today.    If you have a reaction: Most reactions happen right away. If you have  any new symptoms after leaving the hospital (such as hives or swelling),  call your hospital at the correct number below. Or call your family doctor.  If you have breathing distress or wheezing, call 911.    Special instructions: ***    I have read and understand the above information.    Signature:______________________________________ Date:___________    Staff:__________________________________________ Date:___________     Time:__________    [facility] Departments:    ___[place]: [phone]  ___[facility]: [phone]  ___[place]: [phone] ___[facility]: [phone]  ___[facility]: [phone]  ___[facility]: [phone]  ___[phone]  ___[facility]: [phone]  ___[phone]

## 2021-03-02 DIAGNOSIS — Z17.0 MALIGNANT NEOPLASM OF UPPER-OUTER QUADRANT OF LEFT BREAST IN FEMALE, ESTROGEN RECEPTOR POSITIVE (H): ICD-10-CM

## 2021-03-02 DIAGNOSIS — C50.412 MALIGNANT NEOPLASM OF UPPER-OUTER QUADRANT OF LEFT BREAST IN FEMALE, ESTROGEN RECEPTOR POSITIVE (H): ICD-10-CM

## 2021-03-03 RX ORDER — LETROZOLE 2.5 MG/1
2.5 TABLET, FILM COATED ORAL DAILY
Qty: 90 TABLET | Refills: 3 | Status: SHIPPED | OUTPATIENT
Start: 2021-03-03 | End: 2022-04-27

## 2021-03-03 NOTE — TELEPHONE ENCOUNTER
RECORDS STATUS - ALL OTHER DIAGNOSIS      RECORDS RECEIVED FROM: [facility]     DATE RECEIVED: 3/12/2021   NOTES STATUS DETAILS   OFFICE NOTE from referring provider Nay Harrison GC   OFFICE NOTE from medical oncologist Complete [facility] 1/4/2021 Virtual Visit - Malignant Neoplasm of overlapping sites of the breast in female    6/29/2020 Virtual Visit -Li Fraumeni Syndrome    3/10/2020 Oncology Visit -Li   Fraumeni Syndrome    More in [facility]   OPERATIVE REPORT     LABS     PATHOLOGY REPORTS Complete- internal biopsies in EPIC 11/20/2020 internal surgical biopsy of uterus- no malignancy     10/11/2019 Internal endometrial biopsy   - Fragments of inactive endometrium/lower uterine segment - see comment.   - Cervical glandular epithelium with no evidence of glandular neoplasia.    8/6/2018   A: Breast, left, skin sparing mastectomy:   -INVASIVE MAMMARY CARCINOMA OF NO SPECIAL TYPE (INVASIVE DUCTAL   CARCINOMA)    3/6/2018   BREAST, RIGHT, 12:30, 2 CM FROM NIPPLE, ULTRASOUND GUIDED CORE BIOPSY:   - Fibroadenoma   - No atypia or malignancy     ANYTHING RELATED TO DIAGNOSIS Complete Labs last updated on 1/26/2021 in     8/6/2018 Her 2 [facility]    GENONOMIC TESTING     TYPE:     IMAGING (NEED IMAGES & REPORT)     CT SCANS     MRI Complete MR Humerus Upper Arm 2/25/2021    MRI Abdomen 2/25/2021    MRI Humerus Upper Arm 2/25/2021    MRI Femur Thigh Right 2/25/2021    MRI Femur Right Thigh 2/25/2021    MRI Chest 2/25/2021    MRI Pelvic Bones 2/25/2021   MAMMO Complete Mammo 2/20/2018, 2/12/2018   ULTRASOUND Complete US Bx Breast 2/20/2018   PET       Action    Action Taken 3/3/2021 11:22AM     I called [facility] to have IMG pushed to PACS. Ph: : [phone] option 4    3/11/2021 3:31PM   I called pt Farrukh- her outside records are with [facility]    I called [facility] to see if they had an EUS report. They weren't able to locate an EUS report.    I called [facility]- they don't have an EUS report either.      3/15/2021 2:20pm   I called [facility] Phone: [phone]- They only saw her on Feb 12th 2021, there are no path reports.     I called [facility] Ph: [phone] #4 - They don't have pt Omaira in their system.     I called [facility] ([facility] Ph: [phone]- I left a detailed vm. I requested colonoscopy and EGD reports.     2:41PM   [facility] called back and said they don't have the pt in their system.     3/16/2021 10:32AM  I called [facility] to see if they have any additional GI records on pt Omaira. [facility] doesn't have any colonoscopy or EGD reports from [phone]

## 2021-03-03 NOTE — TELEPHONE ENCOUNTER
Last OV 1/4/2021 with Fanny REVELES, next follow-up 7/5/2021 with Dr. Gann  3  Per last OV notes: -Continues on adjuvant endocrine therapy with AI, tolerating well.  Continue following up with an oncologist every 3-4 months for one more year, then every 6 months through year 5, and potentially annually thereafter.      Routed to provider for approval

## 2021-03-12 ENCOUNTER — PRE VISIT (OUTPATIENT)
Dept: ONCOLOGY | Facility: CLINIC | Age: 46
End: 2021-03-12

## 2021-03-12 ENCOUNTER — VIRTUAL VISIT (OUTPATIENT)
Dept: ONCOLOGY | Facility: CLINIC | Age: 46
End: 2021-03-12
Attending: CLINICAL NURSE SPECIALIST
Payer: COMMERCIAL

## 2021-03-12 DIAGNOSIS — Z15.01 LI-FRAUMENI SYNDROME: Primary | ICD-10-CM

## 2021-03-12 PROBLEM — R73.03 PREDIABETES: Status: ACTIVE | Noted: 2021-01-27

## 2021-03-12 PROBLEM — Z79.899 CONTROLLED SUBSTANCE AGREEMENT SIGNED: Status: ACTIVE | Noted: 2019-04-15

## 2021-03-12 PROBLEM — R79.1 LOW PLASMA VON WILLEBRAND FACTOR (VWF): Status: ACTIVE | Noted: 2021-01-27

## 2021-03-12 PROCEDURE — 999N001193 HC VIDEO/TELEPHONE VISIT; NO CHARGE

## 2021-03-12 PROCEDURE — 99417 PROLNG OP E/M EACH 15 MIN: CPT | Performed by: CLINICAL NURSE SPECIALIST

## 2021-03-12 PROCEDURE — 99205 OFFICE O/P NEW HI 60 MIN: CPT | Mod: GT | Performed by: CLINICAL NURSE SPECIALIST

## 2021-03-12 NOTE — LETTER
"    3/12/2021         RE: Mary Chadwick  [address]  [place] [address]        Dear Colleague,    Thank you for referring your patient, Mary Chadwick, to the [facility]. Please see a copy of my visit note below.    Omaira is a 45 year old who is being evaluated via a billable video visit.      How would you like to obtain your AVS? MyChart  If the video visit is dropped, the invitation should be resent by: Send to e-mail at: [email or web address]  Will anyone else be joining your video visit? No     Krystina SALCEDO    Video Start Time: 1615      Type of service:  Video Visit    Video End Time:1701    Originating Location (pt. Location): Home    Distant Location (provider location):  [facility]     Platform used for Video Visit: [employer])    Oncology Risk Management Consultation:  Date on this visit: 3/12/2021    Mary \"Bet\" Nat Chadwick  is referred by Dr. Valeria Gann for an oncology risk management consultation. She requires high risk screening and surveillance to reduce her risk of cancer secondary to Li Fraumeni Syndrome.  She is considered to be at high risk for soft tissue sarcomas, osteosarcomas, breast cancer, brain tumors, adrenocortical carcinoma, leukemia and other malignancies. Unfortunately, she has a history of Stage II invasive ductal carcinoma of the left breast.    Primary Physician: Melly Navarrete PA-C    History Of Present Illness:  Ms. Chadwick is a very pleasant 45 year old female who presents with Li Fraumeni Syndrome.      Genetic testing:  Genetic Testing Results: POSITIVE - TP53 mutation  3/2/2018 -- Omaira is POSITIVE for a TP53 mutation. Specifically her mutation is called c.638G>A (p.R213Q) identified using a  Cancer Next panel from "[employer].  This region was covered at 433x, and the heterozygosity rate was 48%. This is consistent with a germline mutation, as the expected heterozygosity rate is around 50% (i.e. " one allele has the mutation and the other allele does not). Mandeep is confident that this is very likely germline.      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 Specifically her mutation is called c.1187G>A (p.G396D). We discussed this mutation is associated with a slightly increased risk for colon cancer.  Some research has suggested that there is a small increased risk for breast cancer in individuals who have a mutation in this gene.  However, there are no guidelines for breast screening with this gene. No other mutations were found in the MUTYH gene.      Of note, Omaira tested negative for mutations in the following genes: APC, FRANCES, BARD1, BRCA1, BRCA2, BRIP1, BMPR1A, CDH1, CDK4, CDKN2A, CHEK2, DICER1, EPCAM, HOXB13, GREM1, MLH1, MRE11A, MSH2, MSH6, NBN, NF1, PALB2, PMS2, POLD1, POLE, PTEN, RAD50, RAD51C, RAD51D, SMAD4, SMARCA4, and STK11. No mutations were found in these remaining 32 genes analyzed.  This test involved sequencing and deletion/duplication analysis of all genes with the exception of EPCAM and GREM1 (deletions only).      Pertinent Medical History:  2/9/2018- Stage IIA invasive ductal carcinoma, ER/SD-positive, HER2--negative, involving the left breast.     2/9/2018 - screening mammogram  followed by diagnostic breast ultrasound, which revealed a 2.6 x 1.1 x 1.7 cm irregular shaped mass at the 2 o'clock position involving the left breast. Left breast biopsy showed invasive ductal carcinoma, grade 2, ER/SD-positive, HER2-negative. Breast MRI showed a 3.5 cm mass in her left breast with an area of non-mass enhancement measuring approximately 8 cm. On this imaging, she had an equivocal node in the left axilla; this was not biopsied.     She was screened for the I-SPY-2 clinical trial. She came back as low-risk MammaPrint and the study was not recommended. During this time,  she was diagnosed with Li-Fraumeni syndrome.      She started on neoadjuvant endocrine therapy with monthly Zoladex on 3/22/18, with letrozole added in April 2018.     She underwent bilateral mastectomies with sentinel node evaluation and breast reconstruction on 8/6/18 with Carissa Andrews and Marti. Pathology revealed a T2N1 with treatment-related change.     She underwent HIMANSHU-BSO by Dr. Yenny Hidalgo on 11/20/2019 12/7/2020 DEXA scan  showed new osteopenia in her lumbar spine.  Prescribed 1200 mg of calcium daily in divided doses and 1,000-2,000 international unit(s) of Vitamin D3.     Li-Fraumeni Specific Screening to date:     Body:  3/28/2018- Whole body MRI: There are 2 right benign hemangiomas measuring 1.0 and 0.6 cm corresponding to the previously noted hypoattenuating lesions described on 3/20/2018 CT. Bilateral simple appearing renal cysts, the largest is at the inferior pole of the right kidney (series 11 image 18) measuring approximately 1.6 x 1.9 cm. Otherwise both kidneys are unremarkable with no evidence of hydronephrosis.    1/16/2019- Whole body MRI: Abdomen/pelvis: T2 hyperintense foci in the right hepatic lobe, consistent with hemangiomas as characterized on comparison MRI. Again noted benign-appearing renal cysts bilaterally    2/25/2021- Whole body MRI: No evidence of metastatic disease in this limited study.    Brain:  1/16/2019- Brain MRI: No abnormal enhancing intracranial lesion. Vague T2 hyperintensity in the periventricular and supraventricular white matter is nonspecific and may be secondary to prior therapy.    2/25/2021- Brain MRI: No abnormal enhancing intracranial lesion. Vague T2 hyperintensity in the periventricular and supraventricular white matter is nonspecific and may be secondary to prior therapy.    Gastrointestinal:  5/1/2018 (Dr. Debora Lee, [facility]), Colonoscopy, normal    Per report, colonoscopy and EUS done in 2019 through [facility], records to be requested.  Next: due 5/2023.     Skin:  Per report, Dermatology full body skin check at [facility] in 2020 (records requested)    Past Medical/Surgical History:  Past Medical History:   Diagnosis Date     Breast cancer, stage 1 (H) 2018    HR Negtive and Estrogin Postive     Complication of anesthesia      Herpes simplex without mention of complication     genital herpes     Li-Fraumeni syndrome 03/02/2018    germline TP53 genetic mutation     Monoallelic mutation of MUTYH gene 03/02/2018    c.1187G>A ([id number]), 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; identified using CancerNext panel through [employer]     Monoallelic mutation of TP53 gene 03/02/2018    c.638G>A (p.R213Q), identified using CancerNext genetic testing through [employer]     PONV (postoperative nausea and vomiting)      Past Surgical History:   Procedure Laterality Date     ESOPHAGOSCOPY, GASTROSCOPY, DUODENOSCOPY (EGD), COMBINED N/A 10/15/2018    Procedure: EGD;  Surgeon: Edwardo Beatty MD;  Location: [facility] OR     GRAFT FAT TO BREAST Bilateral 11/8/2018    Procedure: Bilateral Breast Fat Grafting and nipple reconstruction;  Surgeon: BASILIO Kidd MD;  [facility]: [facility] OR     HERNIA REPAIR, INGUINAL RT/LT  2002    Hernia Repair, Femoral RT/LT     HYSTERECTOMY, PAP NO LONGER INDICATED       LAPAROSCOPIC HYSTERECTOMY TOTAL, BILATERAL SALPINGO-OOPHORECTOMY, COMBINED Bilateral 11/20/2019    Procedure: Total Laparoscopic Hysterectomy Via Single Port, Bilateral Salpingo-Oophorectomy;  Surgeon: Yenny Hidalgo MD;  Location: [facility] OR     MASTECTOMY SIMPLE BILATERAL, SENTINEL NODE BILATERAL, COMBINED Bilateral 8/6/2018    Procedure: COMBINED MASTECTOMY SIMPLE BILATERAL, SENTINEL NODE BILATERAL;  Bilateral Mastectomy With Immediate Reconstruction, Left [place] Lymph Node Biopsy, Anesthesia Block;  Surgeon: Antonio Andrews MD;  [facility] OR     RECONSTRUCT BREAST BILATERAL Bilateral 8/6/2018    Procedure: RECONSTRUCT BREAST BILATERAL;;   Surgeon: BASILIO Kidd MD;  [facility]     RECONSTRUCT NIPPLE BILATERAL Bilateral 2018    Procedure: Nipple Reconstruction;  Surgeon: BASILIO Kidd MD;  [facility]     REMOVE AND REPLACE BREAST IMPLANT PROSTHESIS Bilateral 2018    Procedure: REMOVE AND REPLACE BREAST IMPLANT PROSTHESIS;  Bilateral Breast Implant Exchange and Revision;  Surgeon: BASILIO Kidd MD;  Location: [facility] OR       Allergies:  Allergies as of 2021 - Reviewed 03/10/2020   Allergen Reaction Noted     Lactose Other (See Comments) 2018       Current Medications:  Current Outpatient Medications   Medication Sig Dispense Refill     amphetamine-dextroamphetamine (ADDERALL XR) 10 MG 24 hr capsule Take 10 mg by mouth       calcium citrate-vitamin D (CITRACAL) 315-250 MG-UNIT TABS per tablet Take 2 tablets by mouth       CALCIUM-MAGNESIUM-VITAMIN D PO        Evening Primrose Oil 1000 MG CAPS Take 2,000 mg by mouth At Bedtime       FISH OIL-CHOLECALCIFEROL PO Take 2,000 Units by mouth daily       ibuprofen (ADVIL/MOTRIN) 600 MG tablet Take 1 tablet (600 mg) by mouth every 6 hours as needed for moderate pain (Patient not taking: Reported on 3/10/2020) 40 tablet 0     letrozole (FEMARA) 2.5 MG tablet Take 1 tablet (2.5 mg) by mouth daily 90 tablet 3     senna-docusate (SENOKOT-S/PERICOLACE) 8.6-50 MG tablet Take 1-2 tablets by mouth 2 times daily (Patient not taking: Reported on 1/3/2020) 30 tablet 0     Spirulina 500 MG TABS 6 Tabs daily.       UNABLE TO FIND MEDICATION NAME: Iodoral 12.5 iodine and potassium       UNABLE TO FIND MEDICATION NAME: Woman's Multi 40+       vitamin C w/FRANCISCO HIPS 1000 MG tablet Take 2,000 mg by mouth daily          Family History:  Family History   Problem Relation Age of Onset     Colon Cancer Mother 31         at 39     Thyroid Disease Father      Neurologic Disorder Father         myasthenia gravis     Stomach Cancer Maternal Grandfather 70        not sure what  type of cancer, thinks it was stomach     Cerebrovascular Disease Paternal Grandmother      Myocardial Infarction Paternal Grandfather 40     Cancer Paternal Uncle         unknown type;  in 60s     Breast Cancer Maternal Aunt          in 50s due to breast cancer     Stomach Cancer Maternal Uncle          in 50s, no sure what type of cancer       Social History:  Social History     Socioeconomic History     Marital status: Single     Spouse name: Not on file     Number of children: 2     Years of education: Not on file     Highest education level: Not on file   Occupational History     Employer: NONE      Comment: [place]   Social Needs     Financial resource strain: Not on file     Food insecurity     Worry: Not on file     Inability: Not on file     Transportation needs     Medical: Not on file     Non-medical: Not on file   Tobacco Use     Smoking status: Former Smoker     Quit date: 2004     Years since quittin.0     Smokeless tobacco: Never Used   Substance and Sexual Activity     Alcohol use: No     Drug use: No     Sexual activity: Yes     Partners: Male     Birth control/protection: Post-menopausal, Female Surgical     Comment: Hysterectomy    Lifestyle     Physical activity     Days per week: Not on file     Minutes per session: Not on file     Stress: Not on file   Relationships     Social connections     Talks on phone: Not on file     Gets together: Not on file     Attends Hoahaoism service: Not on file     Active member of club or organization: Not on file     Attends meetings of clubs or organizations: Not on file     Relationship status: Not on file     Intimate partner violence     Fear of current or ex partner: Not on file     Emotionally abused: Not on file     Physically abused: Not on file     Forced sexual activity: Not on file   Other Topics Concern     Parent/sibling w/ CABG, MI or angioplasty before 65F 55M? Not Asked   Social History Narrative     Not on  file     Review of Systems:  GENERAL: No change in weight, sleep or appetite.  Normal energy.  No fever or chills  EYES: Negative for vision changes or eye problems  ENT: No problems with ears, nose or throat.  No difficulty swallowing.  RESP: No coughing, wheezing or shortness of breath  CV: No chest pains or palpitations  GI: No nausea, vomiting,  heartburn, abdominal pain, diarrhea, constipation or change in bowel habits  : No urinary frequency or dysuria, bladder or kidney problems  MUSCULOSKELETAL: No significant muscle or joint pains  NEUROLOGIC: Denies headaches other than tension  Denies numbness, tingling, weakness, problems with balance or coordination  PSYCHIATRIC: Reports anxiety uses acupuncture to help with this, practices and teaches yoga. Anxiety heightened around the time of scans  HEME/IMMUNE/ALLERGY: History of bleeding with abnormal Von Willebrand levels; consulted with Dr. Gann about this.  Will follow with PCP and Dr. Gann. Denies bleeding problems today and states even gum bleeding has been alleviated with flossing.   No allergies or immune system problems  ENDOCRINE: No history of thyroid disease, diabetes or other endocrine disorders  SKIN: Reports squamous cells removed at last dermatological visit through [facility].     Physical Exam:  There were no vitals taken for this visit.  GENERAL: Healthy, alert and no distress  EYES: Eyes grossly normal to inspection.  No discharge or erythema, or obvious scleral/conjunctival abnormalities.  RESP: No audible wheeze, cough, or visible cyanosis.  No visible retractions or increased work of breathing.    SKIN: Visible skin clear. No significant rash, abnormal pigmentation or lesions.  NEURO: Cranial nerves grossly intact.  Mentation and speech appropriate for age.  PSYCH: Mentation appears normal, affect normal/bright, judgement and insight intact, normal speech and appearance well-groomed.    Laboratory/Imaging Studies  No results  found for any visits on 03/12/21.    ASSESSMENT  We reviewed her history.  She is taking prerequisites for a nursing degree now at [employer] and is very engaged and enthusiastic about what she is learning. She has stopped drinking altogether and is trying to pay attention to nutrition, stress reduction and self care as she juggles work and school. She would like to have her sons tested for LiFraumeni but their father is not in agreement. We discussed that in classic LFS families, childhood cancers are soon, but in other families, an attenuated form may be seen with adult onset only cancers.      We reviewed her plan, per below, and will try to time her ultrasound of the abdomen to be in September with her whole body MRI in December. It is not clear whether she is due for another colonoscopy now or not and she will try to find where she had her last colonoscopy and upper endoscopy and let me know so that we can get records. It is also not clear whether she needs a short term follow up with Dermatology; pathology is being requested.    We reviewed the function of the TP53 gene and its product, tumor protein p53, which can delay cell cycle progression and inhibiting the cell's ability to repair DNA or initiate programmed apoptosis, causing damaged DNA cells to survive and proliferate into malignancies. We reviewed the autosomal dominant pattern of inheritance, whereby children of TP53 mutation carriers would have a 50 percent chance of inheriting the mutation. We discussed that the lifetime risk of developing cancers has been estimated to be as high as 73% for male carriers and 93% for female carriers. (Angely et al. 2011. Lancet 12)607-661). We discussed the history of Li Fraumeni syndrome dating from 1969 in which Franky Jung and Joseph Fraumeni Jr. Described families with the syndrome after reviewing medical records of children with rhabdomyosarcoma.     We reviewed the broad range of  "malignancies found in the LiFraumeni population (usually under the age of 45) including leukemias, lymphomas, gastrointestinal and lung cancers and melanoma. Individuals with Li-Fraumeni syndrome are at increased risk of developing multiple primary cancers in their lifetime. Research has suggested there may be as high as a 50% chance to develop a second cancer and approximately 33% chance to develop a third cancer. It is not currently clear how the age at first diagnosis, the type of first cancer, or cancer treatment may influence future cancer risks.     There are several \"core\" cancers that are most commonly seen with classic Li-Fraumeni syndrome. The exact lifetime risks for these cancers have not been established, though recent research has attempted to define these risks.  ? Breast cancer: the lifetime breast cancer risk may be as high as 85%, compared to a 12% lifetime risk in the general population (Milagros et al 2016).  ? Soft-tissue sarcoma: the lifetime risk may be as high as 70% and 40% for women and men, respectively (Milagros et al 2016).  ? Brain tumors (astrocytomas, glioblastomas, medulloblastomas, choroid plexus carcinomas [CPC], etc.):  the lifetime risk may be as high as 20% and 30% for women and men, respectively (Milagros et al 2016).  ? Osteosarcomas:  the lifetime risk may be as high as 10% for both women and men (Milagros et al 2016).  ? Adrenocortical carcinomas (ACC):  typically a childhood cancer, though it can rarely be seen in adulthood  ? Leukemia - previous research suggested that acute leukemia was a \"core'\" cancer in Li-Fraumeni syndrome, though more recent data has suggested otherwise.     Several other cancers have also been seen in families with Li-Fraumeni syndrome, including melanoma (and other skin cancers), lymphoma, gastrointestinal (colorectal, pancreatic, stomach, etc.), kidney, gynecologic (uterine and ovarian), lung (particularly bronchoalveolar), and thyroid cancer. The exact lifetime " risks for these cancers are unknown at this time.    Individualized Surveillance Plan for children and adults  with Li Fraumeni Syndrome    based on NCCN Guidelines Version 1.2020 Gene Reviews, Angely et al 2016, Jesse et al 2017   Beginning at diagnosis   Purpose of screening Test or procedure Last done Next Due   Comprehensive exam every 6-12 months Comprehensive physical exam including neurological exam with high index of suspicion for rare cancers (and second malignancies in cancer survivors)     Deferred, also follows with Dr. Gann   Return to clinic in February 2022   Adrenocortical Carcinoma Ultrasound of the abdomen and pelvis every 3- 4 months (change to annually at age 18) NA NA   Adrenocortical Carcinoma    NOTE: Stop at age 18. Every 4 months:  1.Beta hCG (Stop at age 6)  2. Alpha fetoprotein (Stop at age 6)  3. 17-OH-progesterone  4. Testosterone  5. Androstenedione  6. Dehydroepiandrosterone sulfate   NA   NA   Acute Leukemia Every 4 months:    CBC    Erythrocyte sedimentation rate    lactate dehydrogenase   Ordered to be done in September February 2022   Brain Tumors Annual brain MRI with contrast   (without dye if previous are normal) 2/25/2021- Brain MRI: No abnormal enhancing intracranial lesion. Vague T2 hyperintensity in the periventricular and supraventricular white matter is nonspecific and may be secondary to prior therapy. February 2022   Bone and Soft Tissue Sarcomas Annual rapid whole body MRI (without contrast) 2/25/2021- Whole body MRI: No evidence of metastatic disease in this limited study.   February 2022   Beginning at age 18   From the ages of 20-or at the age of earliest diagnosed breast cancer in the family, if younger than 20. Clinical breast exam every 6-12 months   NA-mastectomy Follow with Dr. Gann' team   Breast screening for women Annual breast MRI from ages 20-29    Alternate annual breast MRI and annual mammogram (consider tomosynthesis) from ages 30-75. (after age 75,  consider screening schedule on individual basis)   NA   NA   Melanoma Annual dermatological exam 2020-[facility], records requested Recheck in 2021   Soft tissue, bone sarcomas and adrenal carcinoma Ultrasound of abdomen and pelvis every 12 months (alternate w/whole body MRI) None to date September 2021   Colon and Upper GI Screening  Beginning at age 25 or 5 years prior to the earliest known colon cancer in the family Upper endoscopy and colonoscopy every 2-5 years.   2019- records requested      *Therapeutic radiation treatment for cancer should be avoided when possible as it can promote soft tissue sarcomas in this population.  **Women with TP53 mutation who are treated for breast cancer, screening of remaining breast tissue with annual mammogram and annual breast MRI should continue. Discuss option of  risk reducing mastectomy regarding degree of protection, reconstruction options and risks.  In addition, the family history and residual breast cancer risk should be considered during counseling.     I spent a total of 100 minutes on the day of the visit. Please see the note for further information on patient assessment and treatment.    REY Johnson-CNS, OCN, AGN-BC  Clinical Nurse Specialist  Cancer Risk Management Program  [facility] [address]  [place] [address]    phone:  [phone]  Pager: [phone]  fax: [phone]    CC: MD Melly Garcia PA-C

## 2021-04-17 ENCOUNTER — IMMUNIZATION (OUTPATIENT)
Dept: NURSING | Facility: CLINIC | Age: 46
End: 2021-04-17
Payer: COMMERCIAL

## 2021-04-17 PROCEDURE — 0011A PR COVID VAC MODERNA 100 MCG/0.5 ML IM: CPT

## 2021-04-17 PROCEDURE — 91301 PR COVID VAC MODERNA 100 MCG/0.5 ML IM: CPT

## 2021-05-05 ENCOUNTER — MYC MEDICAL ADVICE (OUTPATIENT)
Dept: ONCOLOGY | Facility: CLINIC | Age: 46
End: 2021-05-05

## 2021-05-15 ENCOUNTER — IMMUNIZATION (OUTPATIENT)
Dept: NURSING | Facility: CLINIC | Age: 46
End: 2021-05-15
Attending: INTERNAL MEDICINE
Payer: COMMERCIAL

## 2021-05-15 PROCEDURE — 0012A PR COVID VAC MODERNA 100 MCG/0.5 ML IM: CPT

## 2021-05-15 PROCEDURE — 91301 PR COVID VAC MODERNA 100 MCG/0.5 ML IM: CPT

## 2021-06-14 ENCOUNTER — VIRTUAL VISIT (OUTPATIENT)
Dept: ONCOLOGY | Facility: CLINIC | Age: 46
End: 2021-06-14
Attending: INTERNAL MEDICINE
Payer: COMMERCIAL

## 2021-06-14 DIAGNOSIS — Z79.811 USE OF AROMATASE INHIBITORS: ICD-10-CM

## 2021-06-14 DIAGNOSIS — C50.412 MALIGNANT NEOPLASM OF UPPER-OUTER QUADRANT OF LEFT BREAST IN FEMALE, ESTROGEN RECEPTOR POSITIVE (H): Primary | ICD-10-CM

## 2021-06-14 DIAGNOSIS — Z17.0 MALIGNANT NEOPLASM OF UPPER-OUTER QUADRANT OF LEFT BREAST IN FEMALE, ESTROGEN RECEPTOR POSITIVE (H): Primary | ICD-10-CM

## 2021-06-14 PROCEDURE — 999N001193 HC VIDEO/TELEPHONE VISIT; NO CHARGE

## 2021-06-14 PROCEDURE — 99214 OFFICE O/P EST MOD 30 MIN: CPT | Mod: GT | Performed by: INTERNAL MEDICINE

## 2021-06-14 NOTE — PROGRESS NOTES
"Omaira is a 45 year old who is being evaluated via a billable video visit.      How would you like to obtain your AVS? MyChart  If the video visit is dropped, the invitation should be resent by: Send to e-mail at: [email or web address]  Will anyone else be joining your video visit? No       DENISSE Hinson      Video Time:  20 min    Video-Visit Details    Type of service:  Video Visit     Originating Location (pt. Location): Home    Distant Location (provider location):  [facility]     Platform:  itzat    HEMATOLOGY/ONCOLOGY PROGRESS NOTE  Jun 14, 2021    REASON FOR VISIT: follow-up of breast cancer    DIAGNOSIS:   Mary \"Omaira\" Farrukh is a 42-year-old female with stage IIA invasive ductal carcinoma, ER/WI-positive, HER2--negative, involving the left breast. She is originally from [place], displaced due to [place]. She underwent screening mammogram 2/9/2018 followed by diagnostic breast ultrasound, which revealed a 2.6 x 1.1 x 1.7 cm irregular shaped mass at the 2 o'clock position involving the left breast. Left breast biopsy showed invasive ductal carcinoma, grade 2, ER/WI-positive, HER2-negative. Breast MRI showed a 3.5 cm mass in her left breast with an area of non-mass enhancement measuring approximately 8 cm. On this imaging, she had an equivocal node in the left axilla; this was not biopsied.    She was screened for the I-SPY-2 clinical trial. She came back as low-risk MammaPrint and the study was not recommended. 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     She started on neoadjuvant endocrine therapy with monthly Zoladex on 3/22/18, with letrozole added in April 2018.    She underwent bilateral mastectomies with sentinel node evaluation and breast reconstruction on 8/6/18 with Carissa Andrews and Marti. Pathology revealed a T2N1 with treatment-related change.    She underwent HIMANSHU-BSO by Dr. Yenny Hidalgo on 11/20/2019.    INTERVAL " HISTORY:   Omaira is here for follow-up.    No new concerns. She remains on AI and is tolerating it reasonably well. Occasional but tolerable hot flashes.  Her boys are trying to come from the [facility] for the summer.    She gained weight while quarantining during covid.  She is now trying to lose weight.  She has been intermittently fasting and has lost 12 pounds.    Her hgb A1c was borderline high, so she is working extra hard to do this.      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      She feels more like herself.      She takes calcium and vitamin D.      No fevers, chills, cough, SOB, chest pain, abdominal pain, nausea, vomiting, bleeding, or swelling.      ROS: 10 point ROS neg other than the symptoms noted above in the HPI.      Current Outpatient Medications   Medication Sig Dispense Refill     amphetamine-dextroamphetamine (ADDERALL XR) 10 MG 24 hr capsule Take 10 mg by mouth       FISH OIL-CHOLECALCIFEROL PO Take 2,000 Units by mouth daily       letrozole (FEMARA) 2.5 MG tablet Take 1 tablet (2.5 mg) by mouth daily 90 tablet 3     Spirulina 500 MG TABS 6 Tabs daily.            No Known Allergies    PHYSICAL EXAMINATION  GENERAL: Healthy, alert and no distress  EYES: Eyes grossly normal to inspection.  No discharge or erythema, or obvious scleral/conjunctival abnormalities.  RESP: No audible wheeze, cough, or visible cyanosis.  No visible retractions or increased work of breathing.    SKIN: Visible skin clear. No significant rash, abnormal pigmentation or lesions.  NEURO: Cranial nerves grossly intact.  Mentation and speech appropriate for age.  PSYCH: Mentation appears normal, affect normal/bright, judgement and insight intact, normal speech and appearance well-groomed.      LABS:  No results found for this or any previous visit (from the past 24 hour(s)).    Reviewed outside labs.  Borderline low vonwillebrand antigen and factor levels    IMPRESSION/PLAN:  Mary Chadwick is a 45 year old female with  a T2 NX, invasive ductal carcinoma, ER/MT-positive, HER2 negative, involving the left breast, in the setting of Li-Fraumeni syndrome, s/p bilateral masectomies, HIMANSHU-BSO.    Stage IIA breast cancer: Continues on adjuvant endocrine therapy with AI.  We discussed following up with an oncologist every 3 months for the first 2-3 years, then every 6 months through year 5, and potentially annually thereafter.    We discussed having her return in 6 months.  She is doing very well with no s/s of recurrence.    Liver lesions: follow-up imaging confirmed benign hemangiomas    Bone health: continue supplementation with calcium, vitamin D. Dexa 12/2018 normal bone density. Would be due for repeat DEXA 12/2020.  This did not get done due to the covid pandemic; will reorder.    She is needing dental work, and as a result is not on zometa.      Li-Fraumeni:  - She has completed screening colonoscopy (5/2018), derm exam, body MRI, brain MRI, and EUS 1/2019.  Annual body MRI oders placed.   Per results, colonoscopy and EUS due 5/2023.  She will see Shelli Díaz in September in our high risk program.    Low VWF - will review with colleagues.  I anticipate we will recheck these levels.  She is having no bleeding.  Labs were done as pt was experiencing bleeding gums; this has now resolved.    Valeria Gann MD

## 2021-06-14 NOTE — LETTER
"    6/14/2021         RE: Mary Chadwick  [address]  [facility] [address]        Dear Colleague,    Thank you for referring your patient, Mary Chadwick, to the [facility]. Please see a copy of my visit note below.    Omaira is a 45 year old who is being evaluated via a billable video visit.      How would you like to obtain your AVS? MyChart  If the video visit is dropped, the invitation should be resent by: Send to e-mail at: [email or web address]  Will anyone else be joining your video visit? DENISSE Barnes      Video Time:  20 min    Video-Visit Details    Type of service:  Video Visit     Originating Location (pt. Location): Home    Distant Location (provider location):  [facility]     Platform:  Aledia    HEMATOLOGY/ONCOLOGY PROGRESS NOTE  Jun 14, 2021    REASON FOR VISIT: follow-up of breast cancer    DIAGNOSIS:   Mary \"Osei Chadwick is a 42-year-old female with stage IIA invasive ductal carcinoma, ER/VA-positive, HER2--negative, involving the left breast. She is originally from [place], displaced due to [place]. She underwent screening mammogram 2/9/2018 followed by diagnostic breast ultrasound, which revealed a 2.6 x 1.1 x 1.7 cm irregular shaped mass at the 2 o'clock position involving the left breast. Left breast biopsy showed invasive ductal carcinoma, grade 2, ER/VA-positive, HER2-negative. Breast MRI showed a 3.5 cm mass in her left breast with an area of non-mass enhancement measuring approximately 8 cm. On this imaging, she had an equivocal node in the left axilla; this was not biopsied.    She was screened for the I-SPY-2 clinical trial. She came back as low-risk MammaPrint and the study was not recommended. 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     She started on neoadjuvant endocrine therapy with monthly Zoladex on 3/22/18, with letrozole added in April 2018.    She " underwent bilateral mastectomies with sentinel node evaluation and breast reconstruction on 8/6/18 with Carissa Andrews and Marti. Pathology revealed a T2N1 with treatment-related change.    She underwent HIMANSHU-BSO by Dr. Yenny Hidalgo on 11/20/2019.    INTERVAL HISTORY:   Omaira is here for follow-up.    No new concerns. She remains on AI and is tolerating it reasonably well. Occasional but tolerable hot flashes.  Her boys are trying to come from the [email or web address] for the summer.    She gained weight while quarantining during covid.  She is now trying to lose weight.  She has been intermittently fasting and has lost 12 pounds.    Her hgb A1c was borderline high, so she is working extra hard to do this.      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      She feels more like herself.      She takes calcium and vitamin D.      No fevers, chills, cough, SOB, chest pain, abdominal pain, nausea, vomiting, bleeding, or swelling.      ROS: 10 point ROS neg other than the symptoms noted above in the HPI.      Current Outpatient Medications   Medication Sig Dispense Refill     amphetamine-dextroamphetamine (ADDERALL XR) 10 MG 24 hr capsule Take 10 mg by mouth       FISH OIL-CHOLECALCIFEROL PO Take 2,000 Units by mouth daily       letrozole (FEMARA) 2.5 MG tablet Take 1 tablet (2.5 mg) by mouth daily 90 tablet 3     Spirulina 500 MG TABS 6 Tabs daily.            No Known Allergies    PHYSICAL EXAMINATION  GENERAL: Healthy, alert and no distress  EYES: Eyes grossly normal to inspection.  No discharge or erythema, or obvious scleral/conjunctival abnormalities.  RESP: No audible wheeze, cough, or visible cyanosis.  No visible retractions or increased work of breathing.    SKIN: Visible skin clear. No significant rash, abnormal pigmentation or lesions.  NEURO: Cranial nerves grossly intact.  Mentation and speech appropriate for age.  PSYCH: Mentation appears normal, affect normal/bright, judgement and insight intact, normal speech and  appearance well-groomed.      LABS:  No results found for this or any previous visit (from the past 24 hour(s)).    Reviewed outside labs.  Borderline low vonwillebrand antigen and factor levels    IMPRESSION/PLAN:  Mary Chadwick is a 45 year old female with a T2 NX, invasive ductal carcinoma, ER/NM-positive, HER2 negative, involving the left breast, in the setting of Li-Fraumeni syndrome, s/p bilateral masectomies, HIMANSHU-BSO.    Stage IIA breast cancer: Continues on adjuvant endocrine therapy with AI.  We discussed following up with an oncologist every 3 months for the first 2-3 years, then every 6 months through year 5, and potentially annually thereafter.    We discussed having her return in 6 months.  She is doing very well with no s/s of recurrence.    Liver lesions: follow-up imaging confirmed benign hemangiomas    Bone health: continue supplementation with calcium, vitamin D. Dexa 12/2018 normal bone density. Would be due for repeat DEXA 12/2020.  This did not get done due to the covid pandemic; will reorder.    She is needing dental work, and as a result is not on zometa.      Li-Fraumeni:  - She has completed screening colonoscopy (5/2018), derm exam, body MRI, brain MRI, and EUS 1/2019.  Annual body MRI oders placed.   Per results, colonoscopy and EUS due 5/2023.  She will see Shelli Díaz in September in our high risk program.    Low VWF - will review with colleagues.  I anticipate we will recheck these levels.  She is having no bleeding.  Labs were done as pt was experiencing bleeding gums; this has now resolved.    Valeria Gann MD

## 2021-09-02 ENCOUNTER — ANCILLARY PROCEDURE (OUTPATIENT)
Dept: BONE DENSITY | Facility: CLINIC | Age: 46
End: 2021-09-02
Attending: INTERNAL MEDICINE
Payer: COMMERCIAL

## 2021-09-02 ENCOUNTER — LAB (OUTPATIENT)
Dept: LAB | Facility: CLINIC | Age: 46
End: 2021-09-02
Payer: COMMERCIAL

## 2021-09-02 ENCOUNTER — MYC MEDICAL ADVICE (OUTPATIENT)
Dept: ONCOLOGY | Facility: CLINIC | Age: 46
End: 2021-09-02

## 2021-09-02 DIAGNOSIS — C50.412 MALIGNANT NEOPLASM OF UPPER-OUTER QUADRANT OF LEFT BREAST IN FEMALE, ESTROGEN RECEPTOR POSITIVE (H): ICD-10-CM

## 2021-09-02 DIAGNOSIS — R31.29 MICROHEMATURIA: Primary | ICD-10-CM

## 2021-09-02 DIAGNOSIS — Z79.811 USE OF AROMATASE INHIBITORS: ICD-10-CM

## 2021-09-02 DIAGNOSIS — Z15.01 LI-FRAUMENI SYNDROME: ICD-10-CM

## 2021-09-02 DIAGNOSIS — Z17.0 MALIGNANT NEOPLASM OF UPPER-OUTER QUADRANT OF LEFT BREAST IN FEMALE, ESTROGEN RECEPTOR POSITIVE (H): ICD-10-CM

## 2021-09-02 LAB
ALBUMIN UR-MCNC: NEGATIVE MG/DL
APPEARANCE UR: CLEAR
BASOPHILS # BLD AUTO: 0.1 10E3/UL (ref 0–0.2)
BASOPHILS NFR BLD AUTO: 1 %
BILIRUB UR QL STRIP: NEGATIVE
COLOR UR AUTO: ABNORMAL
EOSINOPHIL # BLD AUTO: 0.2 10E3/UL (ref 0–0.7)
EOSINOPHIL NFR BLD AUTO: 2 %
ERYTHROCYTE [DISTWIDTH] IN BLOOD BY AUTOMATED COUNT: 13.2 % (ref 10–15)
ERYTHROCYTE [SEDIMENTATION RATE] IN BLOOD BY WESTERGREN METHOD: 7 MM/HR (ref 0–20)
GLUCOSE UR STRIP-MCNC: NEGATIVE MG/DL
HCT VFR BLD AUTO: 42.2 % (ref 35–47)
HGB BLD-MCNC: 13.8 G/DL (ref 11.7–15.7)
HGB UR QL STRIP: NEGATIVE
IMM GRANULOCYTES # BLD: 0 10E3/UL
IMM GRANULOCYTES NFR BLD: 0 %
KETONES UR STRIP-MCNC: NEGATIVE MG/DL
LDH SERPL L TO P-CCNC: 162 U/L (ref 81–234)
LEUKOCYTE ESTERASE UR QL STRIP: ABNORMAL
LYMPHOCYTES # BLD AUTO: 3.1 10E3/UL (ref 0.8–5.3)
LYMPHOCYTES NFR BLD AUTO: 37 %
MCH RBC QN AUTO: 28.2 PG (ref 26.5–33)
MCHC RBC AUTO-ENTMCNC: 32.7 G/DL (ref 31.5–36.5)
MCV RBC AUTO: 86 FL (ref 78–100)
MONOCYTES # BLD AUTO: 0.6 10E3/UL (ref 0–1.3)
MONOCYTES NFR BLD AUTO: 7 %
NEUTROPHILS # BLD AUTO: 4.4 10E3/UL (ref 1.6–8.3)
NEUTROPHILS NFR BLD AUTO: 53 %
NITRATE UR QL: NEGATIVE
NRBC # BLD AUTO: 0 10E3/UL
NRBC BLD AUTO-RTO: 0 /100
PH UR STRIP: 7 [PH] (ref 5–7)
PLATELET # BLD AUTO: 298 10E3/UL (ref 150–450)
RBC # BLD AUTO: 4.89 10E6/UL (ref 3.8–5.2)
RBC URINE: 0 /HPF
SP GR UR STRIP: 1 (ref 1–1.03)
SQUAMOUS EPITHELIAL: <1 /HPF
TRANSITIONAL EPI: <1 /HPF
UROBILINOGEN UR STRIP-MCNC: NORMAL MG/DL
WBC # BLD AUTO: 8.4 10E3/UL (ref 4–11)
WBC URINE: 8 /HPF

## 2021-09-02 PROCEDURE — 81001 URINALYSIS AUTO W/SCOPE: CPT | Performed by: PATHOLOGY

## 2021-09-02 PROCEDURE — 85652 RBC SED RATE AUTOMATED: CPT | Performed by: PATHOLOGY

## 2021-09-02 PROCEDURE — 85025 COMPLETE CBC W/AUTO DIFF WBC: CPT | Performed by: PATHOLOGY

## 2021-09-02 PROCEDURE — 36415 COLL VENOUS BLD VENIPUNCTURE: CPT | Performed by: PATHOLOGY

## 2021-09-02 PROCEDURE — 77080 DXA BONE DENSITY AXIAL: CPT | Performed by: INTERNAL MEDICINE

## 2021-09-02 PROCEDURE — 83615 LACTATE (LD) (LDH) ENZYME: CPT | Performed by: PATHOLOGY

## 2021-09-19 ENCOUNTER — HEALTH MAINTENANCE LETTER (OUTPATIENT)
Age: 46
End: 2021-09-19

## 2021-11-14 ENCOUNTER — HEALTH MAINTENANCE LETTER (OUTPATIENT)
Age: 46
End: 2021-11-14

## 2021-12-02 ENCOUNTER — IMMUNIZATION (OUTPATIENT)
Dept: NURSING | Facility: CLINIC | Age: 46
End: 2021-12-02
Payer: COMMERCIAL

## 2021-12-02 PROCEDURE — 91306 COVID-19,PF,MODERNA (18+ YRS BOOSTER .25ML): CPT

## 2021-12-02 PROCEDURE — 0064A COVID-19,PF,MODERNA (18+ YRS BOOSTER .25ML): CPT

## 2021-12-20 ENCOUNTER — ONCOLOGY VISIT (OUTPATIENT)
Dept: ONCOLOGY | Facility: CLINIC | Age: 46
End: 2021-12-20
Attending: INTERNAL MEDICINE
Payer: COMMERCIAL

## 2021-12-20 VITALS
WEIGHT: 192 LBS | TEMPERATURE: 98.3 F | SYSTOLIC BLOOD PRESSURE: 115 MMHG | HEART RATE: 76 BPM | RESPIRATION RATE: 18 BRPM | DIASTOLIC BLOOD PRESSURE: 77 MMHG | OXYGEN SATURATION: 100 % | BODY MASS INDEX: 29.2 KG/M2

## 2021-12-20 DIAGNOSIS — M85.80 OSTEOPENIA, UNSPECIFIED LOCATION: ICD-10-CM

## 2021-12-20 DIAGNOSIS — Z17.0 MALIGNANT NEOPLASM OF UPPER-OUTER QUADRANT OF LEFT BREAST IN FEMALE, ESTROGEN RECEPTOR POSITIVE (H): ICD-10-CM

## 2021-12-20 DIAGNOSIS — C50.412 MALIGNANT NEOPLASM OF UPPER-OUTER QUADRANT OF LEFT BREAST IN FEMALE, ESTROGEN RECEPTOR POSITIVE (H): ICD-10-CM

## 2021-12-20 DIAGNOSIS — Z15.01 LI-FRAUMENI SYNDROME: Primary | ICD-10-CM

## 2021-12-20 DIAGNOSIS — Z15.89 MONOALLELIC MUTATION OF MUTYH GENE: ICD-10-CM

## 2021-12-20 DIAGNOSIS — Z79.811 USE OF AROMATASE INHIBITORS: ICD-10-CM

## 2021-12-20 PROCEDURE — G0463 HOSPITAL OUTPT CLINIC VISIT: HCPCS

## 2021-12-20 PROCEDURE — 99214 OFFICE O/P EST MOD 30 MIN: CPT | Performed by: INTERNAL MEDICINE

## 2021-12-20 RX ORDER — ALENDRONATE SODIUM 70 MG/1
70 TABLET ORAL
Qty: 12 TABLET | Refills: 3 | Status: SHIPPED | OUTPATIENT
Start: 2021-12-20 | End: 2022-09-09

## 2021-12-20 ASSESSMENT — PAIN SCALES - GENERAL: PAINLEVEL: NO PAIN (0)

## 2021-12-20 NOTE — NURSING NOTE
"Oncology Rooming Note    December 20, 2021 12:28 PM   Mary Chadwick is a 46 year old female who presents for:    Chief Complaint   Patient presents with     Oncology Clinic Visit     Return; Breast Ca     Initial Vitals: /77   Pulse 76   Temp 98.3  F (36.8  C) (Oral)   Resp 18   Wt 87.1 kg (192 lb)   SpO2 100%   BMI 29.20 kg/m   Estimated body mass index is 29.2 kg/m  as calculated from the following:    Height as of 3/10/20: 1.727 m (5' 7.99\").    Weight as of this encounter: 87.1 kg (192 lb). Body surface area is 2.04 meters squared.  No Pain (0) Comment: Data Unavailable   No LMP recorded. Patient is perimenopausal.  Allergies reviewed: Yes  Medications reviewed: Yes    Medications: Medication refills not needed today.  Pharmacy name entered into EPIC:    [facility] - [place], [place] - [address] [place] [address] PHARMACY #[id number] [place], [place] - [address] [facility] [address] [place] [address] - [address] [place] [address] [address] [place]    Clinical concerns: Patient states there are no new concerns to discuss with provider.  Dr Gann was not notified.         Dianna Moon CMA              "

## 2021-12-20 NOTE — PROGRESS NOTES
"  HEMATOLOGY/ONCOLOGY PROGRESS NOTE  Dec 20, 2021    REASON FOR VISIT: follow-up of breast cancer    DIAGNOSIS:   Mary \"Omaira\" Farrukh is a 46-year-old female with stage IIA invasive ductal carcinoma, ER/CA-positive, HER2--negative, involving the left breast. She is originally from [place], [place]. She underwent screening mammogram 2/9/2018 followed by diagnostic breast ultrasound, which revealed a 2.6 x 1.1 x 1.7 cm irregular shaped mass at the 2 o'clock position involving the left breast. Left breast biopsy showed invasive ductal carcinoma, grade 2, ER/CA-positive, HER2-negative. Breast MRI showed a 3.5 cm mass in her left breast with an area of non-mass enhancement measuring approximately 8 cm. On this imaging, she had an equivocal node in the left axilla; this was not biopsied.    She was screened for the I-SPY-2 clinical trial. She came back as low-risk MammaPrint and the study was not recommended. 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     She started on neoadjuvant endocrine therapy with monthly Zoladex on 3/22/18, with letrozole added in April 2018.    She underwent bilateral mastectomies with sentinel node evaluation and breast reconstruction on 8/6/18 with Carissa Andrews and Marti. Pathology revealed a T2N1 with treatment-related change.    She underwent HIMANSHU-BSO by Dr. Yenny Hidalgo on 11/20/2019.    INTERVAL HISTORY:   Omaira is here for follow-up.    No new concerns. She remains on AI and is tolerating it reasonably well. Occasional but tolerable hot flashes.  Her boys are here for the winter holiday.    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      She feels more like herself.      She takes calcium and vitamin D.      No fevers, chills, cough, SOB, chest pain, abdominal pain, nausea, vomiting, bleeding, or swelling.      ROS: 10 point ROS neg other than the symptoms noted " above in the HPI.      Current Outpatient Medications   Medication Sig Dispense Refill     amphetamine-dextroamphetamine (ADDERALL XR) 10 MG 24 hr capsule Take 10 mg by mouth       letrozole (FEMARA) 2.5 MG tablet Take 1 tablet (2.5 mg) by mouth daily 90 tablet 3     Spirulina 500 MG TABS 6 Tabs daily.       FISH OIL-CHOLECALCIFEROL PO Take 2,000 Units by mouth daily            No Known Allergies    PHYSICAL EXAMINATION  GENERAL: Healthy, alert and no distress  HEENT: no icterus  NECk: supple  Cv: rr  Lungs; clear  Abd; soft, nt, nd + bs  Ext: no edema  Skin no rashes  Breast: s/p bilateral mastectomy with reconstruction, no nodules or masses, no axillary adenopathy    LABS:  No results found for this or any previous visit (from the past 24 hour(s)).    Reviewed outside labs.  Borderline low vonwillebrand antigen and factor levels    IMPRESSION/PLAN:  Mary Chadwick is a 46 year old female with a T2 NX, invasive ductal carcinoma, ER/NH-positive, HER2 negative, involving the left breast, in the setting of Li-Fraumeni syndrome, s/p bilateral masectomies, HIMANSHU-BSO.    Stage IIA breast cancer: Continues on adjuvant endocrine therapy with AI.  We discussed following up every 6 months through year 5, and potentially annually thereafter.    We discussed having her return in 6 months.  She is doing very well with no s/s of recurrence.    Liver lesions: follow-up imaging confirmed benign hemangiomas    Bone health: continue supplementation with calcium, vitamin D. Dexa 12/2018 normal bone density. Would be due for repeat DEXA 12/2020.  This did not get done due to the covid pandemic; we reviewed this scan today demonstrating osteopenia.  I reviewed treatment choices, and ultimately we discussed starting fosamax weekly.  Side effects discussed.  She was advised to see dentist.      Li-Fraumeni: Annual body MRI oders scheduled for February.   Per results, colonoscopy and EUS due 5/2023.  She will see Shelli Díaz         Valeria Gann MD

## 2021-12-20 NOTE — LETTER
"    12/20/2021         RE: Mary Chadwick  [address]  [facility] [address]        Dear Colleague,    Thank you for referring your patient, Mary Chadwick, to the [facility]IC. Please see a copy of my visit note below.      HEMATOLOGY/ONCOLOGY PROGRESS NOTE  Dec 20, 2021    REASON FOR VISIT: follow-up of breast cancer    DIAGNOSIS:   Mary \"Osei Chadwick is a 46-year-old female with stage IIA invasive ductal carcinoma, ER/GA-positive, HER2--negative, involving the left breast. She is originally from [place], [place] due [place]. She underwent screening mammogram 2/9/2018 followed by diagnostic breast ultrasound, which revealed a 2.6 x 1.1 x 1.7 cm irregular shaped mass at the 2 o'clock position involving the left breast. Left breast biopsy showed invasive ductal carcinoma, grade 2, ER/GA-positive, HER2-negative. Breast MRI showed a 3.5 cm mass in her left breast with an area of non-mass enhancement measuring approximately 8 cm. On this imaging, she had an equivocal node in the left axilla; this was not biopsied.    She was screened for the I-SPY-2 clinical trial. She came back as low-risk MammaPrint and the study was not recommended. 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     She started on neoadjuvant endocrine therapy with monthly Zoladex on 3/22/18, with letrozole added in April 2018.    She underwent bilateral mastectomies with sentinel node evaluation and breast reconstruction on 8/6/18 with Carissa Andrews and Marti. Pathology revealed a T2N1 with treatment-related change.    She underwent HIMANSHU-BSO by Dr. Yenny Hidalgo on 11/20/2019.    INTERVAL HISTORY:   Omaira is here for follow-up.    No new concerns. She remains on AI and is tolerating it reasonably well. Occasional but tolerable hot flashes.  Her boys are here for the winter holiday.    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      She feels more like herself.      She takes calcium and vitamin D.      No fevers, chills, cough, SOB, chest pain, abdominal pain, nausea, vomiting, bleeding, or swelling.      ROS: 10 point ROS neg other than the symptoms noted above in the HPI.      Current Outpatient Medications   Medication Sig Dispense Refill     amphetamine-dextroamphetamine (ADDERALL XR) 10 MG 24 hr capsule Take 10 mg by mouth       letrozole (FEMARA) 2.5 MG tablet Take 1 tablet (2.5 mg) by mouth daily 90 tablet 3     Spirulina 500 MG TABS 6 Tabs daily.       FISH OIL-CHOLECALCIFEROL PO Take 2,000 Units by mouth daily            No Known Allergies    PHYSICAL EXAMINATION  GENERAL: Healthy, alert and no distress  HEENT: no icterus  NECk: supple  Cv: rr  Lungs; clear  Abd; soft, nt, nd + bs  Ext: no edema  Skin no rashes  Breast: s/p bilateral mastectomy with reconstruction, no nodules or masses, no axillary adenopathy    LABS:  No results found for this or any previous visit (from the past 24 hour(s)).    Reviewed outside labs.  Borderline low vonwillebrand antigen and factor levels    IMPRESSION/PLAN:  Mary Chadwick is a 46 year old female with a T2 NX, invasive ductal carcinoma, ER/AL-positive, HER2 negative, involving the left breast, in the setting of Li-Fraumeni syndrome, s/p bilateral masectomies, HIMANSHU-BSO.    Stage IIA breast cancer: Continues on adjuvant endocrine therapy with AI.  We discussed following up every 6 months through year 5, and potentially annually thereafter.    We discussed having her return in 6 months.  She is doing very well with no s/s of recurrence.    Liver lesions: follow-up imaging confirmed benign hemangiomas    Bone health: continue supplementation with calcium, vitamin D. Dexa 12/2018 normal bone density. Would be due for repeat DEXA 12/2020.  This did not get done due to the covid pandemic; we reviewed this scan today demonstrating osteopenia.  I reviewed treatment  choices, and ultimately we discussed starting fosamax weekly.  Side effects discussed.  She was advised to see dentist.      Li-Fraumeni: Annual body MRI oders scheduled for February.   Per results, colonoscopy and EUS due 5/2023.  She will see Shelli Gann MD

## 2022-01-09 ENCOUNTER — HEALTH MAINTENANCE LETTER (OUTPATIENT)
Age: 47
End: 2022-01-09

## 2022-02-11 ENCOUNTER — ANCILLARY PROCEDURE (OUTPATIENT)
Dept: MRI IMAGING | Facility: CLINIC | Age: 47
End: 2022-02-11
Attending: CLINICAL NURSE SPECIALIST
Payer: COMMERCIAL

## 2022-02-11 DIAGNOSIS — Z15.01 LI-FRAUMENI SYNDROME: ICD-10-CM

## 2022-02-11 PROCEDURE — 76498 UNLISTED MR PROCEDURE: CPT | Performed by: RADIOLOGY

## 2022-02-11 PROCEDURE — 70553 MRI BRAIN STEM W/O & W/DYE: CPT | Performed by: RADIOLOGY

## 2022-02-11 PROCEDURE — A9585 GADOBUTROL INJECTION: HCPCS | Performed by: RADIOLOGY

## 2022-02-11 RX ORDER — GADOBUTROL 604.72 MG/ML
10 INJECTION INTRAVENOUS ONCE
Status: COMPLETED | OUTPATIENT
Start: 2022-02-11 | End: 2022-02-11

## 2022-02-11 RX ADMIN — GADOBUTROL 9 ML: 604.72 INJECTION INTRAVENOUS at 14:00

## 2022-02-11 NOTE — DISCHARGE INSTRUCTIONS
MRI Contrast Discharge Instructions    The IV contrast you received today will pass out of your body in your  urine. This will happen in the next 24 hours. You will not feel this process.  Your urine will not change color.    Drink at least 4 extra glasses of water or juice today (unless your doctor  has restricted your fluids). This reduces the stress on your kidneys.  You may take your regular medicines.    If you are on dialysis: It is best to have dialysis today.    If you have a reaction: Most reactions happen right away. If you have  any new symptoms after leaving the hospital (such as hives or swelling),  call your hospital at the correct number below. Or call your family doctor.  If you have breathing distress or wheezing, call 911.    Special instructions: ***    I have read and understand the above information.    Signature:______________________________________ Date:___________    Staff:__________________________________________ Date:___________     Time:__________    [facility] Departments:    ___[place]: [phone]  ___[facility]: [phone]  ___[place]: [phone] ___[facility]: [phone]  ___[facility]: [phone]  ___[facility]: [phone]  ___[phone]  ___[facility]: [phone]  ___[phone]

## 2022-02-15 ENCOUNTER — LAB (OUTPATIENT)
Dept: LAB | Facility: CLINIC | Age: 47
End: 2022-02-15
Attending: CLINICAL NURSE SPECIALIST
Payer: COMMERCIAL

## 2022-02-15 ENCOUNTER — ONCOLOGY VISIT (OUTPATIENT)
Dept: ONCOLOGY | Facility: CLINIC | Age: 47
End: 2022-02-15
Attending: CLINICAL NURSE SPECIALIST
Payer: COMMERCIAL

## 2022-02-15 VITALS
RESPIRATION RATE: 16 BRPM | OXYGEN SATURATION: 98 % | HEART RATE: 91 BPM | WEIGHT: 193 LBS | BODY MASS INDEX: 29.35 KG/M2 | TEMPERATURE: 98.4 F | DIASTOLIC BLOOD PRESSURE: 74 MMHG | SYSTOLIC BLOOD PRESSURE: 115 MMHG

## 2022-02-15 DIAGNOSIS — Z15.01 LI-FRAUMENI SYNDROME: Primary | ICD-10-CM

## 2022-02-15 DIAGNOSIS — N39.0 URINARY TRACT INFECTION WITH HEMATURIA, SITE UNSPECIFIED: Primary | ICD-10-CM

## 2022-02-15 DIAGNOSIS — R31.9 URINARY TRACT INFECTION WITH HEMATURIA, SITE UNSPECIFIED: Primary | ICD-10-CM

## 2022-02-15 DIAGNOSIS — R31.29 MICROHEMATURIA: ICD-10-CM

## 2022-02-15 PROBLEM — Z02.89 PAIN MANAGEMENT CONTRACT AGREEMENT: Status: ACTIVE | Noted: 2019-04-15

## 2022-02-15 PROBLEM — Z12.4 CERVICAL CANCER SCREENING: Status: ACTIVE | Noted: 2018-02-15

## 2022-02-15 LAB
ALBUMIN UR-MCNC: NEGATIVE MG/DL
AMORPH CRY #/AREA URNS HPF: ABNORMAL /HPF
APPEARANCE UR: ABNORMAL
BILIRUB UR QL STRIP: NEGATIVE
COLOR UR AUTO: YELLOW
GLUCOSE UR STRIP-MCNC: NEGATIVE MG/DL
HGB UR QL STRIP: NEGATIVE
HOLD SPECIMEN: NORMAL
KETONES UR STRIP-MCNC: 5 MG/DL
LEUKOCYTE ESTERASE UR QL STRIP: ABNORMAL
MUCOUS THREADS #/AREA URNS LPF: PRESENT /LPF
NITRATE UR QL: NEGATIVE
PH UR STRIP: 6 [PH] (ref 5–7)
RBC URINE: 3 /HPF
SP GR UR STRIP: 1.02 (ref 1–1.03)
SQUAMOUS EPITHELIAL: 1 /HPF
TRANSITIONAL EPI: <1 /HPF
UROBILINOGEN UR STRIP-MCNC: NORMAL MG/DL
WBC URINE: 15 /HPF

## 2022-02-15 PROCEDURE — 81001 URINALYSIS AUTO W/SCOPE: CPT | Performed by: PATHOLOGY

## 2022-02-15 PROCEDURE — 99417 PROLNG OP E/M EACH 15 MIN: CPT | Performed by: CLINICAL NURSE SPECIALIST

## 2022-02-15 PROCEDURE — G0463 HOSPITAL OUTPT CLINIC VISIT: HCPCS

## 2022-02-15 PROCEDURE — 99215 OFFICE O/P EST HI 40 MIN: CPT | Performed by: CLINICAL NURSE SPECIALIST

## 2022-02-15 ASSESSMENT — PAIN SCALES - GENERAL: PAINLEVEL: NO PAIN (0)

## 2022-02-15 NOTE — PROGRESS NOTES
"Oncology Risk Management Consultation:  Date on this visit: 2/15/2022    Mary Johns \"Omaira\" Farrukh  requires high risk screening and surveillance to reduce her risk of cancer secondary to Li Fraumeni Syndrome.  She is considered to be at high risk for soft tissue sarcomas, osteosarcomas, breast cancer, brain tumors, adrenocortical carcinoma, leukemia and other malignancies. Unfortunately, she has a history of Stage II T2 NX invasive ductal carcinoma of the left breast and is s/p bilateral mastectomies (8/6/2018). She underwent HIMANSHU-BSO by Dr. Yenny Hidalgo on 11/20/2019; pathology was benign. See below for details of treatment.   Also present at this visit is Mark Antoine,  Doctorate of Nursing Practice student at the [employer], who is shadowing in clinic today.    Primary Physician: Melly Navarrete PA-C     History Of Present Illness:  Ms. Chadwick is a very pleasant 46 year old female who presents with Li Fraumeni Syndrome.       Genetic testing:  Genetic Testing Results: POSITIVE - TP53 mutation  3/2/2018 -- Omaira is POSITIVE for a TP53 mutation. Specifically her mutation is called c.638G>A (p.R213Q) identified using a  Cancer Next panel from [employer].  This region was covered at 433x, and the heterozygosity rate was 48%. This is consistent with a germline mutation, as the expected heterozygosity rate is around 50% (i.e. one allele has the mutation and the other allele does not). Sibaritus is confident that this is very likely germline.       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 Specifically her mutation is called c.1187G>A (p.G396D). We discussed this mutation is associated with a slightly increased risk for colon cancer.  Some research has suggested that there is a small increased risk for breast cancer in individuals who have a mutation in this gene.  However, " there are no guidelines for breast screening with this gene. No other mutations were found in the MUTYH gene.      Of note, [facility] tested negative for mutations in the following genes: APC, FRANCES, BARD1, BRCA1, BRCA2, BRIP1, BMPR1A, CDH1, CDK4, CDKN2A, CHEK2, DICER1, EPCAM, HOXB13, GREM1, MLH1, MRE11A, MSH2, MSH6, NBN, NF1, PALB2, PMS2, POLD1, POLE, PTEN, RAD50, RAD51C, RAD51D, SMAD4, SMARCA4, and STK11. No mutations were found in these remaining 32 genes analyzed.  This test involved sequencing and deletion/duplication analysis of all genes with the exception of EPCAM and GREM1 (deletions only).      Pertinent Medical History:  2/9/2018- Stage IIA invasive ductal carcinoma, ER/OR-positive, HER2--negative, involving the left breast.    She started on neoadjuvant endocrine therapy with monthly Zoladex on 3/22/18, with letrozole added in April 2018.   She underwent bilateral mastectomies with sentinel node evaluation and breast reconstruction on 8/6/18 with Carissa Andrews and Marti. Pathology revealed a T2N1 with treatment-related change.     She underwent HIMANSHU-BSO by Dr. Yenny Hidalgo on 11/20/2019 12/7/2020 DEXA scan  showed new osteopenia in her lumbar spine.  Prescribed 1200 mg of calcium daily in divided doses and 1,000-2,000 international unit(s) of Vitamin D3.     Li-Fraumeni Specific Screening to date:  Body:  3/28/2018- Whole body MRI: There are 2 right benign hemangiomas measuring 1.0 and 0.6 cm corresponding to the previously noted hypo attenuating lesions described on 3/20/2018 CT. Bilateral simple appearing renal cysts, the largest is at the inferior pole of the right kidney (series 11 image 18) measuring approximately 1.6 x 1.9 cm. Otherwise both kidneys are unremarkable with no evidence of hydronephrosis.  1/16/2019- Whole body MRI: Abdomen/pelvis: T2 hyperintense foci in the right hepatic lobe, consistent with hemangiomas as characterized on comparison MRI. Again noted benign-appearing renal cysts  "bilaterally  2/25/2021- Whole body MRI: No evidence of metastatic disease in this limited study.  2/11/2022- Whole body MRI: Abdomen/pelvis: Two stable sub centimeter T2 hyper intensities in the right hepatic lobe previously described as hemangiomas. Stable bilateral renal cysts. No hydronephrosis. Status post hysterectomy. No pelvic masses. No lymphadenopathy.Otherwise normal study.     Brain:  1/16/2019- Brain MRI: No abnormal enhancing intracranial lesion. Vague T2 hyperintensity in the periventricular and supraventricular white matter is nonspecific and may be secondary to prior therapy.  2/25/2021- Brain MRI: No abnormal enhancing intracranial lesion. Vague T2 hyperintensity in the periventricular and supraventricular white matter is nonspecific and may be secondary to prior therapy.  2/11/2022- Brain MRI: No abnormal enhancing intracranial lesion. Stable vague T2 hyperintensity in the periventricular and supraventricular white matter which is nonspecific and likely secondary to prior therapy.     Gastrointestinal:  5/1/2018 (Dr. Debora Lee, "Mobile Location, IP" [facility]), Colonoscopy, normal Next: due 5/2023.      Skin:  Per report, Dermatology full body skin check at [facility] in 2020 (records requested)  3/16/2021- Dermatology visit, all benign findings    Review of Systems:  GENERAL: No change in weight, sleep or appetite.  Normal energy.  No fever or chills  EYES: Negative for vision changes or eye problems  ENT: No problems with ears, nose or throat.  No difficulty swallowing.  RESP: No coughing, wheezing or shortness of breath  CV: No chest pains or palpitations  GI: No nausea, vomiting,  heartburn, abdominal pain, diarrhea, constipation or change in bowel habits  : No urinary frequency or dysuria, bladder or kidney problems  MUSCULOSKELETAL: She describes generalized aches and pains, with  particularly noting that her knees feel as if \"they have lost all their fluid\" when she is taking the " letrozole  NEUROLOGIC: Feels that letrozole was giving her brain fog. No headaches, numbness, tingling, weakness, problems with balance or coordination  PSYCHIATRIC:ADHD, takes Adderal   HEME/IMMUNE/ALLERGY: No history of bleeding or clotting problems or anemia.  No allergies or immune system problems  ENDOCRINE: No history of thyroid disease, diabetes or other endocrine disorders  SKIN: No rashes,worrisome lesions or skin problems    Past Medical/Surgical History:  Past Medical History:   Diagnosis Date     Breast cancer, stage 1 (H) 2018    HR Negtive and Estrogin Postive     Complication of anesthesia      Herpes simplex without mention of complication     genital herpes     Li-Fraumeni syndrome 03/02/2018    germline TP53 genetic mutation     Monoallelic mutation of MUTYH gene 03/02/2018    c.1187G>A (fT752I), 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; identified using CancerNext panel through [employer]     Monoallelic mutation of TP53 gene 03/02/2018    c.638G>A (p.R213Q), identified using CancerNext genetic testing through [employer]     PONV (postoperative nausea and vomiting)      Past Surgical History:   Procedure Laterality Date     ESOPHAGOSCOPY, GASTROSCOPY, DUODENOSCOPY (EGD), COMBINED N/A 10/15/2018    Procedure: EGD;  Surgeon: Edwardo Beatty MD;  Location: [facility]     GRAFT FAT TO BREAST Bilateral 11/8/2018    Procedure: Bilateral Breast Fat Grafting and nipple reconstruction;  Surgeon: BASILIO Kidd MD;  Location: [facility] OR     HERNIA REPAIR, INGUINAL RT/LT  2002    Hernia Repair, Femoral RT/LT     HYSTERECTOMY, PAP NO LONGER INDICATED       LAPAROSCOPIC HYSTERECTOMY TOTAL, BILATERAL SALPINGO-OOPHORECTOMY, COMBINED Bilateral 11/20/2019    Procedure: Total Laparoscopic Hysterectomy Via Single Port, Bilateral Salpingo-Oophorectomy;  Surgeon: Yenny Hidalgo MD;  Location: [facility] OR     MASTECTOMY SIMPLE BILATERAL, SENTINEL NODE BILATERAL, COMBINED Bilateral 8/6/2018    Procedure: COMBINED  MASTECTOMY SIMPLE BILATERAL, SENTINEL NODE BILATERAL;  Bilateral Mastectomy With Immediate Reconstruction, Left [place] Lymph Node Biopsy, Anesthesia Block;  Surgeon: Antonio Andrews MD;  Location: [facility] OR     RECONSTRUCT BREAST BILATERAL Bilateral 2018    Procedure: RECONSTRUCT BREAST BILATERAL;;  Surgeon: BASILIO Kidd MD;  [facility]     RECONSTRUCT NIPPLE BILATERAL Bilateral 2018    Procedure: Nipple Reconstruction;  Surgeon: BASILIO Kidd MD;  [facility]     REMOVE AND REPLACE BREAST IMPLANT PROSTHESIS Bilateral 2018    Procedure: REMOVE AND REPLACE BREAST IMPLANT PROSTHESIS;  Bilateral Breast Implant Exchange and Revision;  Surgeon: BASILIO Kidd MD;  Location: [facility] OR       Allergies:  Allergies as of 02/15/2022     (No Known Allergies)       Current Medications:  Current Outpatient Medications   Medication Sig Dispense Refill     amphetamine-dextroamphetamine (ADDERALL XR) 10 MG 24 hr capsule Take 10 mg by mouth       letrozole (FEMARA) 2.5 MG tablet Take 1 tablet (2.5 mg) by mouth daily 90 tablet 3     Spirulina 500 MG TABS 6 Tabs daily.       alendronate (FOSAMAX) 70 MG tablet Take 1 tablet (70 mg) by mouth every 7 days (Patient not taking: Reported on 2/15/2022) 12 tablet 3     FISH OIL-CHOLECALCIFEROL PO Take 2,000 Units by mouth daily          Family History:  Family History   Problem Relation Age of Onset     Colon Cancer Mother 31         at 39     Thyroid Disease Father      Neurologic Disorder Father         myasthenia gravis     Stomach Cancer Maternal Grandfather 70     Cerebrovascular Disease Paternal Grandmother      Myocardial Infarction Paternal Grandfather 40     Cancer Paternal Uncle         unknown type;  in 60s     Ovarian Cancer Maternal Aunt          in 50s due to cancer     Liver Cancer Maternal Uncle          in 50s     Cancer Maternal Cousin         two types, but unknown       Social History:  Social History      Socioeconomic History     Marital status: Single     Spouse name: Not on file     Number of children: 2     Years of education: Not on file     Highest education level: Not on file   Occupational History     Employer: NONE      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 Not Asked   Social History Narrative     Not on file     Social Determinants of Health     Financial Resource Strain: Not on file   Food Insecurity: Not on file   Transportation Needs: Not on file   Physical Activity: Not on file   Stress: Not on file   Social Connections: Not on file   Intimate Partner Violence: Not on file   Housing Stability: Not on file       Physical Exam:  /74   Pulse 91   Temp 98.4  F (36.9  C) (Oral)   Resp 16   Wt 87.5 kg (193 lb)   SpO2 98%   BMI 29.35 kg/m    GENERAL: Healthy, alert and no distress  EYES: Eyes grossly normal to inspection.  No discharge or erythema, or obvious scleral/conjunctival abnormalities.  RESP: No audible wheeze, cough, or visible cyanosis.  No visible retractions or increased work of breathing.    SKIN: Visible skin clear. No significant rash, abnormal pigmentation or lesions.  NEURO: Cranial nerves grossly intact.  Mentation and speech appropriate for age.  PSYCH: Mentation appears normal, affect normal/bright, judgement and insight intact, normal speech and appearance well-groomed.    Laboratory/Imaging Studies  Results for orders placed or performed in visit on 02/15/22   Extra Green Top (Lithium Heparin) Tube     Status: None   Result Value Ref Range    Hold Specimen [facility]     Extra Purple Top Tube     Status: None   Result Value Ref Range    Hold Specimen [facility]    Extra Purple Top Tube     Status: None   Result Value Ref Range    Hold Specimen [facility]    Routine UA with microscopic - No culture     Status: Abnormal   Result Value Ref Range    Color Urine Yellow Colorless, Straw, Light Yellow, Yellow    Appearance Urine Slightly Cloudy (A) Clear    Glucose Urine Negative Negative mg/dL    Bilirubin Urine Negative Negative    Ketones Urine 5  (A) Negative mg/dL    Specific Gravity Urine 1.018 1.003 - 1.035    Blood Urine Negative Negative    pH Urine 6.0 5.0 - 7.0    Protein Albumin Urine Negative Negative mg/dL    Urobilinogen Urine Normal Normal, 2.0 mg/dL    Nitrite Urine Negative Negative    Leukocyte Esterase Urine Moderate (A) Negative    Mucus Urine Present (A) None Seen /LPF    Amorphous Crystals Urine Few (A) None Seen /HPF    RBC Urine 3 (H) <=2 /HPF    WBC Urine 15 (H) <=5 /HPF    Squamous Epithelials Urine 1 <=1 /HPF    Transitional Epithelials Urine <1 <=1 /HPF   Extra Tube     Status: None    Narrative    The following orders were created for panel order Extra Tube.  Procedure                               Abnormality         Status                     ---------                               -----------         ------                     Extra Green Top (Lithium...[[id number]]                      Final result               Extra Purple Top Tube[[id number]]                            Final result               Extra Purple Top Tube[[id number]]                            Final result                 Please view results for these tests on the individual orders.       ASSESSMENT  We discussed  her complaints about both the Fosamax and the letrozole.  She has apprehension about the Fosamax, as she is concerned about dental issues and is wondering how long she has to take it.  She states that she has stopped taking it for about a month and she feels better.  She asks me what  "alternatives she has to taking it.  We discussed that there were some other medications that might be reasonable but not recommended for her, but due to clotting issues and also likely less efficacy than the Fosamax.  I encouraged her to consult with Dr. Gann with regard to options.    She also states that she wants to get off the letrozole, because she does not feel like herself when she is on it.  She feels as if she has brain fog, less energy, less ability to focus.  We discussed that she should not stop taking it as she needs to do everything she can to help prevent her breast cancer from returning.  She understands that if it were to return, that it would appear in another organ system.  She states that she will continue taking it but would like to have \"vacations\" from it.  I encouraged her to talk to Dr. Gann about this.    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  She states that it is hard to focus, but that she loves it, and feels like the burden is on her to get her online work done; it requires focus, intention, and self discipline.  She continues to work as a  and sick states that she feels that she can practice her yoga \"better\" when she is not taking the letrozole.  We discussed the issues with joint stiffness.  We also discussed that she loves to swim and perhaps this could be helpful to her; she states that she will try swimming to help promote her ability to practice yoga and better tolerate the letrozole.  I encouraged her in this.    We reviewed the screening plan below and discussed that she should go to a dentist soon.  We discussed her looking for a \"bio dentist\" and I encouraged her to look for a practitioner who is board certified from the [facility], who would be careful about the judicious use of dental x-rays.  At this point time she denies all Li-Fraumeni syndromes we " will proceed with the screening plan below.    Individualized Surveillance Plan for children and adults  with Li Fraumeni Syndrome    based on NCCN Guidelines Version 1.2020 Gene Reviews, Angely et al 2016, Jesse et al 2017   Beginning at diagnosis   Purpose of screening Test or procedure Last done Next Due   Comprehensive exam every 6-12 months Comprehensive physical exam including neurological exam with high index of suspicion for rare cancers (and second malignancies in cancer survivors)     2/15/2022   Feb. 2023   Adrenocortical Carcinoma Ultrasound of the abdomen and pelvis every 3- 4 months (change to annually at age 18) NA NA   Adrenocortical Carcinoma    NOTE: Stop at age 18. Every 4 months:  1.Beta hCG (Stop at age 6)  2. Alpha fetoprotein (Stop at age 6)  3. 17-OH-progesterone  4. Testosterone  5. Androstenedione  6. Dehydroepiandrosterone sulfate   NA   NA   Acute Leukemia Every 4 months:    CBC    Erythrocyte sedimentation rate    lactate dehydrogenase Labs today Repeat in 6 months    Brain Tumors Annual brain MRI with contrast   (without dye if previous are normal) 2/11/2022- Brain MRI: No abnormal enhancing intracranial lesion. Stable vague T2 hyperintensity in the periventricular and supraventricular white matter which is nonspecific and likely secondary to prior therapy. Feb. 2023   Bone and Soft Tissue Sarcomas Annual rapid whole body MRI (without contrast) 2/11/2022- Whole body MRI: Abdomen/pelvis: Two stable sub centimeter T2 hyper intensities in the right hepatic lobe previously described as hemangiomas. Stable bilateral renal cysts. No hydronephrosis. Status post hysterectomy. No pelvic masses. No lymphadenopathy.Otherwise normal study. Feb. 2023   Beginning at age 18   From the ages of 20-or at the age of earliest diagnosed breast cancer in the family, if younger than 20. Clinical breast exam every 6-12 months     NA   Follow with Dr. Gann   Breast screening for women Annual breast MRI from  ages 20-29    Alternate annual breast MRI and annual mammogram (consider tomosynthesis) from ages 30-75. (after age 75, consider screening schedule on individual basis)   NA   NA   Melanoma Annual dermatological exam 3/16/2021- Dermatology visit, all benign findings Due now   Soft tissue, bone sarcomas and adrenal carcinoma Ultrasound of abdomen and pelvis every 12 months (alternate w/whole body MRI) NA US of abdomen in August 2022   Colon and Upper GI Screening  Beginning at age 25 or 5 years prior to the earliest known colon cancer in the family Upper endoscopy and colonoscopy every 2-5 years. 5/1/2018 (Dr. Debora Lee, [place]), Colonoscopy, normal Next: due 5/2023.      May 2023   *Therapeutic radiation treatment for cancer should be avoided when possible as it can promote soft tissue sarcomas in this population.  **Women with TP53 mutation who are treated for breast cancer, screening of remaining breast tissue with annual mammogram and annual breast MRI should continue. Discuss option of  risk reducing mastectomy regarding degree of protection, reconstruction options and risks.  In addition, the family history and residual breast cancer risk should be considered during counseling.       I spent a total of 80 minutes on the day of the visit. Please see the note for further information on patient assessment and treatment.    Shelli Díaz, REY-CNS, OCN, AGN-BC  Clinical Nurse Specialist  Cancer Risk Management Program  [facility]  phone:  [phone]  fax: [phone]    CC: DOMINGO Montanez MD

## 2022-02-15 NOTE — PATIENT INSTRUCTIONS
Individualized Surveillance Plan for children and adults  with Li Fraumeni Syndrome    based on NCCN Guidelines Version 1.2020 Gene Reviews, Angely et al 2016, Jesse et al 2017   Beginning at diagnosis   Purpose of screening Test or procedure Last done Next Due   Comprehensive exam every 6-12 months Comprehensive phyical exam including neurological exam with high index of suspicion for rare cancers (and second malignancies in cancer survivors)     2/15/2022   Feb. 2023   Adrenocortical Carcinoma Ultrasound of the abdomen and pelvis every 3- 4 months (change to annually at age 18) NA NA   Adrenocortical Carcinoma    NOTE: Stop at age 18. Every 4 months:  1.Beta hCG (Stop at age 6)  2. Alpha fetoprotein (Stop at age 6)  3. 17-OH-progesterone  4. Testosterone  5. Androstenedione  6. Dehydroepiandrosterone sulfate   NA   NA   Acute Leukemia Every 4 months:    CBC    Erythrocyte sedimentation rate    lactate dehydrogenase Labs today Repeat in 6 months    Brain Tumors Annual brain MRI with contrast   (without dye if previous are normal) 2/11/2022- Brain MRI: No abnormal enhancing intracranial lesion. Stable vague T2 hyperintensity in the periventricular andsupraventricular white matter which is nonspecific and likely secondary to prior therapy. Feb. 2023   Bone and Soft Tissue Sarcomas Annual rapid whole body MRI (without contrast) 2/11/2022- Whole body MRI: Abdomen/pelvis: Two stable subcentimeter T2 hyperintensities in the right hepatic lobe previously described as hemangiomas. Stable bilateral renal cysts. No hydronephrosis. Status post hysterectomy. No pelvic masses. No lymphadenopathy.Otherwise normal study. Feb. 2023   Beginning at age 18   From the ages of 20-or at the age of earliest diagnosed breast cancer in the family, if younger than 20. Clinical breast exam every 6-12 months     NA   Follow with Dr. Gann   Breast screening for women Annual breast MRI from ages 20-29    Alternate annual breast MRI and annual  mammogram (consider tomosynthesis) from ages 30-75. (after age 75, consider screening schedule on individual basis)   NA   NA   Melanoma Annual dermatological exam 3/16/2021- Dermatology visit, all benign findings Due now   Soft tissue, bone sarcomas and adrenal carcinoma Ultrasound of abdomen and pelvis every 12 months (alternate w/whole body MRI) NA US of abdomen in August 2022   Colon and Upper GI Screening  Beginning at age 25 or 5 years prior to the earliest known colon cancer in the family Upper endoscopy and colonoscopy every 2-5 years. 5/1/2018 (Dr. Debora Lee, [place] ), Colonoscopy, normal Next: due 5/2023.      May 2023   *Therapeutic radiation treatment for cancer should be avoided when possible as it can promote soft tissue sarcomas in this population.  **Women with TP53 mutation who are treated for breast cancer, screening of remaining breast tissue with annual mammogram and annual breast MRI should continue. Discuss option of  risk reducing mastectomy regarding degree of protection, reconstruction options and risks.  In addition, the family history and residual breast cancer risk should be considered during counseling.

## 2022-02-15 NOTE — LETTER
"    2/15/2022         RE: Mary Chadwick  [address]  [facility] [address]        Dear Colleague,    Thank you for referring your patient, Mary Chadwick, to the [facility]. Please see a copy of my visit note below.    Oncology Risk Management Consultation:  Date on this visit: 2/15/2022    Mary Johns \"Omaira\" Farrukh  requires high risk screening and surveillance to reduce her risk of cancer secondary to Li Fraumeni Syndrome.  She is considered to be at high risk for soft tissue sarcomas, osteosarcomas, breast cancer, brain tumors, adrenocortical carcinoma, leukemia and other malignancies. Unfortunately, she has a history of Stage II T2 NX invasive ductal carcinoma of the left breast and is s/p bilateral mastectomies (8/6/2018). She underwent HIMANSHU-BSO by Dr. Yenny Hidalgo on 11/20/2019; pathology was benign. See below for details of treatment.   Also present at this visit is Mark Antoine,  Doctorate of Nursing Practice student at the [employer], who is shadowing in clinic today.    Primary Physician: Melly Navarrete PA-C     History Of Present Illness:  Ms. Chadwick is a very pleasant 46 year old female who presents with Li Fraumeni Syndrome.       Genetic testing:  Genetic Testing Results: POSITIVE - TP53 mutation  3/2/2018 -- Omaira is POSITIVE for a TP53 mutation. Specifically her mutation is called c.638G>A (p.R213Q) identified using a  Cancer Next panel from [employer].  This region was covered at 433x, and the heterozygosity rate was 48%. This is consistent with a germline mutation, as the expected heterozygosity rate is around 50% (i.e. one allele has the mutation and the other allele does not). [employer] is confident that this is very likely germline.       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 Specifically her mutation is " called c.1187G>A (p.G396D). We discussed this mutation is associated with a slightly increased risk for colon cancer.  Some research has suggested that there is a small increased risk for breast cancer in individuals who have a mutation in this gene.  However, there are no guidelines for breast screening with this gene. No other mutations were found in the MUTYH gene.      Of note, [facility] tested negative for mutations in the following genes: APC, FRANCES, BARD1, BRCA1, BRCA2, BRIP1, BMPR1A, CDH1, CDK4, CDKN2A, CHEK2, DICER1, EPCAM, HOXB13, GREM1, MLH1, MRE11A, MSH2, MSH6, NBN, NF1, PALB2, PMS2, POLD1, POLE, PTEN, RAD50, RAD51C, RAD51D, SMAD4, SMARCA4, and STK11. No mutations were found in these remaining 32 genes analyzed.  This test involved sequencing and deletion/duplication analysis of all genes with the exception of EPCAM and GREM1 (deletions only).      Pertinent Medical History:  2/9/2018- Stage IIA invasive ductal carcinoma, ER/TX-positive, HER2--negative, involving the left breast.    She started on neoadjuvant endocrine therapy with monthly Zoladex on 3/22/18, with letrozole added in April 2018.   She underwent bilateral mastectomies with sentinel node evaluation and breast reconstruction on 8/6/18 with Carissa Andrews and Marti. Pathology revealed a T2N1 with treatment-related change.     She underwent HIMANSHU-BSO by Dr. Yenny Hidalgo on 11/20/2019 12/7/2020 DEXA scan  showed new osteopenia in her lumbar spine.  Prescribed 1200 mg of calcium daily in divided doses and 1,000-2,000 international unit(s) of Vitamin D3.     Li-Fraumeni Specific Screening to date:  Body:  3/28/2018- Whole body MRI: There are 2 right benign hemangiomas measuring 1.0 and 0.6 cm corresponding to the previously noted hypo attenuating lesions described on 3/20/2018 CT. Bilateral simple appearing renal cysts, the largest is at the inferior pole of the right kidney (series 11 image 18) measuring approximately 1.6 x 1.9 cm. Otherwise both  kidneys are unremarkable with no evidence of hydronephrosis.  1/16/2019- Whole body MRI: Abdomen/pelvis: T2 hyperintense foci in the right hepatic lobe, consistent with hemangiomas as characterized on comparison MRI. Again noted benign-appearing renal cysts bilaterally  2/25/2021- Whole body MRI: No evidence of metastatic disease in this limited study.  2/11/2022- Whole body MRI: Abdomen/pelvis: Two stable sub centimeter T2 hyper intensities in the right hepatic lobe previously described as hemangiomas. Stable bilateral renal cysts. No hydronephrosis. Status post hysterectomy. No pelvic masses. No lymphadenopathy.Otherwise normal study.     Brain:  1/16/2019- Brain MRI: No abnormal enhancing intracranial lesion. Vague T2 hyperintensity in the periventricular and supraventricular white matter is nonspecific and may be secondary to prior therapy.  2/25/2021- Brain MRI: No abnormal enhancing intracranial lesion. Vague T2 hyperintensity in the periventricular and supraventricular white matter is nonspecific and may be secondary to prior therapy.  2/11/2022- Brain MRI: No abnormal enhancing intracranial lesion. Stable vague T2 hyperintensity in the periventricular and supraventricular white matter which is nonspecific and likely secondary to prior therapy.     Gastrointestinal:  5/1/2018 (Dr. Debora Lee, [facility]), Colonoscopy, normal Next: due 5/2023.      Skin:  Per report, Dermatology full body skin check at [facility] in 2020 (records requested)  3/16/2021- Dermatology visit, all benign findings    Review of Systems:  GENERAL: No change in weight, sleep or appetite.  Normal energy.  No fever or chills  EYES: Negative for vision changes or eye problems  ENT: No problems with ears, nose or throat.  No difficulty swallowing.  RESP: No coughing, wheezing or shortness of breath  CV: No chest pains or palpitations  GI: No nausea, vomiting,  heartburn, abdominal pain, diarrhea, constipation or change in  "bowel habits  : No urinary frequency or dysuria, bladder or kidney problems  MUSCULOSKELETAL: She describes generalized aches and pains, with  particularly noting that her knees feel as if \"they have lost all their fluid\" when she is taking the letrozole  NEUROLOGIC: Feels that letrozole was giving her brain fog. No headaches, numbness, tingling, weakness, problems with balance or coordination  PSYCHIATRIC:ADHD, takes Adderal   HEME/IMMUNE/ALLERGY: No history of bleeding or clotting problems or anemia.  No allergies or immune system problems  ENDOCRINE: No history of thyroid disease, diabetes or other endocrine disorders  SKIN: No rashes,worrisome lesions or skin problems    Past Medical/Surgical History:  Past Medical History:   Diagnosis Date     Breast cancer, stage 1 (H) 2018    HR Negtive and Estrogin Postive     Complication of anesthesia      Herpes simplex without mention of complication     genital herpes     Li-Fraumeni syndrome 03/02/2018    germline TP53 genetic mutation     Monoallelic mutation of MUTYH gene 03/02/2018    c.1187G>A ([id number]), 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; identified using CancerNext panel through [facility]     Monoallelic mutation of TP53 gene 03/02/2018    c.638G>A (p.R213Q), identified using CancerNext genetic testing through [facility]     PONV (postoperative nausea and vomiting)      Past Surgical History:   Procedure Laterality Date     ESOPHAGOSCOPY, GASTROSCOPY, DUODENOSCOPY (EGD), COMBINED N/A 10/15/2018    Procedure: EGD;  Surgeon: Edwardo Beatty MD;  Location: [facility]     GRAFT FAT TO BREAST Bilateral 11/8/2018    Procedure: Bilateral Breast Fat Grafting and nipple reconstruction;  Surgeon: BASILIO Kidd MD;  Location: [facility]     HERNIA REPAIR, INGUINAL RT/LT  2002    Hernia Repair, Femoral RT/LT     HYSTERECTOMY, PAP NO LONGER INDICATED       LAPAROSCOPIC HYSTERECTOMY TOTAL, BILATERAL SALPINGO-OOPHORECTOMY, COMBINED Bilateral 11/20/2019    Procedure: " Total Laparoscopic Hysterectomy Via Single Port, Bilateral Salpingo-Oophorectomy;  Surgeon: Yenny Hidalgo MD;  Location: [facility] OR     MASTECTOMY SIMPLE BILATERAL, SENTINEL NODE BILATERAL, COMBINED Bilateral 2018    Procedure: COMBINED MASTECTOMY SIMPLE BILATERAL, SENTINEL NODE BILATERAL;  Bilateral Mastectomy With Immediate Reconstruction, Left [place] Lymph Node Biopsy, Anesthesia Block;  Surgeon: Antonio Andrews MD;  [facility]     RECONSTRUCT BREAST BILATERAL Bilateral 2018    Procedure: RECONSTRUCT BREAST BILATERAL;;  Surgeon: BASILIO Kidd MD;  [facility]     RECONSTRUCT NIPPLE BILATERAL Bilateral 2018    Procedure: Nipple Reconstruction;  Surgeon: BASILIO Kidd MD;  [facility]     REMOVE AND REPLACE BREAST IMPLANT PROSTHESIS Bilateral 2018    Procedure: REMOVE AND REPLACE BREAST IMPLANT PROSTHESIS;  Bilateral Breast Implant Exchange and Revision;  Surgeon: BASILIO Kidd MD;  Location: [facility] OR       Allergies:  Allergies as of 02/15/2022     (No Known Allergies)       Current Medications:  Current Outpatient Medications   Medication Sig Dispense Refill     amphetamine-dextroamphetamine (ADDERALL XR) 10 MG 24 hr capsule Take 10 mg by mouth       letrozole (FEMARA) 2.5 MG tablet Take 1 tablet (2.5 mg) by mouth daily 90 tablet 3     Spirulina 500 MG TABS 6 Tabs daily.       alendronate (FOSAMAX) 70 MG tablet Take 1 tablet (70 mg) by mouth every 7 days (Patient not taking: Reported on 2/15/2022) 12 tablet 3     FISH OIL-CHOLECALCIFEROL PO Take 2,000 Units by mouth daily          Family History:  Family History   Problem Relation Age of Onset     Colon Cancer Mother 31         at 39     Thyroid Disease Father      Neurologic Disorder Father         myasthenia gravis     Stomach Cancer Maternal Grandfather 70     Cerebrovascular Disease Paternal Grandmother      Myocardial Infarction Paternal Grandfather 40     Cancer Paternal Uncle          unknown type;  in 60s     Ovarian Cancer Maternal Aunt          in 50s due to cancer     Liver Cancer Maternal Uncle          in 50s     Cancer Maternal Cousin         two types, but unknown       Social History:  Social History     Socioeconomic History     Marital status: Single     Spouse name: Not on file     Number of children: 2     Years of education: Not on file     Highest education level: Not on file   Occupational History     Employer: NONE      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 Not Asked   Social History Narrative     Not on file     Social Determinants of Health     Financial Resource Strain: Not on file   Food Insecurity: Not on file   Transportation Needs: Not on file   Physical Activity: Not on file   Stress: Not on file   Social Connections: Not on file   Intimate Partner Violence: Not on file   Housing Stability: Not on file       Physical Exam:  /74   Pulse 91   Temp 98.4  F (36.9  C) (Oral)   Resp 16   Wt 87.5 kg (193 lb)   SpO2 98%   BMI 29.35 kg/m    GENERAL: Healthy, alert and no distress  EYES: Eyes grossly normal to inspection.  No discharge or erythema, or obvious scleral/conjunctival abnormalities.  RESP: No audible wheeze, cough, or visible cyanosis.  No visible retractions or increased work of breathing.    SKIN: Visible skin clear. No significant rash, abnormal pigmentation or lesions.  NEURO: Cranial nerves grossly intact.  Mentation and speech appropriate for age.  PSYCH: Mentation appears normal, affect normal/bright, judgement and insight  intact, normal speech and appearance well-groomed.    Laboratory/Imaging Studies  Results for orders placed or performed in visit on 02/15/22   Extra Green Top (Lithium Heparin) Tube     Status: None   Result Value Ref Range    Hold Specimen JIC    Extra Purple Top Tube     Status: None   Result Value Ref Range    Hold Specimen JIC    Extra Purple Top Tube     Status: None   Result Value Ref Range    Hold Specimen JIC    Routine UA with microscopic - No culture     Status: Abnormal   Result Value Ref Range    Color Urine Yellow Colorless, Straw, Light Yellow, Yellow    Appearance Urine Slightly Cloudy (A) Clear    Glucose Urine Negative Negative mg/dL    Bilirubin Urine Negative Negative    Ketones Urine 5  (A) Negative mg/dL    Specific Gravity Urine 1.018 1.003 - 1.035    Blood Urine Negative Negative    pH Urine 6.0 5.0 - 7.0    Protein Albumin Urine Negative Negative mg/dL    Urobilinogen Urine Normal Normal, 2.0 mg/dL    Nitrite Urine Negative Negative    Leukocyte Esterase Urine Moderate (A) Negative    Mucus Urine Present (A) None Seen /LPF    Amorphous Crystals Urine Few (A) None Seen /HPF    RBC Urine 3 (H) <=2 /HPF    WBC Urine 15 (H) <=5 /HPF    Squamous Epithelials Urine 1 <=1 /HPF    Transitional Epithelials Urine <1 <=1 /HPF   Extra Tube     Status: None    Narrative    The following orders were created for panel order Extra Tube.  Procedure                               Abnormality         Status                     ---------                               -----------         ------                     Extra Green Top (Lithium...[[id number]]                      Final result               Extra Purple Top Tube[[id number]]                            Final result               Extra Purple Top Tube[[id number]]                            Final result                 Please view results for these tests on the individual orders.       ASSESSMENT  We discussed  her complaints about both the Fosamax and the  "letrozole.  She has apprehension about the Fosamax, as she is concerned about dental issues and is wondering how long she has to take it.  She states that she has stopped taking it for about a month and she feels better.  She asks me what alternatives she has to taking it.  We discussed that there were some other medications that might be reasonable but not recommended for her, but due to clotting issues and also likely less efficacy than the Fosamax.  I encouraged her to consult with Dr. Gann with regard to options.    She also states that she wants to get off the letrozole, because she does not feel like herself when she is on it.  She feels as if she has brain fog, less energy, less ability to focus.  We discussed that she should not stop taking it as she needs to do everything she can to help prevent her breast cancer from returning.  She understands that if it were to return, that it would appear in another organ system.  She states that she will continue taking it but would like to have \"vacations\" from it.  I encouraged her to talk to Dr. Gann about this.    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  She states that it is hard to focus, but that she loves it, and feels like the burden is on her to get her online work done; it requires focus, intention, and self discipline.  She continues to work as a  and sick states that she feels that she can practice her yoga \"better\" when she is not taking the letrozole.  We discussed the issues with joint stiffness.  We also discussed that she loves to swim and perhaps this could be helpful to her; she states that she will try swimming to help promote her ability to practice yoga and better tolerate the letrozole.  I encouraged her in this.    We reviewed the screening plan below and discussed that she should go to a dentist soon.  We discussed her looking for a " "\"bio dentist\" and I encouraged her to look for a practitioner who is board certified from the [facility], who would be careful about the judicious use of dental x-rays.  At this point time she denies all Li-Fraumeni syndromes we will proceed with the screening plan below.    Individualized Surveillance Plan for children and adults  with Li Fraumeni Syndrome    based on NCCN Guidelines Version 1.2020 Gene Reviews, Angely et al 2016, Jesse et al 2017   Beginning at diagnosis   Purpose of screening Test or procedure Last done Next Due   Comprehensive exam every 6-12 months Comprehensive physical exam including neurological exam with high index of suspicion for rare cancers (and second malignancies in cancer survivors)     2/15/2022   Feb. 2023   Adrenocortical Carcinoma Ultrasound of the abdomen and pelvis every 3- 4 months (change to annually at age 18) NA NA   Adrenocortical Carcinoma    NOTE: Stop at age 18. Every 4 months:  1.Beta hCG (Stop at age 6)  2. Alpha fetoprotein (Stop at age 6)  3. 17-OH-progesterone  4. Testosterone  5. Androstenedione  6. Dehydroepiandrosterone sulfate   NA   NA   Acute Leukemia Every 4 months:    CBC    Erythrocyte sedimentation rate    lactate dehydrogenase Labs today Repeat in 6 months    Brain Tumors Annual brain MRI with contrast   (without dye if previous are normal) 2/11/2022- Brain MRI: No abnormal enhancing intracranial lesion. Stable vague T2 hyperintensity in the periventricular and supraventricular white matter which is nonspecific and likely secondary to prior therapy. Feb. 2023   Bone and Soft Tissue Sarcomas Annual rapid whole body MRI (without contrast) 2/11/2022- Whole body MRI: Abdomen/pelvis: Two stable sub centimeter T2 hyper intensities in the right hepatic lobe previously described as hemangiomas. Stable bilateral renal cysts. No hydronephrosis. Status post hysterectomy. No pelvic masses. No lymphadenopathy.Otherwise normal study. Feb. 2023   Beginning at " age 18   From the ages of 20-or at the age of earliest diagnosed breast cancer in the family, if younger than 20. Clinical breast exam every 6-12 months     NA   Follow with Dr. Gann   Breast screening for women Annual breast MRI from ages 20-29    Alternate annual breast MRI and annual mammogram (consider tomosynthesis) from ages 30-75. (after age 75, consider screening schedule on individual basis)   NA   NA   Melanoma Annual dermatological exam 3/16/2021- Dermatology visit, all benign findings Due now   Soft tissue, bone sarcomas and adrenal carcinoma Ultrasound of abdomen and pelvis every 12 months (alternate w/whole body MRI) NA US of abdomen in August 2022   Colon and Upper GI Screening  Beginning at age 25 or 5 years prior to the earliest known colon cancer in the family Upper endoscopy and colonoscopy every 2-5 years. 5/1/2018 (Dr. Debora Lee, [facility]), Colonoscopy, normal Next: due 5/2023.      May 2023   *Therapeutic radiation treatment for cancer should be avoided when possible as it can promote soft tissue sarcomas in this population.  **Women with TP53 mutation who are treated for breast cancer, screening of remaining breast tissue with annual mammogram and annual breast MRI should continue. Discuss option of  risk reducing mastectomy regarding degree of protection, reconstruction options and risks.  In addition, the family history and residual breast cancer risk should be considered during counseling.       I spent a total of 80 minutes on the day of the visit. Please see the note for further information on patient assessment and treatment.    Shelli Díaz, REY-CNS, OCN, AGN-BC  Clinical Nurse Specialist  Cancer Risk Management Program  [facility]  phone:  [phone]  fax: [phone]    CC: DOMINGO Montanez MD

## 2022-02-15 NOTE — NURSING NOTE
"Oncology Rooming Note    February 15, 2022 3:29 PM   Mary Chadwick is a 46 year old female who presents for:    Chief Complaint   Patient presents with     Oncology Clinic Visit     Breast Ca     Initial Vitals: /74   Pulse 91   Temp 98.4  F (36.9  C) (Oral)   Resp 16   Wt 87.5 kg (193 lb)   SpO2 98%   BMI 29.35 kg/m   Estimated body mass index is 29.35 kg/m  as calculated from the following:    Height as of 3/10/20: 1.727 m (5' 7.99\").    Weight as of this encounter: 87.5 kg (193 lb). Body surface area is 2.05 meters squared.  No Pain (0) Comment: Data Unavailable   No LMP recorded. Patient is perimenopausal.  Allergies reviewed: Yes  Medications reviewed: Yes    Medications: Medication refills not needed today.  Pharmacy name entered into EPIC:    [facility] - [place], [place] - [address] [place] [address] PH[facility]ACY #[id number] [place], [place] - [address] [facility] [address] [place] [address] - [address] [place] [address] [address] [place]    Clinical concerns: Patient states there are no new concerns to discuss with provider.  Shelli was not notified.         Dianna Moon CMA              "

## 2022-03-04 ENCOUNTER — VIRTUAL VISIT (OUTPATIENT)
Dept: ONCOLOGY | Facility: CLINIC | Age: 47
End: 2022-03-04
Attending: PHYSICIAN ASSISTANT
Payer: COMMERCIAL

## 2022-03-04 DIAGNOSIS — Z79.811 USE OF AROMATASE INHIBITORS: ICD-10-CM

## 2022-03-04 DIAGNOSIS — M85.80 OSTEOPENIA, UNSPECIFIED LOCATION: ICD-10-CM

## 2022-03-04 DIAGNOSIS — C50.412 MALIGNANT NEOPLASM OF UPPER-OUTER QUADRANT OF LEFT BREAST IN FEMALE, ESTROGEN RECEPTOR POSITIVE (H): Primary | ICD-10-CM

## 2022-03-04 DIAGNOSIS — Z15.01 LI-FRAUMENI SYNDROME: ICD-10-CM

## 2022-03-04 DIAGNOSIS — Z17.0 MALIGNANT NEOPLASM OF UPPER-OUTER QUADRANT OF LEFT BREAST IN FEMALE, ESTROGEN RECEPTOR POSITIVE (H): Primary | ICD-10-CM

## 2022-03-04 PROCEDURE — 99214 OFFICE O/P EST MOD 30 MIN: CPT | Mod: GT | Performed by: PHYSICIAN ASSISTANT

## 2022-03-04 NOTE — PROGRESS NOTES
"Omaira is a 46 year old who is being evaluated via a billable video visit.      How would you like to obtain your AVS? MyChart  If the video visit is dropped, the invitation should be resent by: Text to cell phone: [phone]  Will anyone else be joining your video visit? Hina Adler VF    Video Start Time: 2:35  Video-Visit Details    Type of service:  Video Visit    Video End Time: 3:20    Originating Location (pt. Location): Home    Distant Location (provider location):  [facility] CANCER [facility]     Platform used for Video Visit: Norse        HEMATOLOGY/ONCOLOGY PROGRESS NOTE  Mar 4, 2022       REASON FOR VISIT: follow-up of breast cancer     DIAGNOSIS:   Mary \"Omaira\" Farrukh is a 46-year-old female with stage IIA invasive ductal carcinoma, ER/NM-positive, HER2--negative, involving the left breast. She is originally from [place], displaced due to [place]. She underwent screening mammogram 2/9/2018 followed by diagnostic breast ultrasound, which revealed a 2.6 x 1.1 x 1.7 cm irregular shaped mass at the 2 o'clock position involving the left breast. Left breast biopsy showed invasive ductal carcinoma, grade 2, ER/NM-positive, HER2-negative. Breast MRI showed a 3.5 cm mass in her left breast with an area of non-mass enhancement measuring approximately 8 cm. On this imaging, she had an equivocal node in the left axilla; this was not biopsied.     She was screened for the I-SPY-2 clinical trial. She came back as low-risk MammaPrint and the study was not recommended. 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      She started on neoadjuvant endocrine therapy with monthly Zoladex on 3/22/18, with letrozole added in April 2018.     She underwent bilateral mastectomies with sentinel node evaluation and breast reconstruction on 8/6/18 with Carissa Andrews and Marti. Pathology revealed a T2N1 with treatment-related change.     She underwent HIMANSHU-BSO by Dr." Yenny Hidalgo on 11/20/2019.     INTERVAL HISTORY:   Omaira is here for follow-up over video today.  Omaira tells me she stopped her letrozole after the holidays.  She has been off now for about 2+ months.    She states her main reasons for stopping were ongoing fatigue difficulty maintaining a desirable weight and joint stiffness.  She feels so incredibly well off of the letrozole she is really emotionally struggling whether she needs this medication or not.  She has been able to do yoga on a routine basis and get in daily exercise.  She feels young healthy and has lost 10 pounds.  While she strongly desires to stay off of it she also realizes the importance 7 does not want to do anything to compromise her health.       She takes calcium and vitamin D.       No fevers, chills, cough, SOB, chest pain, abdominal pain, nausea, vomiting, bleeding, or swelling.       ROS: 10 point ROS neg other than the symptoms noted above in the HPI.        Active Medications          Current Outpatient Medications   Medication Sig Dispense Refill     amphetamine-dextroamphetamine (ADDERALL XR) 10 MG 24 hr capsule Take 10 mg by mouth         letrozole (FEMARA) 2.5 MG tablet Take 1 tablet (2.5 mg) by mouth daily 90 tablet 3     Spirulina 500 MG TABS 6 Tabs daily.         FISH OIL-CHOLECALCIFEROL PO Take 2,000 Units by mouth daily                   No Known Allergies     PHYSICAL EXAMINATION  Video physical exam  General: Patient appears well in no acute distress.   Skin: No visualized rash or lesions on visualized skin  Eyes: EOMI, no erythema, sclera icterus or discharge noted  Resp: Appears to be breathing comfortably without accessory muscle usage, speaking in full sentences, no cough  MSK: Appears to have normal range of motion based on visualized movements  Neurologic: No apparent tremors, facial movements symmetric  Psych: affect bright, alert and oriented    The rest of a comprehensive physical examination is deferred due to PHE  (public health emergency) video restrictions         LABS:  No results found for this or any previous visit (from the past 24 hour(s)).     Reviewed outside labs.  Borderline low vonwillebrand antigen and factor levels     IMPRESSION/PLAN:  Mary Chadwick is a 46 year old female with a T2 NX, invasive ductal carcinoma, ER/NV-positive, HER2 negative, involving the left breast, in the setting of Li-Fraumeni syndrome, s/p bilateral masectomies, HIMANSHU-BSO.     Stage IIA breast cancer:  Omaira started her letrozole in April 2018 thus she is a little shy of 4 years of adjuvant endocrine therapy.  Today she is tearful we had a long conversation regarding her quality of life while she is on the letrozole.  She states the stiffness fatigue and weight gain have been persistent since she has been taking the letrozole however this fall seem to be amplified thus leading her to make the decision to come off the medication after the holidays.  She is feeling a little overwhelmed with her increased risk of cancer with the Li-Fraumeni.  She feels overwhelmed every beginning of the year when she needs to have her body MRI scans.  She is sick of taking this medication every day that does not allow her to feel her very best.  We had long conversation about risk reduction and also ways to control some of the side effects while continuing on AI.  Ultimately she decided she would be willing to proceed and wishes to resume letrozole in April.  We did discuss switching AI's however she felt after this 3-month break she would like to resume what she was taking previously.  We discussed daily exercise and omega-3's to help with some of the joints stiffness.  Encouraged her to reach out to her old therapist to address some of the coping issues related to her Li-Fraumeni.  She was open to this.     I'll have her RTC in 2-3 months. No other concerning s/s of recurrence.     Liver lesions: follow-up imaging confirmed benign hemangiomas     Bone  health: continue supplementation with calcium, vitamin D. Dexa 12/2018 normal bone density. Would be due for repeat DEXA 12/2020.  This did not get done due to the covid pandemic; we reviewed this scan today demonstrating osteopenia. Dr Gann offered her Fosamax, however Omaira is feeling overwhelmed about this currently.  We can readdress in the future.     Li-Fraumeni: Annual body MRI WNL in Feb 2022.  Per results, colonoscopy and EUS due 5/2023.  Continue f/up with Shelli Major PA-C

## 2022-03-04 NOTE — LETTER
"    3/4/2022         RE: Mary Chadwick  [address]  [facility] [address]      Omaira is a 46 year old who is being evaluated via a billable video visit.      How would you like to obtain your AVS? MyChart  If the video visit is dropped, the invitation should be resent by: Text to cell phone: [phone]  Will anyone else be joining your video visit? No   Zeinab Adler        HEMATOLOGY/ONCOLOGY PROGRESS NOTE  Mar 4, 2022       REASON FOR VISIT: follow-up of breast cancer     DIAGNOSIS:   Mary \"Omaira\" Farrukh is a 46-year-old female with stage IIA invasive ductal carcinoma, ER/CA-positive, HER2--negative, involving the left breast. She is originally from [place], displaced due to [place]. She underwent screening mammogram 2/9/2018 followed by diagnostic breast ultrasound, which revealed a 2.6 x 1.1 x 1.7 cm irregular shaped mass at the 2 o'clock position involving the left breast. Left breast biopsy showed invasive ductal carcinoma, grade 2, ER/CA-positive, HER2-negative. Breast MRI showed a 3.5 cm mass in her left breast with an area of non-mass enhancement measuring approximately 8 cm. On this imaging, she had an equivocal node in the left axilla; this was not biopsied.     She was screened for the I-SPY-2 clinical trial. She came back as low-risk MammaPrint and the study was not recommended. 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      She started on neoadjuvant endocrine therapy with monthly Zoladex on 3/22/18, with letrozole added in April 2018.     She underwent bilateral mastectomies with sentinel node evaluation and breast reconstruction on 8/6/18 with Carissa Andrews and Marti. Pathology revealed a T2N1 with treatment-related change.     She underwent HIMANSHU-BSO by Dr. Yenny Hidalgo on 11/20/2019.     INTERVAL HISTORY:   Omaira is here for follow-up over video today.  Omaira tells me she stopped her letrozole after the holidays.  She has been off now for about " 2+ months.    She states her main reasons for stopping were ongoing fatigue difficulty maintaining a desirable weight and joint stiffness.  She feels so incredibly well off of the letrozole she is really emotionally struggling whether she needs this medication or not.  She has been able to do yoga on a routine basis and get in daily exercise.  She feels young healthy and has lost 10 pounds.  While she strongly desires to stay off of it she also realizes the importance 7 does not want to do anything to compromise her health.       She takes calcium and vitamin D.       No fevers, chills, cough, SOB, chest pain, abdominal pain, nausea, vomiting, bleeding, or swelling.       ROS: 10 point ROS neg other than the symptoms noted above in the HPI.        Active Medications          Current Outpatient Medications   Medication Sig Dispense Refill     amphetamine-dextroamphetamine (ADDERALL XR) 10 MG 24 hr capsule Take 10 mg by mouth         letrozole (FEMARA) 2.5 MG tablet Take 1 tablet (2.5 mg) by mouth daily 90 tablet 3     Spirulina 500 MG TABS 6 Tabs daily.         FISH OIL-CHOLECALCIFEROL PO Take 2,000 Units by mouth daily                   No Known Allergies     PHYSICAL EXAMINATION  Video physical exam  General: Patient appears well in no acute distress.   Skin: No visualized rash or lesions on visualized skin  Eyes: EOMI, no erythema, sclera icterus or discharge noted  Resp: Appears to be breathing comfortably without accessory muscle usage, speaking in full sentences, no cough  MSK: Appears to have normal range of motion based on visualized movements  Neurologic: No apparent tremors, facial movements symmetric  Psych: affect bright, alert and oriented    The rest of a comprehensive physical examination is deferred due to PHE (public health emergency) video restrictions         LABS:  No results found for this or any previous visit (from the past 24 hour(s)).     Reviewed outside labs.  Borderline low vonwillebrand  antigen and factor levels     IMPRESSION/PLAN:  Mary Chadwick is a 46 year old female with a T2 NX, invasive ductal carcinoma, ER/IN-positive, HER2 negative, involving the left breast, in the setting of Li-Fraumeni syndrome, s/p bilateral masectomies, HIMANSHU-BSO.     Stage IIA breast cancer:  Omaira started her letrozole in April 2018 thus she is a little shy of 4 years of adjuvant endocrine therapy.  Today she is tearful we had a long conversation regarding her quality of life while she is on the letrozole.  She states the stiffness fatigue and weight gain have been persistent since she has been taking the letrozole however this fall seem to be amplified thus leading her to make the decision to come off the medication after the holidays.  She is feeling a little overwhelmed with her increased risk of cancer with the Li-Fraumeni.  She feels overwhelmed every beginning of the year when she needs to have her body MRI scans.  She is sick of taking this medication every day that does not allow her to feel her very best.  We had long conversation about risk reduction and also ways to control some of the side effects while continuing on AI.  Ultimately she decided she would be willing to proceed and wishes to resume letrozole in April.  We did discuss switching AI's however she felt after this 3-month break she would like to resume what she was taking previously.  We discussed daily exercise and omega-3's to help with some of the joints stiffness.  Encouraged her to reach out to her old therapist to address some of the coping issues related to her Li-Fraumeni.  She was open to this.     I'll have her RTC in 2-3 months. No other concerning s/s of recurrence.     Liver lesions: follow-up imaging confirmed benign hemangiomas     Bone health: continue supplementation with calcium, vitamin D. Dexa 12/2018 normal bone density. Would be due for repeat DEXA 12/2020.  This did not get done due to the covid pandemic; we reviewed  this scan today demonstrating osteopenia. Dr Gann offered her Fosamax, however Omaira is feeling overwhelmed about this currently.  We can readdress in the future.     Li-Fraumeni: Annual body MRI WNL in Feb 2022.  Per results, colonoscopy and EUS due 5/2023.  Continue f/up with Shelli Mjaor PA-C

## 2022-03-10 ENCOUNTER — DOCUMENTATION ONLY (OUTPATIENT)
Dept: ONCOLOGY | Facility: CLINIC | Age: 47
End: 2022-03-10
Payer: COMMERCIAL

## 2022-03-10 NOTE — PROGRESS NOTES
CLINICAL NUTRITION SERVICES     Reason for Contact: Patient was contacted by phone due to requested to speak with a Dietitian on the Oncology Distress Screening tool.    Action: Attempted to call pt, no answer and no voicemail set up. Unable to reach pt.      Follow up: MELINDA Valentine RD, LD  [phone]

## 2022-04-26 DIAGNOSIS — Z17.0 MALIGNANT NEOPLASM OF UPPER-OUTER QUADRANT OF LEFT BREAST IN FEMALE, ESTROGEN RECEPTOR POSITIVE (H): ICD-10-CM

## 2022-04-26 DIAGNOSIS — C50.412 MALIGNANT NEOPLASM OF UPPER-OUTER QUADRANT OF LEFT BREAST IN FEMALE, ESTROGEN RECEPTOR POSITIVE (H): ICD-10-CM

## 2022-04-27 RX ORDER — LETROZOLE 2.5 MG/1
2.5 TABLET, FILM COATED ORAL DAILY
Qty: 90 TABLET | Refills: 0 | Status: SHIPPED | OUTPATIENT
Start: 2022-04-27 | End: 2022-09-09

## 2022-04-27 NOTE — TELEPHONE ENCOUNTER
Refill Request: Letrozole 2.5 mg     Last prescribing provider : Maggie Major     Last clinic visit date: 3/4/22 Maggie Major     Any missed appointments/no show since last visit: no     Recommendations for requested medication: copied from Maggie chart 3/4/22   Ultimately she decided she would be willing to proceed and wishes to resume letrozole in April.  We did discuss switching AI's however she felt after this 3-month break she would like to resume what she was taking previously.     Next clinic date 6/9/22 with Dr. Gann     Any other pertinent information : NA

## 2022-06-09 ENCOUNTER — ONCOLOGY VISIT (OUTPATIENT)
Dept: ONCOLOGY | Facility: CLINIC | Age: 47
End: 2022-06-09
Attending: INTERNAL MEDICINE
Payer: COMMERCIAL

## 2022-06-09 VITALS
BODY MASS INDEX: 29.66 KG/M2 | TEMPERATURE: 98 F | DIASTOLIC BLOOD PRESSURE: 72 MMHG | WEIGHT: 195 LBS | SYSTOLIC BLOOD PRESSURE: 106 MMHG | OXYGEN SATURATION: 95 % | HEART RATE: 67 BPM

## 2022-06-09 DIAGNOSIS — Z15.01 MONOALLELIC MUTATION OF TP53 GENE: ICD-10-CM

## 2022-06-09 DIAGNOSIS — Z15.09 MONOALLELIC MUTATION OF TP53 GENE: ICD-10-CM

## 2022-06-09 DIAGNOSIS — Z79.811 USE OF AROMATASE INHIBITORS: ICD-10-CM

## 2022-06-09 DIAGNOSIS — Z15.01 LI-FRAUMENI SYNDROME: ICD-10-CM

## 2022-06-09 DIAGNOSIS — Z15.89 MONOALLELIC MUTATION OF TP53 GENE: ICD-10-CM

## 2022-06-09 DIAGNOSIS — Z17.0 MALIGNANT NEOPLASM OF UPPER-OUTER QUADRANT OF LEFT BREAST IN FEMALE, ESTROGEN RECEPTOR POSITIVE (H): Primary | ICD-10-CM

## 2022-06-09 DIAGNOSIS — C50.412 MALIGNANT NEOPLASM OF UPPER-OUTER QUADRANT OF LEFT BREAST IN FEMALE, ESTROGEN RECEPTOR POSITIVE (H): Primary | ICD-10-CM

## 2022-06-09 DIAGNOSIS — M85.80 OSTEOPENIA, UNSPECIFIED LOCATION: ICD-10-CM

## 2022-06-09 PROCEDURE — 99214 OFFICE O/P EST MOD 30 MIN: CPT | Performed by: INTERNAL MEDICINE

## 2022-06-09 PROCEDURE — G0463 HOSPITAL OUTPT CLINIC VISIT: HCPCS

## 2022-06-09 ASSESSMENT — PAIN SCALES - GENERAL: PAINLEVEL: NO PAIN (0)

## 2022-06-09 NOTE — LETTER
"    6/9/2022         RE: Mary Chadwick  [address]  [facility] [address]        Dear Colleague,    Thank you for referring your patient, Mary Chadwick, to the [facility]. Please see a copy of my visit note below.      HEMATOLOGY/ONCOLOGY PROGRESS NOTE  Jun 9, 2022    REASON FOR VISIT: follow-up of breast cancer    DIAGNOSIS:   Mary \"Osei Chadwick is a 46-year-old female with stage IIA invasive ductal carcinoma, ER/MI-positive, HER2--negative, involving the left breast. She is originally from [place], [place] due [place]. She underwent screening mammogram 2/9/2018 followed by diagnostic breast ultrasound, which revealed a 2.6 x 1.1 x 1.7 cm irregular shaped mass at the 2 o'clock position involving the left breast. Left breast biopsy showed invasive ductal carcinoma, grade 2, ER/MI-positive, HER2-negative. Breast MRI showed a 3.5 cm mass in her left breast with an area of non-mass enhancement measuring approximately 8 cm. On this imaging, she had an equivocal node in the left axilla; this was not biopsied.    She was screened for the I-SPY-2 clinical trial. She came back as low-risk MammaPrint and the study was not recommended. 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     She started on neoadjuvant endocrine therapy with monthly Zoladex on 3/22/18, with letrozole added in April 2018.    She underwent bilateral mastectomies with sentinel node evaluation and breast reconstruction on 8/6/18 with Carissa Andrews and Marti. Pathology revealed a T2N1 with treatment-related change.    She underwent HIMANSHU-BSO by Dr. Yenny Hidalgo on 11/20/2019.    INTERVAL HISTORY:   Omaira is here for follow-up.    She has stopped Letrozole since 2/2022 and she has been feeling great in a way that she is not grumpy anymore and she is able to connect back with the world around her. 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 She is also in school and wants to get into nursing school.     No new concerns.    She takes calcium and vitamin D.      No fevers, chills, cough, SOB, chest pain, abdominal pain, nausea, vomiting, bleeding, or swelling.      ROS: 10 point ROS neg other than the symptoms noted above in the HPI.      Current Outpatient Medications   Medication Sig Dispense Refill     Spirulina 500 MG TABS 6 Tabs daily.       alendronate (FOSAMAX) 70 MG tablet Take 1 tablet (70 mg) by mouth every 7 days (Patient not taking: No sig reported) 12 tablet 3     amphetamine-dextroamphetamine (ADDERALL XR) 10 MG 24 hr capsule Take 10 mg by mouth (Patient not taking: No sig reported)       letrozole (FEMARA) 2.5 MG tablet TAKE 1 TABLET (2.5 MG) BY MOUTH DAILY (Patient not taking: Reported on 6/9/2022) 90 tablet 0          No Known Allergies    PHYSICAL EXAMINATION  GENERAL: Healthy, alert and no distress  HEENT: no icterus  NECk: supple  Cv: rr  Lungs; clear  Abd; soft, nt, nd + bs  Ext: no edema  Skin no rashes  Breast: s/p bilateral mastectomy with reconstruction, no nodules or masses, no axillary adenopathy    LABS:  No results found for this or any previous visit (from the past 24 hour(s)).    Reviewed outside labs.  Borderline low vonwillebrand antigen and factor levels    IMPRESSION/PLAN:  Mary Chadwick is a 46 year old female with a T2 NX, invasive ductal carcinoma, ER/MI-positive, HER2 negative, involving the left breast, in the setting of Li-Fraumeni syndrome, s/p bilateral masectomies, HIMANSHU-BSO.    Stage IIA breast cancer: we discussed the benefit of AI is to prevent metastatic disease. She definitely does not like the side effect of Letrozole. After going through the benefit she is interested to try Tamoxifen. She is told the risk of VTE comparing to Letrozole. She agrees with lower dose and contact us if she has any questions. will arrange PA visit shortly.     Liver lesions: follow-up imaging confirmed benign  hemangiomas    Bone health: continue supplementation with calcium, vitamin D. Dexa 12/2018 normal bone density. Would be due for repeat DEXA 12/2020.  This did not get done due to the covid pandemic and ultimately she was started on fosamax weekly.      Li-Fraumeni: Annual body MRI done in February.   Per results, colonoscopy and EUS due 5/2023.       Patient is seen and discussed with Dr. Gann.     PT was seen and evaluated by me with Dr Bowden.  Reviewed side effects of tamoxifen.  Script sent out.  Return with GRACIE in 1 month.  Return with me in 6 months.    > 30 min spent in reviewing records, imaging, and counseling and coordinating care    Valeria Gann MD

## 2022-06-09 NOTE — NURSING NOTE
"Oncology Rooming Note    June 9, 2022 8:48 AM   Mary Chadwick is a 46 year old female who presents for:    Chief Complaint   Patient presents with     Oncology Clinic Visit     Malignant neoplasm of left breast in female, estrogen receptor positive      Initial Vitals: /72   Pulse 67   Temp 98  F (36.7  C) (Oral)   Wt 88.5 kg (195 lb)   SpO2 95%   BMI 29.66 kg/m   Estimated body mass index is 29.66 kg/m  as calculated from the following:    Height as of 3/10/20: 1.727 m (5' 7.99\").    Weight as of this encounter: 88.5 kg (195 lb). Body surface area is 2.06 meters squared.  No Pain (0) Comment: Data Unavailable   No LMP recorded. Patient is perimenopausal.  Allergies reviewed: Yes  Medications reviewed: Yes    Medications: Medication refills not needed today.  Pharmacy name entered into EPIC:    [facility] - [place], [place] - [address] [id number] [place], [place] - [address] [facility] [address] [place] [address] - [address] [place]    Clinical concerns: none       Khanh Scales            "

## 2022-06-09 NOTE — PROGRESS NOTES
"  HEMATOLOGY/ONCOLOGY PROGRESS NOTE  Jun 9, 2022    REASON FOR VISIT: follow-up of breast cancer    DIAGNOSIS:   Mary \"Omaira\"Farrukh is a 46-year-old female with stage IIA invasive ductal carcinoma, ER/AZ-positive, HER2--negative, involving the left breast. She is originally from [place], [place]. She underwent screening mammogram 2/9/2018 followed by diagnostic breast ultrasound, which revealed a 2.6 x 1.1 x 1.7 cm irregular shaped mass at the 2 o'clock position involving the left breast. Left breast biopsy showed invasive ductal carcinoma, grade 2, ER/AZ-positive, HER2-negative. Breast MRI showed a 3.5 cm mass in her left breast with an area of non-mass enhancement measuring approximately 8 cm. On this imaging, she had an equivocal node in the left axilla; this was not biopsied.    She was screened for the I-SPY-2 clinical trial. She came back as low-risk MammaPrint and the study was not recommended. 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     She started on neoadjuvant endocrine therapy with monthly Zoladex on 3/22/18, with letrozole added in April 2018.    She underwent bilateral mastectomies with sentinel node evaluation and breast reconstruction on 8/6/18 with Carissa Andrews and Marti. Pathology revealed a T2N1 with treatment-related change.    She underwent HIMANSHU-BSO by Dr. Yenny Hidalgo on 11/20/2019.    INTERVAL HISTORY:   Omaira is here for follow-up.    She has stopped Letrozole since 2/2022 and she has been feeling great in a way that she is not grumpy anymore and she is able to connect back with the world around her. 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 She is also in school and wants to get into nursing school.     No new concerns.    She takes calcium and vitamin D.      No fevers, chills, cough, SOB, chest pain, abdominal pain, nausea, vomiting, bleeding, or swelling.      ROS: 10 point ROS neg " other than the symptoms noted above in the HPI.      Current Outpatient Medications   Medication Sig Dispense Refill     Spirulina 500 MG TABS 6 Tabs daily.       alendronate (FOSAMAX) 70 MG tablet Take 1 tablet (70 mg) by mouth every 7 days (Patient not taking: No sig reported) 12 tablet 3     amphetamine-dextroamphetamine (ADDERALL XR) 10 MG 24 hr capsule Take 10 mg by mouth (Patient not taking: No sig reported)       letrozole (FEMARA) 2.5 MG tablet TAKE 1 TABLET (2.5 MG) BY MOUTH DAILY (Patient not taking: Reported on 6/9/2022) 90 tablet 0          No Known Allergies    PHYSICAL EXAMINATION  GENERAL: Healthy, alert and no distress  HEENT: no icterus  NECk: supple  Cv: rr  Lungs; clear  Abd; soft, nt, nd + bs  Ext: no edema  Skin no rashes  Breast: s/p bilateral mastectomy with reconstruction, no nodules or masses, no axillary adenopathy    LABS:  No results found for this or any previous visit (from the past 24 hour(s)).    Reviewed outside labs.  Borderline low vonwillebrand antigen and factor levels    IMPRESSION/PLAN:  Mary Chadwick is a 46 year old female with a T2 NX, invasive ductal carcinoma, ER/AR-positive, HER2 negative, involving the left breast, in the setting of Li-Fraumeni syndrome, s/p bilateral masectomies, HIMANSHU-BSO.    Stage IIA breast cancer: we discussed the benefit of AI is to prevent metastatic disease. She definitely does not like the side effect of Letrozole. After going through the benefit she is interested to try Tamoxifen. She is told the risk of VTE comparing to Letrozole. She agrees with lower dose and contact us if she has any questions. will arrange PA visit shortly.     Liver lesions: follow-up imaging confirmed benign hemangiomas    Bone health: continue supplementation with calcium, vitamin D. Dexa 12/2018 normal bone density. Would be due for repeat DEXA 12/2020.  This did not get done due to the covid pandemic and ultimately she was started on fosamax weekly.       Li-Fraumeni: Annual body MRI done in February.   Per results, colonoscopy and EUS due 5/2023.       Patient is seen and discussed with Dr. Gann.     PT was seen and evaluated by me with Dr Bowden.  Reviewed side effects of tamoxifen.  Script sent out.  Return with GRACIE in 1 month.  Return with me in 6 months.    > 30 min spent in reviewing records, imaging, and counseling and coordinating care    Valeria Gann MD

## 2022-06-16 RX ORDER — TAMOXIFEN CITRATE 20 MG/1
20 TABLET ORAL DAILY
Qty: 90 TABLET | Refills: 3 | Status: SHIPPED | OUTPATIENT
Start: 2022-06-16 | End: 2023-02-14

## 2022-08-23 ENCOUNTER — ANCILLARY PROCEDURE (OUTPATIENT)
Dept: ULTRASOUND IMAGING | Facility: CLINIC | Age: 47
End: 2022-08-23
Attending: CLINICAL NURSE SPECIALIST
Payer: COMMERCIAL

## 2022-08-23 ENCOUNTER — LAB (OUTPATIENT)
Dept: LAB | Facility: CLINIC | Age: 47
End: 2022-08-23
Payer: COMMERCIAL

## 2022-08-23 DIAGNOSIS — Z15.01 LI-FRAUMENI SYNDROME: ICD-10-CM

## 2022-08-23 DIAGNOSIS — N39.0 URINARY TRACT INFECTION WITH HEMATURIA, SITE UNSPECIFIED: ICD-10-CM

## 2022-08-23 DIAGNOSIS — R31.29 OTHER MICROSCOPIC HEMATURIA: Primary | ICD-10-CM

## 2022-08-23 DIAGNOSIS — R31.9 URINARY TRACT INFECTION WITH HEMATURIA, SITE UNSPECIFIED: ICD-10-CM

## 2022-08-23 DIAGNOSIS — Z91.89 AT RISK FOR CANCER: ICD-10-CM

## 2022-08-23 LAB
ALBUMIN UR-MCNC: NEGATIVE MG/DL
APPEARANCE UR: CLEAR
BASOPHILS # BLD AUTO: 0.1 10E3/UL (ref 0–0.2)
BASOPHILS NFR BLD AUTO: 1 %
BILIRUB UR QL STRIP: NEGATIVE
COLOR UR AUTO: YELLOW
EOSINOPHIL # BLD AUTO: 0.2 10E3/UL (ref 0–0.7)
EOSINOPHIL NFR BLD AUTO: 3 %
ERYTHROCYTE [DISTWIDTH] IN BLOOD BY AUTOMATED COUNT: 12.8 % (ref 10–15)
ERYTHROCYTE [SEDIMENTATION RATE] IN BLOOD BY WESTERGREN METHOD: 7 MM/HR (ref 0–20)
GLUCOSE UR STRIP-MCNC: NEGATIVE MG/DL
HCT VFR BLD AUTO: 43.6 % (ref 35–47)
HGB BLD-MCNC: 13.9 G/DL (ref 11.7–15.7)
HGB UR QL STRIP: ABNORMAL
IMM GRANULOCYTES # BLD: 0 10E3/UL
IMM GRANULOCYTES NFR BLD: 1 %
KETONES UR STRIP-MCNC: NEGATIVE MG/DL
LDH SERPL L TO P-CCNC: 177 U/L (ref 81–234)
LEUKOCYTE ESTERASE UR QL STRIP: NEGATIVE
LYMPHOCYTES # BLD AUTO: 2.3 10E3/UL (ref 0.8–5.3)
LYMPHOCYTES NFR BLD AUTO: 36 %
MCH RBC QN AUTO: 28.4 PG (ref 26.5–33)
MCHC RBC AUTO-ENTMCNC: 31.9 G/DL (ref 31.5–36.5)
MCV RBC AUTO: 89 FL (ref 78–100)
MONOCYTES # BLD AUTO: 0.5 10E3/UL (ref 0–1.3)
MONOCYTES NFR BLD AUTO: 8 %
MUCOUS THREADS #/AREA URNS LPF: PRESENT /LPF
NEUTROPHILS # BLD AUTO: 3.3 10E3/UL (ref 1.6–8.3)
NEUTROPHILS NFR BLD AUTO: 51 %
NITRATE UR QL: NEGATIVE
NRBC # BLD AUTO: 0 10E3/UL
NRBC BLD AUTO-RTO: 0 /100
PH UR STRIP: 7 [PH] (ref 5–7)
PLATELET # BLD AUTO: 295 10E3/UL (ref 150–450)
RBC # BLD AUTO: 4.9 10E6/UL (ref 3.8–5.2)
RBC URINE: 1 /HPF
SP GR UR STRIP: 1.02 (ref 1–1.03)
SQUAMOUS EPITHELIAL: 2 /HPF
UROBILINOGEN UR STRIP-MCNC: NORMAL MG/DL
WBC # BLD AUTO: 6.3 10E3/UL (ref 4–11)
WBC URINE: 2 /HPF

## 2022-08-23 PROCEDURE — 85652 RBC SED RATE AUTOMATED: CPT | Performed by: PATHOLOGY

## 2022-08-23 PROCEDURE — 93975 VASCULAR STUDY: CPT | Mod: GC | Performed by: RADIOLOGY

## 2022-08-23 PROCEDURE — 83615 LACTATE (LD) (LDH) ENZYME: CPT | Performed by: PATHOLOGY

## 2022-08-23 PROCEDURE — 36415 COLL VENOUS BLD VENIPUNCTURE: CPT | Performed by: PATHOLOGY

## 2022-08-23 PROCEDURE — 85025 COMPLETE CBC W/AUTO DIFF WBC: CPT | Performed by: PATHOLOGY

## 2022-08-23 PROCEDURE — 81001 URINALYSIS AUTO W/SCOPE: CPT | Performed by: PATHOLOGY

## 2022-09-09 ENCOUNTER — ONCOLOGY VISIT (OUTPATIENT)
Dept: ONCOLOGY | Facility: CLINIC | Age: 47
End: 2022-09-09
Attending: PHYSICIAN ASSISTANT
Payer: COMMERCIAL

## 2022-09-09 VITALS
RESPIRATION RATE: 16 BRPM | SYSTOLIC BLOOD PRESSURE: 114 MMHG | BODY MASS INDEX: 30.11 KG/M2 | DIASTOLIC BLOOD PRESSURE: 77 MMHG | HEART RATE: 75 BPM | TEMPERATURE: 97.8 F | OXYGEN SATURATION: 100 % | WEIGHT: 198 LBS

## 2022-09-09 DIAGNOSIS — Z17.0 MALIGNANT NEOPLASM OF UPPER-OUTER QUADRANT OF LEFT BREAST IN FEMALE, ESTROGEN RECEPTOR POSITIVE (H): ICD-10-CM

## 2022-09-09 DIAGNOSIS — Z15.01 LI-FRAUMENI SYNDROME: Primary | ICD-10-CM

## 2022-09-09 DIAGNOSIS — C50.412 MALIGNANT NEOPLASM OF UPPER-OUTER QUADRANT OF LEFT BREAST IN FEMALE, ESTROGEN RECEPTOR POSITIVE (H): ICD-10-CM

## 2022-09-09 PROCEDURE — G0463 HOSPITAL OUTPT CLINIC VISIT: HCPCS

## 2022-09-09 PROCEDURE — 99214 OFFICE O/P EST MOD 30 MIN: CPT | Performed by: PHYSICIAN ASSISTANT

## 2022-09-09 ASSESSMENT — PAIN SCALES - GENERAL: PAINLEVEL: NO PAIN (0)

## 2022-09-09 NOTE — NURSING NOTE
"Oncology Rooming Note    September 9, 2022 12:29 PM   Mary Chadwick is a 46 year old female who presents for:    Chief Complaint   Patient presents with     Oncology Clinic Visit     Breast Ca     Initial Vitals: /77   Pulse 75   Temp 97.8  F (36.6  C) (Oral)   Resp 16   Wt 89.8 kg (198 lb)   SpO2 100%   BMI 30.11 kg/m   Estimated body mass index is 30.11 kg/m  as calculated from the following:    Height as of 3/10/20: 1.727 m (5' 7.99\").    Weight as of this encounter: 89.8 kg (198 lb). Body surface area is 2.08 meters squared.  No Pain (0) Comment: Data Unavailable   No LMP recorded. Patient is perimenopausal.  Allergies reviewed: Yes  Medications reviewed: Yes    Medications: Medication refills not needed today.  Pharmacy name entered into EPIC:    [facility] - [place], [place] - [address] [facility] [address] [place] [address] [address]    Clinical concerns:        Dianna Moon CMA              "

## 2022-09-09 NOTE — LETTER
"    9/9/2022         RE: Mary Chadwick  [address]  [facility] [address]        HEMATOLOGY/ONCOLOGY PROGRESS NOTE  Sep 9, 2022    REASON FOR VISIT: follow-up of breast cancer    DIAGNOSIS:   Mary \"Omaira\"Farrukh is a 46-year-old female with stage IIA invasive ductal carcinoma, ER/MO-positive, HER2--negative, involving the left breast. She is originally from [place], [place] due to [place]. She underwent screening mammogram 2/9/2018 followed by diagnostic breast ultrasound, which revealed a 2.6 x 1.1 x 1.7 cm irregular shaped mass at the 2 o'clock position involving the left breast. Left breast biopsy showed invasive ductal carcinoma, grade 2, ER/MO-positive, HER2-negative. Breast MRI showed a 3.5 cm mass in her left breast with an area of non-mass enhancement measuring approximately 8 cm. On this imaging, she had an equivocal node in the left axilla; this was not biopsied.    She was screened for the I-SPY-2 clinical trial. She came back as low-risk MammaPrint and the study was not recommended. 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     She started on neoadjuvant endocrine therapy with monthly Zoladex on 3/22/18, with letrozole added in April 2018.    She underwent bilateral mastectomies with sentinel node evaluation and breast reconstruction on 8/6/18 with Carissa Andrews and Marti. Pathology revealed a T2N1 with treatment-related change.    She underwent HIMANSHU-BSO by Dr. Yenny Hidalgo on 11/20/2019.    INTERVAL HISTORY:   Omaira is here for follow-up.    She has stopped Letrozole since 2/2022 and she has been feeling great in a way that she is not grumpy anymore and she is able to connect back with the world around her. 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  She feels her emotional health is in a good place and she is struggling to think about taking the tamoxifen.    No new concerns.    She takes calcium and vitamin " D.      No fevers, chills, cough, SOB, chest pain, abdominal pain, nausea, vomiting, bleeding, or swelling.     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 Off her letrozole, she no longer needs the adderall     ROS: 10 point ROS neg other than the symptoms noted above in the HPI.      Current Outpatient Medications   Medication Sig Dispense Refill     Spirulina 500 MG TABS 6 Tabs daily.       alendronate (FOSAMAX) 70 MG tablet Take 1 tablet (70 mg) by mouth every 7 days (Patient not taking: No sig reported) 12 tablet 3     amphetamine-dextroamphetamine (ADDERALL XR) 10 MG 24 hr capsule Take 10 mg by mouth (Patient not taking: No sig reported)       letrozole (FEMARA) 2.5 MG tablet TAKE 1 TABLET (2.5 MG) BY MOUTH DAILY (Patient not taking: No sig reported) 90 tablet 0     tamoxifen (NOLVADEX) 20 MG tablet Take 1 tablet (20 mg) by mouth daily (Patient not taking: Reported on 9/9/2022) 90 tablet 3          No Known Allergies    PHYSICAL EXAMINATION  GENERAL: Healthy, alert and no distress  HEENT: no icterus  NECk: supple  Cv: rr  Lungs; clear  Abd; soft, nt, nd + bs  Ext: no edema  Skin no rashes  Breast: s/p bilateral mastectomy with reconstruction, no nodules or masses, no axillary adenopathy    LABS:  No results found for this or any previous visit (from the past 24 hour(s)).    Reviewed outside labs.  Borderline low vonwillebrand antigen and factor levels    IMPRESSION/PLAN:  Mary Chadwick is a 46 year old female with a T2 NX, invasive ductal carcinoma, ER/OR-positive, HER2 negative, involving the left breast, in the setting of Li-Fraumeni syndrome, s/p bilateral masectomies, HIMANSHU-BSO.    Stage IIA breast cancer: we discussed the benefit of AI is to prevent metastatic disease. She definitely does not like the side effect of Letrozole. After going through the benefit she is interested to try Tamoxifen. She is told the risk of VTE comparing to Letrozole. She was hesitant to try this, but willing to try a 1/2 pill  for a few weeks and then escalate to a full pill twenty mg if tolerable.    Liver lesions: follow-up imaging confirmed benign hemangiomas    Bone health: continue supplementation with calcium, vitamin D. Dexa 12/2018 normal bone density. DEXA from 2021 has osteopenia.  At this time she is working on exercise and taking calcium and D.     Li-Fraumeni: Annual body MRI done in March of 2023.   Per results, colonoscopy and EUS due 5/2023.       35 minutes spent on the date of the encounter doing chart review, review of test results, interpretation of tests, patient visit and documentation       Maggie Major PA-C

## 2022-09-09 NOTE — PROGRESS NOTES
"  HEMATOLOGY/ONCOLOGY PROGRESS NOTE  Sep 9, 2022    REASON FOR VISIT: follow-up of breast cancer    DIAGNOSIS:   Mary \"Omaira\" Farrukh is a 46-year-old female with stage IIA invasive ductal carcinoma, ER/WI-positive, HER2--negative, involving the left breast. She is originally from [place], displaced due to [place]. She underwent screening mammogram 2/9/2018 followed by diagnostic breast ultrasound, which revealed a 2.6 x 1.1 x 1.7 cm irregular shaped mass at the 2 o'clock position involving the left breast. Left breast biopsy showed invasive ductal carcinoma, grade 2, ER/WI-positive, HER2-negative. Breast MRI showed a 3.5 cm mass in her left breast with an area of non-mass enhancement measuring approximately 8 cm. On this imaging, she had an equivocal node in the left axilla; this was not biopsied.    She was screened for the I-SPY-2 clinical trial. She came back as low-risk MammaPrint and the study was not recommended. 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     She started on neoadjuvant endocrine therapy with monthly Zoladex on 3/22/18, with letrozole added in April 2018.    She underwent bilateral mastectomies with sentinel node evaluation and breast reconstruction on 8/6/18 with Carissa Andrews and Marti. Pathology revealed a T2N1 with treatment-related change.    She underwent HIMANSHU-BSO by Dr. Yenny Hidalgo on 11/20/2019.    INTERVAL HISTORY:   Omaira is here for follow-up.    She has stopped Letrozole since 2/2022 and she has been feeling great in a way that she is not grumpy anymore and she is able to connect back with the world around her. 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  She feels her emotional health is in a good place and she is struggling to think about taking the tamoxifen.    No new concerns.    She takes calcium and vitamin D.      No fevers, chills, cough, SOB, chest pain, abdominal pain, nausea, vomiting, " bleeding, or swelling.     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 Off her letrozole, she no longer needs the adderall     ROS: 10 point ROS neg other than the symptoms noted above in the HPI.      Current Outpatient Medications   Medication Sig Dispense Refill     Spirulina 500 MG TABS 6 Tabs daily.       alendronate (FOSAMAX) 70 MG tablet Take 1 tablet (70 mg) by mouth every 7 days (Patient not taking: No sig reported) 12 tablet 3     amphetamine-dextroamphetamine (ADDERALL XR) 10 MG 24 hr capsule Take 10 mg by mouth (Patient not taking: No sig reported)       letrozole (FEMARA) 2.5 MG tablet TAKE 1 TABLET (2.5 MG) BY MOUTH DAILY (Patient not taking: No sig reported) 90 tablet 0     tamoxifen (NOLVADEX) 20 MG tablet Take 1 tablet (20 mg) by mouth daily (Patient not taking: Reported on 9/9/2022) 90 tablet 3          No Known Allergies    PHYSICAL EXAMINATION  GENERAL: Healthy, alert and no distress  HEENT: no icterus  NECk: supple  Cv: rr  Lungs; clear  Abd; soft, nt, nd + bs  Ext: no edema  Skin no rashes  Breast: s/p bilateral mastectomy with reconstruction, no nodules or masses, no axillary adenopathy    LABS:  No results found for this or any previous visit (from the past 24 hour(s)).    Reviewed outside labs.  Borderline low vonwillebrand antigen and factor levels    IMPRESSION/PLAN:  Mary Chadwick is a 46 year old female with a T2 NX, invasive ductal carcinoma, ER/ND-positive, HER2 negative, involving the left breast, in the setting of Li-Fraumeni syndrome, s/p bilateral masectomies, HIMANSHU-BSO.    Stage IIA breast cancer: we discussed the benefit of AI is to prevent metastatic disease. She definitely does not like the side effect of Letrozole. After going through the benefit she is interested to try Tamoxifen. She is told the risk of VTE comparing to Letrozole. She was hesitant to try this, but willing to try a 1/2 pill for a few weeks and then escalate to a full pill twenty mg if tolerable.    Liver  lesions: follow-up imaging confirmed benign hemangiomas    Bone health: continue supplementation with calcium, vitamin D. Dexa 12/2018 normal bone density. DEXA from 2021 has osteopenia.  At this time she is working on exercise and taking calcium and D.     Li-Fraumeni: Annual body MRI done in March of 2023.   Per results, colonoscopy and EUS due 5/2023.       35 minutes spent on the date of the encounter doing chart review, review of test results, interpretation of tests, patient visit and documentation

## 2022-11-20 ENCOUNTER — HEALTH MAINTENANCE LETTER (OUTPATIENT)
Age: 47
End: 2022-11-20

## 2023-01-24 ENCOUNTER — VIRTUAL VISIT (OUTPATIENT)
Dept: ONCOLOGY | Facility: CLINIC | Age: 48
End: 2023-01-24
Attending: PHYSICIAN ASSISTANT
Payer: COMMERCIAL

## 2023-01-24 DIAGNOSIS — R63.5 WEIGHT GAIN: Primary | ICD-10-CM

## 2023-01-24 PROCEDURE — 99214 OFFICE O/P EST MOD 30 MIN: CPT | Mod: GT | Performed by: PHYSICIAN ASSISTANT

## 2023-01-24 NOTE — LETTER
Date:January 26, 2023      Provider requested that no letter be sent. Do not send.       [facility]

## 2023-01-24 NOTE — Clinical Note
"    1/24/2023         RE: Mary Chadwick  [address]  [facility] [address]        Dear Colleague,    Thank you for referring your patient, Mary Chadwick, to the [facility]. Please see a copy of my visit note below.    Omaira is a 47 year old who is being evaluated via a billable video visit.      How would you like to obtain your AVS? MyChart  If the video visit is dropped, the invitation should be resent by: Text to cell phone: [phone]  Will anyone else be joining your video visit? No  {If patient encounters technical issues they should call [phone] :[id number]}      Video-Visit Details    Type of service:  Video Visit   Video Start Time: {video visit start/end time for provider to select:899278}  Video End Time:{video visit start/end time for provider to select:316328}    Originating Location (pt. Location): {video visit patient location:575115::\"Home\"}  {PROVIDER LOCATION On-site should be selected for visits conducted from your clinic location or adjoining [facility] hospital, academic office, or other nearby [facility] building. Off-site should be selected for all other provider locations, including home:007[address]}  Distant Location (provider location):  {virtual location provider:038055}  Platform used for Video Visit: {Virtual Visit Platforms:641013::\"Operatix\"}     Jones Bear      HEMATOLOGY/ONCOLOGY PROGRESS NOTE  Jan 24, 2023    REASON FOR VISIT: follow-up of breast cancer    DIAGNOSIS:   Mary \"Omaira\"Farrukh is a 46-year-old female with stage IIA invasive ductal carcinoma, ER/GA-positive, HER2--negative, involving the left breast. She is originally from [place], displaced due to [place]. She underwent screening mammogram 2/9/2018 followed by diagnostic breast ultrasound, which revealed a 2.6 x 1.1 x 1.7 cm irregular shaped mass at the 2 o'clock position involving the left breast. Left breast biopsy showed invasive ductal carcinoma, grade 2, " ER/PA-positive, HER2-negative. Breast MRI showed a 3.5 cm mass in her left breast with an area of non-mass enhancement measuring approximately 8 cm. On this imaging, she had an equivocal node in the left axilla; this was not biopsied.    She was screened for the I-SPY-2 clinical trial. She came back as low-risk MammaPrint and the study was not recommended. 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     She started on neoadjuvant endocrine therapy with monthly Zoladex on 3/22/18, with letrozole added in April 2018.    She underwent bilateral mastectomies with sentinel node evaluation and breast reconstruction on 8/6/18 with Carissa Andrews and Marti. Pathology revealed a T2N1 with treatment-related change.    She underwent HIMANSHU-BSO by Dr. Yenny Hidalgo on 11/20/2019.    INTERVAL HISTORY:   Omaira is here for follow-up.    She has stopped Letrozole since 2/2022 and she has been feeling well.  Her mood has been stable.  She stopped Adderall when she stopped letrozole.  Feels healthy and no joint pains.      She has not tried the tamoxifen yet.  Feels worried about side effects.     She took a semester off at [employer] due to grades.  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     No new concerns, except weight gain.  She was blaming this on the letrozole but realizes it may be post menopausal body.     She takes calcium and vitamin D.      No fevers, chills, cough, SOB, chest pain, abdominal pain, nausea, vomiting, bleeding, or swelling.     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 Off her letrozole, she no longer needs the adderall     ROS: 10 point ROS neg other than the symptoms noted above in the HPI.      Current Outpatient Medications   Medication Sig Dispense Refill     Boswellia Annalisa (BOSWELLIA PO) Take 250 mg by mouth as needed (To help sleep)       Spirulina 500 MG TABS 6 Tabs daily.       tamoxifen (NOLVADEX) 20 MG  tablet Take 1 tablet (20 mg) by mouth daily (Patient not taking: Reported on 9/9/2022) 90 tablet 3     UNABLE TO FIND MEDICATION NAME: Osteo MD supplement contains: Vitamin K2, Calcium, Vitamin d3,       UNABLE TO FIND Take 3 capsules by mouth 2 times daily MEDICATION NAME: Wild Caught Fish Oil            No Known Allergies    PHYSICAL EXAMINATION  GENERAL: Healthy, alert and no distress  HEENT: no icterus  NECk: supple  Cv: rr  Lungs; clear  Abd; soft, nt, nd + bs  Ext: no edema  Skin no rashes  Breast: s/p bilateral mastectomy with reconstruction, no nodules or masses, no axillary adenopathy    LABS:  No results found for this or any previous visit (from the past 24 hour(s)).    Reviewed outside labs.  Borderline low vonwillebrand antigen and factor levels    IMPRESSION/PLAN:  Mary Chadwick is a 46 year old female with a T2 NX, invasive ductal carcinoma, ER/AZ-positive, HER2 negative, involving the left breast, in the setting of Li-Fraumeni syndrome, s/p bilateral masectomies, HIMANSHU-BSO.    Stage IIA breast cancer: we discussed the benefit of AI is to prevent metastatic disease. She definitely does not like the side effect of Letrozole. After going through the benefit she is interested to try Tamoxifen. She is told the risk of VTE comparing to Letrozole. She was hesitant to try this, but willing to try a 1/2 pill for a few weeks and then escalate to a full pill twenty mg if tolerable.    Liver lesions: follow-up imaging confirmed benign hemangiomas    Bone health: continue supplementation with calcium, vitamin D. Dexa 12/2018 normal bone density. DEXA from 2021 has osteopenia.  At this time she is working on exercise and taking calcium and D.     Li-Fraumeni: Annual body MRI done in March of 2023.   Per results, colonoscopy and EUS due 5/2023.       35 minutes spent on the date of the encounter doing chart review, review of test results, interpretation of tests, patient visit and documentation                 Again, thank you for allowing me to participate in the care of your patient.        Sincerely,        Maggie Major PA-C

## 2023-01-24 NOTE — LETTER
January 25, 2023       TO: Mary Chadwick  [address] [facility] [address]       DearMs.Farrukh,    We are writing to inform you of your test results.    {ump results letter list:[id number]}    No results found from the In Basket message.    ***

## 2023-01-24 NOTE — PROGRESS NOTES
"Omaira is a 47 year old who is being evaluated via a billable video visit.      How would you like to obtain your AVS? MyChart  If the video visit is dropped, the invitation should be resent by: Text to cell phone: [phone]  Will anyone else be joining your video visit? No      Video-Visit Details    Type of service:  Video Visit   Video Start Time: 7:05  Video End Time:7:40    Originating Location (pt. Location): Home  Distant Location (provider location):  Off-site  Platform used for Video Visit: [employer] Bear      HEMATOLOGY/ONCOLOGY PROGRESS NOTE  Jan 24, 2023    REASON FOR VISIT: follow-up of breast cancer    DIAGNOSIS:   Mary \"Omaira\"Farrukh is a 46-year-old female with stage IIA invasive ductal carcinoma, ER/NJ-positive, HER2--negative, involving the left breast. She is originally from [place], [place] due [place]. She underwent screening mammogram 2/9/2018 followed by diagnostic breast ultrasound, which revealed a 2.6 x 1.1 x 1.7 cm irregular shaped mass at the 2 o'clock position involving the left breast. Left breast biopsy showed invasive ductal carcinoma, grade 2, ER/NJ-positive, HER2-negative. Breast MRI showed a 3.5 cm mass in her left breast with an area of non-mass enhancement measuring approximately 8 cm. On this imaging, she had an equivocal node in the left axilla; this was not biopsied.    She was screened for the I-SPY-2 clinical trial. She came back as low-risk MammaPrint and the study was not recommended. 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     She started on neoadjuvant endocrine therapy with monthly Zoladex on 3/22/18, with letrozole added in April 2018.    She underwent bilateral mastectomies with sentinel node evaluation and breast reconstruction on 8/6/18 with Carissa Andrews and Marti. Pathology revealed a T2N1 with treatment-related change.    She underwent HIMANSHU-BSO by Dr. Yenny Hidalgo on 11/20/2019.    INTERVAL HISTORY:   Omaira" is here for follow-up over video    She has stopped Letrozole since 2/2022 and she has been feeling well.  Her mood has been stable.  She stopped Adderall when she stopped letrozole.  Feels healthy and no joint pains.      She has not tried the tamoxifen yet.  Feels worried about side effects.     She took a semester off at [employer] due to grades.  Took a job at the [employer] place she worked at before covid -virtually.  Teaching yoga 5 days a week.     No new concerns, except weight gain.  She was blaming this on the letrozole but realizes it may be post menopausal body.     She takes calcium and vitamin D.      No fevers, chills, cough, SOB, chest pain, abdominal pain, nausea, vomiting, bleeding, or swelling.        ROS: 10 point ROS neg other than the symptoms noted above in the HPI.      Current Outpatient Medications   Medication Sig Dispense Refill     Boswellia Annalisa (BOSWELLIA PO) Take 250 mg by mouth as needed (To help sleep)       Spirulina 500 MG TABS 6 Tabs daily.       tamoxifen (NOLVADEX) 20 MG tablet Take 1 tablet (20 mg) by mouth daily (Patient not taking: Reported on 9/9/2022) 90 tablet 3     UNABLE TO FIND MEDICATION NAME: Ostealetha FARIAS supplement contains: Vitamin K2, Calcium, Vitamin d3,       UNABLE TO FIND Take 3 capsules by mouth 2 times daily MEDICATION NAME: Wild Caught Fish Oil            No Known Allergies    PHYSICAL EXAMINATION  Video physical exam  General: Patient appears well in no acute distress.   Skin: No visualized rash or lesions on visualized skin  Eyes: EOMI, no erythema, sclera icterus or discharge noted  Resp: Appears to be breathing comfortably without accessory muscle usage, speaking in full sentences, no cough  MSK: Appears to have normal range of motion based on visualized movements  Neurologic: No apparent tremors, facial movements symmetric  Psych: affect bright, alert and oriented      LABS:  No results found for this or any previous visit (from the past  24 hour(s)).    Reviewed outside labs.  Borderline low vonwillebrand antigen and factor levels    IMPRESSION/PLAN:  Mary Chadwick is a 46 year old female with a T2 NX, invasive ductal carcinoma, ER/NJ-positive, HER2 negative, involving the left breast, in the setting of Li-Fraumeni syndrome, s/p bilateral masectomies, HIMANSHU-BSO.    Stage IIA breast cancer: she completed 4 years of letrozole. She stopped one year ago in Feb of 2022 due to fatigue, weigh gain, joint stiffness.  She feels good about her choice to stay off the medication.  She does feel inclined to try Tamoxifen. She is told the risk of VTE comparing to Letrozole. She was hesitant to try this, but willing to try a 1/2 pill for a few weeks and then escalate to a full pill twenty mg if tolerable.    Liver lesions: follow-up imaging confirmed benign hemangiomas    Bone health: continue supplementation with calcium, vitamin D. Dexa 12/2018 normal bone density. DEXA from 2021 has osteopenia.  At this time she is working on exercise and taking calcium and D.     Li-Fraumeni: Annual body MRI done in March of 2023.   Per results, colonoscopy and EUS due 5/2023.       35 minutes spent on the date of the encounter doing chart review, review of test results, interpretation of tests, patient visit and documentation     Fanny Major PA-C

## 2023-02-09 ENCOUNTER — ANCILLARY PROCEDURE (OUTPATIENT)
Dept: MRI IMAGING | Facility: CLINIC | Age: 48
End: 2023-02-09
Attending: CLINICAL NURSE SPECIALIST
Payer: COMMERCIAL

## 2023-02-09 ENCOUNTER — LAB (OUTPATIENT)
Dept: LAB | Facility: CLINIC | Age: 48
End: 2023-02-09
Payer: COMMERCIAL

## 2023-02-09 DIAGNOSIS — Z91.89 AT RISK FOR CANCER: ICD-10-CM

## 2023-02-09 DIAGNOSIS — R31.29 OTHER MICROSCOPIC HEMATURIA: ICD-10-CM

## 2023-02-09 DIAGNOSIS — Z15.01 LI-FRAUMENI SYNDROME: ICD-10-CM

## 2023-02-09 LAB
ALBUMIN UR-MCNC: NEGATIVE MG/DL
AMORPH CRY #/AREA URNS HPF: ABNORMAL /HPF
APPEARANCE UR: CLEAR
BASOPHILS # BLD AUTO: 0 10E3/UL (ref 0–0.2)
BASOPHILS NFR BLD AUTO: 1 %
BILIRUB UR QL STRIP: NEGATIVE
COLOR UR AUTO: ABNORMAL
EOSINOPHIL # BLD AUTO: 0.1 10E3/UL (ref 0–0.7)
EOSINOPHIL NFR BLD AUTO: 2 %
ERYTHROCYTE [DISTWIDTH] IN BLOOD BY AUTOMATED COUNT: 12.8 % (ref 10–15)
ERYTHROCYTE [SEDIMENTATION RATE] IN BLOOD BY WESTERGREN METHOD: 6 MM/HR (ref 0–20)
GLUCOSE UR STRIP-MCNC: NEGATIVE MG/DL
HCT VFR BLD AUTO: 42.8 % (ref 35–47)
HGB BLD-MCNC: 14.2 G/DL (ref 11.7–15.7)
HGB UR QL STRIP: NEGATIVE
IMM GRANULOCYTES # BLD: 0 10E3/UL
IMM GRANULOCYTES NFR BLD: 0 %
KETONES UR STRIP-MCNC: NEGATIVE MG/DL
LDH SERPL L TO P-CCNC: 205 U/L (ref 0–250)
LEUKOCYTE ESTERASE UR QL STRIP: ABNORMAL
LYMPHOCYTES # BLD AUTO: 2.4 10E3/UL (ref 0.8–5.3)
LYMPHOCYTES NFR BLD AUTO: 36 %
MCH RBC QN AUTO: 28.6 PG (ref 26.5–33)
MCHC RBC AUTO-ENTMCNC: 33.2 G/DL (ref 31.5–36.5)
MCV RBC AUTO: 86 FL (ref 78–100)
MONOCYTES # BLD AUTO: 0.5 10E3/UL (ref 0–1.3)
MONOCYTES NFR BLD AUTO: 8 %
MUCOUS THREADS #/AREA URNS LPF: PRESENT /LPF
NEUTROPHILS # BLD AUTO: 3.5 10E3/UL (ref 1.6–8.3)
NEUTROPHILS NFR BLD AUTO: 53 %
NITRATE UR QL: NEGATIVE
NRBC # BLD AUTO: 0 10E3/UL
NRBC BLD AUTO-RTO: 0 /100
PH UR STRIP: 6.5 [PH] (ref 5–7)
PLATELET # BLD AUTO: 291 10E3/UL (ref 150–450)
RBC # BLD AUTO: 4.96 10E6/UL (ref 3.8–5.2)
RBC URINE: 3 /HPF
SP GR UR STRIP: 1.02 (ref 1–1.03)
SQUAMOUS EPITHELIAL: 2 /HPF
UROBILINOGEN UR STRIP-MCNC: NORMAL MG/DL
WBC # BLD AUTO: 6.6 10E3/UL (ref 4–11)
WBC URINE: 3 /HPF

## 2023-02-09 PROCEDURE — 36415 COLL VENOUS BLD VENIPUNCTURE: CPT | Performed by: PATHOLOGY

## 2023-02-09 PROCEDURE — 85652 RBC SED RATE AUTOMATED: CPT | Performed by: PATHOLOGY

## 2023-02-09 PROCEDURE — 81001 URINALYSIS AUTO W/SCOPE: CPT | Performed by: PATHOLOGY

## 2023-02-09 PROCEDURE — 85025 COMPLETE CBC W/AUTO DIFF WBC: CPT | Performed by: PATHOLOGY

## 2023-02-09 PROCEDURE — 76498 UNLISTED MR PROCEDURE: CPT | Performed by: RADIOLOGY

## 2023-02-09 PROCEDURE — 83615 LACTATE (LD) (LDH) ENZYME: CPT | Performed by: CLINICAL NURSE SPECIALIST

## 2023-02-09 PROCEDURE — 70551 MRI BRAIN STEM W/O DYE: CPT | Mod: GC | Performed by: RADIOLOGY

## 2023-02-14 ENCOUNTER — VIRTUAL VISIT (OUTPATIENT)
Dept: ONCOLOGY | Facility: CLINIC | Age: 48
End: 2023-02-14
Payer: COMMERCIAL

## 2023-02-14 DIAGNOSIS — Z15.01 LI-FRAUMENI SYNDROME: Primary | ICD-10-CM

## 2023-02-14 DIAGNOSIS — Z12.9 SCREENING FOR CANCER: ICD-10-CM

## 2023-02-14 PROBLEM — L57.8 SUN-DAMAGED SKIN: Status: ACTIVE | Noted: 2018-02-09

## 2023-02-14 PROBLEM — Z15.89 MONOALLELIC MUTATION OF TP53 GENE: Status: ACTIVE | Noted: 2018-03-02

## 2023-02-14 PROBLEM — Z15.89 MONOALLELIC MUTATION OF MUTYH GENE: Status: ACTIVE | Noted: 2018-03-02

## 2023-02-14 PROBLEM — Z15.09 MONOALLELIC MUTATION OF TP53 GENE: Status: ACTIVE | Noted: 2018-03-02

## 2023-02-14 PROCEDURE — G0463 HOSPITAL OUTPT CLINIC VISIT: HCPCS | Mod: PN,GT | Performed by: CLINICAL NURSE SPECIALIST

## 2023-02-14 PROCEDURE — 99215 OFFICE O/P EST HI 40 MIN: CPT | Mod: VID | Performed by: CLINICAL NURSE SPECIALIST

## 2023-02-14 RX ORDER — KETOCONAZOLE 20 MG/ML
SHAMPOO TOPICAL
COMMUNITY
Start: 2022-11-28

## 2023-02-14 NOTE — NURSING NOTE
Is the patient currently in the state of [place]? YES    Visit mode:VIDEO    If the visit is dropped, the patient can be reconnected by: VIDEO VISIT: Text to cell phone: [phone]    Will anyone else be joining the visit? NO      How would you like to obtain your AVS? MyChart    Are changes needed to the allergy or medication list? NO    Patient declined individual allergy and medication review by support staff because pt states nothing has changed since last reviewed on January 24th 2023.    Comments or concerns regarding today's visit: None    Bea LEI

## 2023-02-14 NOTE — LETTER
Cancer Risk Management  Program Locations    [facility]  [facility]  [facility]  Mailing Address  Cancer Risk Management Program  [facility]  [address]  [place] [address]    New patient appointments  [phone]    Oncology Risk Management Consultation:  Date on this visit: 2/14/2023    Mary Chadwick requires high risk screening and surveillance to reduce her risk of cancer secondary to Li Fraumeni Syndrome.  She is considered to be at high risk for soft tissue sarcomas, osteosarcomas, breast cancer, brain tumors, adrenocortical carcinoma, leukemia and other malignancies. Unfortunately, she has a history of Stage II T2 NX invasive ductal carcinoma of the left breast and is s/p bilateral mastectomies (8/6/2018). She underwent HIMANSHU-BSO by Dr. Yenny Hidalgo on 11/20/2019; pathology was benign. See below for details of treatment.  Also present at this visit is Samantha Peguero, [employer] Student at the [employer]     Primary Physician: Melly Navarrete PA-C     History Of Present Illness:  Ms. Chadwick is a very pleasant 47 year old female who presents with Li Fraumeni Syndrome.       Genetic testing:  Genetic Testing Results: POSITIVE - TP53 mutation  3/2/2018 -- Omaira is POSITIVE for a TP53 mutation. Specifically her mutation is called c.638G>A (p.R213Q) identified using a  Cancer Next panel from [employer].  This region was covered at 433x, and the heterozygosity rate was 48%. This is consistent with a germline mutation, as the expected heterozygosity rate is around 50% (i.e. one allele has the mutation and the other allele does not). [employer] is confident that this is very likely germline.       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 Specifically her mutation is called c.1187G>A (p.G396D). We discussed this mutation is associated with a slightly increased risk for colon cancer.  Some research has suggested that there is a small increased risk for breast cancer in individuals who have a mutation in this gene.  However, there are no guidelines for breast screening with this gene. No other mutations were found in the MUTYH gene.      Of note, Omaira tested negative for mutations in the following genes: APC, FRANCES, BARD1, BRCA1, BRCA2, BRIP1, BMPR1A, CDH1, CDK4, CDKN2A, CHEK2, DICER1, EPCAM, HOXB13, GREM1, MLH1, MRE11A, MSH2, MSH6, NBN, NF1, PALB2, PMS2, POLD1, POLE, PTEN, RAD50, RAD51C, RAD51D, SMAD4, SMARCA4, and STK11. No mutations were found in these remaining 32 genes analyzed.  This test involved sequencing and deletion/duplication analysis of all genes with the exception of EPCAM and GREM1 (deletions only).      Pertinent Medical History:  2/9/2018- Stage IIA invasive ductal carcinoma, ER/MD-positive, HER2--negative, involving the left breast.    She started on neoadjuvant endocrine therapy with monthly Zoladex on 3/22/18, with letrozole added in April 2018. Stopped letrozole in February 2022. Prescribed tamoxifen, but has not started it as of yet.     She underwent bilateral mastectomies with sentinel node evaluation and breast reconstruction on 8/6/18 with Carissa Andrews and Marti. Pathology revealed a T2N1 with treatment-related change.     She underwent HIMANSHU-BSO by Dr. Yenny Hidalgo on 11/20/2019 12/7/2020 DEXA scan  showed new osteopenia in her lumbar spine.  Prescribed 1200 mg of calcium daily in divided doses and 1,000-2,000 international unit(s) of Vitamin D3.     Li-Fraumeni Specific Screening to date:  Body:  3/28/2018- Whole body MRI: There are 2 right benign hemangiomas measuring 1.0 and 0.6 cm corresponding to the previously noted hypo attenuating lesions described on 3/20/2018 CT.  Bilateral simple appearing renal cysts, the largest is at the inferior pole of the right kidney (series 11 image 18) measuring approximately 1.6 x 1.9 cm. Otherwise both kidneys are unremarkable with no evidence of hydronephrosis.  1/16/2019- Whole body MRI: Abdomen/pelvis: T2 hyperintense foci in the right hepatic lobe, consistent with hemangiomas as characterized on comparison MRI. Again noted benign-appearing renal cysts bilaterally  2/25/2021- Whole body MRI: No evidence of metastatic disease in this limited study.  2/11/2022- Whole body MRI: Abdomen/pelvis: Two stable sub centimeter T2 hyper intensities in the right hepatic lobe previously described as hemangiomas. Stable bilateral renal cysts. No hydronephrosis. Status post hysterectomy. No pelvic masses. No lymphadenopathy.Otherwise normal study.    8/23/2022- US of abdomen:  Grossly stable hepatic hemangiomas. Otherwise normal abdominal ultrasound with Doppler assessment.    2/9/2023- Whole body MRI:  Abdomen/pelvis: A couple stable subcentimeter T2 hyperintensities in the right hepatic lobe previously described as hemangiomas (19/11). Stable bilateral renal cysts. No hydronephrosis. Status post hysterectomy. No pelvic masses. No lymphadenopathy. IMPRESSION: No evidence of metastatic disease in this limited noncontrast study.     Brain:  1/16/2019- Brain MRI: No abnormal enhancing intracranial lesion. Vague T2 hyperintensity in the periventricular and supraventricular white matter is nonspecific and may be secondary to prior therapy.  2/25/2021- Brain MRI: No abnormal enhancing intracranial lesion. Vague T2 hyperintensity in the periventricular and supraventricular white matter is nonspecific and may be secondary to prior therapy.  2/11/2022- Brain MRI: No abnormal enhancing intracranial lesion. Stable vague T2 hyperintensity in the periventricular and supraventricular white matter which is nonspecific and likely secondary to prior therapy.  2/9/2023- Brain  MRI: Impression:No acute intracranial pathology.  Stable appearing, subtle T2 hyperintense signal within the periventricular white matter.     Gastrointestinal:  5/1/2018 (Dr. Debora Lee, [facility]), Colonoscopy, normal Next: due 5/2023.   10/15/2018- EGD (Edwardo Beatty), normal     Skin:  Per report, Dermatology full body skin check at [facility] in 2020 (records requested)  3/16/2021- Dermatology visit, all benign findings     Past Medical/Surgical History:  Past Medical History:   Diagnosis Date     Breast cancer, stage 1 (H) 2018    HR Negtive and Estrogin Postive     Complication of anesthesia      Herpes simplex without mention of complication     genital herpes     Li-Fraumeni syndrome 03/02/2018    germline TP53 genetic mutation     Monoallelic mutation of MUTYH gene 03/02/2018    c.1187G>A ([id number]), 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; identified using CancerNext panel through [employer]     Monoallelic mutation of TP53 gene 03/02/2018    c.638G>A (p.R213Q), identified using CancerNext genetic testing through [employer]     PONV (postoperative nausea and vomiting)      Past Surgical History:   Procedure Laterality Date     ESOPHAGOSCOPY, GASTROSCOPY, DUODENOSCOPY (EGD), COMBINED N/A 10/15/2018    Procedure: EGD;  Surgeon: Edwardo Beatty MD;  [facility]: [facility]     GRAFT FAT TO BREAST Bilateral 11/8/2018    Procedure: Bilateral Breast Fat Grafting and nipple reconstruction;  Surgeon: BASILIO Kidd MD;  Location: [facility] OR     HERNIA REPAIR, INGUINAL RT/LT  2002    Hernia Repair, Femoral RT/LT     HYSTERECTOMY, PAP NO LONGER INDICATED       LAPAROSCOPIC HYSTERECTOMY TOTAL, BILATERAL SALPINGO-OOPHORECTOMY, COMBINED Bilateral 11/20/2019    Procedure: Total Laparoscopic Hysterectomy Via Single Port, Bilateral Salpingo-Oophorectomy;  Surgeon: Yenny Hidalgo MD;  Location: [facility] OR     MASTECTOMY SIMPLE BILATERAL, SENTINEL NODE BILATERAL, COMBINED Bilateral 8/6/2018     Procedure: COMBINED MASTECTOMY SIMPLE BILATERAL, SENTINEL NODE BILATERAL;  Bilateral Mastectomy With Immediate Reconstruction, Left [place] Lymph Node Biopsy, Anesthesia Block;  Surgeon: Antonio Andrews MD;  Location: [facility] OR     RECONSTRUCT BREAST BILATERAL Bilateral 2018    Procedure: RECONSTRUCT BREAST BILATERAL;;  Surgeon: BASILIO Kidd MD;  [facility]     RECONSTRUCT NIPPLE BILATERAL Bilateral 2018    Procedure: Nipple Reconstruction;  Surgeon: BASILIO Kidd MD;  [facility]     REMOVE AND REPLACE BREAST IMPLANT PROSTHESIS Bilateral 2018    Procedure: REMOVE AND REPLACE BREAST IMPLANT PROSTHESIS;  Bilateral Breast Implant Exchange and Revision;  Surgeon: BASILIO Kidd MD;  Location: [facility] OR       Allergies:  Allergies as of 2023     (No Known Allergies)       Current Medications:  Current Outpatient Medications   Medication Sig Dispense Refill     Boswellia Annalisa (BOSWELLIA PO) Take 250 mg by mouth as needed (To help sleep)       ketoconazole (NIZORAL) 2 % external shampoo        Spirulina 500 MG TABS 6 Tabs daily.       UNABLE TO FIND MEDICATION NAME: Osteo MD supplement contains: Vitamin K2, Calcium, Vitamin d3,       UNABLE TO FIND Take 3 capsules by mouth 2 times daily MEDICATION NAME: Wild Caught Fish Oil          Family History:  Family History   Problem Relation Age of Onset     Colon Cancer Mother 31         at 39     Thyroid Disease Father      Neurologic Disorder Father         myasthenia gravis     Stomach Cancer Maternal Grandfather 70     Cerebrovascular Disease Paternal Grandmother      Myocardial Infarction Paternal Grandfather 40     Cancer Paternal Uncle         unknown type;  in 60s     Ovarian Cancer Maternal Aunt          in 50s due to cancer     Liver Cancer Maternal Uncle          in 50s     Cancer Maternal Cousin         two types, but unknown       Social History:  Social History     Socioeconomic History     Marital  status: Single     Spouse name: NA     Number of children: 2     Years of education: Not on file     Highest education level: Not on file   Occupational History     Occupation:      Comment: [employer]     Comment: [employer]   Tobacco Use     Smoking status: Former     Types: Cigarettes     Quit date: 2004     Years since quittin.9     Smokeless tobacco: Never   Substance and Sexual Activity     Alcohol use: No     Drug use: No     Sexual activity: Yes     Partners: Male     Birth control/protection: Post-menopausal, Female Surgical   Other Topics Concern     Parent/sibling w/ CABG, MI or angioplasty before 65F 55M? Not Asked   Social History Narrative     Not on file     Social Determinants of Health     Financial Resource Strain: Not on file   Food Insecurity: Not on file   Transportation Needs: Not on file   Physical Activity: Not on file   Stress: Not on file   Social Connections: Not on file   Intimate Partner Violence: Not on file   Housing Stability: Not on file       Physical Exam:  There were no vitals taken for this visit.  GENERAL: Healthy, alert and no distress  EYES: Eyes grossly normal to inspection.  No discharge or erythema, or obvious scleral/conjunctival abnormalities.  RESP: No audible wheeze, cough, or visible cyanosis.  No visible retractions or increased work of breathing.    SKIN: Visible skin clear. No significant rash, abnormal pigmentation or lesions.  NEURO: Cranial nerves grossly intact.  Mentation and speech appropriate for age.  PSYCH: Mentation appears normal, affect normal/bright, judgement and insight intact, normal speech and appearance well-groomed.    Laboratory/Imaging Studies  No results found for any visits on 23.    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     She is currently working remotely from  home for [employer] and is enjoying the flexibility with that and her teaching schedule, as she teaches yoga at two venues. She has stopped drinking alcohol, except for a glass of wine in December and notes that she feels great, sleeps better, feels better when she wakes up and throughout the day.  She is considering a liver and kidney cleanse, which she notes that her sister does, but she does not have details about what is involved.  She is still considering whether to start the tamoxifen, which I encouraged her to take.  Her hairdresser, who also takes tamoxifen, has encouraged her as well. She has not yet started taking it but is strongly considering it.    At this point, she has no questions about her results or her screening plan.  She will continue with the plan below.    Individualized Surveillance Plan for children and adults  with Li Fraumeni Syndrome    based on NCCN Guidelines Version 1.2020 Gene Reviews, Angely et al 2016, Jesse et al 2017   Beginning at diagnosis   Purpose of screening Test or procedure Last done Next Due   Comprehensive exam every 6-12 months Comprehensive physical exam including neurological exam with high index of suspicion for rare cancers (and second malignancies in cancer survivors)     2/14/2023 - reviewed systems   Feb. 2024   Adrenocortical Carcinoma Ultrasound of the abdomen and pelvis every 3- 4 months (change to annually at age 18) See below   See below   Adrenocortical Carcinoma    NOTE: Stop at age 18. Every 4 months:  1.Beta hCG (Stop at age 6)  2. Alpha fetoprotein (Stop at age 6)  3. 17-OH-progesterone  4. Testosterone  5. Androstenedione  6. Dehydroepiandrosterone sulfate   NA   NA   Acute Leukemia Every 4 months:    CBC    Erythrocyte sedimentation rate    lactate dehydrogenase NA Labs per Dr. Gann   Brain Tumors Annual brain MRI with contrast   (without dye if previous are normal) 2/9/2023- Brain MRI: Impression:No acute intracranial pathology.  Stable appearing, subtle  T2 hyperintense signal within the periventricular white matter. February 2024   Bone and Soft Tissue Sarcomas Annual rapid whole body MRI (without contrast) 2/9/2023- Whole body MRI:  Abdomen/pelvis: A couple stable subcentimeter T2 hyperintensities in the right hepatic lobe previously described as hemangiomas (19/11). Stable bilateral renal cysts. No hydronephrosis. Status post hysterectomy. No pelvic masses. No lymphadenopathy. IMPRESSION: No evidence of metastatic disease in this limited noncontrast study. February 2024   Beginning at age 18   From the ages of 20-or at the age of earliest diagnosed breast cancer in the family, if younger than 20. Clinical breast exam every 6-12 months   NA   NA   Breast screening for women Annual breast MRI from ages 20-29    Alternate annual breast MRI and annual mammogram (consider tomosynthesis) from ages 30-75. (after age 75, consider screening schedule on individual basis)   NA- follow with Oncology team     NA   Melanoma Annual dermatological exam     Soft tissue, bone sarcomas and adrenal carcinoma Ultrasound of abdomen and pelvis every 12 months (alternate w/whole body MRI) 8/23/2022- US of abdomen:  Grossly stable hepatic hemangiomas. Otherwise normal abdominal ultrasound with Doppler assessment.   August 2023   Colon and Upper GI Screening  Beginning at age 25 or 5 years prior to the earliest known colon cancer in the family Upper endoscopy and colonoscopy every 2-5 years. 5/1/2018, Colonoscopy and EGD, normal Next: due 5/2023.  May 2023 for both EGD and colonoscopy   *Therapeutic radiation treatment for cancer should be avoided when possible as it can promote soft tissue sarcomas in this population.  **Women with TP53 mutation who are treated for breast cancer, screening of remaining breast tissue with annual mammogram and annual breast MRI should continue. Discuss option of  risk reducing mastectomy regarding degree of protection, reconstruction options and risks.  In  addition, the family history and residual breast cancer risk should be considered during counseling.       I spent a total of 51 minutes on the day of the visit. Please see the note for further information on patient assessment and treatment.    Shelli Díaz, REY-CNS, OCN, AGN-BC  Clinical Nurse Specialist  Cancer Risk Management Program  [facility] [place]      CC: MD Fanny Garcia PA-C

## 2023-02-14 NOTE — PATIENT INSTRUCTIONS
Individualized Surveillance Plan for children and adults  with Li Fraumeni Syndrome    based on NCCN Guidelines Version 1.2020 Gene Reviews, Angely et al 2016, Jesse et al 2017   Beginning at diagnosis   Purpose of screening Test or procedure Last done Next Due   Comprehensive exam every 6-12 months Comprehensive physical exam including neurological exam with high index of suspicion for rare cancers (and second malignancies in cancer survivors)     2/14/2023 - reviewed systems   Feb. 2024   Adrenocortical Carcinoma Ultrasound of the abdomen and pelvis every 3- 4 months (change to annually at age 18) See below   See below   Adrenocortical Carcinoma    NOTE: Stop at age 18. Every 4 months:  1.Beta hCG (Stop at age 6)  2. Alpha fetoprotein (Stop at age 6)  3. 17-OH-progesterone  4. Testosterone  5. Androstenedione  6. Dehydroepiandrosterone sulfate   NA   NA   Acute Leukemia Every 4 months:    CBC    Erythrocyte sedimentation rate    lactate dehydrogenase NA Labs per Dr. Gann   Brain Tumors Annual brain MRI with contrast   (without dye if previous are normal) 2/9/2023- Brain MRI: Impression:No acute intracranial pathology.  Stable appearing, subtle T2 hyperintense signal within the periventricular white matter. February 2024   Bone and Soft Tissue Sarcomas Annual rapid whole body MRI (without contrast) 2/9/2023- Whole body MRI:  Abdomen/pelvis: A couple stable subcentimeter T2 hyperintensities in the right hepatic lobe previously described as hemangiomas (19/11). Stable bilateral renal cysts. No hydronephrosis. Status post hysterectomy. No pelvic masses. No lymphadenopathy. IMPRESSION: No evidence of metastatic disease in this limited noncontrast study. February 2024   Beginning at age 18   From the ages of 20-or at the age of earliest diagnosed breast cancer in the family, if younger than 20. Clinical breast exam every 6-12 months   NA   NA   Breast screening for women Annual breast MRI from ages 20-29    Alternate  annual breast MRI and annual mammogram (consider tomosynthesis) from ages 30-75. (after age 75, consider screening schedule on individual basis)   NA- follow with Oncology team     NA   Melanoma Annual dermatological exam     Soft tissue, bone sarcomas and adrenal carcinoma Ultrasound of abdomen and pelvis every 12 months (alternate w/whole body MRI) 8/23/2022- US of abdomen:  Grossly stable hepatic hemangiomas. Otherwise normal abdominal ultrasound with Doppler assessment.   August 2023   Colon and Upper GI Screening  Beginning at age 25 or 5 years prior to the earliest known colon cancer in the family Upper endoscopy and colonoscopy every 2-5 years. 5/1/2018, Colonoscopy and EGD, normal Next: due 5/2023.  May 2023 for both EGD and colonoscopy   *Therapeutic radiation treatment for cancer should be avoided when possible as it can promote soft tissue sarcomas in this population.  **Women with TP53 mutation who are treated for breast cancer, screening of remaining breast tissue with annual mammogram and annual breast MRI should continue. Discuss option of  risk reducing mastectomy regarding degree of protection, reconstruction options and risks.  In addition, the family history and residual breast cancer risk should be considered during counseling.

## 2023-02-14 NOTE — PROGRESS NOTES
Video-Visit Details    Type of service:  Video Visit    Video Start Time (time video started): 1300     Video End Time (time video stopped): 1322     Originating Location (pt. Location): Home    Distant Location (provider location):  On-site    Mode of Communication:  Video Conference via [facility]    Oncology Risk Management Consultation:  Date on this visit: 2/14/2023    Mary Chadwick requires high risk screening and surveillance to reduce her risk of cancer secondary to Li Fraumeni Syndrome.  She is considered to be at high risk for soft tissue sarcomas, osteosarcomas, breast cancer, brain tumors, adrenocortical carcinoma, leukemia and other malignancies. Unfortunately, she has a history of Stage II T2 NX invasive ductal carcinoma of the left breast and is s/p bilateral mastectomies (8/6/2018). She underwent HIMANSHU-BSO by Dr. Yenny Hidalgo on 11/20/2019; pathology was benign. See below for details of treatment.  Also present at this visit is Samantha Peguero, [employer] Student at the [facility]     Primary Physician: Melly Navarrete PA-C     History Of Present Illness:  Ms. Chadwick is a very pleasant 47 year old female who presents with Li Fraumeni Syndrome.       Genetic testing:  Genetic Testing Results: POSITIVE - TP53 mutation  3/2/2018 -- Omaira is POSITIVE for a TP53 mutation. Specifically her mutation is called c.638G>A (p.R213Q) identified using a  Cancer Next panel from [employer] (now [employer]).  This region was covered at 433x, and the heterozygosity rate was 48%. This is consistent with a germline mutation, as the expected heterozygosity rate is around 50% (i.e. one allele has the mutation and the other allele does not). ncyclo is confident that this is very likely germline.       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 Specifically her mutation is called c.1187G>A  (p.G396D). We discussed this mutation is associated with a slightly increased risk for colon cancer.  Some research has suggested that there is a small increased risk for breast cancer in individuals who have a mutation in this gene.  However, there are no guidelines for breast screening with this gene. No other mutations were found in the MUTYH gene.      Of note, [facility] tested negative for mutations in the following genes: APC, FRANCES, BARD1, BRCA1, BRCA2, BRIP1, BMPR1A, CDH1, CDK4, CDKN2A, CHEK2, DICER1, EPCAM, HOXB13, GREM1, MLH1, MRE11A, MSH2, MSH6, NBN, NF1, PALB2, PMS2, POLD1, POLE, PTEN, RAD50, RAD51C, RAD51D, SMAD4, SMARCA4, and STK11. No mutations were found in these remaining 32 genes analyzed.  This test involved sequencing and deletion/duplication analysis of all genes with the exception of EPCAM and GREM1 (deletions only).      Pertinent Medical History:  2/9/2018- Stage IIA invasive ductal carcinoma, ER/NH-positive, HER2--negative, involving the left breast.    She started on neoadjuvant endocrine therapy with monthly Zoladex on 3/22/18, with letrozole added in April 2018. Stopped letrozole in February 2022. Prescribed tamoxifen, but has not started it as of yet.     She underwent bilateral mastectomies with sentinel node evaluation and breast reconstruction on 8/6/18 with Carissa Andrews and Marti. Pathology revealed a T2N1 with treatment-related change.     She underwent HIMANSHU-BSO by Dr. Yenny Hidalgo on 11/20/2019 12/7/2020 DEXA scan  showed new osteopenia in her lumbar spine.  Prescribed 1200 mg of calcium daily in divided doses and 1,000-2,000 international unit(s) of Vitamin D3.     Li-Fraumeni Specific Screening to date:  Body:  3/28/2018- Whole body MRI: There are 2 right benign hemangiomas measuring 1.0 and 0.6 cm corresponding to the previously noted hypo attenuating lesions described on 3/20/2018 CT. Bilateral simple appearing renal cysts, the largest is at the inferior pole of the right kidney  (series 11 image 18) measuring approximately 1.6 x 1.9 cm. Otherwise both kidneys are unremarkable with no evidence of hydronephrosis.  1/16/2019- Whole body MRI: Abdomen/pelvis: T2 hyperintense foci in the right hepatic lobe, consistent with hemangiomas as characterized on comparison MRI. Again noted benign-appearing renal cysts bilaterally  2/25/2021- Whole body MRI: No evidence of metastatic disease in this limited study.  2/11/2022- Whole body MRI: Abdomen/pelvis: Two stable sub centimeter T2 hyper intensities in the right hepatic lobe previously described as hemangiomas. Stable bilateral renal cysts. No hydronephrosis. Status post hysterectomy. No pelvic masses. No lymphadenopathy.Otherwise normal study.    8/23/2022- US of abdomen:  Grossly stable hepatic hemangiomas. Otherwise normal abdominal ultrasound with Doppler assessment.    2/9/2023- Whole body MRI:  Abdomen/pelvis: A couple stable subcentimeter T2 hyperintensities in the right hepatic lobe previously described as hemangiomas (19/11). Stable bilateral renal cysts. No hydronephrosis. Status post hysterectomy. No pelvic masses. No lymphadenopathy. IMPRESSION: No evidence of metastatic disease in this limited noncontrast study.     Brain:  1/16/2019- Brain MRI: No abnormal enhancing intracranial lesion. Vague T2 hyperintensity in the periventricular and supraventricular white matter is nonspecific and may be secondary to prior therapy.  2/25/2021- Brain MRI: No abnormal enhancing intracranial lesion. Vague T2 hyperintensity in the periventricular and supraventricular white matter is nonspecific and may be secondary to prior therapy.  2/11/2022- Brain MRI: No abnormal enhancing intracranial lesion. Stable vague T2 hyperintensity in the periventricular and supraventricular white matter which is nonspecific and likely secondary to prior therapy.  2/9/2023- Brain MRI: Impression:No acute intracranial pathology.  Stable appearing, subtle T2 hyperintense  signal within the periventricular white matter.     Gastrointestinal:  5/1/2018 (Dr. Debora Lee, [facility]), Colonoscopy, normal Next: due 5/2023.   10/15/2018- EGD (Edwardo Beatty), normal     Skin:  Per report, Dermatology full body skin check at [facility] in 2020 (records requested)  3/16/2021- Dermatology visit, all benign findings     Past Medical/Surgical History:  Past Medical History:   Diagnosis Date     Breast cancer, stage 1 (H) 2018    HR Negtive and Estrogin Postive     Complication of anesthesia      Herpes simplex without mention of complication     genital herpes     Li-Fraumeni syndrome 03/02/2018    germline TP53 genetic mutation     Monoallelic mutation of MUTYH gene 03/02/2018    c.1187G>A ([id number]), 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; identified using CancerNext panel through [employer]     Monoallelic mutation of TP53 gene 03/02/2018    c.638G>A (p.R213Q), identified using CancerNext genetic testing through [employer]     PONV (postoperative nausea and vomiting)      Past Surgical History:   Procedure Laterality Date     ESOPHAGOSCOPY, GASTROSCOPY, DUODENOSCOPY (EGD), COMBINED N/A 10/15/2018    Procedure: EGD;  Surgeon: Edwardo Beatty MD;  [facility]     GRAFT FAT TO BREAST Bilateral 11/8/2018    Procedure: Bilateral Breast Fat Grafting and nipple reconstruction;  Surgeon: BASILIO Kidd MD;  [facility]     HERNIA REPAIR, INGUINAL RT/LT  2002    Hernia Repair, Femoral RT/LT     HYSTERECTOMY, PAP NO LONGER INDICATED       LAPAROSCOPIC HYSTERECTOMY TOTAL, BILATERAL SALPINGO-OOPHORECTOMY, COMBINED Bilateral 11/20/2019    Procedure: Total Laparoscopic Hysterectomy Via Single Port, Bilateral Salpingo-Oophorectomy;  Surgeon: Yenny Hidalgo MD;  [facility]     MASTECTOMY SIMPLE BILATERAL, SENTINEL NODE BILATERAL, COMBINED Bilateral 8/6/2018    Procedure: COMBINED MASTECTOMY SIMPLE BILATERAL, SENTINEL NODE BILATERAL;  Bilateral Mastectomy With  Immediate Reconstruction, Left [place] Lymph Node Biopsy, Anesthesia Block;  Surgeon: Antonio Andrews MD;  [facility]     RECONSTRUCT BREAST BILATERAL Bilateral 2018    Procedure: RECONSTRUCT BREAST BILATERAL;;  Surgeon: BASILIO Kidd MD;  [facility]     RECONSTRUCT NIPPLE BILATERAL Bilateral 2018    Procedure: Nipple Reconstruction;  Surgeon: BASILIO Kidd MD;  [facility]     REMOVE AND REPLACE BREAST IMPLANT PROSTHESIS Bilateral 2018    Procedure: REMOVE AND REPLACE BREAST IMPLANT PROSTHESIS;  Bilateral Breast Implant Exchange and Revision;  Surgeon: BASILIO Kidd MD;  [facility]       Allergies:  Allergies as of 2023     (No Known Allergies)       Current Medications:  Current Outpatient Medications   Medication Sig Dispense Refill     Boswellia Annalias (BOSWELLIA PO) Take 250 mg by mouth as needed (To help sleep)       ketoconazole (NIZORAL) 2 % external shampoo        Spirulina 500 MG TABS 6 Tabs daily.       UNABLE TO FIND MEDICATION NAME: Osteo MD supplement contains: Vitamin K2, Calcium, Vitamin d3,       UNABLE TO FIND Take 3 capsules by mouth 2 times daily MEDICATION NAME: Wild Caught Fish Oil          Family History:  Family History   Problem Relation Age of Onset     Colon Cancer Mother 31         at 39     Thyroid Disease Father      Neurologic Disorder Father         myasthenia gravis     Stomach Cancer Maternal Grandfather 70     Cerebrovascular Disease Paternal Grandmother      Myocardial Infarction Paternal Grandfather 40     Cancer Paternal Uncle         unknown type;  in 60s     Ovarian Cancer Maternal Aunt          in 50s due to cancer     Liver Cancer Maternal Uncle          in 50s     Cancer Maternal Cousin         two types, but unknown       Social History:  Social History     Socioeconomic History     Marital status: Single     Spouse name: NA     Number of children: 2     Years of education: Not on file      Highest education level: Not on file   Occupational History     Occupation:      Comment: [employer]     Comment: [employer]   Tobacco Use     Smoking status: Former     Types: Cigarettes     Quit date: 2004     Years since quittin.9     Smokeless tobacco: Never   Substance and Sexual Activity     Alcohol use: No     Drug use: No     Sexual activity: Yes     Partners: Male     Birth control/protection: Post-menopausal, Female Surgical   Other Topics Concern     Parent/sibling w/ CABG, MI or angioplasty before 65F 55M? Not Asked   Social History Narrative     Not on file     Social Determinants of Health     Financial Resource Strain: Not on file   Food Insecurity: Not on file   Transportation Needs: Not on file   Physical Activity: Not on file   Stress: Not on file   Social Connections: Not on file   Intimate Partner Violence: Not on file   Housing Stability: Not on file       Physical Exam:  There were no vitals taken for this visit.  GENERAL: Healthy, alert and no distress  EYES: Eyes grossly normal to inspection.  No discharge or erythema, or obvious scleral/conjunctival abnormalities.  RESP: No audible wheeze, cough, or visible cyanosis.  No visible retractions or increased work of breathing.    SKIN: Visible skin clear. No significant rash, abnormal pigmentation or lesions.  NEURO: Cranial nerves grossly intact.  Mentation and speech appropriate for age.  PSYCH: Mentation appears normal, affect normal/bright, judgement and insight intact, normal speech and appearance well-groomed.    Laboratory/Imaging Studies  No results found for any visits on 23.    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     She is currently working remotely from home for [employer] and is enjoying the flexibility with that and her teaching schedule, as she teaches  yoga at two venues. She has stopped drinking alcohol, except for a glass of wine in December and notes that she feels great, sleeps better, feels better when she wakes up and throughout the day.  She is considering a liver and kidney cleanse, which she notes that her sister does, but she does not have details about what is involved.  She is still considering whether to start the tamoxifen, which I encouraged her to take.  Her hairdresser, who also takes tamoxifen, has encouraged her as well. She has not yet started taking it but is strongly considering it.    At this point, she has no questions about her results or her screening plan.  She will continue with the plan below.    Individualized Surveillance Plan for children and adults  with Li Fraumeni Syndrome    based on NCCN Guidelines Version 1.2020 Gene Reviews, Angely et al 2016, Jesse et al 2017   Beginning at diagnosis   Purpose of screening Test or procedure Last done Next Due   Comprehensive exam every 6-12 months Comprehensive physical exam including neurological exam with high index of suspicion for rare cancers (and second malignancies in cancer survivors)     2/14/2023 - reviewed systems   Feb. 2024   Adrenocortical Carcinoma Ultrasound of the abdomen and pelvis every 3- 4 months (change to annually at age 18) See below   See below   Adrenocortical Carcinoma    NOTE: Stop at age 18. Every 4 months:  1.Beta hCG (Stop at age 6)  2. Alpha fetoprotein (Stop at age 6)  3. 17-OH-progesterone  4. Testosterone  5. Androstenedione  6. Dehydroepiandrosterone sulfate   NA   NA   Acute Leukemia Every 4 months:    CBC    Erythrocyte sedimentation rate    lactate dehydrogenase NA Labs per Dr. Gann   Brain Tumors Annual brain MRI with contrast   (without dye if previous are normal) 2/9/2023- Brain MRI: Impression:No acute intracranial pathology.  Stable appearing, subtle T2 hyperintense signal within the periventricular white matter. February 2024   Bone and Soft  Tissue Sarcomas Annual rapid whole body MRI (without contrast) 2/9/2023- Whole body MRI:  Abdomen/pelvis: A couple stable subcentimeter T2 hyperintensities in the right hepatic lobe previously described as hemangiomas (19/11). Stable bilateral renal cysts. No hydronephrosis. Status post hysterectomy. No pelvic masses. No lymphadenopathy. IMPRESSION: No evidence of metastatic disease in this limited noncontrast study. February 2024   Beginning at age 18   From the ages of 20-or at the age of earliest diagnosed breast cancer in the family, if younger than 20. Clinical breast exam every 6-12 months   NA   NA   Breast screening for women Annual breast MRI from ages 20-29    Alternate annual breast MRI and annual mammogram (consider tomosynthesis) from ages 30-75. (after age 75, consider screening schedule on individual basis)   NA- follow with Oncology team     NA   Melanoma Annual dermatological exam     Soft tissue, bone sarcomas and adrenal carcinoma Ultrasound of abdomen and pelvis every 12 months (alternate w/whole body MRI) 8/23/2022- US of abdomen:  Grossly stable hepatic hemangiomas. Otherwise normal abdominal ultrasound with Doppler assessment.   August 2023   Colon and Upper GI Screening  Beginning at age 25 or 5 years prior to the earliest known colon cancer in the family Upper endoscopy and colonoscopy every 2-5 years. 5/1/2018, Colonoscopy and EGD, normal Next: due 5/2023.  May 2023 for both EGD and colonoscopy   *Therapeutic radiation treatment for cancer should be avoided when possible as it can promote soft tissue sarcomas in this population.  **Women with TP53 mutation who are treated for breast cancer, screening of remaining breast tissue with annual mammogram and annual breast MRI should continue. Discuss option of  risk reducing mastectomy regarding degree of protection, reconstruction options and risks.  In addition, the family history and residual breast cancer risk should be considered during  counseling.       I spent a total of 51 minutes on the day of the visit. Please see the note for further information on patient assessment and treatment.    Shelli Díaz, APRN-CNS, OCN, AGN-BC  Clinical Nurse Specialist  Cancer Risk Management Program  [facility]      CC: MD Fanny Garcia PA-C

## 2023-02-22 ENCOUNTER — TELEPHONE (OUTPATIENT)
Dept: GASTROENTEROLOGY | Facility: CLINIC | Age: 48
End: 2023-02-22
Payer: COMMERCIAL

## 2023-02-22 NOTE — TELEPHONE ENCOUNTER
Screening Questions  BLUE  KIND OF PREP RED  LOCATION [review exclusion criteria] GREEN  SEDATION TYPE        Y Are you active on mychart?       Shelli Díaz APRN CNS  Ordering/Referring Provider?        [facility] What type of coverage do you have?      N Have you had a positive covid test in the last 14 days?     31.0 1. BMI  [BMI 40+ - review exclusion criteria]    Y  2. Are you able to give consent for your medical care? [IF NO,RN REVIEW]          N  3. Are you taking any prescription pain medications on a routine schedule   (ex narcotics: oxycodone, roxicodone, oxycontin,  and percocet)? [RN Review]          3a. EXTENDED PREP What kind of prescription?     N 4. Do you have any chemical dependencies such as alcohol, street drugs, or methadone?        **If yes 3- 5 , please schedule with MAC sedation.**          IF YES TO ANY 6 - 10 - HOSPITAL SETTING ONLY.     N 6.   Do you need assistance transferring?     N 7.   Have you had a heart or lung transplant?    N 8.   Are you currently on dialysis?   N 9.   Do you use daily home oxygen?   N 10. Do you take nitroglycerin?   10a.  If yes, how often?     11. [FEMALES]  N Are you currently pregnant?    11a.  If yes, how many weeks? [ Greater than 12 weeks, OR NEEDED]    N 12. Do you have Pulmonary Hypertension? *NEED PAC APPT AT [facility] w/ MAC*     N 13. [review exclusion criteria]  Do you have any implantable devices in your body (pacemaker, defib, LVAD)?    N 14. In the past 6 months, have you had any heart related issues including cardiomyopathy or heart attack?     14a. = If yes, did it require cardiac stenting if so when?     N 15. Have you had a stroke or Transient ischemic attack (TIA - aka  mini stroke ) within 6 months?      N 16. Do you have mod to severe Obstructive Sleep Apnea?  [Hospital only]    N 17. Do you have SEVERE AND UNCONTROLLED asthma? *NEED PAC APPT AT [facility] w/MAC*     N 18. Are you currently taking any blood thinners?     " 18a. If yes, inform patient to \"follow up w/ ordering provider for bridging instructions.\"    N 19. Do you take the medication Phentermine?    19a. If yes, \"Hold for 7 days before procedure.  Please consult your prescribing provider if you have questions about holding this medication.\"     N  20. Do you have chronic kidney disease?      N  21. Do you have a diagnosis of diabetes?     N  22. On a regular basis do you go 3-5 days between bowel movements?      23. Preferred LOCAL Pharmacy for Pre Prescription    [ LIST ONLY ONE PHARMACY]        [id number] - [place] [id number] [facility]  AT [facility]        - CLOSING REMINDERS -    Informed patient they will need an adult    Cannot take any type of public or medical transportation alone    Conscious Sedation- Needs  for 6 hours after the procedure       MAC/General-Needs  for 24 hours after procedure    Pre-Procedure Covid test to be completed [ESSC PCR Testing Required]    Confirmed Nurse will call to complete assessment       - SCHEDULING DETAILS -  N Hospital Setting Required? If yes, what is the exclusion?:    [place]  Surgeon    05/17/2023  Date of Procedure  Upper and Lower Endoscopy [EGD and Colonoscopy]  Type of Procedure Scheduled  [facility] [place] Location   [facility]-If you answer yes to questions #8, #20, #21Which Colonoscopy Prep was Sent?     CS Sedation Type     N PAC / Pre-op Required               "

## 2023-03-24 ENCOUNTER — VIRTUAL VISIT (OUTPATIENT)
Dept: SURGERY | Facility: CLINIC | Age: 48
End: 2023-03-24
Payer: COMMERCIAL

## 2023-03-24 DIAGNOSIS — E66.9 OBESITY (BMI 30-39.9): Primary | ICD-10-CM

## 2023-03-24 PROCEDURE — 97802 MEDICAL NUTRITION INDIV IN: CPT | Mod: VID | Performed by: DIETITIAN, REGISTERED

## 2023-03-24 NOTE — PATIENT INSTRUCTIONS
Doctors in the weight management clinic:  Dr. Carmen Hunt    To set up an appointment with a bariatrician doctor, call [phone]    Goals for next visit  Meal Plan:  Light Breakfast: 7 am 200-300 calories  Lunch: 10:30-11 am 600 calories  Dinner: 5-6 pm 600 calories  Optional: Snack: 200 calories  Total: 7624-6399 calories     +prioritize lean protein, veggies at meals and snacks  +no limit on nonstarchy vegetables  +prioritize whole, nutrient dense foods  +avoid brien tea drink

## 2023-03-24 NOTE — LETTER
3/24/2023         RE: Mary Chadwick  [address]  [place] [address]        Dear Colleague,    Thank you for referring your patient, Mary Chadwick, to the [facility] [place]. Please see a copy of my visit note below.    Mary Chadwick is a 47 year old who is being evaluated via a billable video visit.      Medical Weight Loss Initial Diet Evaluation  Assessment:  Mary is presenting today for a new weight management nutrition consultation. Pt has not had an initial appointment with a doctor yet.     Weight loss medication: none currently.     Anthropometrics:    Pt's weight is 215 lb  BMI: There is no height or weight on file to calculate BMI.   Patient weight not recorded  Estimated RMR (Wood-St Jeor equation):  1663 kcals x 1.2 (sedentary) = 1995 kcals (for weight maintenance)  1663 kcals x 1.3 (light active) = 2286 kcals (for weight maintenance)    Medical History:  Patient Active Problem List   Diagnosis     Malignant neoplasm of left breast in female, estrogen receptor positive (H)     Monoallelic mutation of TP53 gene     Monoallelic mutation of MUTYH gene     Li-Fraumeni syndrome     Cervical cancer screening     Family history of colon cancer     Sun-damaged skin     Birth control - Mirena IUD removed 5/14/18     S/P breast reconstruction, bilateral     Pain management contract agreement     Low plasma von Willebrand factor (vWF)     Prediabetes      Diabetes: No  HbA1c:  No results found for: HGBA1C    Nutrition History:   Weight loss history: Intermittent Fasting  - 16:8 ([address]), 1275 kcals    Dietary Recall:  Breakfast: mushroom, asparagus, eggs, parm OR srouted oats with a little syrup OR egg white sandwich from [employer]  Lunch: Grilled chicken salad OR quesadilla   Dinner: Turkey tacos (3) OR meat and veggies  Typical Snacks: apples, brazil nuts, tricuits, apples and cheese, chocolate  Eating out: no fast food, about one  meal out per week    7 am: 200-300 calories  11 am:   300 calories  8-9 pm 700 calories    Beverages:   Brien tea  Water  Matcha Green Tea    Exercise: Sedentary at home, works from home, yoga - 5 days, walking the dog    Nutrition Diagnosis (PES statement):   Overweight/Obesity (NC 3.3) related to overeating and poor lifestyle habits as evidenced by patient report of high calorie beverages, decreased activity and sedentary job and BMI     Nutrition Intervention  1. Food and/or Nutrient Delivery   a. Placed emphasis on importance of developing a healthy meal routine  2. Nutrition Education   a. Discussed with patient how to build a meal: the importance of including a lean/low fat protein at each meal, include a source of vegetables  b. Discussed the importance of adequate hydration, with emphasis on drinking 64oz of water or zero calorie beverages per day.  3. Nutrition Counseling   a. Encouraged importance of developing routine exercise for health benefits and weight loss.    Goals established by patient:   1. Aim to eat meals/snack according to discussed plan  2. Avoid brien tea drink   3. Prioritize lean protein and vegetables at meals      Assessment/Plan:    Pt will set up initial appt with bariatrician and 1.5 month(s) with dietitian.     Video-Visit Details    Type of service:  Video Visit    Video Start Time (time video started): 1:00 pm    Video End Time (time video stopped): 1:40 pm    Originating Location (pt. Location): Home        Distant Location (provider location):  Off-site    Mode of Communication:  Video Conference via [employer]    Physician has received verbal consent for a Video Visit from the patient? Yes      Kita Ayoub RD        Again, thank you for allowing me to participate in the care of your patient.        Sincerely,        Kita Ayoub RD

## 2023-03-24 NOTE — PROGRESS NOTES
Mary Chadwick is a 47 year old who is being evaluated via a billable video visit.      Medical Weight Loss Initial Diet Evaluation  Assessment:  Mary is presenting today for a new weight management nutrition consultation. Pt has not had an initial appointment with a doctor yet.     Weight loss medication: none currently.     Anthropometrics:    Pt's weight is 215 lb  BMI: There is no height or weight on file to calculate BMI.   Patient weight not recorded  Estimated RMR (Armstrong-St Jeor equation):  1663 kcals x 1.2 (sedentary) = 1995 kcals (for weight maintenance)  1663 kcals x 1.3 (light active) = 2286 kcals (for weight maintenance)    Medical History:  Patient Active Problem List   Diagnosis     Malignant neoplasm of left breast in female, estrogen receptor positive (H)     Monoallelic mutation of TP53 gene     Monoallelic mutation of MUTYH gene     Li-Fraumeni syndrome     Cervical cancer screening     Family history of colon cancer     Sun-damaged skin     Birth control - Mirena IUD removed 5/14/18     S/P breast reconstruction, bilateral     Pain management contract agreement     Low plasma von Willebrand factor (vWF)     Prediabetes      Diabetes: No  HbA1c:  No results found for: HGBA1C    Nutrition History:   Weight loss history: Intermittent Fasting  - 16:8 ([address]), 1275 kcals    Dietary Recall:  Breakfast: mushroom, asparagus, eggs, parm OR srouted oats with a little syrup OR egg white sandwich from [employer]  Lunch: Grilled chicken salad OR quesadilla   Dinner: Turkey tacos (3) OR meat and veggies  Typical Snacks: apples, brazil nuts, tricuits, apples and cheese, chocolate  Eating out: no fast food, about one meal out per week    7 am: 200-300 calories  11 am:   300 calories  8-9 pm 700 calories    Beverages:   Martínez tea  Water  Matcha Green Tea    Exercise: Sedentary at home, works from home, yoga - 5 days, walking the dog    Nutrition Diagnosis (PES statement):   Overweight/Obesity (NC  3.3) related to overeating and poor lifestyle habits as evidenced by patient report of high calorie beverages, decreased activity and sedentary job and BMI     Nutrition Intervention  1. Food and/or Nutrient Delivery   a. Placed emphasis on importance of developing a healthy meal routine  2. Nutrition Education   a. Discussed with patient how to build a meal: the importance of including a lean/low fat protein at each meal, include a source of vegetables  b. Discussed the importance of adequate hydration, with emphasis on drinking 64oz of water or zero calorie beverages per day.  3. Nutrition Counseling   a. Encouraged importance of developing routine exercise for health benefits and weight loss.    Goals established by patient:   1. Aim to eat meals/snack according to discussed plan  2. Avoid brien tea drink   3. Prioritize lean protein and vegetables at meals      Assessment/Plan:    Pt will set up initial appt with bariatrician and 1.5 month(s) with dietitian.     Video-Visit Details    Type of service:  Video Visit    Video Start Time (time video started): 1:00 pm    Video End Time (time video stopped): 1:40 pm    Originating Location (pt. Location): Home        Distant Location (provider location):  Off-site    Mode of Communication:  Video Conference via [facility]    Physician has received verbal consent for a Video Visit from the patient? Yes      Kita Ayoub RD

## 2023-04-15 ENCOUNTER — HEALTH MAINTENANCE LETTER (OUTPATIENT)
Age: 48
End: 2023-04-15

## 2023-05-02 ENCOUNTER — TELEPHONE (OUTPATIENT)
Dept: GASTROENTEROLOGY | Facility: CLINIC | Age: 48
End: 2023-05-02
Payer: COMMERCIAL

## 2023-05-02 NOTE — TELEPHONE ENCOUNTER
Patient scheduled for Colonoscopy  on 5/17/2023.     Discuss Covid policy.     Pre op exam needed? N/A    Arrival time: 0815. Procedure time 0915    Facility location: [facility]; [address] [place] [address], [address], [place] [phone]    Sedation type: Conscious sedation     Anticoagulations? No    Electronic implanted devices? No    Diabetic? No, pre-diabetic    Indication for procedure: screening, Li Fraumeni    Bowel prep recommendation: Miralax prep without magnesium citrate    Prep instructions sent via [employer]     Pre visit planning completed.    Nahed Bell RN  Endoscopy Procedure Pre Assessment RN

## 2023-05-02 NOTE — TELEPHONE ENCOUNTER
Attempted to contact patient regarding upcoming Colonoscopy  procedure on 5/17/2023 for pre assessment questions. No answer.     Left [employer] message to return call to [phone] #4. Pt's phone number was not accepting calls    Nahed Bell RN  Endoscopy Procedure Pre Assessment RN

## 2023-05-10 ENCOUNTER — TELEPHONE (OUTPATIENT)
Dept: SURGERY | Facility: CLINIC | Age: 48
End: 2023-05-10

## 2023-05-10 NOTE — TELEPHONE ENCOUNTER
Pre assessment questions completed for upcoming Colonoscopy  procedure scheduled on 5/17/23    COVID policy reviewed.     Pre-op exam? N/A    Reviewed procedural arrival time 0815, procedure time 0915 and facility location [facility]; [address] [place] [address], [address], [place] [address]    Designated  policy reviewed. Instructed to have someone stay 6 hours post procedure.     NSAIDs? No    Anticoagulation/blood thinners? No    Electronic implanted devices? No    Diabetic? No    Reviewed procedure prep instructions.     Patient verbalized understanding and had no questions or concerns at this time.    Suzanne Moyer, RN  Endoscopy Procedure Pre Assessment RN

## 2023-05-10 NOTE — TELEPHONE ENCOUNTER
Second attempt for pre-assessment prior to upcoming colonoscopy     No answer.  Phone number was not working. Left [employer] message.    Chantel Lorenzana, RN  Endoscopy Procedure Pre Assessment RN

## 2023-05-10 NOTE — TELEPHONE ENCOUNTER
Sent patient a text message video link to connect. Called patient when she was not connected for the video nutrition visit. Left voicemail instructing patient to connect or to call the clinic (phone number provided) to reschedule appointment.    Kita Ayoub RD, LD

## 2023-05-17 ENCOUNTER — HOSPITAL ENCOUNTER (OUTPATIENT)
Facility: AMBULATORY SURGERY CENTER | Age: 48
Discharge: HOME | End: 2023-05-17
Attending: INTERNAL MEDICINE | Admitting: INTERNAL MEDICINE
Payer: COMMERCIAL

## 2023-05-17 VITALS
DIASTOLIC BLOOD PRESSURE: 63 MMHG | SYSTOLIC BLOOD PRESSURE: 102 MMHG | HEIGHT: 69 IN | RESPIRATION RATE: 16 BRPM | BODY MASS INDEX: 30.66 KG/M2 | WEIGHT: 207 LBS | TEMPERATURE: 97.5 F | HEART RATE: 75 BPM | OXYGEN SATURATION: 95 %

## 2023-05-17 LAB
COLONOSCOPY: NORMAL
UPPER GI ENDOSCOPY: NORMAL

## 2023-05-17 PROCEDURE — 45380 COLONOSCOPY AND BIOPSY: CPT | Mod: 33

## 2023-05-17 PROCEDURE — 88305 TISSUE EXAM BY PATHOLOGIST: CPT | Mod: 26 | Performed by: PATHOLOGY

## 2023-05-17 PROCEDURE — 88305 TISSUE EXAM BY PATHOLOGIST: CPT | Mod: TC | Performed by: INTERNAL MEDICINE

## 2023-05-17 PROCEDURE — 43235 EGD DIAGNOSTIC BRUSH WASH: CPT

## 2023-05-17 RX ORDER — NALOXONE HYDROCHLORIDE 0.4 MG/ML
0.2 INJECTION, SOLUTION INTRAMUSCULAR; INTRAVENOUS; SUBCUTANEOUS
Status: DISCONTINUED | OUTPATIENT
Start: 2023-05-17 | End: 2023-05-18 | Stop reason: HOSPADM

## 2023-05-17 RX ORDER — PROCHLORPERAZINE MALEATE 10 MG
10 TABLET ORAL EVERY 6 HOURS PRN
Status: CANCELLED | OUTPATIENT
Start: 2023-05-17

## 2023-05-17 RX ORDER — NALOXONE HYDROCHLORIDE 0.4 MG/ML
0.4 INJECTION, SOLUTION INTRAMUSCULAR; INTRAVENOUS; SUBCUTANEOUS
Status: DISCONTINUED | OUTPATIENT
Start: 2023-05-17 | End: 2023-05-18 | Stop reason: HOSPADM

## 2023-05-17 RX ORDER — ONDANSETRON 4 MG/1
4 TABLET, ORALLY DISINTEGRATING ORAL EVERY 6 HOURS PRN
Status: CANCELLED | OUTPATIENT
Start: 2023-05-17

## 2023-05-17 RX ORDER — FENTANYL CITRATE 50 UG/ML
INJECTION, SOLUTION INTRAMUSCULAR; INTRAVENOUS PRN
Status: DISCONTINUED | OUTPATIENT
Start: 2023-05-17 | End: 2023-05-17 | Stop reason: HOSPADM

## 2023-05-17 RX ORDER — LIDOCAINE 40 MG/G
CREAM TOPICAL
Status: DISCONTINUED | OUTPATIENT
Start: 2023-05-17 | End: 2023-05-17 | Stop reason: HOSPADM

## 2023-05-17 RX ORDER — ONDANSETRON 4 MG/1
4 TABLET, ORALLY DISINTEGRATING ORAL EVERY 6 HOURS PRN
Status: DISCONTINUED | OUTPATIENT
Start: 2023-05-17 | End: 2023-05-18 | Stop reason: HOSPADM

## 2023-05-17 RX ORDER — ONDANSETRON 2 MG/ML
4 INJECTION INTRAMUSCULAR; INTRAVENOUS EVERY 6 HOURS PRN
Status: CANCELLED | OUTPATIENT
Start: 2023-05-17

## 2023-05-17 RX ORDER — FLUMAZENIL 0.1 MG/ML
0.2 INJECTION, SOLUTION INTRAVENOUS
Status: CANCELLED | OUTPATIENT
Start: 2023-05-17 | End: 2023-05-17

## 2023-05-17 RX ORDER — FLUMAZENIL 0.1 MG/ML
0.2 INJECTION, SOLUTION INTRAVENOUS
Status: ACTIVE | OUTPATIENT
Start: 2023-05-17 | End: 2023-05-17

## 2023-05-17 RX ORDER — ONDANSETRON 2 MG/ML
4 INJECTION INTRAMUSCULAR; INTRAVENOUS
Status: COMPLETED | OUTPATIENT
Start: 2023-05-17 | End: 2023-05-17

## 2023-05-17 RX ORDER — PROCHLORPERAZINE MALEATE 10 MG
10 TABLET ORAL EVERY 6 HOURS PRN
Status: DISCONTINUED | OUTPATIENT
Start: 2023-05-17 | End: 2023-05-18 | Stop reason: HOSPADM

## 2023-05-17 RX ORDER — ONDANSETRON 2 MG/ML
4 INJECTION INTRAMUSCULAR; INTRAVENOUS EVERY 6 HOURS PRN
Status: DISCONTINUED | OUTPATIENT
Start: 2023-05-17 | End: 2023-05-18 | Stop reason: HOSPADM

## 2023-05-17 RX ADMIN — ONDANSETRON 4 MG: 2 INJECTION INTRAMUSCULAR; INTRAVENOUS at 08:04

## 2023-05-17 NOTE — H&P
Mary Johns Farrukh  [id number]  female  47 year old      Reason for procedure/surgery: EGD/colonoscopy    Patient Active Problem List   Diagnosis     Malignant neoplasm of left breast in female, estrogen receptor positive (H)     Monoallelic mutation of TP53 gene     Monoallelic mutation of MUTYH gene     Li-Fraumeni syndrome     Cervical cancer screening     Family history of colon cancer     Sun-damaged skin     Birth control - Mirena IUD removed 5/14/18     S/P breast reconstruction, bilateral     Pain management contract agreement     Low plasma von Willebrand factor (vWF)     Prediabetes       Past Surgical History:    Past Surgical History:   Procedure Laterality Date     ESOPHAGOSCOPY, GASTROSCOPY, DUODENOSCOPY (EGD), COMBINED N/A 10/15/2018    Procedure: EGD;  Surgeon: Edwardo Beatty MD;  Location: [facility] OR     GRAFT FAT TO BREAST Bilateral 11/8/2018    Procedure: Bilateral Breast Fat Grafting and nipple reconstruction;  Surgeon: BASILIO Kidd MD;  Location: [facility] OR     HERNIA REPAIR, INGUINAL RT/LT  2002    Hernia Repair, Femoral RT/LT     HYSTERECTOMY, PAP NO LONGER INDICATED       LAPAROSCOPIC HYSTERECTOMY TOTAL, BILATERAL SALPINGO-OOPHORECTOMY, COMBINED Bilateral 11/20/2019    Procedure: Total Laparoscopic Hysterectomy Via Single Port, Bilateral Salpingo-Oophorectomy;  Surgeon: Yenny Hidalgo MD;  Location: [facility] OR     MASTECTOMY SIMPLE BILATERAL, SENTINEL NODE BILATERAL, COMBINED Bilateral 8/6/2018    Procedure: COMBINED MASTECTOMY SIMPLE BILATERAL, SENTINEL NODE BILATERAL;  Bilateral Mastectomy With Immediate Reconstruction, Left [place] Lymph Node Biopsy, Anesthesia Block;  Surgeon: Antonio Andrews MD;  Location: [facility] OR     RECONSTRUCT BREAST BILATERAL Bilateral 8/6/2018    Procedure: RECONSTRUCT BREAST BILATERAL;;  Surgeon: BASILIO Kidd MD;  Location: [facility] OR     RECONSTRUCT NIPPLE BILATERAL Bilateral 11/8/2018    Procedure: Nipple Reconstruction;  Surgeon: BASILIO Kidd,  MD;  Location:  OR     REMOVE AND REPLACE BREAST IMPLANT PROSTHESIS Bilateral 2018    Procedure: REMOVE AND REPLACE BREAST IMPLANT PROSTHESIS;  Bilateral Breast Implant Exchange and Revision;  Surgeon: BASILIO Kidd MD;  Location:  OR       Past Medical History:   Past Medical History:   Diagnosis Date     Breast cancer, stage 1 (H) 2018    HR Negtive and Estrogin Postive     Complication of anesthesia      Herpes simplex without mention of complication     genital herpes     Li-Fraumeni syndrome 2018    germline TP53 genetic mutation     Monoallelic mutation of MUTYH gene 2018    c.1187G>A ([id number]), 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; identified using CancerNext panel through [facility]     Monoallelic mutation of TP53 gene 2018    c.638G>A ([id number]), identified using CancerNext genetic testing through [facility]     PONV (postoperative nausea and vomiting)        Social History:   Social History     Tobacco Use     Smoking status: Former     Types: Cigarettes     Quit date: 2004     Years since quittin.2     Smokeless tobacco: Never   Vaping Use     Vaping status: Not on file   Substance Use Topics     Alcohol use: No       Family History:   Family History   Problem Relation Age of Onset     Colon Cancer Mother 31         at 39     Thyroid Disease Father      Neurologic Disorder Father         myasthenia gravis     Stomach Cancer Maternal Grandfather 70     Cerebrovascular Disease Paternal Grandmother      Myocardial Infarction Paternal Grandfather 40     Cancer Paternal Uncle         unknown type;  in 60s     Ovarian Cancer Maternal Aunt          in 50s due to cancer     Liver Cancer Maternal Uncle          in 50s     Cancer Maternal Cousin         two types, but unknown       Allergies: No Known Allergies    Active Medications:   Current Outpatient Medications   Medication Sig Dispense Refill     Boswellia Annalisa (BOSWELLIA PO) Take 250  "mg by mouth as needed (To help sleep)       ketoconazole (NIZORAL) 2 % external shampoo        Spirulina 500 MG TABS 6 Tabs daily.       UNABLE TO FIND MEDICATION NAME: Osteo MD supplement contains: Vitamin K2, Calcium, Vitamin d3,       UNABLE TO FIND Take 3 capsules by mouth 2 times daily MEDICATION NAME: Wild Caught Fish Oil         Systemic Review:   CONSTITUTIONAL: NEGATIVE for fever, chills, change in weight  ENT/MOUTH: NEGATIVE for ear, mouth and throat problems  RESP: NEGATIVE for significant cough or SOB  CV: NEGATIVE for chest pain, palpitations or peripheral edema    Physical Examination:   Vital Signs: /77 (BP Location: Right arm)   Pulse 84   Temp 97.7  F (36.5  C) (Temporal)   Resp 18   Ht 1.753 m (5' 9\")   Wt 93.9 kg (207 lb)   LMP 04/20/2018   SpO2 98%   BMI 30.57 kg/m    GENERAL: healthy, alert and no distress  RESP: Normal respiratory excursion   CV: regular rate   ABDOMEN: soft,bowel sounds normal  MS: no gross musculoskeletal defects noted, no edema    Plan: Appropriate to proceed as scheduled.      Thomas M. Leventhal, MD  5/17/2023    PCP:  Melly Navarrete"

## 2023-05-18 LAB
PATH REPORT.COMMENTS IMP SPEC: NORMAL
PATH REPORT.COMMENTS IMP SPEC: NORMAL
PATH REPORT.FINAL DX SPEC: NORMAL
PATH REPORT.GROSS SPEC: NORMAL
PATH REPORT.MICROSCOPIC SPEC OTHER STN: NORMAL
PATH REPORT.RELEVANT HX SPEC: NORMAL
PHOTO IMAGE: NORMAL

## 2023-07-26 ENCOUNTER — ANCILLARY PROCEDURE (OUTPATIENT)
Dept: ULTRASOUND IMAGING | Facility: CLINIC | Age: 48
End: 2023-07-26
Attending: CLINICAL NURSE SPECIALIST
Payer: COMMERCIAL

## 2023-07-26 DIAGNOSIS — Z15.01 LI-FRAUMENI SYNDROME: ICD-10-CM

## 2023-07-26 PROCEDURE — 76700 US EXAM ABDOM COMPLETE: CPT | Mod: GC | Performed by: RADIOLOGY

## 2023-07-26 PROCEDURE — 93975 VASCULAR STUDY: CPT | Mod: GC | Performed by: RADIOLOGY

## 2024-02-01 ENCOUNTER — ANCILLARY PROCEDURE (OUTPATIENT)
Dept: MRI IMAGING | Facility: CLINIC | Age: 49
End: 2024-02-01
Attending: CLINICAL NURSE SPECIALIST
Payer: COMMERCIAL

## 2024-02-01 DIAGNOSIS — Z12.9 SCREENING FOR CANCER: ICD-10-CM

## 2024-02-01 DIAGNOSIS — Z15.01 LI-FRAUMENI SYNDROME: ICD-10-CM

## 2024-02-01 PROCEDURE — 70551 MRI BRAIN STEM W/O DYE: CPT | Mod: GC | Performed by: RADIOLOGY

## 2024-02-01 PROCEDURE — 76498 UNLISTED MR PROCEDURE: CPT | Performed by: STUDENT IN AN ORGANIZED HEALTH CARE EDUCATION/TRAINING PROGRAM

## 2024-02-01 PROCEDURE — 72197 MRI PELVIS W/O & W/DYE: CPT | Performed by: RADIOLOGY

## 2024-02-01 PROCEDURE — A9585 GADOBUTROL INJECTION: HCPCS | Performed by: RADIOLOGY

## 2024-02-01 RX ORDER — GADOBUTROL 604.72 MG/ML
10 INJECTION INTRAVENOUS ONCE
Status: COMPLETED | OUTPATIENT
Start: 2024-02-01 | End: 2024-02-01

## 2024-02-01 RX ADMIN — GADOBUTROL 9.5 ML: 604.72 INJECTION INTRAVENOUS at 12:58

## 2024-04-11 NOTE — PROGRESS NOTES
"Omaira is a 45 year old who is being evaluated via a billable video visit.      How would you like to obtain your AVS? MyChart  If the video visit is dropped, the invitation should be resent by: Send to e-mail at: [email or web address]  Will anyone else be joining your video visit? Hina SALCEDO    Video Start Time: 1615      Type of service:  Video Visit    Video End Time:1701    Originating Location (pt. Location): Home    Distant Location (provider location):  [facility] CANCER [facility]     Platform used for Video Visit: The Vetted Net    Oncology Risk Management Consultation:  Date on this visit: 3/12/2021    Mary \"Bet\" Nat Chadwick  is referred by Dr. Valeria Gann for an oncology risk management consultation. She requires high risk screening and surveillance to reduce her risk of cancer secondary to Li Fraumeni Syndrome.  She is considered to be at high risk for soft tissue sarcomas, osteosarcomas, breast cancer, brain tumors, adrenocortical carcinoma, leukemia and other malignancies. Unfortunately, she has a history of Stage II invasive ductal carcinoma of the left breast.    Primary Physician: Melly Navarrete PA-C    History Of Present Illness:  Ms. Chadwick is a very pleasant 45 year old female who presents with Li Fraumeni Syndrome.      Genetic testing:  Genetic Testing Results: POSITIVE - TP53 mutation  3/2/2018 -- Omaira is POSITIVE for a TP53 mutation. Specifically her mutation is called c.638G>A (p.R213Q) identified using a  Cancer Next panel from [facility].  This region was covered at 433x, and the heterozygosity rate was 48%. This is consistent with a germline mutation, as the expected heterozygosity rate is around 50% (i.e. one allele has the mutation and the other allele does not). [facility] is confident that this is very likely germline.      Genetic Testing Results: POSITIVE - CARRIER (MUTYH gene)  Omaira is heterozygous for one MUTYH mutation, meaning that she is a CARRIER for " Message sent to patient.      MUTYH-associated polyposis (MAP). Specifically her mutation is called c.1187G>A (p.G396D). We discussed this mutation is associated with a slightly increased risk for colon cancer.  Some research has suggested that there is a small increased risk for breast cancer in individuals who have a mutation in this gene.  However, there are no guidelines for breast screening with this gene. No other mutations were found in the MUTYH gene.      Of note, [facility] tested negative for mutations in the following genes: APC, FRANCES, BARD1, BRCA1, BRCA2, BRIP1, BMPR1A, CDH1, CDK4, CDKN2A, CHEK2, DICER1, EPCAM, HOXB13, GREM1, MLH1, MRE11A, MSH2, MSH6, NBN, NF1, PALB2, PMS2, POLD1, POLE, PTEN, RAD50, RAD51C, RAD51D, SMAD4, SMARCA4, and STK11. No mutations were found in these remaining 32 genes analyzed.  This test involved sequencing and deletion/duplication analysis of all genes with the exception of EPCAM and GREM1 (deletions only).      Pertinent Medical History:  2/9/2018- Stage IIA invasive ductal carcinoma, ER/TN-positive, HER2--negative, involving the left breast.     2/9/2018 - screening mammogram  followed by diagnostic breast ultrasound, which revealed a 2.6 x 1.1 x 1.7 cm irregular shaped mass at the 2 o'clock position involving the left breast. Left breast biopsy showed invasive ductal carcinoma, grade 2, ER/TN-positive, HER2-negative. Breast MRI showed a 3.5 cm mass in her left breast with an area of non-mass enhancement measuring approximately 8 cm. On this imaging, she had an equivocal node in the left axilla; this was not biopsied.     She was screened for the I-SPY-2 clinical trial. She came back as low-risk [employer] and the study was not recommended. 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      She started on neoadjuvant endocrine therapy with monthly Zoladex on 3/22/18, with letrozole added in April 2018.     She underwent bilateral mastectomies with sentinel node evaluation and breast  reconstruction on 8/6/18 with Carissa Andrews and Marti. Pathology revealed a T2N1 with treatment-related change.     She underwent HIMANSHU-BSO by Dr. Yenny Hidalgo on 11/20/2019 12/7/2020 DEXA scan  showed new osteopenia in her lumbar spine.  Prescribed 1200 mg of calcium daily in divided doses and 1,000-2,000 international unit(s) of Vitamin D3.     Li-Fraumeni Specific Screening to date:     Body:  3/28/2018- Whole body MRI: There are 2 right benign hemangiomas measuring 1.0 and 0.6 cm corresponding to the previously noted hypoattenuating lesions described on 3/20/2018 CT. Bilateral simple appearing renal cysts, the largest is at the inferior pole of the right kidney (series 11 image 18) measuring approximately 1.6 x 1.9 cm. Otherwise both kidneys are unremarkable with no evidence of hydronephrosis.    1/16/2019- Whole body MRI: Abdomen/pelvis: T2 hyperintense foci in the right hepatic lobe, consistent with hemangiomas as characterized on comparison MRI. Again noted benign-appearing renal cysts bilaterally    2/25/2021- Whole body MRI: No evidence of metastatic disease in this limited study.    Brain:  1/16/2019- Brain MRI: No abnormal enhancing intracranial lesion. Vague T2 hyperintensity in the periventricular and supraventricular white matter is nonspecific and may be secondary to prior therapy.    2/25/2021- Brain MRI: No abnormal enhancing intracranial lesion. Vague T2 hyperintensity in the periventricular and supraventricular white matter is nonspecific and may be secondary to prior therapy.    Gastrointestinal:  5/1/2018 (Dr. Debora Lee, [facility]), Colonoscopy, normal    Per report, colonoscopy and EUS done in 2019 through [facility], records to be requested. Next: due 5/2023.     Skin:  Per report, Dermatology full body skin check at [facility] in 2020 (records requested)    Past Medical/Surgical History:  Past Medical History:   Diagnosis Date     Breast cancer, stage 1 (H) 2018    HR  Negtive and Estrogin Postive     Complication of anesthesia      Herpes simplex without mention of complication     genital herpes     Li-Fraumeni syndrome 03/02/2018    germline TP53 genetic mutation     Monoallelic mutation of MUTYH gene 03/02/2018    c.1187G>A ([id number]), 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; identified using CancerNext panel through [facility]     Monoallelic mutation of TP53 gene 03/02/2018    c.638G>A (p.R213Q), identified using CancerNext genetic testing through [facility]     PONV (postoperative nausea and vomiting)      Past Surgical History:   Procedure Laterality Date     ESOPHAGOSCOPY, GASTROSCOPY, DUODENOSCOPY (EGD), COMBINED N/A 10/15/2018    Procedure: EGD;  Surgeon: Edwardo Beatty MD;  Location: [facility]     GRAFT FAT TO BREAST Bilateral 11/8/2018    Procedure: Bilateral Breast Fat Grafting and nipple reconstruction;  Surgeon: BASILIO Kidd MD;  [facility]     HERNIA REPAIR, INGUINAL RT/LT  2002    Hernia Repair, Femoral RT/LT     HYSTERECTOMY, PAP NO LONGER INDICATED       LAPAROSCOPIC HYSTERECTOMY TOTAL, BILATERAL SALPINGO-OOPHORECTOMY, COMBINED Bilateral 11/20/2019    Procedure: Total Laparoscopic Hysterectomy Via Single Port, Bilateral Salpingo-Oophorectomy;  Surgeon: Yenny Hidalgo MD;  Location: [facility] OR     MASTECTOMY SIMPLE BILATERAL, SENTINEL NODE BILATERAL, COMBINED Bilateral 8/6/2018    Procedure: COMBINED MASTECTOMY SIMPLE BILATERAL, SENTINEL NODE BILATERAL;  Bilateral Mastectomy With Immediate Reconstruction, Left [place] Lymph Node Biopsy, Anesthesia Block;  Surgeon: Antonio Andrews MD;  [facility]     RECONSTRUCT BREAST BILATERAL Bilateral 8/6/2018    Procedure: RECONSTRUCT BREAST BILATERAL;;  Surgeon: BASILIO Kidd MD;  [facility]     RECONSTRUCT NIPPLE BILATERAL Bilateral 11/8/2018    Procedure: Nipple Reconstruction;  Surgeon: BASILIO Kidd MD;  [facility]     REMOVE AND REPLACE BREAST IMPLANT PROSTHESIS  Bilateral 2018    Procedure: REMOVE AND REPLACE BREAST IMPLANT PROSTHESIS;  Bilateral Breast Implant Exchange and Revision;  Surgeon: BASILIO Kidd MD;  [facility]       Allergies:  Allergies as of 2021 - Reviewed 03/10/2020   Allergen Reaction Noted     Lactose Other (See Comments) 2018       Current Medications:  Current Outpatient Medications   Medication Sig Dispense Refill     amphetamine-dextroamphetamine (ADDERALL XR) 10 MG 24 hr capsule Take 10 mg by mouth       calcium citrate-vitamin D (CITRACAL) 315-250 MG-UNIT TABS per tablet Take 2 tablets by mouth       CALCIUM-MAGNESIUM-VITAMIN D PO        Evening Primrose Oil 1000 MG CAPS Take 2,000 mg by mouth At Bedtime       FISH OIL-CHOLECALCIFEROL PO Take 2,000 Units by mouth daily       ibuprofen (ADVIL/MOTRIN) 600 MG tablet Take 1 tablet (600 mg) by mouth every 6 hours as needed for moderate pain (Patient not taking: Reported on 3/10/2020) 40 tablet 0     letrozole (FEMARA) 2.5 MG tablet Take 1 tablet (2.5 mg) by mouth daily 90 tablet 3     senna-docusate (SENOKOT-S/PERICOLACE) 8.6-50 MG tablet Take 1-2 tablets by mouth 2 times daily (Patient not taking: Reported on 1/3/2020) 30 tablet 0     Spirulina 500 MG TABS 6 Tabs daily.       UNABLE TO FIND MEDICATION NAME: Iodoral 12.5 iodine and potassium       UNABLE TO FIND MEDICATION NAME: Woman's Multi 40+       vitamin C w/FRANCISCO HIPS 1000 MG tablet Take 2,000 mg by mouth daily          Family History:  Family History   Problem Relation Age of Onset     Colon Cancer Mother 31         at 39     Thyroid Disease Father      Neurologic Disorder Father         myasthenia gravis     Stomach Cancer Maternal Grandfather 70        not sure what type of cancer, thinks it was stomach     Cerebrovascular Disease Paternal Grandmother      Myocardial Infarction Paternal Grandfather 40     Cancer Paternal Uncle         unknown type;  in 60s     Breast Cancer Maternal Aunt          in 50s  due to breast cancer     Stomach Cancer Maternal Uncle          in 50s, no sure what type of cancer       Social History:  Social History     Socioeconomic History     Marital status: Single     Spouse name: Not on file     Number of children: 2     Years of education: Not on file     Highest education level: Not on file   Occupational History     Employer: NONE      Comment: [place]   Social Needs     Financial resource strain: Not on file     Food insecurity     Worry: Not on file     Inability: Not on file     Transportation needs     Medical: Not on file     Non-medical: Not on file   Tobacco Use     Smoking status: Former Smoker     Quit date: 2004     Years since quittin.0     Smokeless tobacco: Never Used   Substance and Sexual Activity     Alcohol use: No     Drug use: No     Sexual activity: Yes     Partners: Male     Birth control/protection: Post-menopausal, Female Surgical     Comment: Hysterectomy    Lifestyle     Physical activity     Days per week: Not on file     Minutes per session: Not on file     Stress: Not on file   Relationships     Social connections     Talks on phone: Not on file     Gets together: Not on file     Attends Temple service: Not on file     Active member of club or organization: Not on file     Attends meetings of clubs or organizations: Not on file     Relationship status: Not on file     Intimate partner violence     Fear of current or ex partner: Not on file     Emotionally abused: Not on file     Physically abused: Not on file     Forced sexual activity: Not on file   Other Topics Concern     Parent/sibling w/ CABG, MI or angioplasty before 65F 55M? Not Asked   Social History Narrative     Not on file     Review of Systems:  GENERAL: No change in weight, sleep or appetite.  Normal energy.  No fever or chills  EYES: Negative for vision changes or eye problems  ENT: No problems with ears, nose or throat.  No difficulty swallowing.  RESP: No  coughing, wheezing or shortness of breath  CV: No chest pains or palpitations  GI: No nausea, vomiting,  heartburn, abdominal pain, diarrhea, constipation or change in bowel habits  : No urinary frequency or dysuria, bladder or kidney problems  MUSCULOSKELETAL: No significant muscle or joint pains  NEUROLOGIC: Denies headaches other than tension  Denies numbness, tingling, weakness, problems with balance or coordination  PSYCHIATRIC: Reports anxiety uses acupuncture to help with this, practices and teaches yoga. Anxiety heightened around the time of scans  HEME/IMMUNE/ALLERGY: History of bleeding with abnormal Von Willebrand levels; consulted with Dr. Gann about this.  Will follow with PCP and Dr. Gann. Denies bleeding problems today and states even gum bleeding has been alleviated with flossing.   No allergies or immune system problems  ENDOCRINE: No history of thyroid disease, diabetes or other endocrine disorders  SKIN: Reports squamous cells removed at last dermatological visit through [facility].     Physical Exam:  There were no vitals taken for this visit.  GENERAL: Healthy, alert and no distress  EYES: Eyes grossly normal to inspection.  No discharge or erythema, or obvious scleral/conjunctival abnormalities.  RESP: No audible wheeze, cough, or visible cyanosis.  No visible retractions or increased work of breathing.    SKIN: Visible skin clear. No significant rash, abnormal pigmentation or lesions.  NEURO: Cranial nerves grossly intact.  Mentation and speech appropriate for age.  PSYCH: Mentation appears normal, affect normal/bright, judgement and insight intact, normal speech and appearance well-groomed.    Laboratory/Imaging Studies  No results found for any visits on 03/12/21.    ASSESSMENT  We reviewed her history.  She is taking prerequisites for a nursing degree now at [employer] and is very engaged and enthusiastic about what she is learning. She has stopped drinking  altogether and is trying to pay attention to nutrition, stress reduction and self care as she juggles work and school. She would like to have her sons tested for LiFraumeni but their father is not in agreement. We discussed that in classic LFS families, childhood cancers are soon, but in other families, an attenuated form may be seen with adult onset only cancers.      We reviewed her plan, per below, and will try to time her ultrasound of the abdomen to be in September with her whole body MRI in December. It is not clear whether she is due for another colonoscopy now or not and she will try to find where she had her last colonoscopy and upper endoscopy and let me know so that we can get records. It is also not clear whether she needs a short term follow up with Dermatology; pathology is being requested.    We reviewed the function of the TP53 gene and its product, tumor protein p53, which can delay cell cycle progression and inhibiting the cell's ability to repair DNA or initiate programmed apoptosis, causing damaged DNA cells to survive and proliferate into malignancies. We reviewed the autosomal dominant pattern of inheritance, whereby children of TP53 mutation carriers would have a 50 percent chance of inheriting the mutation. We discussed that the lifetime risk of developing cancers has been estimated to be as high as 73% for male carriers and 93% for female carriers. (Angely et al. 2011. Lancet 12)589-565). We discussed the history of Li Fraumeni syndrome dating from 1969 in which Franky Jung and Joseph Fraumeni Jr. Described families with the syndrome after reviewing medical records of children with rhabdomyosarcoma.     We reviewed the broad range of malignancies found in the LiFraumeni population (usually under the age of 45) including leukemias, lymphomas, gastrointestinal and lung cancers and melanoma. Individuals with Li-Fraumeni syndrome are at increased risk of developing multiple primary cancers in  "their lifetime. Research has suggested there may be as high as a 50% chance to develop a second cancer and approximately 33% chance to develop a third cancer. It is not currently clear how the age at first diagnosis, the type of first cancer, or cancer treatment may influence future cancer risks.     There are several \"core\" cancers that are most commonly seen with classic Li-Fraumeni syndrome. The exact lifetime risks for these cancers have not been established, though recent research has attempted to define these risks.  ? Breast cancer: the lifetime breast cancer risk may be as high as 85%, compared to a 12% lifetime risk in the general population (Milagros et al 2016).  ? Soft-tissue sarcoma: the lifetime risk may be as high as 70% and 40% for women and men, respectively (Milagros et al 2016).  ? Brain tumors (astrocytomas, glioblastomas, medulloblastomas, choroid plexus carcinomas [CPC], etc.):  the lifetime risk may be as high as 20% and 30% for women and men, respectively (Milagros et al 2016).  ? Osteosarcomas:  the lifetime risk may be as high as 10% for both women and men (Milagros et al 2016).  ? Adrenocortical carcinomas (ACC):  typically a childhood cancer, though it can rarely be seen in adulthood  ? Leukemia - previous research suggested that acute leukemia was a \"core'\" cancer in Li-Fraumeni syndrome, though more recent data has suggested otherwise.     Several other cancers have also been seen in families with Li-Fraumeni syndrome, including melanoma (and other skin cancers), lymphoma, gastrointestinal (colorectal, pancreatic, stomach, etc.), kidney, gynecologic (uterine and ovarian), lung (particularly bronchoalveolar), and thyroid cancer. The exact lifetime risks for these cancers are unknown at this time.    Individualized Surveillance Plan for children and adults  with Li Fraumeni Syndrome    based on NCCN Guidelines Version 1.2020 Gene Reviews, Angely et al 2016, Jesse et al 2017   Beginning at diagnosis "   Purpose of screening Test or procedure Last done Next Due   Comprehensive exam every 6-12 months Comprehensive physical exam including neurological exam with high index of suspicion for rare cancers (and second malignancies in cancer survivors)     Deferred, also follows with Dr. Gann   Return to clinic in February 2022   Adrenocortical Carcinoma Ultrasound of the abdomen and pelvis every 3- 4 months (change to annually at age 18) NA NA   Adrenocortical Carcinoma    NOTE: Stop at age 18. Every 4 months:  1.Beta hCG (Stop at age 6)  2. Alpha fetoprotein (Stop at age 6)  3. 17-OH-progesterone  4. Testosterone  5. Androstenedione  6. Dehydroepiandrosterone sulfate   NA   NA   Acute Leukemia Every 4 months:    CBC    Erythrocyte sedimentation rate    lactate dehydrogenase   Ordered to be done in September February 2022   Brain Tumors Annual brain MRI with contrast   (without dye if previous are normal) 2/25/2021- Brain MRI: No abnormal enhancing intracranial lesion. Vague T2 hyperintensity in the periventricular and supraventricular white matter is nonspecific and may be secondary to prior therapy. February 2022   Bone and Soft Tissue Sarcomas Annual rapid whole body MRI (without contrast) 2/25/2021- Whole body MRI: No evidence of metastatic disease in this limited study.   February 2022   Beginning at age 18   From the ages of 20-or at the age of earliest diagnosed breast cancer in the family, if younger than 20. Clinical breast exam every 6-12 months   NA-mastectomy Follow with Dr. Gann' team   Breast screening for women Annual breast MRI from ages 20-29    Alternate annual breast MRI and annual mammogram (consider tomosynthesis) from ages 30-75. (after age 75, consider screening schedule on individual basis)   NA   NA   Melanoma Annual dermatological exam 2020-[facility], records requested Recheck in 2021   Soft tissue, bone sarcomas and adrenal carcinoma Ultrasound of abdomen and pelvis every 12  months (alternate w/whole body MRI) None to date September 2021   Colon and Upper GI Screening  Beginning at age 25 or 5 years prior to the earliest known colon cancer in the family Upper endoscopy and colonoscopy every 2-5 years.   2019- records requested      *Therapeutic radiation treatment for cancer should be avoided when possible as it can promote soft tissue sarcomas in this population.  **Women with TP53 mutation who are treated for breast cancer, screening of remaining breast tissue with annual mammogram and annual breast MRI should continue. Discuss option of  risk reducing mastectomy regarding degree of protection, reconstruction options and risks.  In addition, the family history and residual breast cancer risk should be considered during counseling.     I spent a total of 100 minutes on the day of the visit. Please see the note for further information on patient assessment and treatment.    REY Johnson-CNS, OCN, AGN-BC  Clinical Nurse Specialist  Cancer Risk Management Program  [facility] [address]  [place] [address]    phone:  [phone]  Pager: [phone]  fax: [phone]    CC: MD Melly Garcia PA-C

## 2024-06-22 ENCOUNTER — HEALTH MAINTENANCE LETTER (OUTPATIENT)
Age: 49
End: 2024-06-22

## 2024-08-10 NOTE — PROGRESS NOTES
PRESENTING COMPLAINT:  Postoperative visit, status post bilateral breast reconstruction for breast cancer with fat grafting to bilateral breasts and modified skate flaps done for nipple reconstruction done 11/08/2018.       HISTORY OF PRESENTING COMPLAINT:  Ms. Chadwick is 42 years old.  She is three weeks out from her surgery, overall doing well, no major issues. Was started on Bactrim for a UTI.      PHYSICAL EXAMINATION:  Vital signs stable.  She is afebrile, in no obvious distress.  The upper pole has filled in nicely, minimal bruising. Right nipple healing in well, left nipple has partial skin necrosis with some dry scabbing, no evidence for infection or juju breakdown.  Donor sites are healing in well.       ASSESSMENT AND PLAN:  Based on above findings, a diagnosis of bilateral breast reconstruction with fat grafting and nipple reconstruction was made.  I have asked the patient to start thoroughly moisturizing her breasts as well as her dried necrotic nipple area, keeping an eye on it very closely.  Otherwise, we will continue to follow this closely.  I will see her back in a week's time.  All questions were answered.  She was happy with the visit.   
Satisfactory

## 2025-07-12 ENCOUNTER — HEALTH MAINTENANCE LETTER (OUTPATIENT)
Age: 50
End: 2025-07-12

## (undated) DEVICE — DRAPE IOBAN INCISE 23X17" 6650EZ

## (undated) DEVICE — RETR ELEV / UTERINE MANIPULATOR V-CARE MED CUP 60-6085-201A

## (undated) DEVICE — DRSG KERLIX 4 1/2"X4YDS ROLL 6730

## (undated) DEVICE — SYR 10ML FINGER CONTROL W/O NDL 309695

## (undated) DEVICE — SUCTION TIP YANKAUER W/O VENT K86

## (undated) DEVICE — SOL NACL 0.9% IRRIG 500ML BOTTLE 2F7123

## (undated) DEVICE — LINEN TOWEL PACK X5 5464

## (undated) DEVICE — SUCTION MANIFOLD NEPTUNE 2 SYS 1 PORT 702-025-000

## (undated) DEVICE — DRSG PRIMAPORE 02X3" 7133

## (undated) DEVICE — NDL 18GA 1.5" 305196

## (undated) DEVICE — ESU PENCIL W/COATED BLADE E2450H

## (undated) DEVICE — PANTIES MESH LG/XLG 2PK 706M2

## (undated) DEVICE — SU MONOCRYL 2-0 SH 27" UND Y417H

## (undated) DEVICE — ESU GROUND PAD ADULT W/CORD E7507

## (undated) DEVICE — GLOVE PROTEXIS POWDER FREE SMT 7.0  2D72PT70X

## (undated) DEVICE — CLIP HORIZON MED BLUE 002200

## (undated) DEVICE — PAD CHUX UNDERPAD 30X36" P3036C

## (undated) DEVICE — LINEN TOWEL PACK X6 WHITE 5487

## (undated) DEVICE — SU DERMABOND PRINEO CLR602US

## (undated) DEVICE — SUCTION MANIFOLD DORNOCH ULTRA CART UL-CL500

## (undated) DEVICE — ESU ELEC BLADE 2.75" COATED/INSULATED E1455

## (undated) DEVICE — SU DERMABOND PRINEO 22CM CLR222US

## (undated) DEVICE — SOL WATER IRRIG 500ML BOTTLE 2F7113

## (undated) DEVICE — DRAPE U SPLIT 74X120" 29440

## (undated) DEVICE — DRAPE SHEET MED 44X70" 9355

## (undated) DEVICE — DRAIN JACKSON PRATT CHANNEL 15FR ROUND HUBLESS SIL JP-2228

## (undated) DEVICE — PACK MINOR CUSTOM ASC

## (undated) DEVICE — PAD CHUX UNDERPAD 30X30"

## (undated) DEVICE — WIPES FOLEY CARE SURESTEP PROVON DFC100

## (undated) DEVICE — DRSG ABDOMINAL 07 1/2X8" 7197D

## (undated) DEVICE — SUTURE-VICRYL 4-0 PS-2 J496H

## (undated) DEVICE — SOL WATER IRRIG 1000ML BOTTLE 2F7114

## (undated) DEVICE — TUBING SUCTION MEDI-VAC 1/4"X20' N620A

## (undated) DEVICE — SU SILK 2-0 SH 30" K833H

## (undated) DEVICE — COVER CAMERA IN-LIGHT DISP LT-C02

## (undated) DEVICE — CATH TRAY FOLEY SURESTEP 16FR WDRAIN BAG STLK LATEX A300316A

## (undated) DEVICE — SU PDS II 0 CT-1 27" Z340H

## (undated) DEVICE — ESU HANDPIECE OLYMPUS PK SPATULA PK-SP0533

## (undated) DEVICE — ESU GROUND PAD UNIVERSAL W/O CORD

## (undated) DEVICE — DRSG STERI STRIP 1/2X4" R1547

## (undated) DEVICE — ESU HOLDER LAP INST DISP PURPLE LONG 330MM H-PRO-330

## (undated) DEVICE — CATH TRAY FOLEY SURESTEP 16FR W/URNE MTR STLK LATEX A303316A

## (undated) DEVICE — SOL NACL 0.9% IRRIG 1000ML BOTTLE 2F7124

## (undated) DEVICE — WAVEGUIDE ILLUMINATOR INVUITY EIGR WIDE/FLAT 104008

## (undated) DEVICE — KIT ENDO FIRST STEP DISINFECTANT 200ML W/POUCH EP-4

## (undated) DEVICE — TUBING SUCTION 12"X1/4" N612

## (undated) DEVICE — GOWN IMPERVIOUS 2XL BLUE

## (undated) DEVICE — Device

## (undated) DEVICE — SYR 30ML SLIP TIP W/O NDL 302833

## (undated) DEVICE — SU DERMABOND ADVANCED .7ML DNX12

## (undated) DEVICE — SU VICRYL 0 CT-1 27" UND J260H

## (undated) DEVICE — PREP CHLORAPREP 26ML TINTED ORANGE  260815

## (undated) DEVICE — ENDO SCOPE WARMER LF TM500

## (undated) DEVICE — BARRIER INTERCEED 3X4" 4350

## (undated) DEVICE — SOL NACL 0.9% INJ 1000ML BAG 2B1324X

## (undated) DEVICE — ESU PENCIL SMOKE EVAC W/ROCKER SWITCH 0703-047-000

## (undated) DEVICE — SPONGE LAP 18X18" X8435

## (undated) DEVICE — SUCTION IRR STRYKERFLOW II W/TIP 250-070-520

## (undated) DEVICE — BNDG ELASTIC 6" DBL LENGTH UNSTERILE 6611-16

## (undated) DEVICE — STPL SKIN 35W 059037

## (undated) DEVICE — TUBING C02 INSUFFLATION LAP FILTER HEATER 6198

## (undated) DEVICE — RETR ELEV / UTERINE MANIPULATOR V-CARE LG CUP 60-6085-202A

## (undated) DEVICE — PAD PERI INDIV WRAP 11" 2022A

## (undated) DEVICE — SUCTION CATH AIRLIFE TRI-FLO W/CONTROL PORT 14FR  T60C

## (undated) DEVICE — ENDO ACCESS PLATFORM GELPOINT SGL INCISION CNGL2

## (undated) DEVICE — SU VICRYL 0 CT-1 36" J346H

## (undated) DEVICE — PHOTON GUIDE INVUITY WIDE FLAT 104015

## (undated) DEVICE — ESU ELEC BLADE 6" COATED E1450-6

## (undated) DEVICE — SOL ADH LIQUID BENZOIN SWAB 0.6ML C1544

## (undated) DEVICE — ESU FCP OLYMPUS PK CUTTING 5MMX33CM PK-CF0533

## (undated) DEVICE — LABEL MEDICATION SYSTEM 3303-P

## (undated) DEVICE — SU VICRYL 0 CT-1 3X27" J430T

## (undated) DEVICE — SYR 10ML LL W/O NDL

## (undated) DEVICE — BLADE KNIFE SURG 10 371110

## (undated) DEVICE — SPECIMEN CONTAINER 3OZ W/FORMALIN 59901

## (undated) DEVICE — GLOVE PROTEXIS W/NEU-THERA 6.5  2D73TE65

## (undated) DEVICE — LINEN GOWN X4 5410

## (undated) DEVICE — DRSG PRIMAPORE 03 1/8X6" 66000318

## (undated) DEVICE — KIT SPY ELITE DISP LC3006

## (undated) DEVICE — GLOVE PROTEXIS W/NEU-THERA 7.0  2D73TE70

## (undated) DEVICE — KIT ENDO TURNOVER/PROCEDURE CARRY-ON 101822

## (undated) DEVICE — GLOVE PROTEXIS BLUE W/NEU-THERA 7.0  2D73EB70

## (undated) DEVICE — PREP POVIDONE IODINE SOLUTION 10% 120ML

## (undated) DEVICE — SYR 50ML CATH TIP W/O NDL 309620

## (undated) DEVICE — SU MONOCRYL 3-0 PS-1 27" Y936H

## (undated) DEVICE — SU VICRYL 0 CT-2 27" J334H

## (undated) DEVICE — SU ETHILON 3-0 PS-1 18" 1663H

## (undated) DEVICE — BLADE KNIFE SURG 11 371111

## (undated) DEVICE — ENDO BITE BLOCK ADULT OMNI-BLOC

## (undated) DEVICE — SU VICRYL 0 UR-6 27" J603H

## (undated) DEVICE — DRSG KERLIX 4 1/2"X4YDS ROLL 6715

## (undated) DEVICE — TUBING SMOKE EVAC PNEUVIEW 9660-XE

## (undated) DEVICE — DRAIN JACKSON PRATT RESERVOIR 100ML SU130-1305

## (undated) DEVICE — SNARE CAPIVATOR ROUND COLD SNR BX10 M00561101

## (undated) DEVICE — CLIP HORIZON SM RED WIDE SLOT 001201

## (undated) DEVICE — SU PLAIN FAST ABSORB 5-0 PC-1 18" 1915G

## (undated) DEVICE — ESU ELEC NDL 6" COATED/INSULATED E1465-6

## (undated) DEVICE — DECANTER TRANSFER DEVICE 2008S

## (undated) DEVICE — SPONGE RAY-TEC 4X8" 7318

## (undated) RX ORDER — PROPOFOL 10 MG/ML
INJECTION, EMULSION INTRAVENOUS
Status: DISPENSED
Start: 2018-11-08

## (undated) RX ORDER — FENTANYL CITRATE 50 UG/ML
INJECTION, SOLUTION INTRAMUSCULAR; INTRAVENOUS
Status: DISPENSED
Start: 2019-11-20

## (undated) RX ORDER — FENTANYL CITRATE 50 UG/ML
INJECTION, SOLUTION INTRAMUSCULAR; INTRAVENOUS
Status: DISPENSED
Start: 2018-08-06

## (undated) RX ORDER — CEFAZOLIN SODIUM 1 G/3ML
INJECTION, POWDER, FOR SOLUTION INTRAMUSCULAR; INTRAVENOUS
Status: DISPENSED
Start: 2018-08-06

## (undated) RX ORDER — FENTANYL CITRATE 50 UG/ML
INJECTION, SOLUTION INTRAMUSCULAR; INTRAVENOUS
Status: DISPENSED
Start: 2018-09-20

## (undated) RX ORDER — HYDROMORPHONE HYDROCHLORIDE 1 MG/ML
INJECTION, SOLUTION INTRAMUSCULAR; INTRAVENOUS; SUBCUTANEOUS
Status: DISPENSED
Start: 2018-08-06

## (undated) RX ORDER — OXYCODONE HYDROCHLORIDE 5 MG/1
TABLET ORAL
Status: DISPENSED
Start: 2018-09-20

## (undated) RX ORDER — BUPIVACAINE HYDROCHLORIDE 2.5 MG/ML
INJECTION, SOLUTION INFILTRATION; PERINEURAL
Status: DISPENSED
Start: 2018-11-08

## (undated) RX ORDER — BUPIVACAINE HYDROCHLORIDE 2.5 MG/ML
INJECTION, SOLUTION INFILTRATION; PERINEURAL
Status: DISPENSED
Start: 2018-09-20

## (undated) RX ORDER — PROPOFOL 10 MG/ML
INJECTION, EMULSION INTRAVENOUS
Status: DISPENSED
Start: 2018-09-20

## (undated) RX ORDER — LIDOCAINE HYDROCHLORIDE 20 MG/ML
INJECTION, SOLUTION EPIDURAL; INFILTRATION; INTRACAUDAL; PERINEURAL
Status: DISPENSED
Start: 2018-11-08

## (undated) RX ORDER — ONDANSETRON 2 MG/ML
INJECTION INTRAMUSCULAR; INTRAVENOUS
Status: DISPENSED
Start: 2019-11-20

## (undated) RX ORDER — DEXAMETHASONE SODIUM PHOSPHATE 4 MG/ML
INJECTION, SOLUTION INTRA-ARTICULAR; INTRALESIONAL; INTRAMUSCULAR; INTRAVENOUS; SOFT TISSUE
Status: DISPENSED
Start: 2018-08-06

## (undated) RX ORDER — ONDANSETRON 2 MG/ML
INJECTION INTRAMUSCULAR; INTRAVENOUS
Status: DISPENSED
Start: 2018-08-06

## (undated) RX ORDER — BUPIVACAINE HYDROCHLORIDE 2.5 MG/ML
INJECTION, SOLUTION EPIDURAL; INFILTRATION; INTRACAUDAL
Status: DISPENSED
Start: 2019-11-20

## (undated) RX ORDER — BACITRACIN 50000 [IU]/1
INJECTION, POWDER, FOR SOLUTION INTRAMUSCULAR
Status: DISPENSED
Start: 2018-09-20

## (undated) RX ORDER — LIDOCAINE HYDROCHLORIDE 10 MG/ML
INJECTION, SOLUTION EPIDURAL; INFILTRATION; INTRACAUDAL; PERINEURAL
Status: DISPENSED
Start: 2018-11-08

## (undated) RX ORDER — PROPOFOL 10 MG/ML
INJECTION, EMULSION INTRAVENOUS
Status: DISPENSED
Start: 2018-08-06

## (undated) RX ORDER — ONDANSETRON 2 MG/ML
INJECTION INTRAMUSCULAR; INTRAVENOUS
Status: DISPENSED
Start: 2018-11-08

## (undated) RX ORDER — LIDOCAINE HYDROCHLORIDE 20 MG/ML
INJECTION, SOLUTION EPIDURAL; INFILTRATION; INTRACAUDAL; PERINEURAL
Status: DISPENSED
Start: 2019-11-20

## (undated) RX ORDER — LIDOCAINE HYDROCHLORIDE 20 MG/ML
INJECTION, SOLUTION EPIDURAL; INFILTRATION; INTRACAUDAL; PERINEURAL
Status: DISPENSED
Start: 2018-09-20

## (undated) RX ORDER — HYDROMORPHONE HYDROCHLORIDE 1 MG/ML
INJECTION, SOLUTION INTRAMUSCULAR; INTRAVENOUS; SUBCUTANEOUS
Status: DISPENSED
Start: 2018-11-08

## (undated) RX ORDER — EPINEPHRINE 1 MG/ML
INJECTION, SOLUTION, CONCENTRATE INTRAVENOUS
Status: DISPENSED
Start: 2018-11-08

## (undated) RX ORDER — CEFAZOLIN SODIUM 1 G/50ML
SOLUTION INTRAVENOUS
Status: DISPENSED
Start: 2018-11-08

## (undated) RX ORDER — SODIUM CHLORIDE, SODIUM LACTATE, POTASSIUM CHLORIDE, CALCIUM CHLORIDE 600; 310; 30; 20 MG/100ML; MG/100ML; MG/100ML; MG/100ML
INJECTION, SOLUTION INTRAVENOUS
Status: DISPENSED
Start: 2018-08-06

## (undated) RX ORDER — GABAPENTIN 300 MG/1
CAPSULE ORAL
Status: DISPENSED
Start: 2018-09-20

## (undated) RX ORDER — CEFAZOLIN SODIUM 2 G/100ML
INJECTION, SOLUTION INTRAVENOUS
Status: DISPENSED
Start: 2018-08-06

## (undated) RX ORDER — FENTANYL CITRATE 50 UG/ML
INJECTION, SOLUTION INTRAMUSCULAR; INTRAVENOUS
Status: DISPENSED
Start: 2018-10-15

## (undated) RX ORDER — ONDANSETRON 2 MG/ML
INJECTION INTRAMUSCULAR; INTRAVENOUS
Status: DISPENSED
Start: 2023-05-17

## (undated) RX ORDER — DEXAMETHASONE SODIUM PHOSPHATE 4 MG/ML
INJECTION, SOLUTION INTRA-ARTICULAR; INTRALESIONAL; INTRAMUSCULAR; INTRAVENOUS; SOFT TISSUE
Status: DISPENSED
Start: 2018-11-08

## (undated) RX ORDER — FENTANYL CITRATE 50 UG/ML
INJECTION, SOLUTION INTRAMUSCULAR; INTRAVENOUS
Status: DISPENSED
Start: 2018-11-08

## (undated) RX ORDER — EPINEPHRINE 1 MG/ML
INJECTION, SOLUTION, CONCENTRATE INTRAVENOUS
Status: DISPENSED
Start: 2018-09-20

## (undated) RX ORDER — DEXAMETHASONE SODIUM PHOSPHATE 4 MG/ML
INJECTION, SOLUTION INTRA-ARTICULAR; INTRALESIONAL; INTRAMUSCULAR; INTRAVENOUS; SOFT TISSUE
Status: DISPENSED
Start: 2018-09-20

## (undated) RX ORDER — OXYCODONE HYDROCHLORIDE 5 MG/1
TABLET ORAL
Status: DISPENSED
Start: 2018-11-08

## (undated) RX ORDER — HYDROMORPHONE HYDROCHLORIDE 1 MG/ML
INJECTION, SOLUTION INTRAMUSCULAR; INTRAVENOUS; SUBCUTANEOUS
Status: DISPENSED
Start: 2019-11-20

## (undated) RX ORDER — EPHEDRINE SULFATE 50 MG/ML
INJECTION, SOLUTION INTRAMUSCULAR; INTRAVENOUS; SUBCUTANEOUS
Status: DISPENSED
Start: 2019-11-20

## (undated) RX ORDER — PHENAZOPYRIDINE HYDROCHLORIDE 200 MG/1
TABLET, FILM COATED ORAL
Status: DISPENSED
Start: 2019-11-20

## (undated) RX ORDER — GLYCOPYRROLATE 0.2 MG/ML
INJECTION, SOLUTION INTRAMUSCULAR; INTRAVENOUS
Status: DISPENSED
Start: 2018-08-06

## (undated) RX ORDER — GENTAMICIN 40 MG/ML
INJECTION, SOLUTION INTRAMUSCULAR; INTRAVENOUS
Status: DISPENSED
Start: 2018-09-20

## (undated) RX ORDER — DIPHENHYDRAMINE HYDROCHLORIDE 50 MG/ML
INJECTION INTRAMUSCULAR; INTRAVENOUS
Status: DISPENSED
Start: 2018-10-15

## (undated) RX ORDER — ONDANSETRON 2 MG/ML
INJECTION INTRAMUSCULAR; INTRAVENOUS
Status: DISPENSED
Start: 2018-09-20

## (undated) RX ORDER — CEFAZOLIN SODIUM 1 G/3ML
INJECTION, POWDER, FOR SOLUTION INTRAMUSCULAR; INTRAVENOUS
Status: DISPENSED
Start: 2018-11-08

## (undated) RX ORDER — SCOLOPAMINE TRANSDERMAL SYSTEM 1 MG/1
PATCH, EXTENDED RELEASE TRANSDERMAL
Status: DISPENSED
Start: 2018-09-20

## (undated) RX ORDER — PROPOFOL 10 MG/ML
INJECTION, EMULSION INTRAVENOUS
Status: DISPENSED
Start: 2019-11-20

## (undated) RX ORDER — CEFAZOLIN SODIUM 1 G/3ML
INJECTION, POWDER, FOR SOLUTION INTRAMUSCULAR; INTRAVENOUS
Status: DISPENSED
Start: 2018-09-20

## (undated) RX ORDER — SCOLOPAMINE TRANSDERMAL SYSTEM 1 MG/1
PATCH, EXTENDED RELEASE TRANSDERMAL
Status: DISPENSED
Start: 2018-11-08

## (undated) RX ORDER — PHENYLEPHRINE HCL IN 0.9% NACL 1 MG/10 ML
SYRINGE (ML) INTRAVENOUS
Status: DISPENSED
Start: 2019-11-20

## (undated) RX ORDER — EPHEDRINE SULFATE 50 MG/ML
INJECTION, SOLUTION INTRAMUSCULAR; INTRAVENOUS; SUBCUTANEOUS
Status: DISPENSED
Start: 2018-08-06

## (undated) RX ORDER — CEFAZOLIN SODIUM 2 G/100ML
INJECTION, SOLUTION INTRAVENOUS
Status: DISPENSED
Start: 2019-11-20

## (undated) RX ORDER — ACETAMINOPHEN 325 MG/1
TABLET ORAL
Status: DISPENSED
Start: 2018-09-20

## (undated) RX ORDER — PHENYLEPHRINE HCL IN 0.9% NACL 1 MG/10 ML
SYRINGE (ML) INTRAVENOUS
Status: DISPENSED
Start: 2018-08-06

## (undated) RX ORDER — ISOSULFAN BLUE 50 MG/5ML
INJECTION, SOLUTION SUBCUTANEOUS
Status: DISPENSED
Start: 2018-08-06

## (undated) RX ORDER — ACETAMINOPHEN 325 MG/1
TABLET ORAL
Status: DISPENSED
Start: 2018-11-08

## (undated) RX ORDER — ACETAMINOPHEN 325 MG/1
TABLET ORAL
Status: DISPENSED
Start: 2019-11-20

## (undated) RX ORDER — SCOLOPAMINE TRANSDERMAL SYSTEM 1 MG/1
PATCH, EXTENDED RELEASE TRANSDERMAL
Status: DISPENSED
Start: 2019-11-20

## (undated) RX ORDER — KETOROLAC TROMETHAMINE 30 MG/ML
INJECTION, SOLUTION INTRAMUSCULAR; INTRAVENOUS
Status: DISPENSED
Start: 2018-09-20

## (undated) RX ORDER — FENTANYL CITRATE 50 UG/ML
INJECTION, SOLUTION INTRAMUSCULAR; INTRAVENOUS
Status: DISPENSED
Start: 2023-05-17